# Patient Record
Sex: FEMALE | Race: WHITE | Employment: OTHER | URBAN - NONMETROPOLITAN AREA
[De-identification: names, ages, dates, MRNs, and addresses within clinical notes are randomized per-mention and may not be internally consistent; named-entity substitution may affect disease eponyms.]

---

## 2017-05-15 ENCOUNTER — OFFICE VISIT (OUTPATIENT)
Dept: CARDIOLOGY | Age: 82
End: 2017-05-15
Payer: MEDICARE

## 2017-05-15 VITALS
DIASTOLIC BLOOD PRESSURE: 82 MMHG | WEIGHT: 153 LBS | SYSTOLIC BLOOD PRESSURE: 124 MMHG | HEIGHT: 64 IN | HEART RATE: 47 BPM | BODY MASS INDEX: 26.12 KG/M2

## 2017-05-15 DIAGNOSIS — I48.0 PAROXYSMAL A-FIB (HCC): Primary | ICD-10-CM

## 2017-05-15 DIAGNOSIS — I25.10 MILD CAD: ICD-10-CM

## 2017-05-15 PROCEDURE — 93000 ELECTROCARDIOGRAM COMPLETE: CPT | Performed by: CLINICAL NURSE SPECIALIST

## 2017-05-15 PROCEDURE — 99213 OFFICE O/P EST LOW 20 MIN: CPT | Performed by: CLINICAL NURSE SPECIALIST

## 2017-05-15 ASSESSMENT — ENCOUNTER SYMPTOMS
COUGH: 0
ABDOMINAL PAIN: 0
NAUSEA: 0
ORTHOPNEA: 0
SHORTNESS OF BREATH: 0
VOMITING: 0
HEARTBURN: 0
BLOOD IN STOOL: 0
BLURRED VISION: 0

## 2017-06-16 ENCOUNTER — TELEPHONE (OUTPATIENT)
Dept: INTERNAL MEDICINE | Age: 82
End: 2017-06-16

## 2017-07-23 DIAGNOSIS — I48.91 ATRIAL FIBRILLATION (HCC): ICD-10-CM

## 2017-07-24 RX ORDER — RIVAROXABAN 20 MG/1
TABLET, FILM COATED ORAL
Qty: 90 TABLET | Refills: 3 | Status: SHIPPED | OUTPATIENT
Start: 2017-07-24 | End: 2018-06-11 | Stop reason: SDUPTHER

## 2017-08-03 LAB
ALBUMIN SERPL-MCNC: 4 G/DL (ref 3.5–5.2)
ALP BLD-CCNC: 40 U/L (ref 35–104)
ALT SERPL-CCNC: 10 U/L (ref 5–33)
ANION GAP SERPL CALCULATED.3IONS-SCNC: 17 MMOL/L (ref 7–19)
AST SERPL-CCNC: 16 U/L (ref 5–32)
BASOPHILS ABSOLUTE: 0 K/UL (ref 0–0.2)
BASOPHILS RELATIVE PERCENT: 0.6 % (ref 0–1)
BILIRUB SERPL-MCNC: 0.5 MG/DL (ref 0.2–1.2)
BUN BLDV-MCNC: 16 MG/DL (ref 8–23)
CALCIUM SERPL-MCNC: 10.8 MG/DL (ref 8.8–10.2)
CHLORIDE BLD-SCNC: 103 MMOL/L (ref 98–111)
CHOLESTEROL, TOTAL: 192 MG/DL (ref 160–199)
CO2: 24 MMOL/L (ref 22–29)
CREAT SERPL-MCNC: 0.7 MG/DL (ref 0.5–0.9)
EOSINOPHILS ABSOLUTE: 0.2 K/UL (ref 0–0.6)
EOSINOPHILS RELATIVE PERCENT: 4.8 % (ref 0–5)
GFR NON-AFRICAN AMERICAN: >60
GLUCOSE BLD-MCNC: 82 MG/DL (ref 74–109)
HCT VFR BLD CALC: 34.1 % (ref 37–47)
HDLC SERPL-MCNC: 81 MG/DL (ref 65–121)
HEMOGLOBIN: 12.5 G/DL (ref 12–16)
LDL CHOLESTEROL CALCULATED: 87 MG/DL
LYMPHOCYTES ABSOLUTE: 1.1 K/UL (ref 1.1–4.5)
LYMPHOCYTES RELATIVE PERCENT: 30.2 % (ref 20–40)
MCH RBC QN AUTO: 37.3 PG (ref 27–31)
MCHC RBC AUTO-ENTMCNC: 36.7 G/DL (ref 33–37)
MCV RBC AUTO: 101.8 FL (ref 81–99)
MONOCYTES ABSOLUTE: 0.4 K/UL (ref 0–0.9)
MONOCYTES RELATIVE PERCENT: 11.1 % (ref 0–10)
NEUTROPHILS ABSOLUTE: 1.9 K/UL (ref 1.5–7.5)
NEUTROPHILS RELATIVE PERCENT: 53 % (ref 50–65)
PDW BLD-RTO: 14.2 % (ref 11.5–14.5)
PLATELET # BLD: 267 K/UL (ref 130–400)
PMV BLD AUTO: 10.9 FL (ref 9.4–12.3)
POTASSIUM SERPL-SCNC: 3.9 MMOL/L (ref 3.5–5)
RBC # BLD: 3.35 M/UL (ref 4.2–5.4)
SODIUM BLD-SCNC: 144 MMOL/L (ref 136–145)
T4 FREE: 1.3 NG/ML (ref 0.9–1.7)
TOTAL PROTEIN: 6.9 G/DL (ref 6.6–8.7)
TRIGL SERPL-MCNC: 118 MG/DL (ref 150–199)
TSH SERPL DL<=0.05 MIU/L-ACNC: 2.8 UIU/ML (ref 0.27–4.2)
VITAMIN D 25-HYDROXY: 47.2 NG/ML
WBC # BLD: 3.5 K/UL (ref 4.8–10.8)

## 2017-08-08 ENCOUNTER — HOSPITAL ENCOUNTER (EMERGENCY)
Age: 82
Discharge: HOME | End: 2017-08-08
Attending: FAMILY MEDICINE
Payer: MEDICARE

## 2017-08-08 VITALS
OXYGEN SATURATION: 94 % | SYSTOLIC BLOOD PRESSURE: 163 MMHG | TEMPERATURE: 97.9 F | HEART RATE: 64 BPM | DIASTOLIC BLOOD PRESSURE: 74 MMHG | WEIGHT: 145 LBS | RESPIRATION RATE: 16 BRPM | HEIGHT: 64 IN | BODY MASS INDEX: 24.75 KG/M2

## 2017-08-08 DIAGNOSIS — R31.9 HEMATURIA: Primary | ICD-10-CM

## 2017-08-08 LAB
ALBUMIN SERPL-MCNC: 3.8 G/DL (ref 3.5–5.2)
ALP BLD-CCNC: 41 U/L (ref 35–104)
ALT SERPL-CCNC: 11 U/L (ref 5–33)
ANION GAP SERPL CALCULATED.3IONS-SCNC: 14 MMOL/L (ref 7–19)
AST SERPL-CCNC: 15 U/L (ref 5–32)
BACTERIA: ABNORMAL /HPF
BASOPHILS ABSOLUTE: 0 K/UL (ref 0–0.2)
BASOPHILS RELATIVE PERCENT: 0.4 % (ref 0–1)
BILIRUB SERPL-MCNC: 0.4 MG/DL (ref 0.2–1.2)
BILIRUBIN URINE: NEGATIVE
BLOOD, URINE: ABNORMAL
BUN BLDV-MCNC: 17 MG/DL (ref 8–23)
CALCIUM SERPL-MCNC: 10.1 MG/DL (ref 8.8–10.2)
CHLORIDE BLD-SCNC: 102 MMOL/L (ref 98–111)
CLARITY: ABNORMAL
CO2: 24 MMOL/L (ref 22–29)
COLOR: ABNORMAL
CREAT SERPL-MCNC: 0.6 MG/DL (ref 0.5–0.9)
EOSINOPHILS ABSOLUTE: 0.1 K/UL (ref 0–0.6)
EOSINOPHILS RELATIVE PERCENT: 2.6 % (ref 0–5)
GFR NON-AFRICAN AMERICAN: >60
GLUCOSE BLD-MCNC: 78 MG/DL (ref 74–109)
GLUCOSE URINE: NEGATIVE MG/DL
HCT VFR BLD CALC: 35.9 % (ref 37–47)
HEMOGLOBIN: 11.9 G/DL (ref 12–16)
INR BLD: 1.59 (ref 0.88–1.18)
KETONES, URINE: NEGATIVE MG/DL
LEUKOCYTE ESTERASE, URINE: ABNORMAL
LYMPHOCYTES ABSOLUTE: 1.4 K/UL (ref 1.1–4.5)
LYMPHOCYTES RELATIVE PERCENT: 26.8 % (ref 20–40)
MCH RBC QN AUTO: 32.5 PG (ref 27–31)
MCHC RBC AUTO-ENTMCNC: 33.1 G/DL (ref 33–37)
MCV RBC AUTO: 98.1 FL (ref 81–99)
MONOCYTES ABSOLUTE: 0.6 K/UL (ref 0–0.9)
MONOCYTES RELATIVE PERCENT: 10.6 % (ref 0–10)
NEUTROPHILS ABSOLUTE: 3.2 K/UL (ref 1.5–7.5)
NEUTROPHILS RELATIVE PERCENT: 59.4 % (ref 50–65)
NITRITE, URINE: NEGATIVE
PDW BLD-RTO: 13.8 % (ref 11.5–14.5)
PH UA: 7
PLATELET # BLD: 242 K/UL (ref 130–400)
PMV BLD AUTO: 10.6 FL (ref 9.4–12.3)
POTASSIUM SERPL-SCNC: 4 MMOL/L (ref 3.5–5)
PROTEIN UA: 30 MG/DL
PROTHROMBIN TIME: 19 SEC (ref 12–14.6)
RBC # BLD: 3.66 M/UL (ref 4.2–5.4)
RBC UA: ABNORMAL /HPF (ref 0–2)
SODIUM BLD-SCNC: 140 MMOL/L (ref 136–145)
SPECIFIC GRAVITY UA: 1.01
TOTAL PROTEIN: 6.8 G/DL (ref 6.6–8.7)
UROBILINOGEN, URINE: 1 E.U./DL
WBC # BLD: 5.3 K/UL (ref 4.8–10.8)
WBC UA: ABNORMAL /HPF (ref 0–5)

## 2017-08-08 PROCEDURE — 99283 EMERGENCY DEPT VISIT LOW MDM: CPT

## 2017-08-08 PROCEDURE — 80053 COMPREHEN METABOLIC PANEL: CPT

## 2017-08-08 PROCEDURE — 36415 COLL VENOUS BLD VENIPUNCTURE: CPT

## 2017-08-08 PROCEDURE — 99282 EMERGENCY DEPT VISIT SF MDM: CPT | Performed by: FAMILY MEDICINE

## 2017-08-08 PROCEDURE — 87086 URINE CULTURE/COLONY COUNT: CPT

## 2017-08-08 PROCEDURE — 81001 URINALYSIS AUTO W/SCOPE: CPT

## 2017-08-08 PROCEDURE — 85025 COMPLETE CBC W/AUTO DIFF WBC: CPT

## 2017-08-08 PROCEDURE — 85610 PROTHROMBIN TIME: CPT

## 2017-08-08 PROCEDURE — 87185 SC STD ENZYME DETCJ PER NZM: CPT

## 2017-08-08 ASSESSMENT — ENCOUNTER SYMPTOMS
CONSTIPATION: 0
ABDOMINAL PAIN: 0
COUGH: 0
DIARRHEA: 0
CHEST TIGHTNESS: 0
APNEA: 0
BACK PAIN: 0
SHORTNESS OF BREATH: 0
ABDOMINAL DISTENTION: 0
VOMITING: 0
SORE THROAT: 0
WHEEZING: 0
TROUBLE SWALLOWING: 0

## 2017-08-08 ASSESSMENT — PAIN SCALES - GENERAL: PAINLEVEL_OUTOF10: 8

## 2017-08-08 ASSESSMENT — PAIN DESCRIPTION - PAIN TYPE: TYPE: CHRONIC PAIN

## 2017-08-08 ASSESSMENT — PAIN DESCRIPTION - LOCATION: LOCATION: BACK

## 2017-08-10 ENCOUNTER — OFFICE VISIT (OUTPATIENT)
Dept: INTERNAL MEDICINE | Age: 82
End: 2017-08-10
Payer: MEDICARE

## 2017-08-10 VITALS
HEIGHT: 64 IN | WEIGHT: 140 LBS | SYSTOLIC BLOOD PRESSURE: 116 MMHG | BODY MASS INDEX: 23.9 KG/M2 | HEART RATE: 67 BPM | OXYGEN SATURATION: 94 % | DIASTOLIC BLOOD PRESSURE: 68 MMHG

## 2017-08-10 DIAGNOSIS — Z00.00 ROUTINE GENERAL MEDICAL EXAMINATION AT A HEALTH CARE FACILITY: Primary | ICD-10-CM

## 2017-08-10 DIAGNOSIS — I10 ESSENTIAL HYPERTENSION: ICD-10-CM

## 2017-08-10 DIAGNOSIS — R31.9 URINARY TRACT INFECTION WITH HEMATURIA, SITE UNSPECIFIED: ICD-10-CM

## 2017-08-10 DIAGNOSIS — Z12.31 ENCOUNTER FOR SCREENING MAMMOGRAM FOR MALIGNANT NEOPLASM OF BREAST: ICD-10-CM

## 2017-08-10 DIAGNOSIS — S22.000A COMPRESSION FRACTURE OF BODY OF THORACIC VERTEBRA (HCC): ICD-10-CM

## 2017-08-10 DIAGNOSIS — N39.0 URINARY TRACT INFECTION WITH HEMATURIA, SITE UNSPECIFIED: ICD-10-CM

## 2017-08-10 DIAGNOSIS — I48.20 CHRONIC ATRIAL FIBRILLATION (HCC): ICD-10-CM

## 2017-08-10 DIAGNOSIS — Z12.11 SCREENING FOR COLON CANCER: ICD-10-CM

## 2017-08-10 DIAGNOSIS — E78.5 HYPERLIPIDEMIA, UNSPECIFIED HYPERLIPIDEMIA TYPE: ICD-10-CM

## 2017-08-10 LAB
BILIRUBIN URINE: NEGATIVE
BLOOD, URINE: ABNORMAL
CLARITY: CLEAR
COLOR: YELLOW
GLUCOSE URINE: NEGATIVE MG/DL
KETONES, URINE: NEGATIVE MG/DL
LEUKOCYTE ESTERASE, URINE: ABNORMAL
NITRITE, URINE: POSITIVE
ORGANISM: ABNORMAL
PH UA: 7
PROTEIN UA: NEGATIVE MG/DL
SPECIFIC GRAVITY UA: 1.01
URINE CULTURE, ROUTINE: ABNORMAL
URINE CULTURE, ROUTINE: ABNORMAL
UROBILINOGEN, URINE: 0.2 E.U./DL

## 2017-08-10 PROCEDURE — 81003 URINALYSIS AUTO W/O SCOPE: CPT | Performed by: INTERNAL MEDICINE

## 2017-08-10 PROCEDURE — G0438 PPPS, INITIAL VISIT: HCPCS | Performed by: INTERNAL MEDICINE

## 2017-08-10 PROCEDURE — 99214 OFFICE O/P EST MOD 30 MIN: CPT | Performed by: INTERNAL MEDICINE

## 2017-08-10 RX ORDER — LEVOTHYROXINE SODIUM 0.07 MG/1
TABLET ORAL
Qty: 30 TABLET | Refills: 5 | Status: SHIPPED | OUTPATIENT
Start: 2017-08-10 | End: 2018-03-20 | Stop reason: SDUPTHER

## 2017-08-10 RX ORDER — NITROFURANTOIN 25; 75 MG/1; MG/1
100 CAPSULE ORAL 2 TIMES DAILY
Qty: 14 CAPSULE | Refills: 0 | Status: SHIPPED | OUTPATIENT
Start: 2017-08-10 | End: 2017-08-17

## 2017-08-10 RX ORDER — HYDROCODONE BITARTRATE AND ACETAMINOPHEN 7.5; 325 MG/1; MG/1
1 TABLET ORAL EVERY 6 HOURS PRN
Status: ON HOLD | COMMUNITY
End: 2017-08-31

## 2017-08-10 ASSESSMENT — ANXIETY QUESTIONNAIRES: GAD7 TOTAL SCORE: 0

## 2017-08-10 ASSESSMENT — PATIENT HEALTH QUESTIONNAIRE - PHQ9: SUM OF ALL RESPONSES TO PHQ QUESTIONS 1-9: 0

## 2017-08-10 ASSESSMENT — LIFESTYLE VARIABLES: HOW OFTEN DO YOU HAVE A DRINK CONTAINING ALCOHOL: 0

## 2017-08-12 LAB
ORGANISM: ABNORMAL
ORGANISM: ABNORMAL
URINE CULTURE, ROUTINE: ABNORMAL

## 2017-08-13 ASSESSMENT — ENCOUNTER SYMPTOMS
WHEEZING: 0
SINUS PRESSURE: 0
EYE REDNESS: 0
EYE DISCHARGE: 0
VOICE CHANGE: 0
DIARRHEA: 0
VOMITING: 0
NAUSEA: 0
ABDOMINAL DISTENTION: 0
CONSTIPATION: 0
SHORTNESS OF BREATH: 0
FACIAL SWELLING: 0
RHINORRHEA: 0
COUGH: 0
EYE ITCHING: 0
TROUBLE SWALLOWING: 0
BLOOD IN STOOL: 0
SORE THROAT: 0
BACK PAIN: 1
CHEST TIGHTNESS: 0
EYE PAIN: 0
COLOR CHANGE: 0
PHOTOPHOBIA: 0
ABDOMINAL PAIN: 0

## 2017-08-14 DIAGNOSIS — N39.0 URINARY TRACT INFECTION WITHOUT HEMATURIA, SITE UNSPECIFIED: Primary | ICD-10-CM

## 2017-08-14 RX ORDER — LEVOFLOXACIN 500 MG/1
500 TABLET, FILM COATED ORAL DAILY
COMMUNITY
End: 2017-08-14 | Stop reason: SDUPTHER

## 2017-08-14 RX ORDER — LEVOFLOXACIN 500 MG/1
500 TABLET, FILM COATED ORAL DAILY
Qty: 7 TABLET | Refills: 0 | Status: ON HOLD | OUTPATIENT
Start: 2017-08-14 | End: 2017-08-31 | Stop reason: ALTCHOICE

## 2017-08-15 ENCOUNTER — TELEPHONE (OUTPATIENT)
Dept: UROLOGY | Age: 82
End: 2017-08-15

## 2017-08-15 ENCOUNTER — OFFICE VISIT (OUTPATIENT)
Dept: UROLOGY | Age: 82
End: 2017-08-15
Payer: MEDICARE

## 2017-08-15 VITALS
DIASTOLIC BLOOD PRESSURE: 74 MMHG | BODY MASS INDEX: 24.41 KG/M2 | HEIGHT: 64 IN | OXYGEN SATURATION: 94 % | SYSTOLIC BLOOD PRESSURE: 132 MMHG | HEART RATE: 66 BPM | WEIGHT: 143 LBS | TEMPERATURE: 97.8 F

## 2017-08-15 DIAGNOSIS — N39.0 URINARY TRACT INFECTION WITHOUT HEMATURIA, SITE UNSPECIFIED: ICD-10-CM

## 2017-08-15 DIAGNOSIS — R31.0 GROSS HEMATURIA: Primary | ICD-10-CM

## 2017-08-15 LAB
BACTERIA URINE, POC: ABNORMAL
BILIRUBIN URINE: 2 MG/DL
BLOOD, URINE: POSITIVE
CASTS URINE, POC: ABNORMAL
CLARITY: ABNORMAL
COLOR: ABNORMAL
CRYSTALS URINE, POC: ABNORMAL
EPI CELLS URINE, POC: ABNORMAL
GLUCOSE URINE: ABNORMAL
KETONES, URINE: NEGATIVE
LEUKOCYTE EST, POC: ABNORMAL
NITRITE, URINE: NEGATIVE
PH UA: 5.5 (ref 4.5–8)
PROTEIN UA: POSITIVE
RBC URINE, POC: ABNORMAL
SPECIFIC GRAVITY UA: 1.03 (ref 1–1.03)
UROBILINOGEN, URINE: NORMAL
WBC URINE, POC: ABNORMAL
YEAST URINE, POC: ABNORMAL

## 2017-08-15 PROCEDURE — 99213 OFFICE O/P EST LOW 20 MIN: CPT | Performed by: PHYSICIAN ASSISTANT

## 2017-08-15 PROCEDURE — 81000 URINALYSIS NONAUTO W/SCOPE: CPT | Performed by: PHYSICIAN ASSISTANT

## 2017-08-15 ASSESSMENT — ENCOUNTER SYMPTOMS
NAUSEA: 0
SHORTNESS OF BREATH: 0
WHEEZING: 0
EYE REDNESS: 0
BACK PAIN: 1
EYE DISCHARGE: 0
HEARTBURN: 0

## 2017-08-29 ENCOUNTER — TELEPHONE (OUTPATIENT)
Dept: UROLOGY | Age: 82
End: 2017-08-29

## 2017-08-29 ENCOUNTER — HOSPITAL ENCOUNTER (OUTPATIENT)
Dept: CT IMAGING | Age: 82
Discharge: HOME | End: 2017-08-29
Payer: MEDICARE

## 2017-08-29 DIAGNOSIS — R31.0 GROSS HEMATURIA: ICD-10-CM

## 2017-08-29 DIAGNOSIS — N39.0 URINARY TRACT INFECTION WITHOUT HEMATURIA, SITE UNSPECIFIED: ICD-10-CM

## 2017-08-29 LAB
GFR NON-AFRICAN AMERICAN: 43
PERFORMED ON: ABNORMAL
POC CREATININE: 1.2 MG/DL (ref 0.3–1.3)
POC SAMPLE TYPE: ABNORMAL

## 2017-08-29 PROCEDURE — 74178 CT ABD&PLV WO CNTR FLWD CNTR: CPT

## 2017-08-29 PROCEDURE — 82565 ASSAY OF CREATININE: CPT

## 2017-08-29 PROCEDURE — 6360000004 HC RX CONTRAST MEDICATION: Performed by: PHYSICIAN ASSISTANT

## 2017-08-29 RX ADMIN — IOVERSOL 75 ML: 741 INJECTION INTRA-ARTERIAL; INTRAVENOUS at 09:04

## 2017-08-31 ENCOUNTER — HOSPITAL ENCOUNTER (OUTPATIENT)
Age: 82
Setting detail: OUTPATIENT SURGERY
Discharge: HOME | End: 2017-08-31
Attending: UROLOGY | Admitting: UROLOGY
Payer: MEDICARE

## 2017-08-31 ENCOUNTER — ANESTHESIA EVENT (OUTPATIENT)
Dept: OPERATING ROOM | Age: 82
End: 2017-08-31
Payer: MEDICARE

## 2017-08-31 ENCOUNTER — APPOINTMENT (OUTPATIENT)
Dept: GENERAL RADIOLOGY | Age: 82
End: 2017-08-31
Attending: UROLOGY
Payer: MEDICARE

## 2017-08-31 ENCOUNTER — ANESTHESIA (OUTPATIENT)
Dept: OPERATING ROOM | Age: 82
End: 2017-08-31
Payer: MEDICARE

## 2017-08-31 VITALS
TEMPERATURE: 98.6 F | OXYGEN SATURATION: 100 % | DIASTOLIC BLOOD PRESSURE: 51 MMHG | SYSTOLIC BLOOD PRESSURE: 102 MMHG | RESPIRATION RATE: 1 BRPM

## 2017-08-31 VITALS
DIASTOLIC BLOOD PRESSURE: 55 MMHG | SYSTOLIC BLOOD PRESSURE: 144 MMHG | RESPIRATION RATE: 16 BRPM | OXYGEN SATURATION: 96 % | TEMPERATURE: 97.8 F | BODY MASS INDEX: 22.53 KG/M2 | WEIGHT: 132 LBS | HEART RATE: 91 BPM | HEIGHT: 64 IN

## 2017-08-31 DIAGNOSIS — N20.1 LEFT URETERAL CALCULUS: Primary | ICD-10-CM

## 2017-08-31 LAB
APTT: 42.5 SEC (ref 26–36.2)
INR BLD: 1.3 (ref 0.88–1.18)
PROTHROMBIN TIME: 16.2 SEC (ref 12–14.6)

## 2017-08-31 PROCEDURE — 52356 CYSTO/URETERO W/LITHOTRIPSY: CPT | Performed by: UROLOGY

## 2017-08-31 PROCEDURE — 7100000011 HC PHASE II RECOVERY - ADDTL 15 MIN: Performed by: UROLOGY

## 2017-08-31 PROCEDURE — 2720000000 HC MISC SURG SUPPLY STERILE $0-50: Performed by: UROLOGY

## 2017-08-31 PROCEDURE — 6370000000 HC RX 637 (ALT 250 FOR IP): Performed by: UROLOGY

## 2017-08-31 PROCEDURE — 3600000014 HC SURGERY LEVEL 4 ADDTL 15MIN: Performed by: UROLOGY

## 2017-08-31 PROCEDURE — C1773 RET DEV, INSERTABLE: HCPCS | Performed by: UROLOGY

## 2017-08-31 PROCEDURE — C1758 CATHETER, URETERAL: HCPCS | Performed by: UROLOGY

## 2017-08-31 PROCEDURE — C2617 STENT, NON-COR, TEM W/O DEL: HCPCS | Performed by: UROLOGY

## 2017-08-31 PROCEDURE — 3600000004 HC SURGERY LEVEL 4 BASE: Performed by: UROLOGY

## 2017-08-31 PROCEDURE — 36415 COLL VENOUS BLD VENIPUNCTURE: CPT

## 2017-08-31 PROCEDURE — 2720000010 HC SURG SUPPLY STERILE: Performed by: UROLOGY

## 2017-08-31 PROCEDURE — 7100000001 HC PACU RECOVERY - ADDTL 15 MIN: Performed by: UROLOGY

## 2017-08-31 PROCEDURE — 82360 CALCULUS ASSAY QUANT: CPT

## 2017-08-31 PROCEDURE — 7100000010 HC PHASE II RECOVERY - FIRST 15 MIN: Performed by: UROLOGY

## 2017-08-31 PROCEDURE — 6360000002 HC RX W HCPCS

## 2017-08-31 PROCEDURE — 2500000003 HC RX 250 WO HCPCS

## 2017-08-31 PROCEDURE — 3700000001 HC ADD 15 MINUTES (ANESTHESIA): Performed by: UROLOGY

## 2017-08-31 PROCEDURE — 6360000002 HC RX W HCPCS: Performed by: UROLOGY

## 2017-08-31 PROCEDURE — 6360000002 HC RX W HCPCS: Performed by: ANESTHESIOLOGY

## 2017-08-31 PROCEDURE — 85730 THROMBOPLASTIN TIME PARTIAL: CPT

## 2017-08-31 PROCEDURE — 3700000000 HC ANESTHESIA ATTENDED CARE: Performed by: UROLOGY

## 2017-08-31 PROCEDURE — 2580000003 HC RX 258

## 2017-08-31 PROCEDURE — C1769 GUIDE WIRE: HCPCS | Performed by: UROLOGY

## 2017-08-31 PROCEDURE — 7100000000 HC PACU RECOVERY - FIRST 15 MIN: Performed by: UROLOGY

## 2017-08-31 PROCEDURE — 85610 PROTHROMBIN TIME: CPT

## 2017-08-31 PROCEDURE — 88300 SURGICAL PATH GROSS: CPT

## 2017-08-31 PROCEDURE — 3209999900 FLUORO FOR SURGICAL PROCEDURES

## 2017-08-31 PROCEDURE — C1726 CATH, BAL DIL, NON-VASCULAR: HCPCS | Performed by: UROLOGY

## 2017-08-31 DEVICE — STENT URET 6FR L24CM PERCFLX HYDR+ DBL PGTL THRD 2: Type: IMPLANTABLE DEVICE | Site: URETER | Status: FUNCTIONAL

## 2017-08-31 RX ORDER — SODIUM CHLORIDE, SODIUM LACTATE, POTASSIUM CHLORIDE, CALCIUM CHLORIDE 600; 310; 30; 20 MG/100ML; MG/100ML; MG/100ML; MG/100ML
INJECTION, SOLUTION INTRAVENOUS CONTINUOUS PRN
Status: DISCONTINUED | OUTPATIENT
Start: 2017-08-31 | End: 2017-08-31 | Stop reason: SDUPTHER

## 2017-08-31 RX ORDER — MEPERIDINE HYDROCHLORIDE 50 MG/ML
12.5 INJECTION INTRAMUSCULAR; INTRAVENOUS; SUBCUTANEOUS EVERY 5 MIN PRN
Status: DISCONTINUED | OUTPATIENT
Start: 2017-08-31 | End: 2017-08-31 | Stop reason: HOSPADM

## 2017-08-31 RX ORDER — OXYCODONE HYDROCHLORIDE AND ACETAMINOPHEN 5; 325 MG/1; MG/1
2 TABLET ORAL EVERY 4 HOURS PRN
Status: DISCONTINUED | OUTPATIENT
Start: 2017-08-31 | End: 2017-08-31 | Stop reason: HOSPADM

## 2017-08-31 RX ORDER — HYDRALAZINE HYDROCHLORIDE 20 MG/ML
5 INJECTION INTRAMUSCULAR; INTRAVENOUS EVERY 10 MIN PRN
Status: DISCONTINUED | OUTPATIENT
Start: 2017-08-31 | End: 2017-08-31 | Stop reason: HOSPADM

## 2017-08-31 RX ORDER — CIPROFLOXACIN 500 MG/1
500 TABLET, FILM COATED ORAL 2 TIMES DAILY
Qty: 14 TABLET | Refills: 0 | Status: SHIPPED | OUTPATIENT
Start: 2017-08-31 | End: 2017-08-31

## 2017-08-31 RX ORDER — PROMETHAZINE HYDROCHLORIDE 25 MG/ML
6.25 INJECTION, SOLUTION INTRAMUSCULAR; INTRAVENOUS
Status: DISCONTINUED | OUTPATIENT
Start: 2017-08-31 | End: 2017-08-31 | Stop reason: HOSPADM

## 2017-08-31 RX ORDER — ALFUZOSIN HYDROCHLORIDE 10 MG/1
10 TABLET, EXTENDED RELEASE ORAL DAILY
Qty: 14 TABLET | Refills: 1 | Status: ON HOLD | OUTPATIENT
Start: 2017-08-31 | End: 2017-10-13 | Stop reason: HOSPADM

## 2017-08-31 RX ORDER — PHENAZOPYRIDINE HYDROCHLORIDE 100 MG/1
100 TABLET, FILM COATED ORAL ONCE
Status: COMPLETED | OUTPATIENT
Start: 2017-08-31 | End: 2017-08-31

## 2017-08-31 RX ORDER — CIPROFLOXACIN 500 MG/1
500 TABLET, FILM COATED ORAL 2 TIMES DAILY
Qty: 14 TABLET | Refills: 0 | Status: SHIPPED | OUTPATIENT
Start: 2017-08-31 | End: 2017-09-07

## 2017-08-31 RX ORDER — ENALAPRILAT 2.5 MG/2ML
1.25 INJECTION INTRAVENOUS
Status: DISCONTINUED | OUTPATIENT
Start: 2017-08-31 | End: 2017-08-31 | Stop reason: HOSPADM

## 2017-08-31 RX ORDER — CIPROFLOXACIN 2 MG/ML
400 INJECTION, SOLUTION INTRAVENOUS ONCE
Status: COMPLETED | OUTPATIENT
Start: 2017-08-31 | End: 2017-08-31

## 2017-08-31 RX ORDER — ATROPA BELLADONNA AND OPIUM 16.2; 6 MG/1; MG/1
SUPPOSITORY RECTAL PRN
Status: DISCONTINUED | OUTPATIENT
Start: 2017-08-31 | End: 2017-08-31 | Stop reason: HOSPADM

## 2017-08-31 RX ORDER — DIPHENHYDRAMINE HYDROCHLORIDE 50 MG/ML
12.5 INJECTION INTRAMUSCULAR; INTRAVENOUS
Status: DISCONTINUED | OUTPATIENT
Start: 2017-08-31 | End: 2017-08-31 | Stop reason: HOSPADM

## 2017-08-31 RX ORDER — METOCLOPRAMIDE HYDROCHLORIDE 5 MG/ML
10 INJECTION INTRAMUSCULAR; INTRAVENOUS
Status: DISCONTINUED | OUTPATIENT
Start: 2017-08-31 | End: 2017-08-31 | Stop reason: HOSPADM

## 2017-08-31 RX ORDER — PROPOFOL 10 MG/ML
INJECTION, EMULSION INTRAVENOUS PRN
Status: DISCONTINUED | OUTPATIENT
Start: 2017-08-31 | End: 2017-08-31 | Stop reason: SDUPTHER

## 2017-08-31 RX ORDER — ONDANSETRON 2 MG/ML
INJECTION INTRAMUSCULAR; INTRAVENOUS PRN
Status: DISCONTINUED | OUTPATIENT
Start: 2017-08-31 | End: 2017-08-31 | Stop reason: SDUPTHER

## 2017-08-31 RX ORDER — HYDROCODONE BITARTRATE AND ACETAMINOPHEN 7.5; 325 MG/1; MG/1
1 TABLET ORAL EVERY 6 HOURS PRN
Qty: 30 TABLET | Refills: 0 | Status: ON HOLD | OUTPATIENT
Start: 2017-08-31 | End: 2017-10-13

## 2017-08-31 RX ORDER — FENTANYL CITRATE 50 UG/ML
INJECTION, SOLUTION INTRAMUSCULAR; INTRAVENOUS PRN
Status: DISCONTINUED | OUTPATIENT
Start: 2017-08-31 | End: 2017-08-31 | Stop reason: SDUPTHER

## 2017-08-31 RX ORDER — LABETALOL HYDROCHLORIDE 5 MG/ML
5 INJECTION, SOLUTION INTRAVENOUS EVERY 10 MIN PRN
Status: DISCONTINUED | OUTPATIENT
Start: 2017-08-31 | End: 2017-08-31 | Stop reason: HOSPADM

## 2017-08-31 RX ORDER — SUCCINYLCHOLINE CHLORIDE 20 MG/ML
INJECTION INTRAMUSCULAR; INTRAVENOUS PRN
Status: DISCONTINUED | OUTPATIENT
Start: 2017-08-31 | End: 2017-08-31 | Stop reason: SDUPTHER

## 2017-08-31 RX ORDER — LIDOCAINE HYDROCHLORIDE 10 MG/ML
INJECTION, SOLUTION EPIDURAL; INFILTRATION; INTRACAUDAL; PERINEURAL PRN
Status: DISCONTINUED | OUTPATIENT
Start: 2017-08-31 | End: 2017-08-31 | Stop reason: SDUPTHER

## 2017-08-31 RX ORDER — ONDANSETRON 2 MG/ML
4 INJECTION INTRAMUSCULAR; INTRAVENOUS EVERY 4 HOURS PRN
Status: DISCONTINUED | OUTPATIENT
Start: 2017-08-31 | End: 2017-08-31 | Stop reason: HOSPADM

## 2017-08-31 RX ORDER — ROCURONIUM BROMIDE 10 MG/ML
INJECTION, SOLUTION INTRAVENOUS PRN
Status: DISCONTINUED | OUTPATIENT
Start: 2017-08-31 | End: 2017-08-31 | Stop reason: SDUPTHER

## 2017-08-31 RX ORDER — SODIUM CHLORIDE 9 MG/ML
INJECTION, SOLUTION INTRAVENOUS CONTINUOUS
Status: DISCONTINUED | OUTPATIENT
Start: 2017-08-31 | End: 2017-08-31 | Stop reason: HOSPADM

## 2017-08-31 RX ORDER — MORPHINE SULFATE 4 MG/ML
4 INJECTION, SOLUTION INTRAMUSCULAR; INTRAVENOUS ONCE
Status: COMPLETED | OUTPATIENT
Start: 2017-08-31 | End: 2017-08-31

## 2017-08-31 RX ORDER — MORPHINE SULFATE 4 MG/ML
2 INJECTION, SOLUTION INTRAMUSCULAR; INTRAVENOUS EVERY 5 MIN PRN
Status: DISCONTINUED | OUTPATIENT
Start: 2017-08-31 | End: 2017-08-31 | Stop reason: HOSPADM

## 2017-08-31 RX ORDER — MORPHINE SULFATE 4 MG/ML
4 INJECTION, SOLUTION INTRAMUSCULAR; INTRAVENOUS EVERY 5 MIN PRN
Status: DISCONTINUED | OUTPATIENT
Start: 2017-08-31 | End: 2017-08-31 | Stop reason: HOSPADM

## 2017-08-31 RX ORDER — MORPHINE SULFATE 4 MG/ML
2 INJECTION, SOLUTION INTRAMUSCULAR; INTRAVENOUS EVERY 4 HOURS PRN
Status: DISCONTINUED | OUTPATIENT
Start: 2017-08-31 | End: 2017-08-31 | Stop reason: HOSPADM

## 2017-08-31 RX ADMIN — FENTANYL CITRATE 50 MCG: 50 INJECTION INTRAMUSCULAR; INTRAVENOUS at 19:35

## 2017-08-31 RX ADMIN — SUCCINYLCHOLINE CHLORIDE 120 MG: 20 INJECTION, SOLUTION INTRAMUSCULAR; INTRAVENOUS; PARENTERAL at 19:21

## 2017-08-31 RX ADMIN — SODIUM CHLORIDE, SODIUM LACTATE, POTASSIUM CHLORIDE, AND CALCIUM CHLORIDE: 600; 310; 30; 20 INJECTION, SOLUTION INTRAVENOUS at 19:16

## 2017-08-31 RX ADMIN — FENTANYL CITRATE 50 MCG: 50 INJECTION INTRAMUSCULAR; INTRAVENOUS at 19:21

## 2017-08-31 RX ADMIN — CIPROFLOXACIN 400 MG: 2 INJECTION, SOLUTION INTRAVENOUS at 19:25

## 2017-08-31 RX ADMIN — OXYCODONE HYDROCHLORIDE AND ACETAMINOPHEN 2 TABLET: 5; 325 TABLET ORAL at 21:16

## 2017-08-31 RX ADMIN — ONDANSETRON HYDROCHLORIDE 4 MG: 2 INJECTION, SOLUTION INTRAVENOUS at 19:35

## 2017-08-31 RX ADMIN — PROPOFOL 80 MG: 10 INJECTION, EMULSION INTRAVENOUS at 19:21

## 2017-08-31 RX ADMIN — ROCURONIUM BROMIDE 20 MG: 10 INJECTION INTRAVENOUS at 19:34

## 2017-08-31 RX ADMIN — SUGAMMADEX 120 MG: 100 INJECTION, SOLUTION INTRAVENOUS at 20:04

## 2017-08-31 RX ADMIN — MORPHINE SULFATE 4 MG: 4 INJECTION, SOLUTION INTRAMUSCULAR; INTRAVENOUS at 17:42

## 2017-08-31 RX ADMIN — LIDOCAINE HYDROCHLORIDE 50 MG: 10 INJECTION, SOLUTION EPIDURAL; INFILTRATION; INTRACAUDAL; PERINEURAL at 19:21

## 2017-08-31 RX ADMIN — PHENAZOPYRIDINE HYDROCHLORIDE 100 MG: 100 TABLET ORAL at 21:15

## 2017-08-31 ASSESSMENT — PAIN SCALES - GENERAL
PAINLEVEL_OUTOF10: 0
PAINLEVEL_OUTOF10: 8
PAINLEVEL_OUTOF10: 0
PAINLEVEL_OUTOF10: 5

## 2017-08-31 ASSESSMENT — PAIN DESCRIPTION - PAIN TYPE: TYPE: SURGICAL PAIN

## 2017-09-01 ENCOUNTER — TELEPHONE (OUTPATIENT)
Dept: UROLOGY | Age: 82
End: 2017-09-01

## 2017-09-06 LAB
CALCULI COMPOSITION: NORMAL
MASS: 5 MG
STONE DESCRIPTION: NORMAL
STONE NUMBER: 3
STONE SIZE: NORMAL MM

## 2017-09-07 DIAGNOSIS — I48.0 PAROXYSMAL ATRIAL FIBRILLATION (HCC): ICD-10-CM

## 2017-09-07 RX ORDER — PROPAFENONE HYDROCHLORIDE 225 MG/1
TABLET, FILM COATED ORAL
Qty: 270 TABLET | Refills: 2 | Status: SHIPPED | OUTPATIENT
Start: 2017-09-07 | End: 2018-03-20 | Stop reason: SDUPTHER

## 2017-09-19 ENCOUNTER — HOSPITAL ENCOUNTER (OUTPATIENT)
Dept: CT IMAGING | Age: 82
Discharge: HOME | End: 2017-09-19
Payer: MEDICARE

## 2017-09-19 DIAGNOSIS — N20.1 LEFT URETERAL CALCULUS: ICD-10-CM

## 2017-09-19 PROCEDURE — 74150 CT ABDOMEN W/O CONTRAST: CPT

## 2017-09-22 ENCOUNTER — OFFICE VISIT (OUTPATIENT)
Dept: UROLOGY | Age: 82
End: 2017-09-22
Payer: MEDICARE

## 2017-09-22 VITALS
SYSTOLIC BLOOD PRESSURE: 90 MMHG | TEMPERATURE: 98 F | BODY MASS INDEX: 25.03 KG/M2 | HEART RATE: 67 BPM | HEIGHT: 62 IN | OXYGEN SATURATION: 94 % | DIASTOLIC BLOOD PRESSURE: 60 MMHG | WEIGHT: 136 LBS

## 2017-09-22 DIAGNOSIS — N28.89 PERINEPHRIC FLUID COLLECTION: ICD-10-CM

## 2017-09-22 DIAGNOSIS — N20.1 LEFT URETERAL CALCULUS: Primary | ICD-10-CM

## 2017-09-22 DIAGNOSIS — N20.0 RENAL CALCULUS, LEFT: ICD-10-CM

## 2017-09-22 LAB
BACTERIA URINE, POC: ABNORMAL
BILIRUBIN URINE: 2 MG/DL
BLOOD, URINE: POSITIVE
CASTS URINE, POC: ABNORMAL
CLARITY: ABNORMAL
COLOR: ABNORMAL
CRYSTALS URINE, POC: ABNORMAL
EPI CELLS URINE, POC: ABNORMAL
GLUCOSE URINE: ABNORMAL
KETONES, URINE: POSITIVE
LEUKOCYTE EST, POC: ABNORMAL
NITRITE, URINE: POSITIVE
PH UA: 6.5 (ref 4.5–8)
PROTEIN UA: POSITIVE
RBC URINE, POC: ABNORMAL
SPECIFIC GRAVITY UA: 1.02 (ref 1–1.03)
UROBILINOGEN, URINE: NORMAL
WBC URINE, POC: ABNORMAL
YEAST URINE, POC: ABNORMAL

## 2017-09-22 PROCEDURE — 81000 URINALYSIS NONAUTO W/SCOPE: CPT | Performed by: UROLOGY

## 2017-09-22 PROCEDURE — 99214 OFFICE O/P EST MOD 30 MIN: CPT | Performed by: UROLOGY

## 2017-09-22 RX ORDER — OMEPRAZOLE 10 MG/1
10 CAPSULE, DELAYED RELEASE ORAL DAILY
COMMUNITY
End: 2018-01-02 | Stop reason: SDUPTHER

## 2017-09-22 ASSESSMENT — ENCOUNTER SYMPTOMS
DOUBLE VISION: 0
SHORTNESS OF BREATH: 1
SORE THROAT: 0
HEARTBURN: 1
WHEEZING: 0
BLURRED VISION: 0
NAUSEA: 0

## 2017-10-04 ENCOUNTER — HOSPITAL ENCOUNTER (OUTPATIENT)
Dept: GENERAL RADIOLOGY | Age: 82
Discharge: HOME | End: 2017-10-04
Payer: MEDICARE

## 2017-10-04 ENCOUNTER — PROCEDURE VISIT (OUTPATIENT)
Dept: UROLOGY | Age: 82
End: 2017-10-04
Payer: MEDICARE

## 2017-10-04 VITALS — HEART RATE: 90 BPM | TEMPERATURE: 96.8 F | OXYGEN SATURATION: 94 % | HEIGHT: 64 IN

## 2017-10-04 DIAGNOSIS — N20.0 RENAL CALCULUS, LEFT: ICD-10-CM

## 2017-10-04 DIAGNOSIS — N20.1 LEFT URETERAL CALCULUS: Primary | ICD-10-CM

## 2017-10-04 DIAGNOSIS — N28.89 PERINEPHRIC FLUID COLLECTION: ICD-10-CM

## 2017-10-04 LAB
BACTERIA URINE, POC: ABNORMAL
BILIRUBIN URINE: 2 MG/DL
BLOOD, URINE: POSITIVE
CASTS URINE, POC: ABNORMAL
CLARITY: ABNORMAL
COLOR: ABNORMAL
CRYSTALS URINE, POC: ABNORMAL
EPI CELLS URINE, POC: ABNORMAL
GLUCOSE URINE: ABNORMAL
KETONES, URINE: POSITIVE
LEUKOCYTE EST, POC: ABNORMAL
NITRITE, URINE: POSITIVE
PH UA: 6.5 (ref 4.5–8)
PROTEIN UA: POSITIVE
RBC URINE, POC: ABNORMAL
SPECIFIC GRAVITY UA: 1.02 (ref 1–1.03)
UROBILINOGEN, URINE: 4
WBC URINE, POC: ABNORMAL
YEAST URINE, POC: ABNORMAL

## 2017-10-04 PROCEDURE — 99999 PR CYSTOURETHROGRAPHY REMV CALCULUS, STENT, FOREIGN BODY, SIMPLE: CPT | Performed by: UROLOGY

## 2017-10-04 PROCEDURE — 74000 XR ABDOMEN LIMITED (KUB): CPT

## 2017-10-04 PROCEDURE — 99213 OFFICE O/P EST LOW 20 MIN: CPT | Performed by: UROLOGY

## 2017-10-04 PROCEDURE — 81000 URINALYSIS NONAUTO W/SCOPE: CPT | Performed by: UROLOGY

## 2017-10-04 ASSESSMENT — ENCOUNTER SYMPTOMS
WHEEZING: 0
SHORTNESS OF BREATH: 1
SORE THROAT: 0
NAUSEA: 0
BLURRED VISION: 0
HEARTBURN: 1
DOUBLE VISION: 0

## 2017-10-04 NOTE — PROGRESS NOTES
Karan Young is a 80 y.o. female who presents today   Chief Complaint   Patient presents with    Follow-up     I'm here for cysto stent removal from kidney stone sx    Cystoscopy       HPI: Patient is status post left ureteroscopy for distal left ureteral calculus found on a CT scan from August 29. At that time she also had a left perinephric fluid collection thought to be a urinoma. She comes in now for possible cystoscopy stent removal and a follow-up CT scan see if there are some resolution or decrease in the perinephric fluid. She's been tolerating straight stent reasonably well she does have some gross hematuria    She also has known stone lower pole left kidney.  KUB to see if these are visible    Past Medical History:   Diagnosis Date    A-fib ([address]     Anemia     CAD (coronary artery disease)     Conjunctivitis     [facility]     Depression     Fatigue     Kidney stone     Leukopenia     Low back pain     Mild CAD 5/15/2017    Osteoporosis     [facility]oxysmal a-fib ([facility])     Sinus pause     10/2014    Urinary tract infection     Weakness        Past Surgical History:   Procedure Laterality Date    BACK SURGERY      CARDIAC CATHETERIZATION  8/14/14  [facility]    Mild, non-occlusive CAD, EF 60%    CYSTOSCOPY Left 8/31/2017    CYSTOSCOPY; LEFT URETEROSCOPY; LEFT URETERAL LASER LITHOTRIPSY AND STONE EXTRACTION; INSERTION LEFT URETERAL DOUBLE J STENT performed by Loulou Nunez MD at [facility] 5747      x 2    LAMINECTOMY      2 lumbar segments    SHOULDER ARTHROPLASTY         Current Outpatient Prescriptions   Medication Sig Dispense Refill    omeprazole (PRILOSEC) 10 MG delayed release capsule Take 10 mg by mouth daily      propafenone (RYTHMOL) 225 MG tablet TAKE ONE TABLET BY MOUTH EVERY EIGHT HOURS 270 tablet 2    HYDROcodone-acetaminophen (NORCO) 7.5-325 MG per tablet Take 1 tablet by mouth every 6 hours as needed for Pain . 30 tablet 0    levothyroxine (SYNTHROID) 75 MCG tablet TAKE ONE TABLET BY MOUTH DAILY AS DIRECTED 30 tablet 5    XARELTO 20 MG TABS tablet TAKE 1 TABLET DAILY WITH BREAKFAST 90 tablet 3    meclizine (ANTIVERT) 12.5 MG tablet Take 12.5 mg by mouth as needed      Calcium Carbonate-Vitamin D (CALTRATE 600+D PO) Take 600 mg by mouth 2 times daily.  furosemide (LASIX) 20 MG tablet Take 20 mg by mouth daily as needed       alfuzosin (UROXATRAL) 10 MG extended release tablet Take 1 tablet by mouth daily for 14 days 14 tablet 1     No current facility-administered medications for this visit. Allergies   Allergen Reactions    Nitrofuran Derivatives Swelling    Pcn [Penicillins]     Sulfa Antibiotics          REVIEW OF SYSTEMS:  Review of Systems   Constitutional: Negative for chills and fever. HENT: Negative for congestion and sore throat. Eyes: Negative for blurred vision and double vision. Respiratory: Positive for shortness of breath. Negative for wheezing. Cardiovascular: Negative for chest pain and palpitations. Gastrointestinal: Positive for heartburn. Negative for nausea. Genitourinary: Positive for hematuria. Negative for dysuria, flank pain, frequency and urgency. Musculoskeletal: Negative for falls and neck pain. Skin: Negative for itching and rash. Neurological: Negative for dizziness and tingling. Endo/Heme/Allergies: Bruises/bleeds easily. Psychiatric/Behavioral: The patient does not have insomnia. PHYSICAL EXAM:  Physical Exam   Constitutional: She is oriented to person, place, and time. She appears well-developed and well-nourished. HENT:   Head: Normocephalic and atraumatic. Eyes: Conjunctivae and EOM are normal. Pupils are equal, round, and reactive to light. No scleral icterus. Neck: Normal range of motion. Neck supple. Cardiovascular: Normal rate, regular rhythm and intact distal pulses. Pulmonary/Chest: Effort normal and breath sounds normal. No respiratory distress. She exhibits no tenderness. Abdominal: Soft. She exhibits no distension and no mass. There is no tenderness. Musculoskeletal: Normal range of motion. She exhibits no edema or tenderness. Lymphadenopathy:     She has no cervical adenopathy. Neurological: She is alert and oriented to person, place, and time. Skin: Skin is warm and dry. Psychiatric: She has a normal mood and affect. Her behavior is normal.   Vitals reviewed. DATA:    Results for orders placed or performed in visit on 10/04/17   POC Urine with Microscopic   Result Value Ref Range    Color, UA Red     Clarity, UA Cloudy (A) Clear    Glucose, Ur neg     Bilirubin Urine 2 mg/dL    Ketones, Urine Positive     Specific Gravity, UA 1.020 1.005 - 1.030    Blood, Urine Positive     pH, UA 6.5 4.5 - 8.0    Protein, UA Positive (A) Negative    Nitrite, Urine Positive     Leukocytes, UA large     Urobilinogen, Urine  4     rbc urine, poc ++++     wbc urine, poc      bacteria urine, poc      yeast urine, poc      casts urine, poc      epi cells urine, poc      crystals urine, poc                 IMAGING:   CT scan shows continued left perinephric fluid collection from September 19 as compared to August 29 that has got a little smaller    1. Left ureteral calculus    - POC Urine with Microscopic  - ND CYSTOURETHROGRAPHY REMV CALCULUS, STENT, FOREIGN BODY, SIMPLE    2. Perinephric fluid collection  I recommending waiting another additional 3 weeks before he removed the stent.  - ND CYSTOURETHROGRAPHY REMV CALCULUS, STENT, FOREIGN BODY, SIMPLE    3.  Renal calculus, left  KUB shows the stones lower pole left kidney are not visible therefore she's not a candidate for shockwave lithotripsy we did discuss doing a retrograde ureteroscopy with a center longer not bother her they recommended that he did not want to do anything    Orders Placed This Encounter Procedures    POC Urine with Microscopic    MA CYSTOURETHROGRAPHY REMV CALCULUS, STENT, FOREIGN BODY, SIMPLE     Cysto stent removal in 3 weeks        Return in about 3 months (around 1/4/2018) for Cystoscopy on next visit.

## 2017-10-08 ENCOUNTER — HOSPITAL ENCOUNTER (INPATIENT)
Age: 82
LOS: 5 days | Discharge: NURSING, REHAB OR LONG-TERM CARE | DRG: 481 | End: 2017-10-13
Attending: EMERGENCY MEDICINE | Admitting: INTERNAL MEDICINE
Payer: MEDICARE

## 2017-10-08 ENCOUNTER — APPOINTMENT (OUTPATIENT)
Dept: GENERAL RADIOLOGY | Age: 82
DRG: 481 | End: 2017-10-08
Payer: MEDICARE

## 2017-10-08 ENCOUNTER — APPOINTMENT (OUTPATIENT)
Dept: CT IMAGING | Age: 82
DRG: 481 | End: 2017-10-08
Payer: MEDICARE

## 2017-10-08 DIAGNOSIS — S72.001A CLOSED RIGHT HIP FRACTURE, INITIAL ENCOUNTER (HCC): Primary | ICD-10-CM

## 2017-10-08 DIAGNOSIS — S09.90XA HEAD INJURY, INITIAL ENCOUNTER: ICD-10-CM

## 2017-10-08 LAB
ALBUMIN SERPL-MCNC: 3.2 G/DL (ref 3.5–5.2)
ALP BLD-CCNC: 52 U/L (ref 35–104)
ALT SERPL-CCNC: 8 U/L (ref 5–33)
ANION GAP SERPL CALCULATED.3IONS-SCNC: 13 MMOL/L (ref 7–19)
APTT: 35.9 SEC (ref 26–36.2)
AST SERPL-CCNC: 14 U/L (ref 5–32)
BASOPHILS ABSOLUTE: 0 K/UL (ref 0–0.2)
BASOPHILS RELATIVE PERCENT: 0.3 % (ref 0–1)
BILIRUB SERPL-MCNC: 0.5 MG/DL (ref 0.2–1.2)
BUN BLDV-MCNC: 16 MG/DL (ref 8–23)
CALCIUM SERPL-MCNC: 9.1 MG/DL (ref 8.8–10.2)
CHLORIDE BLD-SCNC: 100 MMOL/L (ref 98–111)
CO2: 26 MMOL/L (ref 22–29)
CREAT SERPL-MCNC: 0.5 MG/DL (ref 0.5–0.9)
EOSINOPHILS ABSOLUTE: 0.2 K/UL (ref 0–0.6)
EOSINOPHILS RELATIVE PERCENT: 2.8 % (ref 0–5)
GFR NON-AFRICAN AMERICAN: >60
GLUCOSE BLD-MCNC: 101 MG/DL (ref 74–109)
HCT VFR BLD CALC: 32 % (ref 37–47)
HEMOGLOBIN: 10.2 G/DL (ref 12–16)
INR BLD: 1.97 (ref 0.88–1.18)
LYMPHOCYTES ABSOLUTE: 1.3 K/UL (ref 1.1–4.5)
LYMPHOCYTES RELATIVE PERCENT: 20.1 % (ref 20–40)
MCH RBC QN AUTO: 31.4 PG (ref 27–31)
MCHC RBC AUTO-ENTMCNC: 31.9 G/DL (ref 33–37)
MCV RBC AUTO: 98.5 FL (ref 81–99)
MONOCYTES ABSOLUTE: 0.5 K/UL (ref 0–0.9)
MONOCYTES RELATIVE PERCENT: 8 % (ref 0–10)
NEUTROPHILS ABSOLUTE: 4.4 K/UL (ref 1.5–7.5)
NEUTROPHILS RELATIVE PERCENT: 68.5 % (ref 50–65)
PDW BLD-RTO: 15.4 % (ref 11.5–14.5)
PLATELET # BLD: 312 K/UL (ref 130–400)
PMV BLD AUTO: 10.2 FL (ref 9.4–12.3)
POTASSIUM SERPL-SCNC: 3.8 MMOL/L (ref 3.5–5)
PROTHROMBIN TIME: 22.5 SEC (ref 12–14.6)
RBC # BLD: 3.25 M/UL (ref 4.2–5.4)
SODIUM BLD-SCNC: 139 MMOL/L (ref 136–145)
TOTAL PROTEIN: 6 G/DL (ref 6.6–8.7)
WBC # BLD: 6.5 K/UL (ref 4.8–10.8)

## 2017-10-08 PROCEDURE — 73552 X-RAY EXAM OF FEMUR 2/>: CPT

## 2017-10-08 PROCEDURE — 1210000000 HC MED SURG R&B

## 2017-10-08 PROCEDURE — 85730 THROMBOPLASTIN TIME PARTIAL: CPT

## 2017-10-08 PROCEDURE — 99284 EMERGENCY DEPT VISIT MOD MDM: CPT | Performed by: EMERGENCY MEDICINE

## 2017-10-08 PROCEDURE — 99285 EMERGENCY DEPT VISIT HI MDM: CPT

## 2017-10-08 PROCEDURE — 96374 THER/PROPH/DIAG INJ IV PUSH: CPT

## 2017-10-08 PROCEDURE — 2580000003 HC RX 258: Performed by: EMERGENCY MEDICINE

## 2017-10-08 PROCEDURE — 73560 X-RAY EXAM OF KNEE 1 OR 2: CPT

## 2017-10-08 PROCEDURE — 71010 XR CHEST PORTABLE: CPT

## 2017-10-08 PROCEDURE — 85025 COMPLETE CBC W/AUTO DIFF WBC: CPT

## 2017-10-08 PROCEDURE — 93005 ELECTROCARDIOGRAM TRACING: CPT

## 2017-10-08 PROCEDURE — 70450 CT HEAD/BRAIN W/O DYE: CPT

## 2017-10-08 PROCEDURE — 73502 X-RAY EXAM HIP UNI 2-3 VIEWS: CPT

## 2017-10-08 PROCEDURE — 80053 COMPREHEN METABOLIC PANEL: CPT

## 2017-10-08 PROCEDURE — 96376 TX/PRO/DX INJ SAME DRUG ADON: CPT

## 2017-10-08 PROCEDURE — 6360000002 HC RX W HCPCS: Performed by: EMERGENCY MEDICINE

## 2017-10-08 PROCEDURE — 99223 1ST HOSP IP/OBS HIGH 75: CPT | Performed by: INTERNAL MEDICINE

## 2017-10-08 PROCEDURE — 36415 COLL VENOUS BLD VENIPUNCTURE: CPT

## 2017-10-08 PROCEDURE — 85610 PROTHROMBIN TIME: CPT

## 2017-10-08 RX ORDER — MORPHINE SULFATE 4 MG/ML
2 INJECTION, SOLUTION INTRAMUSCULAR; INTRAVENOUS ONCE
Status: COMPLETED | OUTPATIENT
Start: 2017-10-08 | End: 2017-10-08

## 2017-10-08 RX ORDER — SODIUM CHLORIDE 9 MG/ML
INJECTION, SOLUTION INTRAVENOUS CONTINUOUS
Status: DISCONTINUED | OUTPATIENT
Start: 2017-10-08 | End: 2017-10-09 | Stop reason: ALTCHOICE

## 2017-10-08 RX ADMIN — SODIUM CHLORIDE: 9 INJECTION, SOLUTION INTRAVENOUS at 22:40

## 2017-10-08 RX ADMIN — MORPHINE SULFATE 2 MG: 4 INJECTION, SOLUTION INTRAMUSCULAR; INTRAVENOUS at 22:40

## 2017-10-08 RX ADMIN — MORPHINE SULFATE 2 MG: 4 INJECTION, SOLUTION INTRAMUSCULAR; INTRAVENOUS at 20:44

## 2017-10-08 ASSESSMENT — ENCOUNTER SYMPTOMS
RHINORRHEA: 0
BACK PAIN: 0
SORE THROAT: 0
ABDOMINAL PAIN: 0
VOMITING: 0
COUGH: 0
NAUSEA: 0
SHORTNESS OF BREATH: 0

## 2017-10-08 ASSESSMENT — PAIN SCALES - GENERAL
PAINLEVEL_OUTOF10: 7
PAINLEVEL_OUTOF10: 3

## 2017-10-09 PROBLEM — K21.9 GASTROESOPHAGEAL REFLUX DISEASE WITHOUT ESOPHAGITIS: Chronic | Status: ACTIVE | Noted: 2017-10-09

## 2017-10-09 PROBLEM — S72.001A CLOSED RIGHT HIP FRACTURE (HCC): Status: ACTIVE | Noted: 2017-10-09

## 2017-10-09 PROBLEM — N30.01 ACUTE CYSTITIS WITH HEMATURIA: Status: ACTIVE | Noted: 2017-10-09

## 2017-10-09 LAB
BACTERIA: ABNORMAL /HPF
BILIRUBIN URINE: ABNORMAL
BLOOD, URINE: ABNORMAL
CLARITY: ABNORMAL
COLOR: ABNORMAL
EPITHELIAL CELLS, UA: 0 /HPF (ref 0–5)
GLUCOSE URINE: NEGATIVE MG/DL
HYALINE CASTS: 3 /HPF (ref 0–8)
KETONES, URINE: 15 MG/DL
LEUKOCYTE ESTERASE, URINE: ABNORMAL
NITRITE, URINE: POSITIVE
PH UA: 7
PROTEIN UA: 300 MG/DL
RBC UA: >100 /HPF (ref 0–4)
SPECIFIC GRAVITY UA: 1.02
UROBILINOGEN, URINE: 1 E.U./DL
WBC UA: 107 /HPF (ref 0–5)

## 2017-10-09 PROCEDURE — 87185 SC STD ENZYME DETCJ PER NZM: CPT

## 2017-10-09 PROCEDURE — 6360000002 HC RX W HCPCS: Performed by: PHYSICIAN ASSISTANT

## 2017-10-09 PROCEDURE — 2580000003 HC RX 258: Performed by: HOSPITALIST

## 2017-10-09 PROCEDURE — 6360000002 HC RX W HCPCS: Performed by: INTERNAL MEDICINE

## 2017-10-09 PROCEDURE — 6370000000 HC RX 637 (ALT 250 FOR IP): Performed by: PHYSICIAN ASSISTANT

## 2017-10-09 PROCEDURE — 81001 URINALYSIS AUTO W/SCOPE: CPT

## 2017-10-09 PROCEDURE — 2580000003 HC RX 258: Performed by: INTERNAL MEDICINE

## 2017-10-09 PROCEDURE — 87077 CULTURE AEROBIC IDENTIFY: CPT

## 2017-10-09 PROCEDURE — 99233 SBSQ HOSP IP/OBS HIGH 50: CPT | Performed by: HOSPITALIST

## 2017-10-09 PROCEDURE — 6370000000 HC RX 637 (ALT 250 FOR IP): Performed by: INTERNAL MEDICINE

## 2017-10-09 PROCEDURE — 94664 DEMO&/EVAL PT USE INHALER: CPT

## 2017-10-09 PROCEDURE — 1210000000 HC MED SURG R&B

## 2017-10-09 PROCEDURE — 87086 URINE CULTURE/COLONY COUNT: CPT

## 2017-10-09 RX ORDER — CLINDAMYCIN PHOSPHATE 600 MG/50ML
600 INJECTION INTRAVENOUS
Status: DISCONTINUED | OUTPATIENT
Start: 2017-10-09 | End: 2017-10-12

## 2017-10-09 RX ORDER — DOCUSATE SODIUM 100 MG/1
100 CAPSULE, LIQUID FILLED ORAL 2 TIMES DAILY
Status: DISCONTINUED | OUTPATIENT
Start: 2017-10-09 | End: 2017-10-11 | Stop reason: SDUPTHER

## 2017-10-09 RX ORDER — 0.9 % SODIUM CHLORIDE 0.9 %
250 INTRAVENOUS SOLUTION INTRAVENOUS ONCE
Status: COMPLETED | OUTPATIENT
Start: 2017-10-09 | End: 2017-10-10

## 2017-10-09 RX ORDER — ACETAMINOPHEN 325 MG/1
650 TABLET ORAL EVERY 4 HOURS PRN
Status: DISCONTINUED | OUTPATIENT
Start: 2017-10-09 | End: 2017-10-09

## 2017-10-09 RX ORDER — CEFTRIAXONE 1 G/1
1 INJECTION, POWDER, FOR SOLUTION INTRAMUSCULAR; INTRAVENOUS EVERY 24 HOURS
Status: DISCONTINUED | OUTPATIENT
Start: 2017-10-09 | End: 2017-10-09

## 2017-10-09 RX ORDER — SODIUM CHLORIDE 9 MG/ML
INJECTION, SOLUTION INTRAVENOUS CONTINUOUS
Status: DISCONTINUED | OUTPATIENT
Start: 2017-10-09 | End: 2017-10-12

## 2017-10-09 RX ORDER — WATER 1000 ML/1000ML
10 INJECTION, SOLUTION INTRAVENOUS EVERY 24 HOURS
Status: DISCONTINUED | OUTPATIENT
Start: 2017-10-09 | End: 2017-10-09

## 2017-10-09 RX ORDER — PROPAFENONE HYDROCHLORIDE 225 MG/1
225 TABLET, FILM COATED ORAL EVERY 8 HOURS SCHEDULED
Status: DISCONTINUED | OUTPATIENT
Start: 2017-10-09 | End: 2017-10-14 | Stop reason: HOSPADM

## 2017-10-09 RX ORDER — PHENOL 1.4 %
600 AEROSOL, SPRAY (ML) MUCOUS MEMBRANE 2 TIMES DAILY
Status: DISCONTINUED | OUTPATIENT
Start: 2017-10-09 | End: 2017-10-14 | Stop reason: HOSPADM

## 2017-10-09 RX ORDER — MECLIZINE HYDROCHLORIDE 25 MG/1
12.5 TABLET ORAL PRN
Status: DISCONTINUED | OUTPATIENT
Start: 2017-10-09 | End: 2017-10-14 | Stop reason: HOSPADM

## 2017-10-09 RX ORDER — HYDROCODONE BITARTRATE AND ACETAMINOPHEN 5; 325 MG/1; MG/1
1 TABLET ORAL EVERY 4 HOURS PRN
Status: DISCONTINUED | OUTPATIENT
Start: 2017-10-09 | End: 2017-10-09 | Stop reason: ALTCHOICE

## 2017-10-09 RX ORDER — PANTOPRAZOLE SODIUM 40 MG/1
40 TABLET, DELAYED RELEASE ORAL
Status: DISCONTINUED | OUTPATIENT
Start: 2017-10-10 | End: 2017-10-14 | Stop reason: HOSPADM

## 2017-10-09 RX ORDER — HYDROCODONE BITARTRATE AND ACETAMINOPHEN 7.5; 325 MG/1; MG/1
1 TABLET ORAL EVERY 6 HOURS PRN
Status: DISCONTINUED | OUTPATIENT
Start: 2017-10-09 | End: 2017-10-11 | Stop reason: SDUPTHER

## 2017-10-09 RX ORDER — LEVOTHYROXINE SODIUM 0.07 MG/1
75 TABLET ORAL DAILY
Status: DISCONTINUED | OUTPATIENT
Start: 2017-10-09 | End: 2017-10-14 | Stop reason: HOSPADM

## 2017-10-09 RX ORDER — OMEGA-3S/DHA/EPA/FISH OIL/D3 300MG-1000
400 CAPSULE ORAL 2 TIMES DAILY
Status: DISCONTINUED | OUTPATIENT
Start: 2017-10-09 | End: 2017-10-14 | Stop reason: HOSPADM

## 2017-10-09 RX ORDER — ONDANSETRON 2 MG/ML
4 INJECTION INTRAMUSCULAR; INTRAVENOUS EVERY 6 HOURS PRN
Status: DISCONTINUED | OUTPATIENT
Start: 2017-10-09 | End: 2017-10-11 | Stop reason: SDUPTHER

## 2017-10-09 RX ORDER — BISACODYL 10 MG
10 SUPPOSITORY, RECTAL RECTAL DAILY PRN
Status: DISCONTINUED | OUTPATIENT
Start: 2017-10-09 | End: 2017-10-14 | Stop reason: HOSPADM

## 2017-10-09 RX ADMIN — LEVOTHYROXINE SODIUM 75 MCG: 75 TABLET ORAL at 11:09

## 2017-10-09 RX ADMIN — DOCUSATE SODIUM 100 MG: 100 CAPSULE, LIQUID FILLED ORAL at 11:10

## 2017-10-09 RX ADMIN — HYDROMORPHONE HYDROCHLORIDE 0.5 MG: 1 INJECTION, SOLUTION INTRAMUSCULAR; INTRAVENOUS; SUBCUTANEOUS at 18:22

## 2017-10-09 RX ADMIN — PROPAFENONE HYDROCHLORIDE 225 MG: 225 TABLET, FILM COATED ORAL at 13:21

## 2017-10-09 RX ADMIN — HYDROCODONE BITARTRATE AND ACETAMINOPHEN 1 TABLET: 7.5; 325 TABLET ORAL at 20:37

## 2017-10-09 RX ADMIN — HYDROMORPHONE HYDROCHLORIDE 0.25 MG: 1 INJECTION, SOLUTION INTRAMUSCULAR; INTRAVENOUS; SUBCUTANEOUS at 00:59

## 2017-10-09 RX ADMIN — Medication 600 MG: at 20:36

## 2017-10-09 RX ADMIN — ENOXAPARIN SODIUM 60 MG: 60 INJECTION SUBCUTANEOUS at 13:21

## 2017-10-09 RX ADMIN — SODIUM CHLORIDE: 9 INJECTION, SOLUTION INTRAVENOUS at 18:23

## 2017-10-09 RX ADMIN — CEFTRIAXONE SODIUM 1 G: 1 INJECTION, POWDER, FOR SOLUTION INTRAMUSCULAR; INTRAVENOUS at 02:59

## 2017-10-09 RX ADMIN — DOCUSATE SODIUM 100 MG: 100 CAPSULE, LIQUID FILLED ORAL at 20:36

## 2017-10-09 RX ADMIN — HYDROMORPHONE HYDROCHLORIDE 0.25 MG: 1 INJECTION, SOLUTION INTRAMUSCULAR; INTRAVENOUS; SUBCUTANEOUS at 10:31

## 2017-10-09 RX ADMIN — HYDROMORPHONE HYDROCHLORIDE 0.5 MG: 1 INJECTION, SOLUTION INTRAMUSCULAR; INTRAVENOUS; SUBCUTANEOUS at 23:04

## 2017-10-09 RX ADMIN — HYDROCODONE BITARTRATE AND ACETAMINOPHEN 1 TABLET: 7.5; 325 TABLET ORAL at 13:25

## 2017-10-09 RX ADMIN — HYDROMORPHONE HYDROCHLORIDE 0.25 MG: 1 INJECTION, SOLUTION INTRAMUSCULAR; INTRAVENOUS; SUBCUTANEOUS at 06:50

## 2017-10-09 RX ADMIN — HYDROMORPHONE HYDROCHLORIDE 0.25 MG: 1 INJECTION, SOLUTION INTRAMUSCULAR; INTRAVENOUS; SUBCUTANEOUS at 03:58

## 2017-10-09 RX ADMIN — CHOLECALCIFEROL TAB 10 MCG (400 UNIT) 400 UNITS: 10 TAB at 20:36

## 2017-10-09 RX ADMIN — PROPAFENONE HYDROCHLORIDE 225 MG: 225 TABLET, FILM COATED ORAL at 23:01

## 2017-10-09 RX ADMIN — CHOLECALCIFEROL TAB 10 MCG (400 UNIT) 400 UNITS: 10 TAB at 11:09

## 2017-10-09 RX ADMIN — Medication 600 MG: at 11:09

## 2017-10-09 RX ADMIN — ENOXAPARIN SODIUM 60 MG: 60 INJECTION SUBCUTANEOUS at 20:36

## 2017-10-09 ASSESSMENT — ENCOUNTER SYMPTOMS
HEARTBURN: 0
SPUTUM PRODUCTION: 0
DOUBLE VISION: 0
VOMITING: 0
DIARRHEA: 0
SHORTNESS OF BREATH: 0
ABDOMINAL PAIN: 0
BLURRED VISION: 0
NAUSEA: 0
PHOTOPHOBIA: 0
BACK PAIN: 0
COUGH: 0
HEMOPTYSIS: 0
ORTHOPNEA: 0

## 2017-10-09 ASSESSMENT — PAIN SCALES - GENERAL
PAINLEVEL_OUTOF10: 9
PAINLEVEL_OUTOF10: 9
PAINLEVEL_OUTOF10: 8
PAINLEVEL_OUTOF10: 3
PAINLEVEL_OUTOF10: 4
PAINLEVEL_OUTOF10: 8
PAINLEVEL_OUTOF10: 7
PAINLEVEL_OUTOF10: 7
PAINLEVEL_OUTOF10: 0
PAINLEVEL_OUTOF10: 3

## 2017-10-09 NOTE — ED NOTES
Bed: 07  Expected date: 10/8/17  Expected time:   Means of arrival:   Comments:  [facility]     [address] [facility] Stacie Fonseca RN  10/08/17 2029

## 2017-10-09 NOTE — ED PROVIDER NOTES
[facility] EMERGENCY DEPT  eMERGENCY dEPARTMENT eNCOUnter      Pt Name: Margot Ritter  MRN: [id number]  [place] 1/11/1932  Date of evaluation: 10/8/2017  Provider: Sandra Ramesh MD    [address] [place] [address]       Chief Complaint   Patient presents with    Fall     Pt fell in kitchen injured right hip         HISTORY OF PRESENT ILLNESS   (Location/Symptom, Timing/Onset, Context/Setting, Quality, Duration, Modifying Factors, Severity)  Note limiting factors. Margot Ritter is a 80 y.o. female who presents to the emergency department For concern of right hip pain after mechanical fall. Patient reports that she asked only stubbed her toe lost her balance and fell onto her right side. Patient reports she did strike the back of her head but did not lose consciousness. The patient reports she is on anticoagulation xarelto  for atrial fibrillation. Patient complains only of right hip region pain. HPI    Nursing Notes were reviewed. REVIEW OF SYSTEMS    (2-9 systems for level 4, 10 or more for level 5)     Review of Systems   Constitutional: Negative for fever. HENT: Negative for rhinorrhea and sore throat. Respiratory: Negative for cough and shortness of breath. Cardiovascular: Negative for chest pain. Gastrointestinal: Negative for abdominal pain, nausea and vomiting. Genitourinary: Negative for dysuria and frequency. Musculoskeletal: Negative for back pain and neck pain. Skin: Negative for rash. Neurological: Negative for dizziness and headaches.             PAST MEDICAL HISTORY     Past Medical History:   Diagnosis Date    A-fib ([address]     Anemia     CAD (coronary artery disease)     Conjunctivitis     COPD (chronic obstructive pulmonary disease) ([facility])     Depression     Fatigue     Kidney stone     Leukopenia     Low back pain     Mild CAD 5/15/2017    Osteoporosis     Paroxysmal a-fib ([address]     Sinus pause     10/2014    Urinary tract infection     Weakness          SURGICAL HISTORY       Past Surgical History:   Procedure Laterality Date    BACK SURGERY      CARDIAC [facility]HETER[facility]ATION  8/14/14  [facility]    Mild, non-occlusive CAD, EF 60%    CYSTOSCOPY Left 8/31/2017    CYSTOSCOPY; LEFT URETEROSCOPY; LEFT URETERAL LASER LITHOTRIPSY AND STONE EXTRACTION; INSERTION LEFT URETERAL DOUBLE J STENT performed by Hemant Anguiano MD at [address] [place] [address]      KNEE ARTHROPLASTY      x 2    LAMINECTOMY      2 lumbar segments    SHOULDER ARTHROPLASTY           CURRENT MEDICATIONS       Previous Medications    ALFUZOSIN (UROXATRAL) 10 MG EXTENDED RELEASE TABLET    Take 1 tablet by mouth daily for 14 days    CALCIUM CARBONATE-VITAMIN D (CALTRATE 600+D PO)    Take 600 mg by mouth 2 times daily. FUROSEMIDE (LASIX) 20 MG TABLET    Take 20 mg by mouth daily as needed     HYDROCODONE-ACETAMINOPHEN (NORCO) 7.5-325 MG PER TABLET    Take 1 tablet by mouth every 6 hours as needed for Pain . LEVOTHYROXINE (SYNTHROID) 75 MCG TABLET    TAKE ONE TABLET BY MOUTH DAILY AS DIRECTED    MECLIZINE (ANTIVERT) 12.5 MG TABLET    Take 12.5 mg by mouth as needed    OMEPRAZOLE (PRILOSEC) 10 MG DELAYED RELEASE CAPSULE    Take 10 mg by mouth daily    PROPAFENONE (RYTHMOL) 225 MG TABLET    TAKE ONE TABLET BY MOUTH EVERY EIGHT HOURS    XARELTO 20 MG TABS TABLET    TAKE 1 TABLET DAILY WITH BREAKFAST       ALLERGIES     Nitrofuran derivatives; Pcn [penicillins]; and Sulfa antibiotics    FAMILY HISTORY       Family History   Problem Relation Age of Onset    Cancer Father      lung    Stroke Mother      mini    Hypotension Mother           SOCIAL HISTORY       Social History     Social History    Marital status:       Spouse name: N/A    Number of children: N/A    Years of education: N/A     Social History Main Topics    Smoking status: Never Smoker    Smokeless tobacco: Never Used    Alcohol use No    Drug use: No    Sexual activity: Yes     Other Topics Concern    None     Social History Narrative    None       SCREENINGS             PHYSICAL EXAM    (up to 7 for level 4, 8 or more for level 5)     ED Triage Vitals [10/08/17 2029]   BP Temp Temp Source Pulse Resp SpO2 Height Weight   (!) 179/79 98.7 °F (37.1 °C) Oral 61 16 94 % 5' 4\" (1.626 m) 132 lb (59.9 kg)       Physical Exam   Constitutional: She is oriented to person, place, and time. She appears well-developed and well-nourished. No distress. HENT:   Head: Normocephalic and atraumatic. Mouth/Throat: Oropharynx is clear and moist.   Eyes: Conjunctivae and EOM are normal. Pupils are equal, round, and reactive to light. Neck: Normal range of motion. No spinous process tenderness and no muscular tenderness present. No tracheal deviation present. Cardiovascular: Normal rate, S1 normal, S2 normal, normal heart sounds and normal pulses. An irregularly irregular rhythm present. No murmur heard. Pulmonary/Chest: Effort normal. She has no decreased breath sounds. She has no wheezes. She has no rhonchi. She has no rales. Abdominal: Soft. Normal appearance. There is no tenderness. Genitourinary: Rectal exam shows guaiac negative stool. Musculoskeletal:   Right lower extremity tender to palpation over right hip and femur region, shortened and externally rotated compared to left, sensation and 2+ DP pulse, strength limited on right secondary to pain, left 5 out of 5   Neurological: She is alert and oriented to person, place, and time. She has normal strength. No sensory deficit. GCS eye subscore is 4. GCS verbal subscore is 5. GCS motor subscore is 6. Skin: Skin is warm and dry. Vitals reviewed.       DIAGNOSTIC RESULTS     EKG: All EKG's are interpreted by the Emergency Department Physician who either signs or Co-signs this chart in the absence of a cardiologist.    Atrial fibrillation rate controlled rate 60    RADIOLOGY:   Non-plain film images such as CT, Ultrasound and MRI are read by the radiologist. Plain radiographic Narrative:      Hemolyzed. Notified Max/RN/ER @ 2050. RN to collect       All other labs were within normal range or not returned as of this dictation.     EMERGENCY DEPARTMENT COURSE and DIFFERENTIAL DIAGNOSIS/MDM:   Vitals:    Vitals:    10/08/17 2029   BP: (!) 179/79   Pulse: 61   Resp: 16   Temp: 98.7 °F (37.1 °C)   TempSrc: Oral   SpO2: 94%   Weight: 132 lb (59.9 kg)   Height: 5' 4\" (1.626 m)       MDM  Number of Diagnoses or Management Options     Amount and/or Complexity of Data Reviewed  Clinical lab tests: ordered and reviewed  Tests in the radiology section of CPT®: ordered and reviewed  Discuss the patient with other providers: yes  Independent visualization of images, tracings, or specimens: yes        Vital signs stable, patient is alert and oriented, no obvious head trauma but anticoagulated proceed with CT head, right lower extremity externally rotated and shortened tender to palpation over right hip, neurovascularly intact, suspect right hip fracture, have ordered screening labs as well as extremity imaging, continue to monitor 2042    No obvious ICH per my read on CT head, pending final radiology, crit 32 dropped from 35 in august, , will plan for rectal, cont to monitor 2103    Pt states last week thought maybe noticed some darker stool but brown stool and hemoccult negative, cont to monitor 2110    intertroch fx with appears to have sheared off greater troch, have paged ortho 2113    Chest x-ray read noted, no infectious symptoms likely atelectasis will not treat his pneumonia, CT head negative, reads noted on lower extremity, discussed this case with Dr. Jonas Whitlock in orthopedics who is agreeable with consult to evaluate the morning, pending CMP as it was hemolyzed, then we'll plan for hospitalist admission 2129    Hospitalist paged for admission at 2159    Discussed case with hospitalist service  in regards to hip fx and crit drop, he is in agreement with plan 2208        CONSULTS:  IP CONSULT

## 2017-10-09 NOTE — CONSULTS
Orthopaedic Inpatient Consultation    NAME:  Lucas Swartz   : 1932  MRN: [id number]    10/8/2017  8:29 PM        CHIEF COMPLAINT:  Right hip pain      HISTORY OF PRESENT ILLNESS:   The patient is a 80 y.o. female who presents with the above complaint. Patient is seen, evaluated and examined in [facility]. Past Medical History:        Diagnosis Date    A-fib ([address]     Anemia     CAD (coronary artery disease)     Conjunctivitis     [facility]     Depression     Fatigue     Kidney stone     Leukopenia     Low back pain     Mild CAD 5/15/2017    Osteoporosis     Paroxysmal a-fib ([facility])     Sinus pause     10/2014    Urinary tract infection     Weakness        Past Surgical History:        Procedure Laterality Date    BACK SURGERY      CARDIAC CATHETERIZATION  14  [facility]    Mild, non-occlusive CAD, EF 60%    CYSTOSCOPY Left 2017    CYSTOSCOPY; LEFT URETEROSCOPY; LEFT URETERAL LASER LITHOTRIPSY AND STONE EXTRACTION; INSERTION LEFT URETERAL DOUBLE J STENT performed by Brendon Torre MD at [facility] 5747      x 2    LAMINECTOMY      2 lumbar segments    SHOULDER ARTHROPLASTY         Current Medications:   Prior to Admission medications    Medication Sig Start Date End Date Taking? Authorizing Provider   omeprazole (PRILOSEC) 10 MG delayed release capsule Take 10 mg by mouth daily   Yes Historical Provider, MD   propafenone (RYTHMOL) 225 MG tablet TAKE ONE TABLET BY MOUTH EVERY EIGHT HOURS 17  Yes ROCAEL Evans   HYDROcodone-acetaminophen (NORCO) 7.5-325 MG per tablet Take 1 tablet by mouth every 6 hours as needed for Pain .  17  Yes Brendon Torre MD   levothyroxine (SYNTHROID) 75 MCG tablet TAKE ONE TABLET BY MOUTH DAILY AS DIRECTED 8/10/17  Yes Roderick Mariscal MD   XARELTO 20 MG TABS tablet TAKE 1 TABLET DAILY WITH BREAKFAST 17  Yes Shawn James APRN   meclizine (ANTIVERT) 12.5 MG tablet Take 12.5 mg by mouth as needed   Yes Historical Provider, MD   Calcium Carbonate-Vitamin D (CALTRATE 600+D PO) Take 600 mg by mouth 2 times daily. Yes Historical Provider, MD   furosemide (LASIX) 20 MG tablet Take 20 mg by mouth daily as needed    Yes Historical Provider, MD   alfuzosin (UROXATRAL) 10 MG extended release tablet Take 1 tablet by mouth daily for 14 days 8/31/17 9/14/17  Shimon Liang MD       Allergies:  Nitrofuran derivatives; Pcn [penicillins]; and Sulfa antibiotics    Social History:   Social History     Social History    Marital status:      Spouse name: N/A    Number of children: N/A    Years of education: N/A     Occupational History    Not on file. Social History Main Topics    Smoking status: Never Smoker    Smokeless tobacco: Never Used    Alcohol use No    Drug use: No    Sexual activity: Yes     Other Topics Concern    Not on file     Social History Narrative    No narrative on file       Family History:   Family History   Problem Relation Age of Onset    Cancer Father      lung    Stroke Mother      mini    Hypotension Mother        REVIEW OF SYSTEMS:  14 point review of systems has been reviewed from the patient's emergency room visit, reviewed with the patient on today's date with no new changes. PHYSICAL EXAM:      Physical Examination:  Vitals:   Vitals:    10/08/17 2308 10/08/17 2357 10/09/17 0418 10/09/17 0610   BP: 132/65 (!) 149/83 127/70 118/67   Pulse: 58 67 69 61   Resp: 14 16 14 14   Temp: 98.9 °F (37.2 °C) 98.9 °F (37.2 °C) 98.3 °F (36.8 °C) 97 °F (36.1 °C)   TempSrc: Oral Temporal Temporal Temporal   SpO2: 92% 95% 95% 92%   Weight:  136 lb 8 oz (61.9 kg)     Height:  5' 4\" (1.626 m)       General:  Appears stated age, no distress. Orientation:  Alert and oriented to time, place, and person. Mood and Affect:  Cooperative and pleasant. Gait:  Resting comfortably in bed.   Cardiovascular:  Symmetric each. Patient is elected to proceed with the proposed operative procedure of short trochanteric fixation nail stabilization intertrochanteric right hip fracture. Definitive operative intervention be totally scheduled for 10/9/2017. For further information please see inpatient hospital chart.         Provider: Jennifer Emanuel  Date: 10/9/2017

## 2017-10-09 NOTE — PLAN OF CARE
Problem: Falls - Risk of  Goal: Absence of falls  Outcome: Ongoing  Pt placed on fall precautions.   Instructed not to get out of bed without asst.

## 2017-10-09 NOTE — PROGRESS NOTES
Nutrition Assessment    Type and Reason for Visit: Initial, Positive Nutrition Screen    Nutrition Recommendations: Adding Vanilla Ensure once daily. Malnutrition Assessment:  · Malnutrition Status: At risk for malnutrition    Nutrition Diagnosis:   · Problem: Increased nutrient needs  · Etiology: related to Increased demand for energy/nutrients due to     Signs and symptoms:  as evidenced by  (anticipated surgery)    Nutrition Assessment:  · Subjective Assessment: +NS for decreased intake. Pt reports appetite decreased since her fall yesterday. Typically drinks one Ensure daily. Prefers Fayetta Dykes. · Current Nutrition Therapies:  · Oral Diet Orders: General   · Oral Diet intake: Unable to assess  · Oral Nutrition Supplement (ONS) Orders: None  · ONS intake: Unable to assess  · Anthropometric Measures:  · Ht: 5' 4\" (162.6 cm)   · Current Body Wt: 136 lb (61.7 kg)  · Admission Body Wt: 136 lb (61.7 kg)  · Ideal Body Wt: 120 lb (54.4 kg),   · BMI Classification: BMI 18.5 - 24.9 Normal Weight    Estimated Intake vs Estimated Needs: Intake Less Than Needs    Nutrition Risk Level: Moderate    Nutrition Interventions:   Continue current diet, Start ONS  Continued Inpatient Monitoring    Nutrition Evaluation:   · Evaluation: Goals set   · Goals: PO 50% or more of meals and ONS. · Monitoring: Meal Intake, Supplement Intake, Weight, Pertinent Labs    See Adult Nutrition Doc Flowsheet for more detail.      Electronically signed by John Oro MS, RD, LD on 10/9/17 at 2:22 PM

## 2017-10-09 NOTE — PROGRESS NOTES
Placed FC #16 Fr using aseptic technique as ordered. Immediate return of bloody urine observed. Pt stated that her urine has had blood in it since she had a urinary stent placed on August 31st.  Pt tolerated well.

## 2017-10-09 NOTE — PLAN OF CARE
Problem: Nutrition  Goal: Optimal nutrition therapy  Outcome: Ongoing  Nutrition Problem: Increased nutrient needs  Intervention: Food and/or Nutrient Delivery: Continue current diet, Start ONS  Nutritional Goals: PO 50% or more of meals and ONS.

## 2017-10-09 NOTE — PROGRESS NOTES
clear to auscultation bilaterally without rales, rhonchi or wheezes  Heart: regular rate and rhythm, S1, S2 normal, no murmur, click, rub or gallop  Abdomen: soft, non-tender; bowel sounds normal; no masses,  no organomegaly  Extremities: extremities normal, atraumatic, no cyanosis or edema  Neurologic: No focal neurologic deficits, normal sensation, alert and oriented, affect and mood appropriate. Skin: no rashes, nodules. A/P: She needs surgery, last dose of Xarelto was 10/8/17, she could go to OR 24-72 hours after last dose depending on the severity of the need, will use Lovenox to bridge starting today, will hope for OR 10/11/17 which is > 72 hours since last dose. Monitor HG, VS, and use incentive spirometry as she will have a prolonged bed-bound period.

## 2017-10-10 LAB
ANION GAP SERPL CALCULATED.3IONS-SCNC: 6 MMOL/L (ref 7–19)
ANION GAP SERPL CALCULATED.3IONS-SCNC: 7 MMOL/L (ref 7–19)
BASOPHILS ABSOLUTE: 0 K/UL (ref 0–0.2)
BASOPHILS RELATIVE PERCENT: 0.2 % (ref 0–1)
BUN BLDV-MCNC: 16 MG/DL (ref 8–23)
BUN BLDV-MCNC: 16 MG/DL (ref 8–23)
CALCIUM SERPL-MCNC: 8.8 MG/DL (ref 8.8–10.2)
CALCIUM SERPL-MCNC: 8.9 MG/DL (ref 8.8–10.2)
CHLORIDE BLD-SCNC: 102 MMOL/L (ref 98–111)
CHLORIDE BLD-SCNC: 105 MMOL/L (ref 98–111)
CO2: 29 MMOL/L (ref 22–29)
CO2: 30 MMOL/L (ref 22–29)
CREAT SERPL-MCNC: 0.4 MG/DL (ref 0.5–0.9)
CREAT SERPL-MCNC: 0.5 MG/DL (ref 0.5–0.9)
EOSINOPHILS ABSOLUTE: 0.1 K/UL (ref 0–0.6)
EOSINOPHILS RELATIVE PERCENT: 1.5 % (ref 0–5)
GFR NON-AFRICAN AMERICAN: >60
GFR NON-AFRICAN AMERICAN: >60
GLUCOSE BLD-MCNC: 106 MG/DL (ref 74–109)
GLUCOSE BLD-MCNC: 117 MG/DL (ref 74–109)
HBA1C MFR BLD: 5.1 %
HCT VFR BLD CALC: 25.6 % (ref 37–47)
HEMOGLOBIN: 8.4 G/DL (ref 12–16)
LYMPHOCYTES ABSOLUTE: 1.3 K/UL (ref 1.1–4.5)
LYMPHOCYTES RELATIVE PERCENT: 24.9 % (ref 20–40)
MAGNESIUM: 1.8 MG/DL (ref 1.6–2.4)
MCH RBC QN AUTO: 33.3 PG (ref 27–31)
MCHC RBC AUTO-ENTMCNC: 32.8 G/DL (ref 33–37)
MCV RBC AUTO: 101.6 FL (ref 81–99)
MONOCYTES ABSOLUTE: 0.7 K/UL (ref 0–0.9)
MONOCYTES RELATIVE PERCENT: 13.1 % (ref 0–10)
NEUTROPHILS ABSOLUTE: 3.2 K/UL (ref 1.5–7.5)
NEUTROPHILS RELATIVE PERCENT: 59.9 % (ref 50–65)
PDW BLD-RTO: 15.9 % (ref 11.5–14.5)
PLATELET # BLD: 271 K/UL (ref 130–400)
PMV BLD AUTO: 10.2 FL (ref 9.4–12.3)
POTASSIUM SERPL-SCNC: 4 MMOL/L (ref 3.5–5)
POTASSIUM SERPL-SCNC: 4.2 MMOL/L (ref 3.5–5)
RBC # BLD: 2.52 M/UL (ref 4.2–5.4)
SODIUM BLD-SCNC: 138 MMOL/L (ref 136–145)
SODIUM BLD-SCNC: 141 MMOL/L (ref 136–145)
TROPONIN: <0.01 NG/ML (ref 0–0.03)
TSH SERPL DL<=0.05 MIU/L-ACNC: 2.49 UIU/ML (ref 0.27–4.2)
WBC # BLD: 5.3 K/UL (ref 4.8–10.8)

## 2017-10-10 PROCEDURE — 6360000002 HC RX W HCPCS: Performed by: INTERNAL MEDICINE

## 2017-10-10 PROCEDURE — 6370000000 HC RX 637 (ALT 250 FOR IP): Performed by: ORTHOPAEDIC SURGERY

## 2017-10-10 PROCEDURE — 2580000003 HC RX 258: Performed by: INTERNAL MEDICINE

## 2017-10-10 PROCEDURE — 1210000000 HC MED SURG R&B

## 2017-10-10 PROCEDURE — 80048 BASIC METABOLIC PNL TOTAL CA: CPT

## 2017-10-10 PROCEDURE — 84484 ASSAY OF TROPONIN QUANT: CPT

## 2017-10-10 PROCEDURE — 83735 ASSAY OF MAGNESIUM: CPT

## 2017-10-10 PROCEDURE — 85025 COMPLETE CBC W/AUTO DIFF WBC: CPT

## 2017-10-10 PROCEDURE — 36415 COLL VENOUS BLD VENIPUNCTURE: CPT

## 2017-10-10 PROCEDURE — 2580000003 HC RX 258: Performed by: HOSPITALIST

## 2017-10-10 PROCEDURE — 6360000002 HC RX W HCPCS: Performed by: PHYSICIAN ASSISTANT

## 2017-10-10 PROCEDURE — 6370000000 HC RX 637 (ALT 250 FOR IP): Performed by: INTERNAL MEDICINE

## 2017-10-10 PROCEDURE — 84443 ASSAY THYROID STIM HORMONE: CPT

## 2017-10-10 PROCEDURE — 6370000000 HC RX 637 (ALT 250 FOR IP): Performed by: PHYSICIAN ASSISTANT

## 2017-10-10 PROCEDURE — 99232 SBSQ HOSP IP/OBS MODERATE 35: CPT | Performed by: PHYSICIAN ASSISTANT

## 2017-10-10 PROCEDURE — 83036 HEMOGLOBIN GLYCOSYLATED A1C: CPT

## 2017-10-10 RX ORDER — VANCOMYCIN HYDROCHLORIDE 1 G/200ML
1000 INJECTION, SOLUTION INTRAVENOUS EVERY 24 HOURS
Status: DISCONTINUED | OUTPATIENT
Start: 2017-10-10 | End: 2017-10-12

## 2017-10-10 RX ORDER — DIAZEPAM 2 MG/1
2 TABLET ORAL EVERY 6 HOURS PRN
Status: DISCONTINUED | OUTPATIENT
Start: 2017-10-10 | End: 2017-10-12

## 2017-10-10 RX ADMIN — PANTOPRAZOLE SODIUM 40 MG: 40 TABLET, DELAYED RELEASE ORAL at 06:55

## 2017-10-10 RX ADMIN — PROPAFENONE HYDROCHLORIDE 225 MG: 225 TABLET, FILM COATED ORAL at 21:04

## 2017-10-10 RX ADMIN — SODIUM CHLORIDE 250 ML: 9 INJECTION, SOLUTION INTRAVENOUS at 01:59

## 2017-10-10 RX ADMIN — Medication 600 MG: at 09:44

## 2017-10-10 RX ADMIN — LEVOTHYROXINE SODIUM 75 MCG: 75 TABLET ORAL at 05:59

## 2017-10-10 RX ADMIN — HYDROMORPHONE HYDROCHLORIDE 0.5 MG: 1 INJECTION, SOLUTION INTRAMUSCULAR; INTRAVENOUS; SUBCUTANEOUS at 06:55

## 2017-10-10 RX ADMIN — PROPAFENONE HYDROCHLORIDE 225 MG: 225 TABLET, FILM COATED ORAL at 14:33

## 2017-10-10 RX ADMIN — PROPAFENONE HYDROCHLORIDE 225 MG: 225 TABLET, FILM COATED ORAL at 05:59

## 2017-10-10 RX ADMIN — DOCUSATE SODIUM 100 MG: 100 CAPSULE, LIQUID FILLED ORAL at 09:44

## 2017-10-10 RX ADMIN — ENOXAPARIN SODIUM 60 MG: 60 INJECTION SUBCUTANEOUS at 09:44

## 2017-10-10 RX ADMIN — HYDROCODONE BITARTRATE AND ACETAMINOPHEN 1 TABLET: 7.5; 325 TABLET ORAL at 21:04

## 2017-10-10 RX ADMIN — HYDROCODONE BITARTRATE AND ACETAMINOPHEN 1 TABLET: 7.5; 325 TABLET ORAL at 14:33

## 2017-10-10 RX ADMIN — VANCOMYCIN HYDROCHLORIDE 1000 MG: 1 INJECTION, SOLUTION INTRAVENOUS at 17:26

## 2017-10-10 RX ADMIN — HYDROCODONE BITARTRATE AND ACETAMINOPHEN 1 TABLET: 7.5; 325 TABLET ORAL at 08:31

## 2017-10-10 RX ADMIN — DOCUSATE SODIUM 100 MG: 100 CAPSULE, LIQUID FILLED ORAL at 21:04

## 2017-10-10 RX ADMIN — DIAZEPAM 2 MG: 2 TABLET ORAL at 09:44

## 2017-10-10 RX ADMIN — CHOLECALCIFEROL TAB 10 MCG (400 UNIT) 400 UNITS: 10 TAB at 09:44

## 2017-10-10 RX ADMIN — HYDROMORPHONE HYDROCHLORIDE 0.5 MG: 1 INJECTION, SOLUTION INTRAMUSCULAR; INTRAVENOUS; SUBCUTANEOUS at 17:35

## 2017-10-10 RX ADMIN — SODIUM CHLORIDE: 9 INJECTION, SOLUTION INTRAVENOUS at 12:32

## 2017-10-10 RX ADMIN — CEFTRIAXONE SODIUM 1 G: 1 INJECTION, POWDER, FOR SOLUTION INTRAMUSCULAR; INTRAVENOUS at 02:00

## 2017-10-10 ASSESSMENT — PAIN SCALES - GENERAL
PAINLEVEL_OUTOF10: 2
PAINLEVEL_OUTOF10: 7
PAINLEVEL_OUTOF10: 4
PAINLEVEL_OUTOF10: 0
PAINLEVEL_OUTOF10: 5
PAINLEVEL_OUTOF10: 3
PAINLEVEL_OUTOF10: 3
PAINLEVEL_OUTOF10: 9
PAINLEVEL_OUTOF10: 5

## 2017-10-10 NOTE — PROGRESS NOTES
[facility]      Patient:  Sima Vázquez  YOB: 1932  Date of Service: 10/10/2017  MRN: [id number]   Acct: [de-identified]   Primary Care Physician: Marilu Antonio MD  Advance Directive: Full Code  Admit Date: 10/8/2017       Hospital Day: 2    CHIEF COMPLAINT Right hip pain    SUBJECTIVE:  Ms. Marleni Valencia states her right hip pain has become intractable. She has not received her oral pain medication yet this afternoon. Cumulative Hospital course:  Ms. Marleni Valencia is a pleasant 80year old female who was admitted to [facility] after she presented to the emergency department after suffering a fall. She stated she stubbed her toe, lost balance and fell onto her right side which resulted in hip pain. She was found to have fracture and was admitted to Hospitalist with orthopedic consultation. Urinalysis is concerning for urinary tract infection. Patient recently had a left ureteroscopy for distal left ureteral calculus with left perinephric fluid collection. Stent was placed per Dr. Brett Temple and at her follow with him on 10/04/2017 he recommended stent removal in 3 weeks. She does also have history of atrial fibrillation and takes Xarelto for anticoagulation. She believes her last dose was on 10/08/2017.      Review of Systems:   Constitutional / general:  Denies fever / chills / sweats  Head:  Denies headache / neck stiffness / trauma / visual change  Eyes:  Denies blurry vision / acute visual change or loss / itching / redness  ENT: Denies sore throat / hoarseness / nasal drainage / ear pain  CV:  Denies chest pain / palpitations/ orthopnea   Respiratory:  Denies cough / shortness of breath / sputum / hemoptysis  GI: Denies nausea / vomiting / abdominal pain / diarrhea / constipation  :  Denies dysuria / hesitancy / urgency /+ hematuria   Neuro: Denies paralysis / syncope / seizure / dysphagia / headache / paresthesias  Musculoskeletal:  + muscle weakness /joint stiffness / fracture or  acute abnormality is identified. Hip 2 view 10/08/2017  Acute closed comminuted intertrochanteric right hip  fracture with mild lateral angulation and displacement. CT head 10/08/2017  Impression: No acute intracranial abnormality. There are chronic  findings associated with aging. Right-sided mastoiditis. Micro: Urine culture pending collected 10/09/2017    Assessment/Plan   Principal Problem:    Closed right hip fracture ([facility])-mgmt per Dr. Marko Yun, likely to OR in AM  Active Problems:    Acute cystitis with hematuria + Staphylococcus [id number] Rocephin, add pharmacy to dose Vanc    Paroxysmal a-fib ([facility])-Xarelto on hold for OR, full dose Lovenox to be held in AM, Rythmol resumed    Renal calculus, left-stent in place followed by Dr. Crystal Warren as OP    Gastroesophageal reflux disease without esophagitis-PPI    COPD (chronic obstructive pulmonary disease) ([facility])-no exacerbation     Will change from Rocephin to Vancomycin based on most recent urine culture, given the patient's bleeding risk Lovenox will need to be held the evening and AM prior to surgery.  Will repeat CBC, BMP in AM, continue to monitor HR    Antibiotic: Rocephin DC'd 10/10/2017, Vanc started     DVT Prophylaxis: Full dose Lovenoxd, held 2' need for operative intervention     GI prophylaxis:  Protonix    Tan Muñiz PA-C

## 2017-10-10 NOTE — PLAN OF CARE
Problem: Falls - Risk of  Goal: Absence of falls  Outcome: Ongoing      Problem: Risk for Impaired Skin Integrity  Goal: Tissue integrity - skin and mucous membranes  Structural intactness and normal physiological function of skin and  mucous membranes.    Outcome: Ongoing      Problem: Pain:  Goal: Pain level will decrease  Pain level will decrease   Outcome: Ongoing    Goal: Control of acute pain  Control of acute pain   Outcome: Ongoing    Goal: Control of chronic pain  Control of chronic pain   Outcome: Ongoing

## 2017-10-10 NOTE — PLAN OF CARE
Problem: Falls - Risk of  Goal: Absence of falls  Outcome: Ongoing      Problem: Risk for Impaired Skin Integrity  Goal: Tissue integrity - skin and mucous membranes  Structural intactness and normal physiological function of skin and  mucous membranes.    Outcome: Ongoing      Problem: Pain:  Goal: Pain level will decrease  Pain level will decrease   Outcome: Met This Shift    Goal: Control of acute pain  Control of acute pain   Outcome: Met This Shift    Goal: Control of chronic pain  Control of chronic pain   Outcome: Met This Shift      Problem: Nutrition  Goal: Optimal nutrition therapy  Outcome: Met This Shift

## 2017-10-10 NOTE — PROGRESS NOTES
Subjective:     Patient seen. No new complaints    Objective:     Patient Vitals for the past 24 hrs:   BP Temp Temp src Pulse Resp SpO2 Height Weight   10/10/17 0648 (!) 149/82 97.4 °F (36.3 °C) Temporal 79 14 93 % - -   10/10/17 0331 130/77 98.2 °F (36.8 °C) Temporal 75 16 94 % - 138 lb (62.6 kg)   10/10/17 0116 112/71 96.8 °F (36 °C) Temporal 75 16 94 % - -   10/09/17 1851 110/69 97.1 °F (36.2 °C) Temporal 73 20 96 % - -   10/09/17 1746 121/70 97.4 °F (36.3 °C) Temporal 74 14 94 % - -   10/09/17 1435 (!) 92/56 - - - - - - -   10/09/17 1432 (!) 82/48 98.3 °F (36.8 °C) Temporal 83 14 93 % - -   10/09/17 1419 - - - - - - 5' 4\" (1.626 m) -   10/09/17 1049 123/72 98.5 °F (36.9 °C) Temporal 65 14 92 % - -       General: alert, appears stated age and cooperative   Wound:    Neurovascular: Exam normal   DVT Exam: No evidence of DVT seen on physical exam.         Data Review:  Recent Labs      10/08/17   2036  10/10/17   0600   HGB  10.2*  8.4*     Recent Labs      10/10/17   0600   NA  141   K  4.2   CREATININE  0.4*         Assessment:     Interrochenteric right hip fracture.    Acute postoperative anemia    Plan:     Continue PO/ IV Medication   For Sx tomorrow am

## 2017-10-11 ENCOUNTER — APPOINTMENT (OUTPATIENT)
Dept: GENERAL RADIOLOGY | Age: 82
DRG: 481 | End: 2017-10-11
Payer: MEDICARE

## 2017-10-11 ENCOUNTER — ANESTHESIA (OUTPATIENT)
Dept: OPERATING ROOM | Age: 82
DRG: 481 | End: 2017-10-11
Payer: MEDICARE

## 2017-10-11 ENCOUNTER — ANESTHESIA EVENT (OUTPATIENT)
Dept: OPERATING ROOM | Age: 82
DRG: 481 | End: 2017-10-11
Payer: MEDICARE

## 2017-10-11 VITALS
TEMPERATURE: 99.2 F | OXYGEN SATURATION: 100 % | SYSTOLIC BLOOD PRESSURE: 103 MMHG | RESPIRATION RATE: 17 BRPM | DIASTOLIC BLOOD PRESSURE: 73 MMHG

## 2017-10-11 LAB
ABO/RH: NORMAL
ANION GAP SERPL CALCULATED.3IONS-SCNC: 8 MMOL/L (ref 7–19)
ANTIBODY SCREEN: NORMAL
BUN BLDV-MCNC: 18 MG/DL (ref 8–23)
CALCIUM SERPL-MCNC: 8.5 MG/DL (ref 8.8–10.2)
CHLORIDE BLD-SCNC: 104 MMOL/L (ref 98–111)
CO2: 27 MMOL/L (ref 22–29)
CREAT SERPL-MCNC: 0.6 MG/DL (ref 0.5–0.9)
GFR NON-AFRICAN AMERICAN: >60
GLUCOSE BLD-MCNC: 120 MG/DL (ref 74–109)
HCT VFR BLD CALC: 23.4 % (ref 37–47)
HCT VFR BLD CALC: 26.2 % (ref 37–47)
HEMOGLOBIN: 7.5 G/DL (ref 12–16)
HEMOGLOBIN: 8.4 G/DL (ref 12–16)
MCH RBC QN AUTO: 31.5 PG (ref 27–31)
MCH RBC QN AUTO: 32.8 PG (ref 27–31)
MCHC RBC AUTO-ENTMCNC: 32.1 G/DL (ref 33–37)
MCHC RBC AUTO-ENTMCNC: 32.1 G/DL (ref 33–37)
MCV RBC AUTO: 102.2 FL (ref 81–99)
MCV RBC AUTO: 98.1 FL (ref 81–99)
ORGANISM: ABNORMAL
PDW BLD-RTO: 16 % (ref 11.5–14.5)
PDW BLD-RTO: 16.5 % (ref 11.5–14.5)
PLATELET # BLD: 252 K/UL (ref 130–400)
PLATELET # BLD: 268 K/UL (ref 130–400)
PMV BLD AUTO: 10.2 FL (ref 9.4–12.3)
PMV BLD AUTO: 10.6 FL (ref 9.4–12.3)
POTASSIUM SERPL-SCNC: 3.5 MMOL/L (ref 3.5–5)
RBC # BLD: 2.29 M/UL (ref 4.2–5.4)
RBC # BLD: 2.67 M/UL (ref 4.2–5.4)
SODIUM BLD-SCNC: 139 MMOL/L (ref 136–145)
URINE CULTURE, ROUTINE: ABNORMAL
URINE CULTURE, ROUTINE: ABNORMAL
WBC # BLD: 5.5 K/UL (ref 4.8–10.8)
WBC # BLD: 6.6 K/UL (ref 4.8–10.8)

## 2017-10-11 PROCEDURE — 6370000000 HC RX 637 (ALT 250 FOR IP): Performed by: ANESTHESIOLOGY

## 2017-10-11 PROCEDURE — 2700000000 HC OXYGEN THERAPY PER DAY

## 2017-10-11 PROCEDURE — 2500000003 HC RX 250 WO HCPCS: Performed by: ORTHOPAEDIC SURGERY

## 2017-10-11 PROCEDURE — 6370000000 HC RX 637 (ALT 250 FOR IP): Performed by: ORTHOPAEDIC SURGERY

## 2017-10-11 PROCEDURE — C1769 GUIDE WIRE: HCPCS | Performed by: ORTHOPAEDIC SURGERY

## 2017-10-11 PROCEDURE — 3209999900 FLUORO FOR SURGICAL PROCEDURES

## 2017-10-11 PROCEDURE — 2580000003 HC RX 258: Performed by: ANESTHESIOLOGY

## 2017-10-11 PROCEDURE — 99232 SBSQ HOSP IP/OBS MODERATE 35: CPT | Performed by: PHYSICIAN ASSISTANT

## 2017-10-11 PROCEDURE — 6360000002 HC RX W HCPCS: Performed by: PHYSICIAN ASSISTANT

## 2017-10-11 PROCEDURE — 7100000000 HC PACU RECOVERY - FIRST 15 MIN: Performed by: ORTHOPAEDIC SURGERY

## 2017-10-11 PROCEDURE — 3700000001 HC ADD 15 MINUTES (ANESTHESIA): Performed by: ORTHOPAEDIC SURGERY

## 2017-10-11 PROCEDURE — 99231 SBSQ HOSP IP/OBS SF/LOW 25: CPT | Performed by: UROLOGY

## 2017-10-11 PROCEDURE — 87086 URINE CULTURE/COLONY COUNT: CPT

## 2017-10-11 PROCEDURE — 3600000014 HC SURGERY LEVEL 4 ADDTL 15MIN: Performed by: ORTHOPAEDIC SURGERY

## 2017-10-11 PROCEDURE — 2720000001 HC MISC SURG SUPPLY STERILE $51-500: Performed by: ORTHOPAEDIC SURGERY

## 2017-10-11 PROCEDURE — 6370000000 HC RX 637 (ALT 250 FOR IP): Performed by: PHYSICIAN ASSISTANT

## 2017-10-11 PROCEDURE — P9045 ALBUMIN (HUMAN), 5%, 250 ML: HCPCS | Performed by: NURSE ANESTHETIST, CERTIFIED REGISTERED

## 2017-10-11 PROCEDURE — 80048 BASIC METABOLIC PNL TOTAL CA: CPT

## 2017-10-11 PROCEDURE — 6360000002 HC RX W HCPCS: Performed by: NURSE ANESTHETIST, CERTIFIED REGISTERED

## 2017-10-11 PROCEDURE — 0QS604Z REPOSITION RIGHT UPPER FEMUR WITH INTERNAL FIXATION DEVICE, OPEN APPROACH: ICD-10-PCS | Performed by: ORTHOPAEDIC SURGERY

## 2017-10-11 PROCEDURE — 7100000001 HC PACU RECOVERY - ADDTL 15 MIN: Performed by: ORTHOPAEDIC SURGERY

## 2017-10-11 PROCEDURE — 86850 RBC ANTIBODY SCREEN: CPT

## 2017-10-11 PROCEDURE — 3700000000 HC ANESTHESIA ATTENDED CARE: Performed by: ORTHOPAEDIC SURGERY

## 2017-10-11 PROCEDURE — 2780000010 HC IMPLANT OTHER: Performed by: ORTHOPAEDIC SURGERY

## 2017-10-11 PROCEDURE — C1713 ANCHOR/SCREW BN/BN,TIS/BN: HCPCS | Performed by: ORTHOPAEDIC SURGERY

## 2017-10-11 PROCEDURE — 86900 BLOOD TYPING SEROLOGIC ABO: CPT

## 2017-10-11 PROCEDURE — 85027 COMPLETE CBC AUTOMATED: CPT

## 2017-10-11 PROCEDURE — 2500000003 HC RX 250 WO HCPCS: Performed by: NURSE ANESTHETIST, CERTIFIED REGISTERED

## 2017-10-11 PROCEDURE — P9016 RBC LEUKOCYTES REDUCED: HCPCS

## 2017-10-11 PROCEDURE — 86901 BLOOD TYPING SEROLOGIC RH(D): CPT

## 2017-10-11 PROCEDURE — 3600000004 HC SURGERY LEVEL 4 BASE: Performed by: ORTHOPAEDIC SURGERY

## 2017-10-11 PROCEDURE — 36415 COLL VENOUS BLD VENIPUNCTURE: CPT

## 2017-10-11 PROCEDURE — 1210000000 HC MED SURG R&B

## 2017-10-11 DEVICE — IMPLANTABLE DEVICE: Type: IMPLANTABLE DEVICE | Site: HIP | Status: FUNCTIONAL

## 2017-10-11 DEVICE — NAIL IM L170MM DIA11MM 130DEG TROCHANTERIC FEM GRN TI CANN: Type: IMPLANTABLE DEVICE | Site: HIP | Status: FUNCTIONAL

## 2017-10-11 DEVICE — BLADE IM L95MM DIA11MM ST G TI HNDL HELI FOR TROCHANTERIC: Type: IMPLANTABLE DEVICE | Site: HIP | Status: FUNCTIONAL

## 2017-10-11 RX ORDER — MORPHINE SULFATE 4 MG/ML
4 INJECTION, SOLUTION INTRAMUSCULAR; INTRAVENOUS EVERY 5 MIN PRN
Status: DISCONTINUED | OUTPATIENT
Start: 2017-10-11 | End: 2017-10-11 | Stop reason: HOSPADM

## 2017-10-11 RX ORDER — HYDRALAZINE HYDROCHLORIDE 20 MG/ML
5 INJECTION INTRAMUSCULAR; INTRAVENOUS EVERY 10 MIN PRN
Status: DISCONTINUED | OUTPATIENT
Start: 2017-10-11 | End: 2017-10-11 | Stop reason: HOSPADM

## 2017-10-11 RX ORDER — ROCURONIUM BROMIDE 10 MG/ML
INJECTION, SOLUTION INTRAVENOUS PRN
Status: DISCONTINUED | OUTPATIENT
Start: 2017-10-11 | End: 2017-10-11 | Stop reason: SDUPTHER

## 2017-10-11 RX ORDER — METOCLOPRAMIDE HYDROCHLORIDE 5 MG/ML
10 INJECTION INTRAMUSCULAR; INTRAVENOUS
Status: DISCONTINUED | OUTPATIENT
Start: 2017-10-11 | End: 2017-10-11 | Stop reason: HOSPADM

## 2017-10-11 RX ORDER — LIDOCAINE HYDROCHLORIDE 10 MG/ML
1 INJECTION, SOLUTION EPIDURAL; INFILTRATION; INTRACAUDAL; PERINEURAL
Status: DISCONTINUED | OUTPATIENT
Start: 2017-10-11 | End: 2017-10-11 | Stop reason: HOSPADM

## 2017-10-11 RX ORDER — LABETALOL HYDROCHLORIDE 5 MG/ML
5 INJECTION, SOLUTION INTRAVENOUS EVERY 10 MIN PRN
Status: DISCONTINUED | OUTPATIENT
Start: 2017-10-11 | End: 2017-10-11 | Stop reason: HOSPADM

## 2017-10-11 RX ORDER — LIDOCAINE HYDROCHLORIDE 10 MG/ML
INJECTION, SOLUTION INFILTRATION; PERINEURAL PRN
Status: DISCONTINUED | OUTPATIENT
Start: 2017-10-11 | End: 2017-10-11 | Stop reason: SDUPTHER

## 2017-10-11 RX ORDER — EPHEDRINE SULFATE 50 MG/ML
INJECTION, SOLUTION INTRAVENOUS PRN
Status: DISCONTINUED | OUTPATIENT
Start: 2017-10-11 | End: 2017-10-11 | Stop reason: SDUPTHER

## 2017-10-11 RX ORDER — SCOLOPAMINE TRANSDERMAL SYSTEM 1 MG/1
1 PATCH, EXTENDED RELEASE TRANSDERMAL
Status: DISCONTINUED | OUTPATIENT
Start: 2017-10-11 | End: 2017-10-14

## 2017-10-11 RX ORDER — SODIUM CHLORIDE 0.9 % (FLUSH) 0.9 %
10 SYRINGE (ML) INJECTION EVERY 12 HOURS SCHEDULED
Status: DISCONTINUED | OUTPATIENT
Start: 2017-10-11 | End: 2017-10-14 | Stop reason: HOSPADM

## 2017-10-11 RX ORDER — 0.9 % SODIUM CHLORIDE 0.9 %
250 INTRAVENOUS SOLUTION INTRAVENOUS ONCE
Status: DISCONTINUED | OUTPATIENT
Start: 2017-10-11 | End: 2017-10-14 | Stop reason: HOSPADM

## 2017-10-11 RX ORDER — HYDROCODONE BITARTRATE AND ACETAMINOPHEN 5; 325 MG/1; MG/1
2 TABLET ORAL EVERY 4 HOURS PRN
Status: DISCONTINUED | OUTPATIENT
Start: 2017-10-11 | End: 2017-10-12

## 2017-10-11 RX ORDER — IPRATROPIUM BROMIDE AND ALBUTEROL SULFATE 2.5; .5 MG/3ML; MG/3ML
1 SOLUTION RESPIRATORY (INHALATION) EVERY 4 HOURS PRN
Status: DISCONTINUED | OUTPATIENT
Start: 2017-10-11 | End: 2017-10-14 | Stop reason: HOSPADM

## 2017-10-11 RX ORDER — PROMETHAZINE HYDROCHLORIDE 25 MG/ML
6.25 INJECTION, SOLUTION INTRAMUSCULAR; INTRAVENOUS
Status: DISCONTINUED | OUTPATIENT
Start: 2017-10-11 | End: 2017-10-11 | Stop reason: HOSPADM

## 2017-10-11 RX ORDER — MEPERIDINE HYDROCHLORIDE 50 MG/ML
12.5 INJECTION INTRAMUSCULAR; INTRAVENOUS; SUBCUTANEOUS EVERY 5 MIN PRN
Status: DISCONTINUED | OUTPATIENT
Start: 2017-10-11 | End: 2017-10-11 | Stop reason: HOSPADM

## 2017-10-11 RX ORDER — DIPHENHYDRAMINE HYDROCHLORIDE 50 MG/ML
12.5 INJECTION INTRAMUSCULAR; INTRAVENOUS
Status: DISCONTINUED | OUTPATIENT
Start: 2017-10-11 | End: 2017-10-11 | Stop reason: HOSPADM

## 2017-10-11 RX ORDER — MORPHINE SULFATE 4 MG/ML
2 INJECTION, SOLUTION INTRAMUSCULAR; INTRAVENOUS EVERY 5 MIN PRN
Status: DISCONTINUED | OUTPATIENT
Start: 2017-10-11 | End: 2017-10-11 | Stop reason: HOSPADM

## 2017-10-11 RX ORDER — ONDANSETRON 2 MG/ML
INJECTION INTRAMUSCULAR; INTRAVENOUS PRN
Status: DISCONTINUED | OUTPATIENT
Start: 2017-10-11 | End: 2017-10-11 | Stop reason: SDUPTHER

## 2017-10-11 RX ORDER — SODIUM CHLORIDE 0.9 % (FLUSH) 0.9 %
10 SYRINGE (ML) INJECTION EVERY 12 HOURS SCHEDULED
Status: DISCONTINUED | OUTPATIENT
Start: 2017-10-11 | End: 2017-10-11 | Stop reason: HOSPADM

## 2017-10-11 RX ORDER — ALBUMIN, HUMAN INJ 5% 5 %
SOLUTION INTRAVENOUS PRN
Status: DISCONTINUED | OUTPATIENT
Start: 2017-10-11 | End: 2017-10-11 | Stop reason: SDUPTHER

## 2017-10-11 RX ORDER — FENTANYL CITRATE 50 UG/ML
INJECTION, SOLUTION INTRAMUSCULAR; INTRAVENOUS PRN
Status: DISCONTINUED | OUTPATIENT
Start: 2017-10-11 | End: 2017-10-11 | Stop reason: SDUPTHER

## 2017-10-11 RX ORDER — MIDAZOLAM HYDROCHLORIDE 1 MG/ML
2 INJECTION INTRAMUSCULAR; INTRAVENOUS
Status: DISCONTINUED | OUTPATIENT
Start: 2017-10-11 | End: 2017-10-11 | Stop reason: HOSPADM

## 2017-10-11 RX ORDER — PROPOFOL 10 MG/ML
INJECTION, EMULSION INTRAVENOUS PRN
Status: DISCONTINUED | OUTPATIENT
Start: 2017-10-11 | End: 2017-10-11 | Stop reason: SDUPTHER

## 2017-10-11 RX ORDER — CLINDAMYCIN PHOSPHATE 900 MG/50ML
900 INJECTION INTRAVENOUS EVERY 8 HOURS
Status: COMPLETED | OUTPATIENT
Start: 2017-10-11 | End: 2017-10-12

## 2017-10-11 RX ORDER — FENTANYL CITRATE 50 UG/ML
50 INJECTION, SOLUTION INTRAMUSCULAR; INTRAVENOUS
Status: DISCONTINUED | OUTPATIENT
Start: 2017-10-11 | End: 2017-10-11 | Stop reason: HOSPADM

## 2017-10-11 RX ORDER — HYDROCODONE BITARTRATE AND ACETAMINOPHEN 5; 325 MG/1; MG/1
1 TABLET ORAL EVERY 4 HOURS PRN
Status: DISCONTINUED | OUTPATIENT
Start: 2017-10-11 | End: 2017-10-14 | Stop reason: HOSPADM

## 2017-10-11 RX ORDER — DOCUSATE SODIUM 100 MG/1
100 CAPSULE, LIQUID FILLED ORAL 2 TIMES DAILY
Status: DISCONTINUED | OUTPATIENT
Start: 2017-10-11 | End: 2017-10-14 | Stop reason: HOSPADM

## 2017-10-11 RX ORDER — SODIUM CHLORIDE, SODIUM LACTATE, POTASSIUM CHLORIDE, CALCIUM CHLORIDE 600; 310; 30; 20 MG/100ML; MG/100ML; MG/100ML; MG/100ML
INJECTION, SOLUTION INTRAVENOUS CONTINUOUS
Status: DISCONTINUED | OUTPATIENT
Start: 2017-10-11 | End: 2017-10-11

## 2017-10-11 RX ORDER — MORPHINE SULFATE 4 MG/ML
2 INJECTION, SOLUTION INTRAMUSCULAR; INTRAVENOUS
Status: DISCONTINUED | OUTPATIENT
Start: 2017-10-11 | End: 2017-10-12

## 2017-10-11 RX ORDER — SODIUM CHLORIDE, SODIUM LACTATE, POTASSIUM CHLORIDE, CALCIUM CHLORIDE 600; 310; 30; 20 MG/100ML; MG/100ML; MG/100ML; MG/100ML
INJECTION, SOLUTION INTRAVENOUS CONTINUOUS
Status: DISCONTINUED | OUTPATIENT
Start: 2017-10-11 | End: 2017-10-12

## 2017-10-11 RX ORDER — SODIUM CHLORIDE 0.9 % (FLUSH) 0.9 %
10 SYRINGE (ML) INJECTION PRN
Status: DISCONTINUED | OUTPATIENT
Start: 2017-10-11 | End: 2017-10-14 | Stop reason: HOSPADM

## 2017-10-11 RX ORDER — MORPHINE SULFATE 4 MG/ML
4 INJECTION, SOLUTION INTRAMUSCULAR; INTRAVENOUS
Status: DISCONTINUED | OUTPATIENT
Start: 2017-10-11 | End: 2017-10-12

## 2017-10-11 RX ORDER — ONDANSETRON 2 MG/ML
4 INJECTION INTRAMUSCULAR; INTRAVENOUS EVERY 6 HOURS PRN
Status: DISCONTINUED | OUTPATIENT
Start: 2017-10-11 | End: 2017-10-14 | Stop reason: HOSPADM

## 2017-10-11 RX ORDER — SODIUM CHLORIDE 0.9 % (FLUSH) 0.9 %
10 SYRINGE (ML) INJECTION PRN
Status: DISCONTINUED | OUTPATIENT
Start: 2017-10-11 | End: 2017-10-11 | Stop reason: HOSPADM

## 2017-10-11 RX ORDER — SCOLOPAMINE TRANSDERMAL SYSTEM 1 MG/1
1 PATCH, EXTENDED RELEASE TRANSDERMAL
Status: DISCONTINUED | OUTPATIENT
Start: 2017-10-11 | End: 2017-10-11

## 2017-10-11 RX ORDER — MORPHINE SULFATE 4 MG/ML
4 INJECTION, SOLUTION INTRAMUSCULAR; INTRAVENOUS
Status: DISCONTINUED | OUTPATIENT
Start: 2017-10-11 | End: 2017-10-11 | Stop reason: HOSPADM

## 2017-10-11 RX ORDER — ACETAMINOPHEN 325 MG/1
650 TABLET ORAL EVERY 4 HOURS PRN
Status: DISCONTINUED | OUTPATIENT
Start: 2017-10-11 | End: 2017-10-14 | Stop reason: HOSPADM

## 2017-10-11 RX ORDER — FAMOTIDINE 20 MG/1
20 TABLET, FILM COATED ORAL 2 TIMES DAILY PRN
Status: DISCONTINUED | OUTPATIENT
Start: 2017-10-11 | End: 2017-10-14 | Stop reason: HOSPADM

## 2017-10-11 RX ORDER — LACTULOSE 10 G/15ML
20 SOLUTION ORAL 3 TIMES DAILY PRN
Status: DISCONTINUED | OUTPATIENT
Start: 2017-10-11 | End: 2017-10-14 | Stop reason: HOSPADM

## 2017-10-11 RX ADMIN — CHOLECALCIFEROL TAB 10 MCG (400 UNIT) 400 UNITS: 10 TAB at 20:34

## 2017-10-11 RX ADMIN — HYDROMORPHONE HYDROCHLORIDE 0.5 MG: 1 INJECTION, SOLUTION INTRAMUSCULAR; INTRAVENOUS; SUBCUTANEOUS at 16:31

## 2017-10-11 RX ADMIN — PROPOFOL 150 MG: 10 INJECTION, EMULSION INTRAVENOUS at 10:24

## 2017-10-11 RX ADMIN — DOCUSATE SODIUM 100 MG: 100 CAPSULE, LIQUID FILLED ORAL at 20:34

## 2017-10-11 RX ADMIN — PROPAFENONE HYDROCHLORIDE 225 MG: 225 TABLET, FILM COATED ORAL at 22:26

## 2017-10-11 RX ADMIN — SODIUM CHLORIDE, SODIUM LACTATE, POTASSIUM CHLORIDE, AND CALCIUM CHLORIDE: 600; 310; 30; 20 INJECTION, SOLUTION INTRAVENOUS at 12:04

## 2017-10-11 RX ADMIN — ROCURONIUM BROMIDE 50 MG: 10 INJECTION INTRAVENOUS at 10:24

## 2017-10-11 RX ADMIN — HYDROCODONE BITARTRATE AND ACETAMINOPHEN 2 TABLET: 5; 325 TABLET ORAL at 20:34

## 2017-10-11 RX ADMIN — EPHEDRINE SULFATE 10 MG: 50 INJECTION, SOLUTION INTRAMUSCULAR; INTRAVENOUS; SUBCUTANEOUS at 10:31

## 2017-10-11 RX ADMIN — HYDROMORPHONE HYDROCHLORIDE 0.5 MG: 1 INJECTION, SOLUTION INTRAMUSCULAR; INTRAVENOUS; SUBCUTANEOUS at 11:31

## 2017-10-11 RX ADMIN — SODIUM CHLORIDE, SODIUM LACTATE, POTASSIUM CHLORIDE, AND CALCIUM CHLORIDE: 600; 310; 30; 20 INJECTION, SOLUTION INTRAVENOUS at 08:50

## 2017-10-11 RX ADMIN — EPHEDRINE SULFATE 10 MG: 50 INJECTION, SOLUTION INTRAMUSCULAR; INTRAVENOUS; SUBCUTANEOUS at 10:36

## 2017-10-11 RX ADMIN — LIDOCAINE HYDROCHLORIDE 50 ML: 10 INJECTION, SOLUTION INFILTRATION; PERINEURAL at 10:24

## 2017-10-11 RX ADMIN — HYDROMORPHONE HYDROCHLORIDE 0.5 MG: 1 INJECTION, SOLUTION INTRAMUSCULAR; INTRAVENOUS; SUBCUTANEOUS at 11:28

## 2017-10-11 RX ADMIN — ONDANSETRON HYDROCHLORIDE 4 MG: 2 INJECTION, SOLUTION INTRAVENOUS at 11:25

## 2017-10-11 RX ADMIN — PROPAFENONE HYDROCHLORIDE 225 MG: 225 TABLET, FILM COATED ORAL at 14:53

## 2017-10-11 RX ADMIN — VANCOMYCIN HYDROCHLORIDE 1000 MG: 1 INJECTION, SOLUTION INTRAVENOUS at 17:40

## 2017-10-11 RX ADMIN — ALBUMIN (HUMAN) 12.5 G: 2.5 SOLUTION INTRAVENOUS at 10:47

## 2017-10-11 RX ADMIN — PROPAFENONE HYDROCHLORIDE 225 MG: 225 TABLET, FILM COATED ORAL at 05:55

## 2017-10-11 RX ADMIN — HYDROCODONE BITARTRATE AND ACETAMINOPHEN 1 TABLET: 5; 325 TABLET ORAL at 14:54

## 2017-10-11 RX ADMIN — CLINDAMYCIN PHOSPHATE 600 MG: 12 INJECTION, SOLUTION INTRAMUSCULAR; INTRAVENOUS at 10:40

## 2017-10-11 RX ADMIN — Medication 600 MG: at 20:34

## 2017-10-11 RX ADMIN — CLINDAMYCIN PHOSPHATE 900 MG: 18 INJECTION, SOLUTION INTRAVENOUS at 19:53

## 2017-10-11 RX ADMIN — SUGAMMADEX 125 MG: 100 INJECTION, SOLUTION INTRAVENOUS at 11:34

## 2017-10-11 RX ADMIN — FENTANYL CITRATE 100 MCG: 50 INJECTION INTRAMUSCULAR; INTRAVENOUS at 10:24

## 2017-10-11 RX ADMIN — DIAZEPAM 2 MG: 2 TABLET ORAL at 02:12

## 2017-10-11 RX ADMIN — ENOXAPARIN SODIUM 60 MG: 60 INJECTION SUBCUTANEOUS at 20:34

## 2017-10-11 RX ADMIN — HYDROMORPHONE HYDROCHLORIDE 0.5 MG: 1 INJECTION, SOLUTION INTRAMUSCULAR; INTRAVENOUS; SUBCUTANEOUS at 01:11

## 2017-10-11 RX ADMIN — EPHEDRINE SULFATE 5 MG: 50 INJECTION, SOLUTION INTRAMUSCULAR; INTRAVENOUS; SUBCUTANEOUS at 11:17

## 2017-10-11 ASSESSMENT — PAIN SCALES - GENERAL
PAINLEVEL_OUTOF10: 0
PAINLEVEL_OUTOF10: 0
PAINLEVEL_OUTOF10: 10
PAINLEVEL_OUTOF10: 0
PAINLEVEL_OUTOF10: 0
PAINLEVEL_OUTOF10: 6
PAINLEVEL_OUTOF10: 0
PAINLEVEL_OUTOF10: 7
PAINLEVEL_OUTOF10: 5

## 2017-10-11 ASSESSMENT — COPD QUESTIONNAIRES: CAT_SEVERITY: MILD

## 2017-10-11 NOTE — PLAN OF CARE
Problem: Nutrition  Goal: Optimal nutrition therapy  Outcome: Ongoing  Nutrition Problem: Increased nutrient needs  Intervention: Food and/or Nutrient Delivery: Start oral diet, Continue current ONS  Nutritional Goals: PO 50% or more of meals and ONS.

## 2017-10-11 NOTE — PROGRESS NOTES
Bonnie SCHULTZ notified earlier that night shift reported poor urinary output and report of increase IVF in OR with a 300 ml bolus with only 30 ml output. Orders received for urology consult.

## 2017-10-11 NOTE — CONSULTS
Inpatient consult to Urology  Consult performed by: Asya Jacobs ordered by: Nolele Rothman  Assessment/Recommendations: Department of Urology  Attending Consult Note      Reason for Consult:  Hematuria and decreased urine output  Requesting Physician:  Helen Doan    CHIEF COMPLAINT:  Hip fracture    History Obtained From:  patient    HISTORY OF PRESENT ILLNESS:                The patient is a 80 y.o. female with significant past medical history of hip fracture who presents with hematuria and decreased urine output.   Pt had stone removal and stent insertion in August.      Past Medical History:       No date: A-fib [facility])  No date: Anemia  No date: CAD (coronary artery disease)  No date: Conjunctivitis  No date: COPD (chronic obstructive pulmonary disease) (*  No date: Depression  No date: Fatigue  No date: Kidney stone  No date: Leukopenia  No date: Low back pain  5/15/2017: Mild CAD  No date: Osteoporosis  No date: Paroxysmal a-fib (HCC)  No date: Sinus pause      Comment: 10/2014  No date: Urinary tract infection  No date: Weakness  Past Surgical History:       No date: BACK SURGERY  8/14/14  [address]: CARDIAC CATHETERIZATION      Comment: Mild, non-occlusive CAD, EF 60%  8/31/2017: CYSTOSCOPY Left      Comment: CYSTOSCOPY; LEFT URETEROSCOPY; LEFT URETERAL                LASER LITHOTRIPSY AND STONE EXTRACTION;                INSERTION LEFT URETERAL DOUBLE J STENT                performed by Pedro Escalera MD at [facility]  10/11/2017: FEMUR FRACTURE SURGERY Right      Comment: FEMUR IM NAIL MICHELLE INSERTION performed by Adrián Peres DO at [facility] OR  No date: HERNIA REPAIR  No date: HYSTERECTOMY  No date: KNEE ARTHROPLASTY      Comment: x 2  No date: LAMINECTOMY      Comment: 2 lumbar segments  No date: SHOULDER ARTHROPLASTY  Current Medications:   Current Facility-Administered Medications: 0.9 % sodium chloride bolus, 250 mL, Intravenous, Once  ipratropium-albuterol (DUONEB) nebulizer solution 1 ampule, 1 ampule, Inhalation, Q4H PRN  lactated ringers infusion, , Intravenous, Continuous  sodium chloride flush 0.9 % injection 10 mL, 10 mL, Intravenous, 2 times per day  sodium chloride flush 0.9 % injection 10 mL, 10 mL, Intravenous, PRN  clindamycin (CLEOCIN) 900 mg in dextrose 5 % 50 mL IVPB, 900 mg, Intravenous, Q8H  acetaminophen (TYLENOL) tablet 650 mg, 650 mg, Oral, Q4H PRN  HYDROcodone-acetaminophen (NORCO) 5-325 MG per tablet 1 tablet, 1 tablet, Oral, Q4H PRN **OR** HYDROcodone-acetaminophen (NORCO) 5-325 MG per tablet 2 tablet, 2 tablet, Oral, Q4H PRN  morphine injection 2 mg, 2 mg, Intravenous, Q2H PRN **OR** morphine injection 4 mg, 4 mg, Intravenous, Q2H PRN  docusate sodium (COLACE) capsule 100 mg, 100 mg, Oral, BID  lactulose (CHRONULAC) 10 GM/15ML solution 20 g, 20 g, Oral, TID PRN  ondansetron (ZOFRAN) injection 4 mg, 4 mg, Intravenous, Q6H PRN  famotidine (PEPCID) tablet 20 mg, 20 mg, Oral, BID PRN  diazepam (VALIUM) tablet 2 mg, 2 mg, Oral, Q6H PRN  vancomycin (VANCOCIN) 1000 mg in dextrose 5% 200 mL IVPB, 1,000 mg, Intravenous, Q24H  vancomycin (VANCOCIN) intermittent dosing (placeholder), , Other, [place]  influenza quadrivalent subunit vaccine (FLUCELVAX) injection 0.5 mL, 0.5 mL, Intramuscular, Once  bisacodyl (DULCOLAX) suppository 10 mg, 10 mg, Rectal, Daily PRN  clindamycin (CLEOCIN) 600 mg in dextrose 5 % 50 mL IVPB, 600 mg, Intravenous, 30 Min Pre-Op  levothyroxine (SYNTHROID) tablet 75 mcg, 75 mcg, Oral, Daily  meclizine (ANTIVERT) tablet 12.5 mg, 12.5 mg, Oral, PRN  pantoprazole (PROTONIX) tablet 40 mg, 40 mg, Oral, QAM AC  propafenone (RYTHMOL) tablet 225 mg, 225 mg, Oral, 3 times per day  calcium carbonate tablet 600 mg, 600 mg, Oral, BID **AND** vitamin D3 (CHOLECALCIFEROL) tablet 400 Units, 400 Units, Oral, BID  HYDROmorphone (DILAUDID) injection 0.5 mg, 0.5 mg, Intravenous, Q4H PRN **OR** HYDROmorphone (DILAUDID) injection 1 mg, 1 mg, Intravenous, Q4H PRN  enoxaparin (LOVENOX) injection 60 mg, 1 mg/kg, Subcutaneous, BID  0.9 % sodium chloride infusion, , Intravenous, Continuous    Allergies:  Nitrofuran derivatives; Pcn (penicillins); and Sulfa antibiotics    Social History:   TOBACCO:  Never used tobacco  Family History:   Review of patient's family history indicates:    Cancer                         Father                      Comment: lung    Stroke                         Mother                      Comment: mini    Hypotension                    Mother                    REVIEW OF SYSTEMS:  GENITOURINARY:  positive for hematuria. Pt has indwelling stent and is on blood thinners    PHYSICAL EXAM:    VITALS:  /80   Pulse 71   Temp 97.2 °F (36.2 °C) (Temporal)   Resp 14   Ht 5' 4\" (1.626 m)   Wt 138 lb (62.6 kg)   SpO2 91%   BMI 23.69 kg/m²   ABDOMEN:  No scars, normal bowel sounds, soft, non-distended, non-tender, no masses palpated, no hepatosplenomegally  DATA:  CBC: Lab Results       Component                Value               Date                       WBC                      5.5                 10/11/2017                 RBC                      2.29                10/11/2017                 HGB                      7.5                 10/11/2017                 HCT                      23.4                10/11/2017                 MCV                      102.2               10/11/2017                 MCH                      32.8                10/11/2017                 MCHC                     32.1                10/11/2017                 RDW                      16.0                10/11/2017                 PLT                      268                 10/11/2017                 MPV                      10.6                10/11/2017            IMPRESSION/RECOMMENDATION:    Hematuria and decreased urine output. Hematuria is probably secondary to indwelling stent and blood thinners. Pt urine C+S shows Staph. Pt has low H+H.   Would

## 2017-10-11 NOTE — ANESTHESIA POSTPROCEDURE EVALUATION
Department of Anesthesiology  Postprocedure Note    Patient: Kamran Bridges  MRN: [id number]  YOB: 1932  Date of evaluation: 10/11/2017  Time:  11:48 AM     Procedure Summary     Date:  10/11/17 Room / Location:  [facility] OR  / [facility] OR    Anesthesia Start:  1020 Anesthesia Stop:      Procedure:  FEMUR IM NAIL MICHELLE INSERTION (Right Hip) Diagnosis:  (femur fracture)    Surgeon:  Anival Rosado DO Responsible Provider:  Nikko Mitchell CRNA    Anesthesia Type:  general ASA Status:  3          Anesthesia Type: general    Lisa Phase I: Lisa Score: 6    Lisa Phase II:      Last vitals: Reviewed and per EMR flowsheets.        Anesthesia Post Evaluation    Patient location during evaluation: PACU  Patient participation: waiting for patient participation  Level of consciousness: sleepy but conscious  Pain score: 1  Airway patency: patent  Nausea & Vomiting: no nausea and no vomiting  Complications: no  Cardiovascular status: blood pressure returned to baseline and hemodynamically stable  Respiratory status: acceptable, nasal cannula and spontaneous ventilation  Hydration status: stable

## 2017-10-11 NOTE — OP NOTE
AP and Lateral c-arm views. The starter drill was advanced over the pin and the drill/pin were removed. The nail was advanced down the canal.   A reamer was utilized to create a starting hole in the proximal femoral metaphysis diaphysis. Radiopaque ruler confirmed appropriate trochanteric fixation nail length and diameter and angulation. The appropriate trochanteric fixation nail was attached to the external alignment guide and impacted into the proximal right femoral metaphysis and diaphysis. The proximal cannula was placed through the external alignment guide and pressed against the skin. The skin was incised over the skin impression and the scalpel advanced to bone. The cannula was advanced up against the lateral femur. A guille pin was advanced through the cannula to within 5 mm of subchondral bone of the femoral head. The pin was centered in the head on AP and lateral views. Depth gauge confirmed the appropriate spiral blade length. After appropriate reaming of the right femoral head neck over the Steinmann pin a spiral blade was impacted over the guide pin and into the femoral head and neck. Spiral blade was locked to the proximal aspect of the trochanteric fixation nail with the trochanteric fixation nail's incorporated proximal locking screw. Guide pin was removed from the femoral head and neck. With the assistance of the external alignment guide a distal locking screw was placed. A 5.0 mm distal titanium locking screw was utilized for this purpose. Fluoroscopic visualization confirmed appropriate fracture reduction appropriate hardware placement. Fluoroscopic spot films were obtained for medical records purposes. Wounds were copiously irrigated pulsatile irrigation. The fascia was closed with 0 vicryl running suture. The subcutaneous layer was closed  with 2-0 vicryl and the skin closed with staples. A sterile dressing was placed.   The patient was awakened, extubated and transferred to recovery in

## 2017-10-11 NOTE — PROGRESS NOTES
Nutrition Assessment    Type and Reason for Visit: Reassess    Nutrition Recommendations: Will follow for intakes. Malnutrition Assessment:  · Malnutrition Status: At risk for malnutrition    Nutrition Diagnosis:   · Problem: Increased nutrient needs  · Etiology: related to Increased demand for energy/nutrients due to     Signs and symptoms:  as evidenced by  (anticipated surgery)    Nutrition Assessment:  · Subjective Assessment: Pt s/p surgery. ONS still ordered. Will follow for intakes. · Current Nutrition Therapies:  · Oral Diet Orders: NPO   · Oral Diet intake: 0%, 1-25%, 26-50%  · Oral Nutrition Supplement (ONS) Orders: Standard High Calorie Oral Supplement  · ONS intake: Unable to assess  · Anthropometric Measures:  · Ht: 5' 4\" (162.6 cm)   · Current Body Wt: 138 lb (62.6 kg)  · Admission Body Wt: 136 lb (61.7 kg)  · Ideal Body Wt: 120 lb (54.4 kg),   · BMI Classification: BMI 18.5 - 24.9 Normal Weight    Estimated Intake vs Estimated Needs: Intake Less Than Needs, Intake Improving    Nutrition Risk Level: Moderate    Nutrition Interventions:   Start oral diet, Continue current ONS  Continued Inpatient Monitoring    Nutrition Evaluation:   · Evaluation: Progressing toward goals   · Goals: PO 50% or more of meals and ONS. · Monitoring: Meal Intake, Supplement Intake, Weight, Pertinent Labs, Wound Healing    See Adult Nutrition Doc Flowsheet for more detail.      Electronically signed by Tavares Waters MS, RD, LD on 10/11/17 at 1:16 PM

## 2017-10-11 NOTE — ANESTHESIA PRE PROCEDURE
Department of Anesthesiology  Preprocedure Note       Name:  Nancy Machado   Age:  80 y.o.  :  1932                                          MRN:  [id number]         Date:  10/11/2017      Surgeon: Guanakito Quinteros):  Gladys Celis DO    Procedure: Procedure(s): FEMUR IM NAIL MICHELLE INSERTION    Medications prior to admission:   Prior to Admission medications    Medication Sig Start Date End Date Taking? Authorizing Provider   omeprazole (PRILOSEC) 10 MG delayed release capsule Take 10 mg by mouth daily   Yes Historical Provider, MD   propafenone (RYTHMOL) 225 MG tablet TAKE ONE TABLET BY MOUTH EVERY EIGHT HOURS 17  Yes ROCAEL Green   HYDROcodone-acetaminophen (NORCO) 7.5-325 MG per tablet Take 1 tablet by mouth every 6 hours as needed for Pain . 17  Yes Omkar Araya MD   levothyroxine (SYNTHROID) 75 MCG tablet TAKE ONE TABLET BY MOUTH DAILY AS DIRECTED 8/10/17  Yes Marcelina Douglas MD   XARELTO 20 MG TABS tablet TAKE 1 TABLET DAILY WITH BREAKFAST 17  Yes ROCAEL Subramanian   meclizine (ANTIVERT) 12.5 MG tablet Take 12.5 mg by mouth as needed   Yes Historical Provider, MD   Calcium Carbonate-Vitamin D (CALTRATE 600+D PO) Take 600 mg by mouth 2 times daily.    Yes Historical Provider, MD   furosemide (LASIX) 20 MG tablet Take 20 mg by mouth daily as needed    Yes Historical Provider, MD   alfuzosin (UROXATRAL) 10 MG extended release tablet Take 1 tablet by mouth daily for 14 days 17  Omkar Araya MD       Current medications:    Current Facility-Administered Medications   Medication Dose Route Frequency Provider Last Rate Last Dose    [MAR Hold] 0.9 % sodium chloride bolus  250 mL Intravenous Once Mak Goss MD        [facility] Hold] diazepam (VALIUM) tablet 2 mg  2 mg Oral Q6H PRN Kae Hughes DO   2 mg at 10/11/17 0212    [MAR Hold] vancomycin (VANCOCIN) 1000 mg in dextrose 5% 200 mL IVPB  1,000 mg Intravenous Q24H Dante Elaine PA-C   Stopped at 10/10/17 2104    [MAR Hold] 0.9 % sodium chloride infusion   Intravenous Continuous Wilder Whitlock  mL/hr at 10/10/17 1232         Allergies:     Allergies   Allergen Reactions    Nitrofuran Derivatives Swelling    Pcn [Penicillins]     Sulfa Antibiotics        Problem List:    Patient Active Problem List   Diagnosis Code    Fast heart beat R00.0    Sinus pause I45.5    Paroxysmal a-fib ([facility]) I48.0    Mild CAD I25.10    Left ureteral calculus N20.1    Renal calculus, left N20.0    Perinephric fluid collection N28.89    Closed right hip fracture ([facility]) S72.001A    Acute cystitis with hematuria N30.01    Gastroesophageal reflux disease without esophagitis K21.9    COPD (chronic obstructive pulmonary disease) ([facility]) J44.9    CAD (coronary artery disease) I25.10       Past Medical History:        Diagnosis Date    A-fib ([facility])     Anemia     CAD (coronary artery disease)     Conjunctivitis     COPD (chronic obstructive pulmonary disease) ([facility])     Depression     Fatigue     Kidney stone     Leukopenia     Low back pain     Mild CAD 5/15/2017    Osteoporosis     Paroxysmal a-fib ([facility])     Sinus pause     10/2014    Urinary tract infection     Weakness        Past Surgical History:        Procedure Laterality Date    BACK SURGERY      CARDIAC CATHETERIZATION  8/14/14  [facility]    Mild, non-occlusive CAD, EF 60%    CYSTOSCOPY Left 8/31/2017    CYSTOSCOPY; LEFT URETEROSCOPY; LEFT URETERAL LASER LITHOTRIPSY AND STONE EXTRACTION; INSERTION LEFT URETERAL DOUBLE J STENT performed by Pedro Escalera MD at [id number]      x 2    LAMINECTOMY      2 lumbar segments    SHOULDER ARTHROPLASTY         Social History:    Social History   Substance Use Topics    Smoking status: Never Smoker    Smokeless tobacco: Never Used    Alcohol use No                                Counseling given: Not Answered      Vital Signs (Current):   Vitals: 10/10/17 2328 10/11/17 0316 10/11/17 0319 10/11/17 0555   BP: 111/66 (!) 89/50 102/62 113/68   Pulse: 102 75  72   Resp: 18 15  16   Temp: 97.1 °F (36.2 °C) 97.1 °F (36.2 °C)  97.2 °F (36.2 °C)   TempSrc: Temporal Temporal  Temporal   SpO2: 91% 92%  92%   Weight:       Height:                                                  BP Readings from Last 3 Encounters:   10/11/17 113/68   09/22/17 90/60   08/31/17 (!) 144/55       NPO Status:                                                                                 BMI:   Wt Readings from Last 3 Encounters:   10/10/17 138 lb (62.6 kg)   09/22/17 136 lb (61.7 kg)   08/31/17 132 lb (59.9 kg)     Body mass index is 23.69 kg/m². CBC:   Lab Results   Component Value Date    WBC 5.5 10/11/2017    RBC 2.29 10/11/2017    HGB 7.5 10/11/2017    HCT 23.4 10/11/2017    .2 10/11/2017    RDW 16.0 10/11/2017     10/11/2017       CMP:   Lab Results   Component Value Date     10/11/2017    K 3.5 10/11/2017     10/11/2017    CO2 27 10/11/2017    BUN 18 10/11/2017    CREATININE 0.6 10/11/2017    LABGLOM >60 10/11/2017    GLUCOSE 120 10/11/2017    PROT 6.0 10/08/2017    CALCIUM 8.5 10/11/2017    BILITOT 0.5 10/08/2017    ALKPHOS 52 10/08/2017    AST 14 10/08/2017    ALT 8 10/08/2017       POC Tests: No results for input(s): POCGLU, POCNA, POCK, POCCL, POCBUN, POCHEMO, POCHCT in the last 72 hours.     Coags:   Lab Results   Component Value Date    PROTIME 22.5 10/08/2017    INR 1.97 10/08/2017    APTT 35.9 10/08/2017       HCG (If Applicable): No results found for: PREGTESTUR, PREGSERUM, HCG, HCGQUANT     ABGs: No results found for: PHART, PO2ART, ULM5ZGL, TCU6FSK, BEART, M8GEVLTO     Type & Screen (If Applicable):  No results found for: LABABO, [address]    Anesthesia Evaluation  Patient summary reviewed and Nursing notes reviewed no history of anesthetic complications:   Airway: Mallampati: II  TM distance: >3 FB   Neck ROM: full  Mouth opening: > = 3 FB Dental: normal exam   (+) upper dentures and lower dentures      Pulmonary:normal exam  breath sounds clear to auscultation  (+) COPD: mild,                             Cardiovascular:    (+) CAD: non-obstructive, dysrhythmias: atrial fibrillation,     (-) pacemaker    ECG reviewed  Rhythm: irregular  Rate: abnormal    Stress test reviewed             ROS comment: [facility] 2014 nl EF, 30% lad      Neuro/Psych:               GI/Hepatic/Renal:   (+) GERD:, renal disease: kidney stones,           Endo/Other:    (+) hypothyroidism, blood dyscrasia: anticoagulation therapy:. Comments: Anemia  Abdominal:       Abdomen: soft. Vascular:                                  Anesthesia Plan      general     ASA 3     (May need prbc )  Induction: intravenous. BIS  MIPS: Postoperative opioids intended and Prophylactic antiemetics administered. Anesthetic plan and risks discussed with patient.         Attending anesthesiologist reviewed and agrees with Pre Eval content              Twyla Lovell MD   10/11/2017

## 2017-10-11 NOTE — PROGRESS NOTES
0600  10/11/17   0238   WBC  6.5  5.3  5.5   HGB  10.2*  8.4*  7.5*   PLT  312  271  268     Recent Labs      10/10/17   0026  10/10/17   0600  10/11/17   0238   NA  138  141  139   K  4.0  4.2  3.5   CL  102  105  104   CO2  30*  29  27   BUN  16  16  18   CREATININE  0.5  0.4*  0.6   GLUCOSE  117*  106  120*     Recent Labs      10/08/17   2125   AST  14   ALT  8   BILITOT  0.5   ALKPHOS  52     INR:   Recent Labs      10/08/17   2036   INR  1.97*     UA:  Recent Labs      10/09/17   0105   COLORU  RED*   PHUR  7.0   WBCUA  107*   RBCUA  >100*   BACTERIA  2+   CLARITYU  TURBID*   SPECGRAV  1.017   LEUKOCYTESUR  LARGE*   UROBILINOGEN  1.0   BILIRUBINUR  SMALL*   BLOODU  LARGE*   GLUCOSEU  Negative     RAD:   CXR 10/08/2017-portable  Left basilar airspace opacities atelectasis versus less  likely pneumonia. Probable small left pleural effusion. Right Femur 10/08/2017  Acute comminuted mildly angulated intertrochanteric right  hip fracture, the lesser trochanter is sheared off and displaced  mildly medially. Right Knee 10/08/2017  Previous right knee arthroplasty is noted. No fracture or  acute abnormality is identified. Hip 2 view 10/08/2017  Acute closed comminuted intertrochanteric right hip  fracture with mild lateral angulation and displacement. CT head 10/08/2017  Impression: No acute intracranial abnormality. There are chronic  findings associated with aging. Right-sided mastoiditis.     Micro: Urine culture collected 10/09/2017  STAPHYLOCOCCUS EPIDERMIDIS   Antibiotic Interpretation ELLE Unit  benzylpenicillin Resistant 0.25 mcg/mL  doxycycline Sensitive  mcg/mL  nitrofurantoin Sensitive <=16 mcg/mL  oxacillin Sensitive <=0.25 mcg/mL  tetracycline Sensitive <=1 mcg/mL  vancomycin Sensitive 2 mcg/mL    Assessment/Plan   Principal Problem:    Closed right hip fracture (HCC)-mgmt per Dr. Karl Good,  to OR this AM  Active Problems:    Acute cystitis with hematuria + Staphylococcus coagulase-neg-

## 2017-10-12 ENCOUNTER — APPOINTMENT (OUTPATIENT)
Dept: GENERAL RADIOLOGY | Age: 82
DRG: 481 | End: 2017-10-12
Payer: MEDICARE

## 2017-10-12 LAB
ANION GAP SERPL CALCULATED.3IONS-SCNC: 5 MMOL/L (ref 7–19)
BUN BLDV-MCNC: 14 MG/DL (ref 8–23)
CALCIUM SERPL-MCNC: 8 MG/DL (ref 8.8–10.2)
CHLORIDE BLD-SCNC: 107 MMOL/L (ref 98–111)
CO2: 27 MMOL/L (ref 22–29)
CREAT SERPL-MCNC: 0.5 MG/DL (ref 0.5–0.9)
GFR NON-AFRICAN AMERICAN: >60
GLUCOSE BLD-MCNC: 97 MG/DL (ref 74–109)
HCT VFR BLD CALC: 25.5 % (ref 37–47)
HEMOGLOBIN: 8.1 G/DL (ref 12–16)
MCH RBC QN AUTO: 31.5 PG (ref 27–31)
MCHC RBC AUTO-ENTMCNC: 31.8 G/DL (ref 33–37)
MCV RBC AUTO: 99.2 FL (ref 81–99)
PDW BLD-RTO: 16.7 % (ref 11.5–14.5)
PLATELET # BLD: 248 K/UL (ref 130–400)
PMV BLD AUTO: 10.4 FL (ref 9.4–12.3)
POTASSIUM SERPL-SCNC: 3.9 MMOL/L (ref 3.5–5)
RBC # BLD: 2.57 M/UL (ref 4.2–5.4)
SODIUM BLD-SCNC: 139 MMOL/L (ref 136–145)
WBC # BLD: 5 K/UL (ref 4.8–10.8)

## 2017-10-12 PROCEDURE — 6370000000 HC RX 637 (ALT 250 FOR IP): Performed by: ORTHOPAEDIC SURGERY

## 2017-10-12 PROCEDURE — G8987 SELF CARE CURRENT STATUS: HCPCS

## 2017-10-12 PROCEDURE — 6370000000 HC RX 637 (ALT 250 FOR IP): Performed by: PHYSICIAN ASSISTANT

## 2017-10-12 PROCEDURE — G0008 ADMIN INFLUENZA VIRUS VAC: HCPCS | Performed by: HOSPITALIST

## 2017-10-12 PROCEDURE — 2580000003 HC RX 258: Performed by: ORTHOPAEDIC SURGERY

## 2017-10-12 PROCEDURE — 2500000003 HC RX 250 WO HCPCS: Performed by: ORTHOPAEDIC SURGERY

## 2017-10-12 PROCEDURE — 6360000002 HC RX W HCPCS: Performed by: HOSPITALIST

## 2017-10-12 PROCEDURE — 36415 COLL VENOUS BLD VENIPUNCTURE: CPT

## 2017-10-12 PROCEDURE — 6360000002 HC RX W HCPCS: Performed by: PHYSICIAN ASSISTANT

## 2017-10-12 PROCEDURE — G8988 SELF CARE GOAL STATUS: HCPCS

## 2017-10-12 PROCEDURE — 85027 COMPLETE CBC AUTOMATED: CPT

## 2017-10-12 PROCEDURE — 51798 US URINE CAPACITY MEASURE: CPT

## 2017-10-12 PROCEDURE — 1210000000 HC MED SURG R&B

## 2017-10-12 PROCEDURE — 6370000000 HC RX 637 (ALT 250 FOR IP): Performed by: INTERNAL MEDICINE

## 2017-10-12 PROCEDURE — G8978 MOBILITY CURRENT STATUS: HCPCS

## 2017-10-12 PROCEDURE — 90674 CCIIV4 VAC NO PRSV 0.5 ML IM: CPT | Performed by: HOSPITALIST

## 2017-10-12 PROCEDURE — 80048 BASIC METABOLIC PNL TOTAL CA: CPT

## 2017-10-12 PROCEDURE — 99232 SBSQ HOSP IP/OBS MODERATE 35: CPT | Performed by: INTERNAL MEDICINE

## 2017-10-12 PROCEDURE — 97162 PT EVAL MOD COMPLEX 30 MIN: CPT

## 2017-10-12 PROCEDURE — 97530 THERAPEUTIC ACTIVITIES: CPT

## 2017-10-12 PROCEDURE — 73502 X-RAY EXAM HIP UNI 2-3 VIEWS: CPT

## 2017-10-12 PROCEDURE — 2580000003 HC RX 258: Performed by: HOSPITALIST

## 2017-10-12 PROCEDURE — 97166 OT EVAL MOD COMPLEX 45 MIN: CPT

## 2017-10-12 PROCEDURE — 3E0234Z INTRODUCTION OF SERUM, TOXOID AND VACCINE INTO MUSCLE, PERCUTANEOUS APPROACH: ICD-10-PCS | Performed by: HOSPITALIST

## 2017-10-12 PROCEDURE — G8979 MOBILITY GOAL STATUS: HCPCS

## 2017-10-12 RX ORDER — DOXYCYCLINE HYCLATE 100 MG/1
100 CAPSULE ORAL EVERY 12 HOURS SCHEDULED
Status: DISCONTINUED | OUTPATIENT
Start: 2017-10-12 | End: 2017-10-14 | Stop reason: HOSPADM

## 2017-10-12 RX ORDER — DIAZEPAM 5 MG/1
5 TABLET ORAL EVERY 6 HOURS PRN
Status: DISCONTINUED | OUTPATIENT
Start: 2017-10-12 | End: 2017-10-12

## 2017-10-12 RX ADMIN — Medication 600 MG: at 10:11

## 2017-10-12 RX ADMIN — ENOXAPARIN SODIUM 60 MG: 60 INJECTION SUBCUTANEOUS at 22:05

## 2017-10-12 RX ADMIN — CLINDAMYCIN PHOSPHATE 900 MG: 18 INJECTION, SOLUTION INTRAVENOUS at 02:43

## 2017-10-12 RX ADMIN — HYDROCODONE BITARTRATE AND ACETAMINOPHEN 1 TABLET: 5; 325 TABLET ORAL at 22:06

## 2017-10-12 RX ADMIN — HYDROCODONE BITARTRATE AND ACETAMINOPHEN 2 TABLET: 5; 325 TABLET ORAL at 00:44

## 2017-10-12 RX ADMIN — CHOLECALCIFEROL TAB 10 MCG (400 UNIT) 400 UNITS: 10 TAB at 22:06

## 2017-10-12 RX ADMIN — HYDROCODONE BITARTRATE AND ACETAMINOPHEN 2 TABLET: 5; 325 TABLET ORAL at 15:49

## 2017-10-12 RX ADMIN — ACETAMINOPHEN 650 MG: 325 TABLET, FILM COATED ORAL at 22:06

## 2017-10-12 RX ADMIN — ENOXAPARIN SODIUM 60 MG: 60 INJECTION SUBCUTANEOUS at 10:12

## 2017-10-12 RX ADMIN — FAMOTIDINE 20 MG: 20 TABLET, FILM COATED ORAL at 22:06

## 2017-10-12 RX ADMIN — DIAZEPAM 2 MG: 2 TABLET ORAL at 10:11

## 2017-10-12 RX ADMIN — DIAZEPAM 5 MG: 5 TABLET ORAL at 02:33

## 2017-10-12 RX ADMIN — Medication 600 MG: at 22:06

## 2017-10-12 RX ADMIN — CHOLECALCIFEROL TAB 10 MCG (400 UNIT) 400 UNITS: 10 TAB at 10:11

## 2017-10-12 RX ADMIN — LEVOTHYROXINE SODIUM 75 MCG: 75 TABLET ORAL at 06:17

## 2017-10-12 RX ADMIN — PROPAFENONE HYDROCHLORIDE 225 MG: 225 TABLET, FILM COATED ORAL at 14:14

## 2017-10-12 RX ADMIN — HYDROCODONE BITARTRATE AND ACETAMINOPHEN 2 TABLET: 5; 325 TABLET ORAL at 10:12

## 2017-10-12 RX ADMIN — PROPAFENONE HYDROCHLORIDE 225 MG: 225 TABLET, FILM COATED ORAL at 22:06

## 2017-10-12 RX ADMIN — Medication 10 ML: at 22:06

## 2017-10-12 RX ADMIN — DOXYCYCLINE HYCLATE 100 MG: 100 CAPSULE, GELATIN COATED ORAL at 22:06

## 2017-10-12 RX ADMIN — DOCUSATE SODIUM 100 MG: 100 CAPSULE, LIQUID FILLED ORAL at 10:11

## 2017-10-12 RX ADMIN — PROPAFENONE HYDROCHLORIDE 225 MG: 225 TABLET, FILM COATED ORAL at 06:18

## 2017-10-12 RX ADMIN — DIAZEPAM 2 MG: 2 TABLET ORAL at 15:48

## 2017-10-12 RX ADMIN — SODIUM CHLORIDE: 9 INJECTION, SOLUTION INTRAVENOUS at 08:55

## 2017-10-12 RX ADMIN — PANTOPRAZOLE SODIUM 40 MG: 40 TABLET, DELAYED RELEASE ORAL at 06:18

## 2017-10-12 RX ADMIN — DOCUSATE SODIUM 100 MG: 100 CAPSULE, LIQUID FILLED ORAL at 22:06

## 2017-10-12 RX ADMIN — A/SINGAPORE/GP1908/2015 IVR-180 (H1N1) (AN A/MICHIGAN/45/2015-LIKE VIRUS), A/SINGAPORE/GP2050/2015 (H3N2) (AN A/HONG KONG/4801/2014 - LIKE VIRUS), B/UTAH/9/2014 (A B/PHUKET/3073/2013-LIKE VIRUS), B/HONG KONG/259/2010 (A B/BRISBANE/60/08-LIKE VIRUS) 0.5 ML: 15; 15; 15; 15 INJECTION, SUSPENSION INTRAMUSCULAR at 11:48

## 2017-10-12 ASSESSMENT — PAIN DESCRIPTION - LOCATION
LOCATION: HIP
LOCATION: HIP

## 2017-10-12 ASSESSMENT — PAIN SCALES - GENERAL
PAINLEVEL_OUTOF10: 7
PAINLEVEL_OUTOF10: 5
PAINLEVEL_OUTOF10: 7
PAINLEVEL_OUTOF10: 0
PAINLEVEL_OUTOF10: 7
PAINLEVEL_OUTOF10: 7

## 2017-10-12 ASSESSMENT — PAIN DESCRIPTION - PAIN TYPE
TYPE: SURGICAL PAIN
TYPE: SURGICAL PAIN

## 2017-10-12 ASSESSMENT — PAIN DESCRIPTION - ORIENTATION
ORIENTATION: RIGHT
ORIENTATION: RIGHT

## 2017-10-12 NOTE — PROGRESS NOTES
Talked to [facility] as requested by GAUDENCIO Henry to see if patient can start full dose lovenox. Ok per [facility] for patient to start full dose lovenox. Patient stable. Will continue to monitor.

## 2017-10-12 NOTE — PROGRESS NOTES
10/12/17 1613   Restrictions/Precautions   Restrictions/Precautions Weight Bearing   Lower Extremity Weight Bearing Restrictions   Right Lower Extremity Weight Bearing Weight Bearing As Tolerated   General   Chart Reviewed Yes   Additional Pertinent Hx LBP, AFIB, OSTEOPOROSIS   Family / Caregiver Present No   Subjective   Subjective I guess I am ready to go back to bed   Pain Screening   Patient Currently in Pain No   Vital Signs   Level of Consciousness 0   Oxygen Therapy   O2 Device None (Room air)   Bed Mobility   Sit to Supine Maximal assistance   Scooting Dependent/Total   Transfers   Sit to Stand Maximum Assistance   Stand to sit Moderate Assistance   Bed to Chair Maximum assistance   Stand Pivot Transfers Maximum Assistance   Comment Patient has difficulty moving R LE   Ambulation   Ambulation? No   Balance   Sitting - Static Fair;+   Sitting - Dynamic Fair;+   Standing - Static Poor   Standing - Dynamic Poor   Exercises   Comments Patient stood and pivot stepped to chair   Short term goals   Time Frame for Short term goals 14 DAYS BEFORE RE EVAL   Short term goal 1 SUP<>SIT MIN A 1   Short term goal 2 SIT<>STAND MIN A 1   Short term goal 3 STAND STEP TF'S WITH WALKER AND MIN A 1   Short term goal 4 AMB 50 FT WITH RW AND MIN A 1   Conditions Requiring Skilled Therapeutic Intervention   Body structures, Functions, Activity limitations Decreased functional mobility ; Decreased strength;Decreased endurance   Activity Tolerance   Activity Tolerance Patient limited by pain   Safety Devices   Type of devices Left in bed;Bed alarm in place;Call light within reach   PT Whiteboard Notes   Therapy Whiteboard WBAT R HIP FX   Physical Therapy  Facility/Department: [facility] SURG SERVICES  Daily Treatment Note  NAME: Nancy Machado  : 1932  MRN: [id number]    Date of Service: 10/12/2017    Patient Diagnosis(es):   Patient Active Problem List    Diagnosis Date Noted    Closed right hip fracture ([facility] [address] 10/09/2017    Consciousness: (P) Alert  Patient Currently in Pain: (P) No  Oxygen Therapy  O2 Device: (P) None (Room air)       Orientation     Objective   Bed mobility  Scooting: (P) Dependent/Total  Transfers  Sit to Stand: (P) Maximum Assistance  Stand to sit: (P) Moderate Assistance  Bed to Chair: (P) Maximum assistance  Stand Pivot Transfers: (P) Maximum Assistance  Comment: (P) Patient has difficulty moving R LE  Ambulation  Ambulation?: (P) No     Balance  Sitting - Static: (P) Fair;+  Sitting - Dynamic: (P) Fair;+  Standing - Static: (P) Poor  Standing - Dynamic: (P) Poor  Exercises  Comments: (P) Patient stood and pivot stepped to chair                        Assessment   Body structures, Functions, Activity limitations: (P) Decreased functional mobility ; Decreased strength;Decreased endurance  Activity Tolerance  Activity Tolerance: (P) Patient limited by pain       Discharge Recommendations:  Continue to assess pending progress, Patient would benefit from continued therapy after discharge    G-Code  PT G-Codes  Functional Assessment Tool Used: BED>CHAIR  Score: CL, MOD A 2  Functional Limitation: Mobility: Walking and moving around  Mobility: Walking and Moving Around Current Status (): At least 60 percent but less than 80 percent impaired, limited or restricted  Mobility: Walking and Moving Around Goal Status ():  At least 40 percent but less than 60 percent impaired, limited or restricted  OutComes Score                                                      Goals  Short term goals  Time Frame for Short term goals: (P) 14 DAYS BEFORE RE EVAL  Short term goal 1: (P) SUP<>SIT MIN A 1  Short term goal 2: (P) SIT<>STAND MIN A 1  Short term goal 3: (P) STAND STEP TF'S WITH WALKER AND MIN A 1  Short term goal 4: (P) AMB 50 FT WITH RW AND MIN A 1  Patient Goals   Patient goals : D/C TO FACILITY FOR ADDITIONAL PT    Plan    Plan  Times per week: PT AT LEAST ONCE DAILY X 14 DAYS  Current Treatment Recommendations: Functional Mobility Training, Transfer Training, Gait Training, Positioning, Pain Management, Safety Education & Training  Plan Comment: WBAT R LE  Safety Devices  Type of devices: (P) Left in bed, Bed alarm in place, Call light within reach     Therapy Time   Individual Concurrent Group Co-treatment   Time In           Time Out           Minutes                   Josiane Cordova PTA    Electronically signed by Josiane Cordova PTA on 10/12/2017 at 4:28 PM

## 2017-10-12 NOTE — CARE COORDINATION
NARCISO spoke with Pt daughter Uriel Tucker () regarding DC Plans. Yenny stated she would prefer that Pt go home with family support and hh services. Yenny stated she did not have any DME needs. NARCISO gave contact number to Pt daughter for her to follow up if her plans/needs change.  Electronically signed by Jessica Mcgregor on 10/12/2017 at 1:49 PM

## 2017-10-12 NOTE — PROGRESS NOTES
Department of Urology  Attending Progress Note      SUBJECTIVE:  decreased urine output    OBJECTIVE:  Decreased urine output    Physical  VITALS:  /73   Pulse 59   Temp 97.2 °F (36.2 °C) (Temporal)   Resp 16   Ht 5' 4\" (1.626 m)   Wt 138 lb (62.6 kg)   SpO2 91%   BMI 23.69 kg/m²   ABDOMEN:  No scars, normal bowel sounds, soft, non-distended, non-tender, no masses palpated, no hepatosplenomegally    Data  CBC:   Lab Results   Component Value Date    WBC 5.0 10/12/2017    RBC 2.57 10/12/2017    HGB 8.1 10/12/2017    HCT 25.5 10/12/2017    MCV 99.2 10/12/2017    MCH 31.5 10/12/2017    MCHC 31.8 10/12/2017    RDW 16.7 10/12/2017     10/12/2017    MPV 10.4 10/12/2017       ASSESSMENT AND PLAN    Decreased urine output. [place] out.   H/H 8.1/25.5  Continue to hydrate and moniter output

## 2017-10-12 NOTE — PLAN OF CARE
Problem: Risk for Impaired Skin Integrity  Goal: Tissue integrity - skin and mucous membranes  Structural intactness and normal physiological function of skin and  mucous membranes.    Outcome: Ongoing      Problem: Pain:  Goal: Pain level will decrease  Pain level will decrease   Outcome: Ongoing    Goal: Control of acute pain  Control of acute pain   Outcome: Ongoing    Goal: Control of chronic pain  Control of chronic pain   Outcome: Ongoing      Problem: Discharge Planning:  Goal: Knowledge of discharge instructions  Knowledge of discharge instructions  Outcome: Ongoing      Problem: Infection - Surgical Site:  Goal: Signs of wound healing will improve  Signs of wound healing will improve  Outcome: Ongoing      Problem: Mobility - Impaired:  Goal: Achieve maximum mobility level  Achieve maximum mobility level  Outcome: Ongoing

## 2017-10-12 NOTE — PROGRESS NOTES
Physical Therapy    Facility/Department: [facility] SURG SERVICES  Initial Assessment    NAME: Sujey Price  : 1932  MRN: [id number]    Date of Service: 10/12/2017    Patient Diagnosis(es): The primary encounter diagnosis was Closed right hip fracture, initial encounter ([facility] [address] A diagnosis of Head injury, initial encounter was also pertinent to this visit. has a past medical history of A-fib ([facility] [address] Anemia; CAD (coronary artery disease); Conjunctivitis; COPD (chronic obstructive pulmonary disease) ([facility] [address] Depression; Fatigue; Kidney stone; Leukopenia; Low back pain; Mild CAD; Osteoporosis; Paroxysmal a-fib ([facility] [address] Sinus pause; Urinary tract infection; and Weakness. has a past surgical history that includes Hysterectomy; laminectomy; Knee Arthroplasty; Total shoulder arthroplasty; hernia repair; Cardiac catheterization (14  [facility]); back surgery; Cystoscopy (Left, 2017); and Femur fracture surgery (Right, 10/11/2017). Restrictions  Restrictions/Precautions  Restrictions/Precautions: Weight Bearing  Lower Extremity Weight Bearing Restrictions  Right Lower Extremity Weight Bearing: Weight Bearing As Tolerated  Vision/Hearing  Vision: Within Functional Limits  Hearing: Exceptions to [facility]  Hearing Exceptions: Hard of hearing/hearing concerns     Subjective  General  Patient assessed for rehabilitation services?: Yes  Additional Pertinent Hx: LBP, AFIB, OSTEOPOROSIS  Diagnosis: R HIP FX WITH NAIL FIXATION  Follows Commands: Within Functional Limits  General Comment  Comments: Pt LYING IN BED WITH NO KRUSE. NOTED pt TO LIE LEANING TO THE LEFT WITH R LE IN IR  Subjective  Subjective: pt EXPRESSES ANXIETY ABOUT WORKING WITH PT/OT DUE TO PAIN.   RN NOTIFIED AND PAIN MEDS WERE OBTAINED  Pain Screening  Patient Currently in Pain: Yes          Orientation  Orientation  Overall Orientation Status: Within Functional Limits    Social/Functional History  Social/Functional History  Lives With: Alone  Type of Home: House  Home Layout: One level  Home Access: Ramped entrance  Bathroom Shower/Tub: Tub/Shower unit  Bathroom Toilet: Standard  Home Equipment: Rolling walker, 4 wheeled walker  ADL Assistance: Independent  Homemaking Assistance: Needs assistance  Ambulation Assistance: Independent (WITH ROLLATOR)  Transfer Assistance: Independent  Active : No  Additional Comments: Has family that lives next door and other family members who assist her  Objective          AROM RLE (degrees)  RLE General AROM: AS EXPECTED FOR POST OP  AROM LLE (degrees)  LLE AROM : WFL  Strength RLE  Comment: REQUIRES ASSSIST TO MOVE R LE FOR BED MOBLITY AND DIFFICULT TO ADVANCE FOR TAKING STEPS     Sensation  Overall Sensation Status: WFL  Bed mobility  Supine to Sit: Maximum assistance (X2, IN PART MAX OF TWO WAS FOR PAIN CONTROL FOR FIRST TIME SITTING UP EOB)  Transfers  Sit to Stand: Moderate Assistance (1+1 FOR SAFETY)  Stand to sit: Minimal Assistance (X2)  Bed to Chair: Minimal assistance; Moderate assistance  Comment: Pt HAS DIFFICULTY ADVANCING R LE DUE TO PAIN AND L LE DUE TO PAIN WITH TRYING TO WB ON R LE. Pt MILDLY ANXIOUS AND ASSISTED LITTLE WITH STAND TO SIT  Ambulation  Ambulation?: No     Balance  Sitting - Static: Fair;+  Sitting - Dynamic: Fair;+  Standing - Static: Fair  Standing - Dynamic: Fair        Assessment   Body structures, Functions, Activity limitations: Decreased functional mobility ; Decreased strength;Decreased endurance  Assessment: Pt WORKING WELL WITH PT, BUT ADVANCED AGE, PAIN, AND ANXIETY CONTRIBUTE TO DECREASED MOBILITY  Treatment Diagnosis: FX R HIP WITH NAIL FIXATION  Prognosis: Fair;Good  Decision Making: Medium Complexity  Patient Education: FALL PREVENTION. MOBLITY TECHNIQUES  REQUIRES PT FOLLOW UP: Yes  Activity Tolerance  Activity Tolerance: Patient Tolerated treatment well;Patient limited by pain; Patient limited by endurance  PT Equipment Recommendations  Other: ASSESSING     Discharge Recommendations:  Continue to assess pending progress, Patient would benefit from continued therapy after discharge      Plan   Plan  Times per week: PT AT LEAST ONCE DAILY X 14 DAYS  Current Treatment Recommendations: Functional Mobility Training, Transfer Training, Gait Training, Positioning, Pain Management, Safety Education & Training  Plan Comment: WBAT R LE  Safety Devices  Type of devices: Gait belt, Left in chair, Call light within reach    G-Code  PT G-Codes  Functional Assessment Tool Used: BED>CHAIR  Score: CL, MOD A 2  Functional Limitation: Mobility: Walking and moving around  Mobility: Walking and Moving Around Current Status (): At least 60 percent but less than 80 percent impaired, limited or restricted  Mobility: Walking and Moving Around Goal Status ():  At least 40 percent but less than 60 percent impaired, limited or restricted  OutComes Score                                           Goals  Short term goals  Time Frame for Short term goals: 14 DAYS BEFORE RE EVAL  Short term goal 1: SUP<>SIT MIN A 1  Short term goal 2: SIT<>STAND MIN A 1  Short term goal 3: STAND STEP TF'S WITH WALKER AND MIN A 1  Short term goal 4: AMB 50 FT WITH RW AND MIN A 1  Patient Goals   Patient goals : D/C TO FACILITY FOR ADDITIONAL PT       Therapy Time   Individual Concurrent Group Co-treatment   Time In           Time Out           Minutes                   Gin Joy PT     Electronically signed by Gin Joy PT on 10/12/2017 at 11:26 AM

## 2017-10-12 NOTE — PROGRESS NOTES
Rolling walker, 4 wheeled walker  ADL Assistance: [address]: Needs assistance  Ambulation Assistance: Independent (used rollator)  Transfer Assistance: Independent  Active : No  Additional Comments: Has family that lives next door and other family members who assist her       Objective   Vision: Within Functional Limits  Hearing: Exceptions to [facility]  Hearing Exceptions: Hard of hearing/hearing concerns          Balance  Sitting Balance: Contact guard assistance  Standing Balance: Moderate assistance  ADL  Feeding: Supervision;Setup  Grooming: Minimal assistance  UE Bathing: Minimal assistance  LE Bathing: Maximum assistance  UE Dressing: Minimal assistance  LE Dressing: Maximum assistance  Toileting: Maximum assistance        Bed mobility  Supine to Sit: Maximum assistance  Transfers  Stand Step Transfers: Moderate assistance (of two)     Cognition  Overall Cognitive Status: [facility]  Cognition Comment: Awake, alert, following directions, no confusion noted                                       Assessment   Performance deficits / Impairments: Decreased functional mobility ; Decreased ADL status  Assessment: Patient will need skilled intervention to progress ADL and mobility. Recommend nursing staff use [facility] initially. Treatment Diagnosis: Right TFN s/p intertrochanteric femoral fracture  Decision Making: Medium Complexity  Patient Education: initial movement strategies  Barriers to Learning: will need repetition  Discharge Recommendations: Patient would benefit from continued therapy after discharge  REQUIRES OT FOLLOW UP: Yes  Activity Tolerance  Activity Tolerance: Patient limited by pain  Safety Devices  Safety Devices in place: Yes  Type of devices: Call light within reach; Left in chair;Nurse notified        Discharge Recommendations:  Patient would benefit from continued therapy after discharge     Plan   Plan  Times per week: 4-8 visits weekly qd to bid as tolerated  Current

## 2017-10-12 NOTE — PROGRESS NOTES
chills / sweats  Head:  Denies headache / neck stiffness / trauma / visual change  Eyes:  Denies blurry vision / acute visual change or loss / itching / redness  ENT: Denies sore throat / hoarseness / nasal drainage / ear pain  CV:  Denies chest pain / palpitations/ orthopnea   Respiratory:  Denies cough / shortness of breath / sputum / hemoptysis  GI: Denies nausea / vomiting / abdominal pain / diarrhea / constipation  :  Denies dysuria / hesitancy / urgency /+ hematuria   Neuro: Denies paralysis / syncope / seizure / dysphagia / headache / paresthesias  Musculoskeletal:  + muscle weakness /joint stiffness / +pain  Vascular: Denies edema / claudication / varicosities  Heme / endocrine: Denies easy bruising / bleeding / excessive sweating / heat or cold intolerance  Psychiatric:  Denies depression / anxiety / insomnia / mood changes  Skin:  Denies new rashes / lesions / skin hair or nail changes    14 point review of systems is negative except as specifically addressed above. Objective:   VITALS:  /73   Pulse 59   Temp 97.2 °F (36.2 °C) (Temporal)   Resp 16   Ht 5' 4\" (1.626 m)   Wt 138 lb (62.6 kg)   SpO2 91%   BMI 23.69 kg/m²   24HR INTAKE/OUTPUT:      Intake/Output Summary (Last 24 hours) at 10/12/17 0813  Last data filed at 10/12/17 0805   Gross per 24 hour   Intake             3170 ml   Output             1075 ml   Net             2095 ml     General appearance: alert, appears stated age, frail, cooperative and no distress  Head: Normocephalic, without obvious abnormality, atraumatic  Eyes: conjunctivae/corneas clear. PERRL, EOM's intact.  Left eye lid droop (chronic per daughter)  Ears: normal external ears and nose, throat without exudate  Neck: no adenopathy, no carotid bruit, no JVD, supple, symmetrical, trachea midline and thyroid not enlarged, symmetric, no tenderness/mass/nodules  Lungs: Coarse air exchange otherwise clear to auscultation bilaterally,no rales or wheezes   Heart: Currently Regular rate and regular rhythm, S1, S2 normal, I/VI systolic murmur, no chest wall tenderness  Abdomen:soft, non-tender; non-distended, normal bowel sounds no masses, no organomegaly  Extremities:Trace lower extremity edema,  No erythema, there is severe tenderness to palpation right lower extremity, Incision C/D/I. Peripheral pulses present and palpable  Skin: Skin color, texture, turgor normal. No rashes or lesions  Lymphatic: No palpable lymph node enlargment  Neurologic: Alert and oriented X 3,generalized weakness and normal tone. Normal symmetric reflexes.   Cranial nerves:II-XII Grossly intact Sensory: normal Motor:grossly normal  Psychiatric: Alert and oriented, thought content appropriate, normal insight, mood appropriate    Medications:      lactated ringers      sodium chloride Stopped (10/12/17 0723)      sodium chloride  250 mL Intravenous Once    sodium chloride flush  10 mL Intravenous 2 times per day    docusate sodium  100 mg Oral BID    vancomycin  1,000 mg Intravenous Q24H    vancomycin (VANCOCIN) intermittent dosing (placeholder)   Other RX Placeholder    influenza virus vaccine  0.5 mL Intramuscular Once    clindamycin (CLEOCIN) IV  600 mg Intravenous 30 Min Pre-Op    levothyroxine  75 mcg Oral Daily    pantoprazole  40 mg Oral QAM AC    propafenone  225 mg Oral 3 times per day    calcium carbonate  600 mg Oral BID    And    vitamin D3  400 Units Oral BID    enoxaparin  1 mg/kg Subcutaneous BID     diazepam, ipratropium-albuterol, sodium chloride flush, acetaminophen, HYDROcodone 5 mg - acetaminophen **OR** HYDROcodone 5 mg - acetaminophen, morphine **OR** morphine, lactulose, ondansetron, famotidine, diazepam, bisacodyl, meclizine, HYDROmorphone **OR** HYDROmorphone  Dietary Nutrition Supplements: Standard High Calorie Oral Supplement  DIET GENERAL;     Lab and other Data:     Recent Labs      10/11/17   0238  10/11/17   1500  10/12/17   0229   WBC  5.5  6.6  5.0   HGB -expected post op blood loss-monitor closely, transfuse if <7.0     Will confirm timing to resume anticoagulation with Dr. Annie Hua. Will do full dose Lovenox for now. Bladder scan, In/Out cath as needed since indwelling was removed. PT/OT to ambulate today. Valium 5 mg was added last night in addition to 2 mg prn. Will DC 5 mg dosage and place hold parameters on 2 mg dosage. Antibiotic: Rocephin DC'd 10/10/2017, Vanc started     DVT Prophylaxis: Full dose Lovenox, Xarelto held 2' need for operative intervention     GI prophylaxis:  Protonix    Barbara Sosa PA-C     ______________________________________________________________________  I have seen and evaluated the patient by myself. I agree with the plan and documentation per the APRN/PA. Please see addendum. Subjective:    Feeling better overall. Pain controlled for the most part. Agreeable to rehab but wants to talk it over with her daughter. No chest pain, SOB, fevers or chills. Objective:   General:  NAD. Pleasant and interactive. Respiratory:  CTA without rhonchi or wheezes. Effort regular and unlabored. Cardiovascular:  RRR with soft systolic murmur. No rubs or gallops. Normal S1/S2. Extremities:  No clubbing or cyanosis. Trace edema. Incisions c/d/i.    GI:  Abdomen soft, NT, ND.  +BS. No guarding. Neuro:  CN II-XII intact. No focal motor deficits. Psych:  Alert and oriented times 3. Plan:  DC vancomycin, doxycycline 100 mg PO BID for s. Epi in her urine culture. Continue PT/OT. Increase activity as tolerated. See all orders. Continue Further recommendations per clinical course.       Electronically signed by Ellis Cabrera DO on 10/12/17 at 4:01 PM

## 2017-10-12 NOTE — CARE COORDINATION
The [address] at [facility]  Notification of Admission Decision      [] Patient has been accepted for admit to [facility] on :       Please write discharge orders and summary prior to discharge. [x] Patient acceptance to Rehab pending the following : insurance approval  [] Eval in progress       [] Patient determined to be ineligible for services at [facility] because : We recommend you consider        Thank you for your referral, we appreciate you.     Electronically Signed by Mary Aponte Admissions Coordinator 10/12/2017 2:56 PM

## 2017-10-13 VITALS
OXYGEN SATURATION: 96 % | HEART RATE: 70 BPM | HEIGHT: 64 IN | DIASTOLIC BLOOD PRESSURE: 79 MMHG | SYSTOLIC BLOOD PRESSURE: 166 MMHG | WEIGHT: 164 LBS | BODY MASS INDEX: 28 KG/M2 | RESPIRATION RATE: 20 BRPM | TEMPERATURE: 97.6 F

## 2017-10-13 LAB
ANION GAP SERPL CALCULATED.3IONS-SCNC: 8 MMOL/L (ref 7–19)
BLOOD BANK DISPENSE STATUS: NORMAL
BLOOD BANK PRODUCT CODE: NORMAL
BPU ID: NORMAL
BUN BLDV-MCNC: 17 MG/DL (ref 8–23)
CALCIUM SERPL-MCNC: 8.3 MG/DL (ref 8.8–10.2)
CHLORIDE BLD-SCNC: 106 MMOL/L (ref 98–111)
CO2: 25 MMOL/L (ref 22–29)
CREAT SERPL-MCNC: 0.7 MG/DL (ref 0.5–0.9)
DESCRIPTION BLOOD BANK: NORMAL
GFR NON-AFRICAN AMERICAN: >60
GLUCOSE BLD-MCNC: 104 MG/DL (ref 74–109)
HCT VFR BLD CALC: 24.7 % (ref 37–47)
HEMOGLOBIN: 7.8 G/DL (ref 12–16)
MCH RBC QN AUTO: 30.8 PG (ref 27–31)
MCHC RBC AUTO-ENTMCNC: 31.6 G/DL (ref 33–37)
MCV RBC AUTO: 97.6 FL (ref 81–99)
PDW BLD-RTO: 16.4 % (ref 11.5–14.5)
PLATELET # BLD: 271 K/UL (ref 130–400)
PMV BLD AUTO: 10.2 FL (ref 9.4–12.3)
POTASSIUM SERPL-SCNC: 4 MMOL/L (ref 3.5–5)
RBC # BLD: 2.53 M/UL (ref 4.2–5.4)
SODIUM BLD-SCNC: 139 MMOL/L (ref 136–145)
URINE CULTURE, ROUTINE: NORMAL
WBC # BLD: 5.8 K/UL (ref 4.8–10.8)

## 2017-10-13 PROCEDURE — 1210000000 HC MED SURG R&B

## 2017-10-13 PROCEDURE — 36430 TRANSFUSION BLD/BLD COMPNT: CPT

## 2017-10-13 PROCEDURE — 80048 BASIC METABOLIC PNL TOTAL CA: CPT

## 2017-10-13 PROCEDURE — 99232 SBSQ HOSP IP/OBS MODERATE 35: CPT | Performed by: UROLOGY

## 2017-10-13 PROCEDURE — 6370000000 HC RX 637 (ALT 250 FOR IP): Performed by: INTERNAL MEDICINE

## 2017-10-13 PROCEDURE — 6370000000 HC RX 637 (ALT 250 FOR IP): Performed by: PHYSICIAN ASSISTANT

## 2017-10-13 PROCEDURE — 6360000002 HC RX W HCPCS: Performed by: PHYSICIAN ASSISTANT

## 2017-10-13 PROCEDURE — 99238 HOSP IP/OBS DSCHRG MGMT 30/<: CPT | Performed by: INTERNAL MEDICINE

## 2017-10-13 PROCEDURE — 6370000000 HC RX 637 (ALT 250 FOR IP): Performed by: ORTHOPAEDIC SURGERY

## 2017-10-13 PROCEDURE — 36415 COLL VENOUS BLD VENIPUNCTURE: CPT

## 2017-10-13 PROCEDURE — 85027 COMPLETE CBC AUTOMATED: CPT

## 2017-10-13 PROCEDURE — 97530 THERAPEUTIC ACTIVITIES: CPT

## 2017-10-13 PROCEDURE — 97116 GAIT TRAINING THERAPY: CPT

## 2017-10-13 PROCEDURE — 2580000003 HC RX 258: Performed by: ORTHOPAEDIC SURGERY

## 2017-10-13 PROCEDURE — P9016 RBC LEUKOCYTES REDUCED: HCPCS

## 2017-10-13 RX ORDER — 0.9 % SODIUM CHLORIDE 0.9 %
250 INTRAVENOUS SOLUTION INTRAVENOUS ONCE
Status: DISCONTINUED | OUTPATIENT
Start: 2017-10-13 | End: 2017-10-14 | Stop reason: HOSPADM

## 2017-10-13 RX ORDER — HYDROCODONE BITARTRATE AND ACETAMINOPHEN 7.5; 325 MG/1; MG/1
1 TABLET ORAL EVERY 6 HOURS PRN
Qty: 20 TABLET | Refills: 0 | Status: SHIPPED | OUTPATIENT
Start: 2017-10-13 | End: 2017-10-18

## 2017-10-13 RX ORDER — DOXYCYCLINE HYCLATE 100 MG/1
100 CAPSULE ORAL EVERY 12 HOURS SCHEDULED
Qty: 8 CAPSULE | Refills: 0 | DISCHARGE
Start: 2017-10-13 | End: 2017-10-17

## 2017-10-13 RX ADMIN — Medication 600 MG: at 10:52

## 2017-10-13 RX ADMIN — HYDROCODONE BITARTRATE AND ACETAMINOPHEN 1 TABLET: 5; 325 TABLET ORAL at 14:57

## 2017-10-13 RX ADMIN — HYDROMORPHONE HYDROCHLORIDE 0.5 MG: 1 INJECTION, SOLUTION INTRAMUSCULAR; INTRAVENOUS; SUBCUTANEOUS at 14:08

## 2017-10-13 RX ADMIN — Medication 10 ML: at 11:12

## 2017-10-13 RX ADMIN — LEVOTHYROXINE SODIUM 75 MCG: 75 TABLET ORAL at 05:26

## 2017-10-13 RX ADMIN — CHOLECALCIFEROL TAB 10 MCG (400 UNIT) 400 UNITS: 10 TAB at 10:54

## 2017-10-13 RX ADMIN — HYDROCODONE BITARTRATE AND ACETAMINOPHEN 1 TABLET: 5; 325 TABLET ORAL at 05:26

## 2017-10-13 RX ADMIN — HYDROCODONE BITARTRATE AND ACETAMINOPHEN 1 TABLET: 5; 325 TABLET ORAL at 11:11

## 2017-10-13 RX ADMIN — DOCUSATE SODIUM 100 MG: 100 CAPSULE, LIQUID FILLED ORAL at 10:54

## 2017-10-13 RX ADMIN — PANTOPRAZOLE SODIUM 40 MG: 40 TABLET, DELAYED RELEASE ORAL at 05:26

## 2017-10-13 RX ADMIN — PROPAFENONE HYDROCHLORIDE 225 MG: 225 TABLET, FILM COATED ORAL at 05:26

## 2017-10-13 RX ADMIN — DOXYCYCLINE HYCLATE 100 MG: 100 CAPSULE, GELATIN COATED ORAL at 10:54

## 2017-10-13 RX ADMIN — ENOXAPARIN SODIUM 60 MG: 60 INJECTION SUBCUTANEOUS at 11:11

## 2017-10-13 RX ADMIN — PROPAFENONE HYDROCHLORIDE 225 MG: 225 TABLET, FILM COATED ORAL at 14:08

## 2017-10-13 ASSESSMENT — PAIN DESCRIPTION - LOCATION: LOCATION: HIP

## 2017-10-13 ASSESSMENT — PAIN SCALES - GENERAL
PAINLEVEL_OUTOF10: 7
PAINLEVEL_OUTOF10: 8
PAINLEVEL_OUTOF10: 9
PAINLEVEL_OUTOF10: 9
PAINLEVEL_OUTOF10: 7

## 2017-10-13 ASSESSMENT — PAIN DESCRIPTION - FREQUENCY: FREQUENCY: CONTINUOUS

## 2017-10-13 ASSESSMENT — PAIN DESCRIPTION - ORIENTATION: ORIENTATION: RIGHT

## 2017-10-13 ASSESSMENT — PAIN DESCRIPTION - PAIN TYPE: TYPE: SURGICAL PAIN

## 2017-10-13 ASSESSMENT — PAIN DESCRIPTION - DESCRIPTORS: DESCRIPTORS: ACHING;SHARP

## 2017-10-13 NOTE — CARE COORDINATION
Pt has been accepted at [facility] with the insurance. Terese will notify SW once Pre-Cert has cleared.     Terese  [phone]  Electronically signed by Analy Freeman on 10/13/2017 at 12:55 PM

## 2017-10-13 NOTE — CARE COORDINATION
Pt has been accepted at [facility] and Pre-Cert has cleared. Pt is able to DC to the facility on Friday October 13th.     Terese  [phone]  Electronically signed by Matheus Cisse on 10/13/2017 at 2:20 PM

## 2017-10-13 NOTE — PROGRESS NOTES
Physical Therapy  [id number]     10/13/17 2577   Subjective   Subjective Pt states she wants to go back to bed   Bed Mobility   Sit to Supine Maximal assistance  (x2)   Scooting Maximal assistance  (x2)   Transfers   Sit to Stand Maximum Assistance  (x2)   Stand to sit Maximum Assistance  (x2)   Bed to Chair Maximum assistance  (x2)   Ambulation   Ambulation? Yes   Ambulation 1   Surface level tile   Device Rolling Walker   Assistance Maximum assistance  (x2)   Distance 2'   Short term goals   Time Frame for Short term goals 14 DAYS BEFORE RE EVAL   Short term goal 1 SUP<>SIT MIN A 1   Short term goal 2 SIT<>STAND MIN A 1   Short term goal 3 STAND STEP TF'S WITH WALKER AND MIN A 1   Short term goal 4 AMB 50 FT WITH RW AND MIN A 1   Activity Tolerance   Activity Tolerance Patient limited by pain; Patient limited by endurance   Electronically signed by Andrez Dempsey PTA on 10/13/2017 at 2:00 PM

## 2017-10-13 NOTE — PROGRESS NOTES
Nutrition Assessment    Type and Reason for Visit: Reassess    Nutrition Recommendations: continue POC    Malnutrition Assessment:  · Malnutrition Status: At risk for malnutrition    Nutrition Diagnosis:   · Problem: Increased nutrient needs  · Etiology: related to Increased demand for energy/nutrients due to     Signs and symptoms:  as evidenced by  (anticipated surgery)    Nutrition Assessment:  · Subjective Assessment: Intake varies, generally poor. Pt states appetite is improving. States she drinks supplement, has one at bedside she is planning to drink later. · Nutrition-Focused Physical Findings:    · Wound Type:    · Current Nutrition Therapies:  · Oral Diet Orders: General   · Oral Diet intake: 26-50%  · Oral Nutrition Supplement (ONS) Orders: Standard High Calorie Oral Supplement  · ONS intake: Unable to assess  · Anthropometric Measures:  · Ht: 5' 4\" (162.6 cm)   · Current Body Wt: 138 lb (62.6 kg)  · Admission Body Wt: 136 lb (61.7 kg)  · Usual Body Wt:    · % Weight Change:  ,     · Ideal Body Wt: 120 lb (54.4 kg), % Ideal Body    · BMI Classification: BMI 18.5 - 24.9 Normal Weight    Estimated Intake vs Estimated Needs: Intake Less Than Needs, Intake Improving    Nutrition Risk Level: Moderate    Nutrition Interventions:   Continue current diet, Continue current ONS  Continued Inpatient Monitoring    Nutrition Evaluation:   · Evaluation: Progressing toward goals   · Goals: PO 50% or more of meals and ONS. · Monitoring: Meal Intake, Supplement Intake, Weight, Pertinent Labs, Wound Healing    See Adult Nutrition Doc Flowsheet for more detail.      Electronically signed by Bozena Dixon MS, RD, LD on 10/13/17 at 2:06 PM    Contact Number: [phone]

## 2017-10-13 NOTE — PROGRESS NOTES
Department of Urology  Attending Progress Note      SUBJECTIVE:  Hip fracture    OBJECTIVE:  Decreased urine output    Physical  VITALS:  BP (!) 91/50   Pulse 56   Temp 97 °F (36.1 °C) (Temporal)   Resp 16   Ht 5' 4\" (1.626 m)   Wt 164 lb (74.4 kg)   SpO2 91%   BMI 28.15 kg/m²   ABDOMEN:  No scars, normal bowel sounds, soft, non-distended, non-tender, no masses palpated, no hepatosplenomegally    Data  CBC:   Lab Results   Component Value Date    WBC 5.8 10/13/2017    RBC 2.53 10/13/2017    HGB 7.8 10/13/2017    HCT 24.7 10/13/2017    MCV 97.6 10/13/2017    MCH 30.8 10/13/2017    MCHC 31.6 10/13/2017    RDW 16.4 10/13/2017     10/13/2017    MPV 10.2 10/13/2017       ASSESSMENT AND PLAN    Still decreased output. Continue present treatment.   Dr. Stan Aponte will be back tomorrow

## 2017-10-13 NOTE — PROGRESS NOTES
assistance (Mod A of 2.)  Bed mobility  Sit to Supine: Maximum assistance (Max of 2)  Scooting: Maximal assistance (Max of 2)  Transfers  Stand Pivot Transfers: Maximum assistance (Max of 2.)                                                                 Assessment   Performance deficits / Impairments: Decreased functional mobility ; Decreased ADL status  Assessment: Patient will need skilled intervention to progress ADL and mobility. Pt limited by pain. Treatment Diagnosis: Right TFN s/p intertrochanteric femoral fracture  Patient Education: Transfer training. Discharge Recommendations: Patient would benefit from continued therapy after discharge  REQUIRES OT FOLLOW UP: Yes  Activity Tolerance  Activity Tolerance: Patient limited by pain       Discharge Recommendations:  Patient would benefit from continued therapy after discharge     Plan   Plan  Times per week: 4-8 visits weekly qd to bid as tolerated  Current Treatment Recommendations: Functional Mobility Training, Patient/Caregiver Education & Training, Self-Care / ADL, Safety Education & Training  G-Code     OutComes Score                                             Goals  Short term goals  Short term goal 1: Min A for dressing and toileting  Short term goal 2: Min A for transfers  Short term goal 3: Verbalize DME options  Short term goal 4:  Independent with bed/chair level therapuetic  exercises  Long term goals  Long term goal 1: Modified independent with personal care and light household activity       Therapy Time   Individual Concurrent Group Co-treatment   Time In           Time Out           Minutes                   SHANIA Mcdonald

## 2017-10-13 NOTE — DISCHARGE SUMMARY
frontal and lateral radiographs of the  RIGHT hip demonstrate near normal alignment status post ORIF of the  intertrochanteric fracture through the right femur. Displacement of  the lesser trochanter is again noted. Soft tissue air is seen.     Impression:       1. Status post RIGHT hip ORIF without evidence of unexpected  complication. XR HIP 2-3 Darlen Edmond PELVIS RIGHT [[id number]] Resulted: 10/08/17 2216      Order Status: Completed Updated: 10/08/17 2118     Narrative:       EXAMINATION: XR HIP 2-3 VW W PELVIS RIGHT 10/8/2017 9:14 PM  HISTORY: Trauma. Right hip fracture. Report: An AP view the pelvis, 2 separate views of the right hip were  obtained. There are no comparison studies. There is an acute comminuted right intertrochanteric hip fracture was  disclosed a mildly elevated laterally. The lesser trochanter is  sheared off and displaced mildly medially. The left hip is intact. There is diffuse osteopenia. A left-sided ureteral stent is present.     Impression:       Acute closed comminuted intertrochanteric right hip  fracture with mild lateral angulation and displacement. XR Knee 2 VW Right [[id number]] Resulted: 10/08/17 2214      Order Status: Completed Updated: 10/08/17 2116     Narrative:       EXAMINATION: XR KNEE 2 VW RIGHT 10/8/2017 9:13 PM  HISTORY: Trauma. Right hip fracture. Report: AP and crosstable lateral views of the right knee were  obtained. There are no comparison studies. There is evidence of previous right knee arthroplasty. Alignment is  normal. The prosthesis is well seated. No fractures identified. There  is diffuse osteopenia.     Impression:       Previous right knee arthroplasty is noted. No fracture or  acute abnormality is identified. XR FEMUR RIGHT (MIN 2 VIEWS) [[id number]] Resulted: 10/08/17 2213      Order Status: Completed Updated: 10/08/17 2115     Narrative:       EXAMINATION: XR FEMUR RIGHT STANDARD 10/8/2017 9:12 PM  HISTORY: Trauma, right hip pain.   Report: 5

## 2017-10-16 LAB
EKG P AXIS: 9 DEGREES
EKG P-R INTERVAL: 200 MS
EKG Q-T INTERVAL: 402 MS
EKG QRS DURATION: 96 MS
EKG QTC CALCULATION (BAZETT): 402 MS
EKG T AXIS: 67 DEGREES

## 2017-10-18 ENCOUNTER — LAB REQUISITION (OUTPATIENT)
Dept: LAB | Facility: HOSPITAL | Age: 82
End: 2017-10-18

## 2017-10-18 DIAGNOSIS — Z00.00 ENCOUNTER FOR GENERAL ADULT MEDICAL EXAMINATION WITHOUT ABNORMAL FINDINGS: ICD-10-CM

## 2017-10-18 LAB
ALBUMIN SERPL-MCNC: 2.2 G/DL (ref 3.5–5)
ALP SERPL-CCNC: 58 U/L (ref 24–120)
ALT SERPL W P-5'-P-CCNC: 28 U/L (ref 0–54)
ALT SERPL W P-5'-P-CCNC: 28 U/L (ref 0–54)
ANION GAP SERPL CALCULATED.3IONS-SCNC: 7 MMOL/L (ref 4–13)
ARTICHOKE IGE QN: 63 MG/DL (ref 0–99)
AST SERPL-CCNC: 20 U/L (ref 7–45)
AST SERPL-CCNC: 20 U/L (ref 7–45)
BASOPHILS # BLD AUTO: 0.01 10*3/MM3 (ref 0–0.2)
BASOPHILS NFR BLD AUTO: 0.2 % (ref 0–2)
BILIRUB CONJ SERPL-MCNC: 0 MG/DL (ref 0–0.3)
BILIRUB INDIRECT SERPL-MCNC: 0.3 MG/DL (ref 0–1.1)
BILIRUB SERPL-MCNC: 0.8 MG/DL (ref 0.1–1)
BUN BLD-MCNC: 12 MG/DL (ref 5–21)
BUN/CREAT SERPL: 23.5 (ref 7–25)
CALCIUM SPEC-SCNC: 8.6 MG/DL (ref 8.4–10.4)
CHLORIDE SERPL-SCNC: 102 MMOL/L (ref 98–110)
CHOLEST SERPL-MCNC: 113 MG/DL (ref 130–200)
CO2 SERPL-SCNC: 28 MMOL/L (ref 24–31)
CREAT BLD-MCNC: 0.51 MG/DL (ref 0.5–1.4)
DEPRECATED RDW RBC AUTO: 56.4 FL (ref 40–54)
EOSINOPHIL # BLD AUTO: 0.23 10*3/MM3 (ref 0–0.7)
EOSINOPHIL NFR BLD AUTO: 4.4 % (ref 0–4)
ERYTHROCYTE [DISTWIDTH] IN BLOOD BY AUTOMATED COUNT: 16.6 % (ref 12–15)
GFR SERPL CREATININE-BSD FRML MDRD: 115 ML/MIN/1.73
GFR SERPL CREATININE-BSD FRML MDRD: 139 ML/MIN/1.73
GLUCOSE BLD-MCNC: 79 MG/DL (ref 70–100)
HBA1C MFR BLD: 4.6 %
HCT VFR BLD AUTO: 30.1 % (ref 37–47)
HDLC SERPL-MCNC: 33 MG/DL
HGB BLD-MCNC: 9.7 G/DL (ref 12–16)
IMM GRANULOCYTES # BLD: 0.02 10*3/MM3 (ref 0–0.03)
IMM GRANULOCYTES NFR BLD: 0.4 % (ref 0–5)
LDLC/HDLC SERPL: 1.82 {RATIO}
LYMPHOCYTES # BLD AUTO: 1.29 10*3/MM3 (ref 0.72–4.86)
LYMPHOCYTES NFR BLD AUTO: 24.9 % (ref 15–45)
MCH RBC QN AUTO: 31 PG (ref 28–32)
MCHC RBC AUTO-ENTMCNC: 32.2 G/DL (ref 33–36)
MCV RBC AUTO: 96.2 FL (ref 82–98)
MONOCYTES # BLD AUTO: 0.52 10*3/MM3 (ref 0.19–1.3)
MONOCYTES NFR BLD AUTO: 10 % (ref 4–12)
NEUTROPHILS # BLD AUTO: 3.11 10*3/MM3 (ref 1.87–8.4)
NEUTROPHILS NFR BLD AUTO: 60.1 % (ref 39–78)
PLATELET # BLD AUTO: 431 10*3/MM3 (ref 130–400)
PMV BLD AUTO: 10.1 FL (ref 6–12)
POTASSIUM BLD-SCNC: 4.1 MMOL/L (ref 3.5–5.3)
PREALB SERPL-MCNC: 8.9 MG/DL (ref 18–36)
PROT SERPL-MCNC: 4.6 G/DL (ref 6.3–8.7)
RBC # BLD AUTO: 3.13 10*6/MM3 (ref 4.2–5.4)
SODIUM BLD-SCNC: 137 MMOL/L (ref 135–145)
TRIGL SERPL-MCNC: 100 MG/DL (ref 0–149)
TSH SERPL DL<=0.05 MIU/L-ACNC: 3.89 MIU/ML (ref 0.47–4.68)
VIT B12 BLD-MCNC: 347 PG/ML (ref 239–931)
WBC NRBC COR # BLD: 5.18 10*3/MM3 (ref 4.8–10.8)

## 2017-10-18 PROCEDURE — 85025 COMPLETE CBC W/AUTO DIFF WBC: CPT | Performed by: NURSE PRACTITIONER

## 2017-10-18 PROCEDURE — 84134 ASSAY OF PREALBUMIN: CPT | Performed by: NURSE PRACTITIONER

## 2017-10-18 PROCEDURE — 84443 ASSAY THYROID STIM HORMONE: CPT | Performed by: NURSE PRACTITIONER

## 2017-10-18 PROCEDURE — 80048 BASIC METABOLIC PNL TOTAL CA: CPT | Performed by: NURSE PRACTITIONER

## 2017-10-18 PROCEDURE — 80076 HEPATIC FUNCTION PANEL: CPT | Performed by: NURSE PRACTITIONER

## 2017-10-18 PROCEDURE — 84450 TRANSFERASE (AST) (SGOT): CPT | Performed by: NURSE PRACTITIONER

## 2017-10-18 PROCEDURE — 80061 LIPID PANEL: CPT | Performed by: NURSE PRACTITIONER

## 2017-10-18 PROCEDURE — 83036 HEMOGLOBIN GLYCOSYLATED A1C: CPT | Performed by: NURSE PRACTITIONER

## 2017-10-18 PROCEDURE — 82607 VITAMIN B-12: CPT | Performed by: NURSE PRACTITIONER

## 2017-10-18 PROCEDURE — 84460 ALANINE AMINO (ALT) (SGPT): CPT | Performed by: NURSE PRACTITIONER

## 2017-10-18 PROCEDURE — 36415 COLL VENOUS BLD VENIPUNCTURE: CPT | Performed by: NURSE PRACTITIONER

## 2017-10-27 ENCOUNTER — LAB REQUISITION (OUTPATIENT)
Dept: LAB | Facility: HOSPITAL | Age: 82
End: 2017-10-27

## 2017-10-27 DIAGNOSIS — Z00.00 ENCOUNTER FOR GENERAL ADULT MEDICAL EXAMINATION WITHOUT ABNORMAL FINDINGS: ICD-10-CM

## 2017-10-27 LAB
ANION GAP SERPL CALCULATED.3IONS-SCNC: 3 MMOL/L (ref 4–13)
BUN BLD-MCNC: 18 MG/DL (ref 5–21)
BUN/CREAT SERPL: 27.3 (ref 7–25)
CALCIUM SPEC-SCNC: 9.1 MG/DL (ref 8.4–10.4)
CHLORIDE SERPL-SCNC: 106 MMOL/L (ref 98–110)
CO2 SERPL-SCNC: 28 MMOL/L (ref 24–31)
CREAT BLD-MCNC: 0.66 MG/DL (ref 0.5–1.4)
DEPRECATED RDW RBC AUTO: 62.5 FL (ref 40–54)
ERYTHROCYTE [DISTWIDTH] IN BLOOD BY AUTOMATED COUNT: 17.9 % (ref 12–15)
GFR SERPL CREATININE-BSD FRML MDRD: 103 ML/MIN/1.73
GFR SERPL CREATININE-BSD FRML MDRD: 85 ML/MIN/1.73
GLUCOSE BLD-MCNC: 73 MG/DL (ref 70–100)
HCT VFR BLD AUTO: 28.3 % (ref 37–47)
HGB BLD-MCNC: 9.3 G/DL (ref 12–16)
MCH RBC QN AUTO: 33.1 PG (ref 28–32)
MCHC RBC AUTO-ENTMCNC: 32.9 G/DL (ref 33–36)
MCV RBC AUTO: 100.7 FL (ref 82–98)
PLATELET # BLD AUTO: 497 10*3/MM3 (ref 130–400)
PMV BLD AUTO: 9.9 FL (ref 6–12)
POTASSIUM BLD-SCNC: 4.5 MMOL/L (ref 3.5–5.3)
RBC # BLD AUTO: 2.81 10*6/MM3 (ref 4.2–5.4)
SODIUM BLD-SCNC: 137 MMOL/L (ref 135–145)
WBC NRBC COR # BLD: 4.52 10*3/MM3 (ref 4.8–10.8)

## 2017-10-27 PROCEDURE — 80048 BASIC METABOLIC PNL TOTAL CA: CPT | Performed by: NURSE PRACTITIONER

## 2017-10-27 PROCEDURE — 36415 COLL VENOUS BLD VENIPUNCTURE: CPT | Performed by: NURSE PRACTITIONER

## 2017-10-27 PROCEDURE — 85027 COMPLETE CBC AUTOMATED: CPT | Performed by: NURSE PRACTITIONER

## 2017-10-28 ENCOUNTER — LAB REQUISITION (OUTPATIENT)
Dept: LAB | Facility: HOSPITAL | Age: 82
End: 2017-10-28

## 2017-10-28 DIAGNOSIS — Z00.00 ENCOUNTER FOR GENERAL ADULT MEDICAL EXAMINATION WITHOUT ABNORMAL FINDINGS: ICD-10-CM

## 2017-10-28 LAB
ANION GAP SERPL CALCULATED.3IONS-SCNC: 7 MMOL/L (ref 4–13)
BUN BLD-MCNC: 22 MG/DL (ref 5–21)
BUN/CREAT SERPL: 31 (ref 7–25)
CALCIUM SPEC-SCNC: 9.1 MG/DL (ref 8.4–10.4)
CHLORIDE SERPL-SCNC: 106 MMOL/L (ref 98–110)
CO2 SERPL-SCNC: 25 MMOL/L (ref 24–31)
CREAT BLD-MCNC: 0.71 MG/DL (ref 0.5–1.4)
DEPRECATED RDW RBC AUTO: 62.3 FL (ref 40–54)
ERYTHROCYTE [DISTWIDTH] IN BLOOD BY AUTOMATED COUNT: 17.6 % (ref 12–15)
GFR SERPL CREATININE-BSD FRML MDRD: 78 ML/MIN/1.73
GFR SERPL CREATININE-BSD FRML MDRD: 95 ML/MIN/1.73
GLUCOSE BLD-MCNC: 77 MG/DL (ref 70–100)
HCT VFR BLD AUTO: 29.5 % (ref 37–47)
HGB BLD-MCNC: 9.4 G/DL (ref 12–16)
MCH RBC QN AUTO: 31.4 PG (ref 28–32)
MCHC RBC AUTO-ENTMCNC: 31.9 G/DL (ref 33–36)
MCV RBC AUTO: 98.7 FL (ref 82–98)
PLATELET # BLD AUTO: 414 10*3/MM3 (ref 130–400)
PMV BLD AUTO: 9.6 FL (ref 6–12)
POTASSIUM BLD-SCNC: 4.2 MMOL/L (ref 3.5–5.3)
RBC # BLD AUTO: 2.99 10*6/MM3 (ref 4.2–5.4)
SODIUM BLD-SCNC: 138 MMOL/L (ref 135–145)
WBC NRBC COR # BLD: 4.21 10*3/MM3 (ref 4.8–10.8)

## 2017-10-28 PROCEDURE — 80048 BASIC METABOLIC PNL TOTAL CA: CPT | Performed by: NURSE PRACTITIONER

## 2017-10-28 PROCEDURE — 85027 COMPLETE CBC AUTOMATED: CPT | Performed by: NURSE PRACTITIONER

## 2017-10-30 ENCOUNTER — PROCEDURE VISIT (OUTPATIENT)
Dept: UROLOGY | Age: 82
End: 2017-10-30
Payer: MEDICARE

## 2017-10-30 DIAGNOSIS — N28.89 PARAURETERIC URINOMA: ICD-10-CM

## 2017-10-30 DIAGNOSIS — N20.1 LEFT URETERAL CALCULUS: Primary | ICD-10-CM

## 2017-10-30 PROCEDURE — 52310 CYSTOSCOPY AND TREATMENT: CPT | Performed by: UROLOGY

## 2017-10-30 PROCEDURE — 99999 PR OFFICE/OUTPT VISIT,PROCEDURE ONLY: CPT | Performed by: UROLOGY

## 2017-10-30 NOTE — PROGRESS NOTES
Elly Miller is a 80 y.o. female who presents today   Chief Complaint   Patient presents with    Cystoscopy     I'm here for cysto stent removal     Patient is here for follow-up after undergoing a left ureteroscopy laser lithotripsy and stent placement on 08/31/17 for a distal left ureteral calculus. She was found on her initial CT scan done on August 29 to have left hydronephrosis with a significant. Nephric fluid collection consistent with urinoma and a distal left ureteral calculus. In addition she has some nonobstructing stones in the lower pole of the left kidney. She comes in today after having a follow-up CT scan showed some decrease in the urinoma. She is here to have her stent removed.       Past Medical History:   Diagnosis Date    A-fib ([address]     Anemia     CAD (coronary artery disease)     Conjunctivitis     COPD (chronic obstructive pulmonary disease) (HCC)     Depression     Fatigue     Kidney stone     Leukopenia     Low back pain     Mild CAD 5/15/2017    Osteoporosis     Paroxysmal a-fib ([address]     Sinus pause     10/2014    Urinary tract infection     Weakness        Past Surgical History:   Procedure Laterality Date    BACK SURGERY      CARDIAC CATHETERIZATION  8/14/14  [facility]    Mild, non-occlusive CAD, EF 60%    CYSTOSCOPY Left 8/31/2017    CYSTOSCOPY; LEFT URETEROSCOPY; LEFT URETERAL LASER LITHOTRIPSY AND STONE EXTRACTION; INSERTION LEFT URETERAL DOUBLE J STENT performed by Pedro sEcalera MD at [place] [id number] Right 10/11/2017    FEMUR IM NAIL MICHELLE INSERTION performed by Eliana Diop DO at [address] [place] [address]      HYSTERECTOMY      KNEE ARTHROPLASTY      x 2    LAMINECTOMY      2 lumbar segments    SHOULDER ARTHROPLASTY         Current Outpatient Prescriptions   Medication Sig Dispense Refill    omeprazole (PRILOSEC) 10 MG delayed release capsule Take 10 mg by mouth daily      propafenone (RYTHMOL) 225 MG tablet TAKE ONE TABLET BY

## 2017-11-09 ENCOUNTER — TELEPHONE (OUTPATIENT)
Dept: UROLOGY | Age: 82
End: 2017-11-09

## 2017-11-09 NOTE — TELEPHONE ENCOUNTER
pt is confused about the xray. she was told a week ahead of the OV. A CT scan is ordered and not an Xray. Please call the pt and advise what to do.

## 2017-11-10 NOTE — TELEPHONE ENCOUNTER
I spoke with pts daughter and advised her that it was a ct that he had ordered.  I looked at the last office note

## 2017-11-13 ENCOUNTER — LAB REQUISITION (OUTPATIENT)
Dept: LAB | Facility: HOSPITAL | Age: 82
End: 2017-11-13

## 2017-11-13 DIAGNOSIS — Z00.00 ENCOUNTER FOR GENERAL ADULT MEDICAL EXAMINATION WITHOUT ABNORMAL FINDINGS: ICD-10-CM

## 2017-11-13 LAB — FOLATE SERPL-MCNC: 7.26 NG/ML

## 2017-11-13 PROCEDURE — 36415 COLL VENOUS BLD VENIPUNCTURE: CPT | Performed by: NURSE PRACTITIONER

## 2017-11-13 PROCEDURE — 82746 ASSAY OF FOLIC ACID SERUM: CPT | Performed by: NURSE PRACTITIONER

## 2017-11-16 ENCOUNTER — LAB REQUISITION (OUTPATIENT)
Dept: LAB | Facility: HOSPITAL | Age: 82
End: 2017-11-16

## 2017-11-16 DIAGNOSIS — Z00.00 ENCOUNTER FOR GENERAL ADULT MEDICAL EXAMINATION WITHOUT ABNORMAL FINDINGS: ICD-10-CM

## 2017-11-16 LAB
ANION GAP SERPL CALCULATED.3IONS-SCNC: 17 MMOL/L (ref 4–13)
BUN BLD-MCNC: 20 MG/DL (ref 5–21)
BUN/CREAT SERPL: 33.9 (ref 7–25)
CALCIUM SPEC-SCNC: 10.9 MG/DL (ref 8.4–10.4)
CHLORIDE SERPL-SCNC: 102 MMOL/L (ref 98–110)
CO2 SERPL-SCNC: 22 MMOL/L (ref 24–31)
CREAT BLD-MCNC: 0.59 MG/DL (ref 0.5–1.4)
DEPRECATED RDW RBC AUTO: 55.8 FL (ref 40–54)
ERYTHROCYTE [DISTWIDTH] IN BLOOD BY AUTOMATED COUNT: 15.9 % (ref 12–15)
GFR SERPL CREATININE-BSD FRML MDRD: 97 ML/MIN/1.73
GLUCOSE BLD-MCNC: 124 MG/DL (ref 70–100)
HCT VFR BLD AUTO: 35.4 % (ref 37–47)
HGB BLD-MCNC: 12.7 G/DL (ref 12–16)
MCH RBC QN AUTO: 34.6 PG (ref 28–32)
MCHC RBC AUTO-ENTMCNC: 35.9 G/DL (ref 33–36)
MCV RBC AUTO: 96.5 FL (ref 82–98)
PLATELET # BLD AUTO: 446 10*3/MM3 (ref 130–400)
PMV BLD AUTO: 10.8 FL (ref 6–12)
POTASSIUM BLD-SCNC: 4.6 MMOL/L (ref 3.5–5.3)
RBC # BLD AUTO: 3.67 10*6/MM3 (ref 4.2–5.4)
SODIUM BLD-SCNC: 141 MMOL/L (ref 135–145)
WBC NRBC COR # BLD: 7.58 10*3/MM3 (ref 4.8–10.8)

## 2017-11-16 PROCEDURE — 80048 BASIC METABOLIC PNL TOTAL CA: CPT | Performed by: NURSE PRACTITIONER

## 2017-11-16 PROCEDURE — 85027 COMPLETE CBC AUTOMATED: CPT | Performed by: NURSE PRACTITIONER

## 2017-11-16 PROCEDURE — 36415 COLL VENOUS BLD VENIPUNCTURE: CPT | Performed by: NURSE PRACTITIONER

## 2017-11-22 ENCOUNTER — LAB REQUISITION (OUTPATIENT)
Dept: LAB | Facility: HOSPITAL | Age: 82
End: 2017-11-22

## 2017-11-22 DIAGNOSIS — Z00.00 ENCOUNTER FOR GENERAL ADULT MEDICAL EXAMINATION WITHOUT ABNORMAL FINDINGS: ICD-10-CM

## 2017-11-22 LAB
ANION GAP SERPL CALCULATED.3IONS-SCNC: 10 MMOL/L (ref 4–13)
BUN BLD-MCNC: 26 MG/DL (ref 5–21)
BUN/CREAT SERPL: 32.9 (ref 7–25)
CALCIUM SPEC-SCNC: 10.2 MG/DL (ref 8.4–10.4)
CHLORIDE SERPL-SCNC: 100 MMOL/L (ref 98–110)
CO2 SERPL-SCNC: 26 MMOL/L (ref 24–31)
CREAT BLD-MCNC: 0.79 MG/DL (ref 0.5–1.4)
DEPRECATED RDW RBC AUTO: 54.2 FL (ref 40–54)
ERYTHROCYTE [DISTWIDTH] IN BLOOD BY AUTOMATED COUNT: 15.3 % (ref 12–15)
GFR SERPL CREATININE-BSD FRML MDRD: 69 ML/MIN/1.73
GLUCOSE BLD-MCNC: 82 MG/DL (ref 70–100)
HCT VFR BLD AUTO: 32.4 % (ref 37–47)
HGB BLD-MCNC: 10.6 G/DL (ref 12–16)
MCH RBC QN AUTO: 32.4 PG (ref 28–32)
MCHC RBC AUTO-ENTMCNC: 32.7 G/DL (ref 33–36)
MCV RBC AUTO: 99.1 FL (ref 82–98)
PLATELET # BLD AUTO: 369 10*3/MM3 (ref 130–400)
PMV BLD AUTO: 10.9 FL (ref 6–12)
POTASSIUM BLD-SCNC: 4.8 MMOL/L (ref 3.5–5.3)
RBC # BLD AUTO: 3.27 10*6/MM3 (ref 4.2–5.4)
SODIUM BLD-SCNC: 136 MMOL/L (ref 135–145)
WBC NRBC COR # BLD: 5.86 10*3/MM3 (ref 4.8–10.8)

## 2017-11-22 PROCEDURE — 85027 COMPLETE CBC AUTOMATED: CPT

## 2017-11-22 PROCEDURE — 36415 COLL VENOUS BLD VENIPUNCTURE: CPT

## 2017-11-22 PROCEDURE — 80048 BASIC METABOLIC PNL TOTAL CA: CPT

## 2017-11-24 ENCOUNTER — TELEPHONE (OUTPATIENT)
Dept: INTERNAL MEDICINE CLINIC | Age: 82
End: 2017-11-24

## 2017-11-27 ENCOUNTER — HOSPITAL ENCOUNTER (OUTPATIENT)
Dept: GENERAL RADIOLOGY | Age: 82
Discharge: HOME | End: 2017-11-27
Payer: MEDICARE

## 2017-11-27 ENCOUNTER — OFFICE VISIT (OUTPATIENT)
Dept: INTERNAL MEDICINE | Age: 82
End: 2017-11-27
Payer: MEDICARE

## 2017-11-27 VITALS
SYSTOLIC BLOOD PRESSURE: 78 MMHG | BODY MASS INDEX: 22.02 KG/M2 | WEIGHT: 129 LBS | HEART RATE: 72 BPM | HEIGHT: 64 IN | TEMPERATURE: 98.9 F | OXYGEN SATURATION: 94 % | DIASTOLIC BLOOD PRESSURE: 49 MMHG | RESPIRATION RATE: 18 BRPM

## 2017-11-27 DIAGNOSIS — S72.001S CLOSED FRACTURE OF RIGHT HIP, SEQUELA: ICD-10-CM

## 2017-11-27 DIAGNOSIS — R11.0 NAUSEA: ICD-10-CM

## 2017-11-27 DIAGNOSIS — I95.9 HYPOTENSION, UNSPECIFIED HYPOTENSION TYPE: ICD-10-CM

## 2017-11-27 DIAGNOSIS — K59.03 DRUG-INDUCED CONSTIPATION: ICD-10-CM

## 2017-11-27 DIAGNOSIS — K59.03 DRUG-INDUCED CONSTIPATION: Primary | ICD-10-CM

## 2017-11-27 DIAGNOSIS — I48.0 PAROXYSMAL A-FIB (HCC): ICD-10-CM

## 2017-11-27 PROCEDURE — G8400 PT W/DXA NO RESULTS DOC: HCPCS | Performed by: NURSE PRACTITIONER

## 2017-11-27 PROCEDURE — 74022 RADEX COMPL AQT ABD SERIES: CPT

## 2017-11-27 PROCEDURE — 99214 OFFICE O/P EST MOD 30 MIN: CPT | Performed by: NURSE PRACTITIONER

## 2017-11-27 PROCEDURE — 4040F PNEUMOC VAC/ADMIN/RCVD: CPT | Performed by: NURSE PRACTITIONER

## 2017-11-27 PROCEDURE — 1123F ACP DISCUSS/DSCN MKR DOCD: CPT | Performed by: NURSE PRACTITIONER

## 2017-11-27 PROCEDURE — G8598 ASA/ANTIPLAT THER USED: HCPCS | Performed by: NURSE PRACTITIONER

## 2017-11-27 PROCEDURE — 1090F PRES/ABSN URINE INCON ASSESS: CPT | Performed by: NURSE PRACTITIONER

## 2017-11-27 PROCEDURE — G8427 DOCREV CUR MEDS BY ELIG CLIN: HCPCS | Performed by: NURSE PRACTITIONER

## 2017-11-27 PROCEDURE — G8420 CALC BMI NORM PARAMETERS: HCPCS | Performed by: NURSE PRACTITIONER

## 2017-11-27 PROCEDURE — G8484 FLU IMMUNIZE NO ADMIN: HCPCS | Performed by: NURSE PRACTITIONER

## 2017-11-27 PROCEDURE — 1036F TOBACCO NON-USER: CPT | Performed by: NURSE PRACTITIONER

## 2017-11-27 RX ORDER — HYDROCODONE BITARTRATE AND ACETAMINOPHEN 7.5; 325 MG/1; MG/1
1 TABLET ORAL EVERY 6 HOURS PRN
COMMUNITY
Start: 2017-11-22 | End: 2018-01-18

## 2017-11-27 RX ORDER — MIDODRINE HYDROCHLORIDE 10 MG/1
10 TABLET ORAL 3 TIMES DAILY
COMMUNITY
Start: 2017-11-22 | End: 2018-01-04 | Stop reason: SDUPTHER

## 2017-11-27 RX ORDER — ONDANSETRON 4 MG/1
4 TABLET, FILM COATED ORAL EVERY 8 HOURS PRN
Qty: 30 TABLET | Refills: 0 | Status: SHIPPED | OUTPATIENT
Start: 2017-11-27 | End: 2020-02-28 | Stop reason: ALTCHOICE

## 2017-11-27 RX ORDER — TIZANIDINE 4 MG/1
4 TABLET ORAL EVERY 8 HOURS PRN
COMMUNITY
Start: 2017-11-22 | End: 2018-01-02 | Stop reason: SDUPTHER

## 2017-11-27 ASSESSMENT — ENCOUNTER SYMPTOMS
WHEEZING: 0
BACK PAIN: 1
CHOKING: 0
VOMITING: 0
SORE THROAT: 0
COLOR CHANGE: 0
COUGH: 0
STRIDOR: 0
EYE ITCHING: 0
TROUBLE SWALLOWING: 0
EYE DISCHARGE: 0
NAUSEA: 1
BLOOD IN STOOL: 0
CONSTIPATION: 0
ABDOMINAL PAIN: 0
NAUSEA: 0
DIARRHEA: 0
ABDOMINAL DISTENTION: 0
SHORTNESS OF BREATH: 0
CONSTIPATION: 1

## 2017-11-27 NOTE — PROGRESS NOTES
[facility]  [address] [address]  Dept: [phone]  Dept Fax: [phone]  Loc: [phone]    Rose Mary Herman is a 80 y.o. female who presents today for her medical conditions/complaints as noted below. Rose Mary Herman is c/o of Hip Injury (Fall with right hip fracture. Patient was in [facility] for rehab and is now back home with home health.); Constipation (Patient is having increased symptoms.); and Nausea & Vomiting (Patient is unable to eat due to vomiting. Per caregiver patient has not ate in 2 days.)        HPI:     HPI   1. Right hip fracture; She had a fall after Stubbing her toe at home. This resulted in a right hip fracture that was repaired by Dr. Little Ibarra. She subsequently went to [facility] for rehab. She now has home health adjustment her visit on Friday and she will be having nursing and PT/OT \  #2 constipation she has had constipation since being on narcotics since June with a compression fracture of back and hip fracture. She had enemas at [facility] for constipation. Since getting on her daughter gave her glycerin suppositories and is trying to increase her fiber. She did have good results from the glycerin  #3 nausea she is having nausea and vomiting to the point that she cannot keep her foods and has not even been able to take her medications. She is taking meds at the nursing home but did not seem to help her nausea much  #4 atrial fibrillation with rapid ventricular response. Heart rate is up but her daughter says she's been unable take her Betapace or her Xarelto  #5 hypotension. This is been chronic for her but worse lately. She started on ProAmatine at the nursing home. Orders given to her the last couple days at home because her pressures been around 140.  78 with a heart rate of 115. They do not want to the hospital and wanted to try to get this fixed at home.   Chief Complaint   Patient presents delayed release capsule Take 10 mg by mouth daily      propafenone (RYTHMOL) 225 MG tablet TAKE ONE TABLET BY MOUTH EVERY EIGHT HOURS 270 tablet 2    levothyroxine (SYNTHROID) 75 MCG tablet TAKE ONE TABLET BY MOUTH DAILY AS DIRECTED 30 tablet 5    XARELTO 20 MG TABS tablet TAKE 1 TABLET DAILY WITH BREAKFAST 90 tablet 3    meclizine (ANTIVERT) 12.5 MG tablet Take 12.5 mg by mouth as needed      Calcium Carbonate-Vitamin D (CALTRATE 600+D PO) Take 600 mg by mouth 2 times daily.  furosemide (LASIX) 20 MG tablet Take 20 mg by mouth daily as needed        No current facility-administered medications for this visit. Allergies   Allergen Reactions    Nitrofuran Derivatives Swelling    Pcn [Penicillins]     Sulfa Antibiotics        Health Maintenance   Topic Date Due    DTaP/Tdap/Td vaccine (1 - Tdap) 01/11/1951    Zostavax vaccine  01/11/1992    DEXA (modify frequency per FRAX score)  01/11/1997    Pneumococcal low/med risk (1 of 2 - PCV13) 01/11/1997    Flu vaccine  Completed       Subjective:      Review of Systems   Constitutional: Negative for fatigue, fever and unexpected weight change. HENT: Negative for ear discharge, ear pain, mouth sores, sore throat and trouble swallowing. Eyes: Negative for discharge, itching and visual disturbance. Respiratory: Negative for cough, choking, shortness of breath, wheezing and stridor. Cardiovascular: Negative for chest pain, palpitations and leg swelling. Gastrointestinal: Positive for constipation and nausea. Negative for abdominal distention, abdominal pain, blood in stool, diarrhea and vomiting. Endocrine: Negative for cold intolerance, polydipsia and polyuria. Genitourinary: Negative for difficulty urinating, dysuria, frequency and urgency. Musculoskeletal: Negative for arthralgias and gait problem. Chronic back pain now with right hip pain status post fracture and repair   Skin: Negative for color change and rash.

## 2017-11-27 NOTE — PROGRESS NOTES
back pain     Mild CAD 5/15/2017    Osteoporosis     Paroxysmal a-fib ([address]     Sinus pause     10/2014    Urinary tract infection     Weakness       Past Surgical History:   Procedure Laterality Date    BACK SURGERY      CARDIAC CATHETERIZATION  8/14/14  [facility]    Mild, non-occlusive CAD, EF 60%    CYSTOSCOPY Left 8/31/2017    CYSTOSCOPY; LEFT URETEROSCOPY; LEFT URETERAL LASER LITHOTRIPSY AND STONE EXTRACTION; INSERTION LEFT URETERAL DOUBLE J STENT performed by Ktae Olivier MD at [place] [address] Right 10/11/2017    FEMUR IM NAIL MICHELLE INSERTION performed by Cristiane Bennett DO at [facility] 5747      x 2    LAMINECTOMY      2 lumbar segments    SHOULDER ARTHROPLASTY         Family History   Problem Relation Age of Onset    Cancer Father      lung    Stroke Mother      mini    Hypotension Mother        Social History   Substance Use Topics    Smoking status: Never Smoker    Smokeless tobacco: Never Used    Alcohol use No      Current Outpatient Prescriptions   Medication Sig Dispense Refill    midodrine (PROAMATINE) 10 MG tablet       tiZANidine (ZANAFLEX) 4 MG tablet       HYDROcodone-acetaminophen (NORCO) 7.5-325 MG per tablet       ondansetron (ZOFRAN) 4 MG tablet Take 1 tablet by mouth every 8 hours as needed for Nausea or Vomiting 30 tablet 0    omeprazole (PRILOSEC) 10 MG delayed release capsule Take 10 mg by mouth daily      propafenone (RYTHMOL) 225 MG tablet TAKE ONE TABLET BY MOUTH EVERY EIGHT HOURS 270 tablet 2    levothyroxine (SYNTHROID) 75 MCG tablet TAKE ONE TABLET BY MOUTH DAILY AS DIRECTED 30 tablet 5    XARELTO 20 MG TABS tablet TAKE 1 TABLET DAILY WITH BREAKFAST 90 tablet 3    meclizine (ANTIVERT) 12.5 MG tablet Take 12.5 mg by mouth as needed      Calcium Carbonate-Vitamin D (CALTRATE 600+D PO) Take 600 mg by mouth 2 times daily.       furosemide (LASIX) 20 MG tablet Take 20 mg by mouth daily as no discharge. Left eye exhibits no discharge. No scleral icterus. Neck: Normal range of motion. Neck supple. No JVD present. No thyromegaly present. Cardiovascular: Normal heart sounds. No murmur heard. afib with RVR;     Pulmonary/Chest: Effort normal and breath sounds normal. No respiratory distress. She has no wheezes. She has no rales. Abdominal: Soft. Bowel sounds are normal. She exhibits no distension and no mass. There is no tenderness. There is no rebound and no guarding. Musculoskeletal: Normal range of motion. She exhibits no edema or tenderness. Neurological: She is alert and oriented to person, place, and time. She has normal reflexes. No cranial nerve deficit. Coordination normal.   Skin: Skin is warm and dry. No rash noted. No erythema. Psychiatric: Her behavior is normal. Judgment and thought content normal. She does not exhibit a depressed mood. BP (!) 78/49   Pulse 72   Temp 98.9 °F (37.2 °C)   Resp 18   Ht 5' 4\" (1.626 m)   Wt 129 lb (58.5 kg)   SpO2 94%   BMI 22.14 kg/m²     Assessment:      1. Drug-induced constipation  XR Acute Abd Series Chest 1 VW   2. Closed fracture of right hip, sequela     3. Nausea  XR Acute Abd Series Chest 1 VW   4. Paroxysmal a-fib (HCC)     5. Hypotension, unspecified hypotension type         Plan:        Patient given educational materials - see patient instructions. Discussed use, benefit, and side effects of prescribed medications. All patient questions answered. Pt voiced understanding. Reviewed health maintenance. Instructed to continue current medications, diet and exercise. Patient agreed with treatment plan. Follow up as directed.    MEDICATIONS:  Orders Placed This Encounter   Medications    ondansetron (ZOFRAN) 4 MG tablet     Sig: Take 1 tablet by mouth every 8 hours as needed for Nausea or Vomiting     Dispense:  30 tablet     Refill:  0         ORDERS:  Orders Placed This Encounter   Procedures    XR Acute Abd Series Chest 1 VW       Follow-up:  No Follow-up on file. PATIENT INSTRUCTIONS:  Patient Instructions   1. Right hip fracture; Stable  Keep fu with DR Loi Jj   2.  COnstipation;  Try to increase the miralax to twice daily   If that does not produce stool in 2 days, then you can use the glycerin but may prefer mag citrate ; We will get obstructive series today   3. Nausea;  rx for zofran as needed; this is likely related tot he constipation;    #4 atrial fibrillation with rapid ventricular response. Again the family would like to be able to take her home and not interested in hospitalizing her now  #5 hypotension this is chronic for her but we will try to get her nausea present she can take the ProAmatine hopefully to prevent any syncope or falls    If she does not seem to improve quickly with the constipation nausea and by mouth intake, he will need to let us know if she likely will need to be admitted to the hospital for heart rate blood pressure control as well as DVT prevention.  Admission was offered  Keep up with Dr. Marycarmen Lynch in 3 months  I will call you later with the results    Electronically signed by ROCAEL Diane on 11/27/2017 at 12:14 PM

## 2017-11-27 NOTE — PATIENT INSTRUCTIONS
1.  Right hip fracture; Stable  Keep fu with DR Higinio Lucas   2.  COnstipation;  Try to increase the miralax to twice daily   If that does not produce stool in 2 days, then you can use the glycerin but may prefer mag citrate ; We will get obstructive series today   3. Nausea;  rx for zofran as needed; this is likely related tot he constipation;    #4 atrial fibrillation with rapid ventricular response. Again the family would like to be able to take her home and not interested in hospitalizing her now  #5 hypotension this is chronic for her but we will try to get her nausea present she can take the ProAmatine hopefully to prevent any syncope or falls    If she does not seem to improve quickly with the constipation nausea and by mouth intake, he will need to let us know if she likely will need to be admitted to the hospital for heart rate blood pressure control as well as DVT prevention.  Admission was offered  Keep up with Dr. Berenice Goldman in 3 months  I will call you later with the results

## 2017-11-30 ENCOUNTER — TELEPHONE (OUTPATIENT)
Dept: INTERNAL MEDICINE CLINIC | Age: 82
End: 2017-11-30

## 2017-11-30 NOTE — TELEPHONE ENCOUNTER
[facility] OT eval completed and requests visits 2X/week for ADLs/IADLs, functional mobility, transfers, activity tolerance, safety, DME/AE recomendations and training and caregiver instruction.    Please advise if approved

## 2017-12-11 ENCOUNTER — HOSPITAL ENCOUNTER (INPATIENT)
Age: 82
LOS: 8 days | Discharge: HOME HEALTH | DRG: 871 | End: 2017-12-22
Attending: UROLOGY | Admitting: UROLOGY
Payer: MEDICARE

## 2017-12-11 ENCOUNTER — HOSPITAL ENCOUNTER (OUTPATIENT)
Dept: CT IMAGING | Age: 82
Discharge: HOME | DRG: 871 | End: 2017-12-11
Payer: MEDICARE

## 2017-12-11 DIAGNOSIS — N28.89 PARAURETERIC URINOMA: ICD-10-CM

## 2017-12-11 LAB
GFR NON-AFRICAN AMERICAN: 59
PERFORMED ON: ABNORMAL
POC CREATININE: 0.9 MG/DL (ref 0.3–1.3)
POC SAMPLE TYPE: ABNORMAL

## 2017-12-11 PROCEDURE — 74178 CT ABD&PLV WO CNTR FLWD CNTR: CPT

## 2017-12-11 PROCEDURE — 6360000004 HC RX CONTRAST MEDICATION: Performed by: UROLOGY

## 2017-12-11 PROCEDURE — 82565 ASSAY OF CREATININE: CPT

## 2017-12-11 RX ADMIN — IOPAMIDOL 75 ML: 755 INJECTION, SOLUTION INTRAVENOUS at 10:38

## 2017-12-14 ENCOUNTER — PREP FOR PROCEDURE (OUTPATIENT)
Dept: UROLOGY | Age: 82
End: 2017-12-14

## 2017-12-14 ENCOUNTER — APPOINTMENT (OUTPATIENT)
Dept: GENERAL RADIOLOGY | Age: 82
DRG: 871 | End: 2017-12-14
Attending: UROLOGY
Payer: MEDICARE

## 2017-12-14 ENCOUNTER — ANESTHESIA (OUTPATIENT)
Dept: OPERATING ROOM | Age: 82
DRG: 871 | End: 2017-12-14
Payer: MEDICARE

## 2017-12-14 ENCOUNTER — ANESTHESIA EVENT (OUTPATIENT)
Dept: OPERATING ROOM | Age: 82
DRG: 871 | End: 2017-12-14
Payer: MEDICARE

## 2017-12-14 ENCOUNTER — OFFICE VISIT (OUTPATIENT)
Dept: UROLOGY | Age: 82
End: 2017-12-14
Payer: MEDICARE

## 2017-12-14 VITALS — HEIGHT: 64 IN | BODY MASS INDEX: 22.02 KG/M2 | WEIGHT: 129 LBS

## 2017-12-14 VITALS
OXYGEN SATURATION: 100 % | DIASTOLIC BLOOD PRESSURE: 63 MMHG | RESPIRATION RATE: 24 BRPM | SYSTOLIC BLOOD PRESSURE: 120 MMHG | TEMPERATURE: 97 F

## 2017-12-14 DIAGNOSIS — R57.9 SHOCK (HCC): Primary | ICD-10-CM

## 2017-12-14 DIAGNOSIS — N20.1 RIGHT URETERAL CALCULUS: ICD-10-CM

## 2017-12-14 DIAGNOSIS — N20.0 RENAL CALCULUS, LEFT: ICD-10-CM

## 2017-12-14 DIAGNOSIS — N28.89 PARAURETERIC URINOMA: Primary | ICD-10-CM

## 2017-12-14 DIAGNOSIS — N20.0 RENAL CALCULUS, RIGHT: ICD-10-CM

## 2017-12-14 PROBLEM — N11.1 OBSTRUCTIVE PYELONEPHRITIS: Status: ACTIVE | Noted: 2017-12-14

## 2017-12-14 PROBLEM — N10 ACUTE PYELONEPHRITIS: Status: ACTIVE | Noted: 2017-12-14

## 2017-12-14 LAB
ANION GAP SERPL CALCULATED.3IONS-SCNC: 12 MMOL/L (ref 7–19)
APTT: 46.8 SEC (ref 26–36.2)
BASOPHILS ABSOLUTE: 0 K/UL (ref 0–0.2)
BASOPHILS RELATIVE PERCENT: 0.5 % (ref 0–1)
BUN BLDV-MCNC: 18 MG/DL (ref 8–23)
CALCIUM SERPL-MCNC: 9.8 MG/DL (ref 8.8–10.2)
CHLORIDE BLD-SCNC: 100 MMOL/L (ref 98–111)
CO2: 26 MMOL/L (ref 22–29)
CREAT SERPL-MCNC: 0.8 MG/DL (ref 0.5–0.9)
EOSINOPHILS ABSOLUTE: 0.1 K/UL (ref 0–0.6)
EOSINOPHILS RELATIVE PERCENT: 0.9 % (ref 0–5)
GFR NON-AFRICAN AMERICAN: >60
GLUCOSE BLD-MCNC: 88 MG/DL (ref 74–109)
HCT VFR BLD CALC: 38.3 % (ref 37–47)
HEMOGLOBIN: 12.1 G/DL (ref 12–16)
INR BLD: 2.33 (ref 0.88–1.18)
LYMPHOCYTES ABSOLUTE: 1.6 K/UL (ref 1.1–4.5)
LYMPHOCYTES RELATIVE PERCENT: 18.8 % (ref 20–40)
MCH RBC QN AUTO: 31.3 PG (ref 27–31)
MCHC RBC AUTO-ENTMCNC: 31.6 G/DL (ref 33–37)
MCV RBC AUTO: 99 FL (ref 81–99)
MONOCYTES ABSOLUTE: 0.6 K/UL (ref 0–0.9)
MONOCYTES RELATIVE PERCENT: 6.9 % (ref 0–10)
NEUTROPHILS ABSOLUTE: 6 K/UL (ref 1.5–7.5)
NEUTROPHILS RELATIVE PERCENT: 72.7 % (ref 50–65)
PDW BLD-RTO: 14.2 % (ref 11.5–14.5)
PLATELET # BLD: 427 K/UL (ref 130–400)
PMV BLD AUTO: 10.4 FL (ref 9.4–12.3)
POTASSIUM SERPL-SCNC: 4.5 MMOL/L (ref 3.5–4.9)
PROTHROMBIN TIME: 25.8 SEC (ref 12–14.6)
RBC # BLD: 3.87 M/UL (ref 4.2–5.4)
SODIUM BLD-SCNC: 138 MMOL/L (ref 136–145)
WBC # BLD: 8.2 K/UL (ref 4.8–10.8)

## 2017-12-14 PROCEDURE — 99999 PR OFFICE/OUTPT VISIT,PROCEDURE ONLY: CPT | Performed by: UROLOGY

## 2017-12-14 PROCEDURE — 6370000000 HC RX 637 (ALT 250 FOR IP): Performed by: UROLOGY

## 2017-12-14 PROCEDURE — 6360000002 HC RX W HCPCS: Performed by: NURSE ANESTHETIST, CERTIFIED REGISTERED

## 2017-12-14 PROCEDURE — 2580000003 HC RX 258: Performed by: NURSE ANESTHETIST, CERTIFIED REGISTERED

## 2017-12-14 PROCEDURE — 6360000002 HC RX W HCPCS: Performed by: CLINICAL NURSE SPECIALIST

## 2017-12-14 PROCEDURE — 3700000000 HC ANESTHESIA ATTENDED CARE: Performed by: UROLOGY

## 2017-12-14 PROCEDURE — 2000000000 HC ICU R&B

## 2017-12-14 PROCEDURE — 52332 CYSTOSCOPY AND TREATMENT: CPT | Performed by: UROLOGY

## 2017-12-14 PROCEDURE — 93005 ELECTROCARDIOGRAM TRACING: CPT

## 2017-12-14 PROCEDURE — 99214 OFFICE O/P EST MOD 30 MIN: CPT | Performed by: UROLOGY

## 2017-12-14 PROCEDURE — 87186 SC STD MICRODIL/AGAR DIL: CPT

## 2017-12-14 PROCEDURE — 7100000001 HC PACU RECOVERY - ADDTL 15 MIN: Performed by: UROLOGY

## 2017-12-14 PROCEDURE — 2580000003 HC RX 258: Performed by: UROLOGY

## 2017-12-14 PROCEDURE — 3600000004 HC SURGERY LEVEL 4 BASE: Performed by: UROLOGY

## 2017-12-14 PROCEDURE — 6360000002 HC RX W HCPCS

## 2017-12-14 PROCEDURE — 2500000003 HC RX 250 WO HCPCS: Performed by: UROLOGY

## 2017-12-14 PROCEDURE — G8427 DOCREV CUR MEDS BY ELIG CLIN: HCPCS | Performed by: UROLOGY

## 2017-12-14 PROCEDURE — 99291 CRITICAL CARE FIRST HOUR: CPT | Performed by: FAMILY MEDICINE

## 2017-12-14 PROCEDURE — 0T788DZ DILATION OF BILATERAL URETERS WITH INTRALUMINAL DEVICE, VIA NATURAL OR ARTIFICIAL OPENING ENDOSCOPIC: ICD-10-PCS | Performed by: UROLOGY

## 2017-12-14 PROCEDURE — 87077 CULTURE AEROBIC IDENTIFY: CPT

## 2017-12-14 PROCEDURE — 3209999900 FLUORO FOR SURGICAL PROCEDURES

## 2017-12-14 PROCEDURE — 3700000001 HC ADD 15 MINUTES (ANESTHESIA): Performed by: UROLOGY

## 2017-12-14 PROCEDURE — 2700000000 HC OXYGEN THERAPY PER DAY

## 2017-12-14 PROCEDURE — P9045 ALBUMIN (HUMAN), 5%, 250 ML: HCPCS | Performed by: ANESTHESIOLOGY

## 2017-12-14 PROCEDURE — 6360000002 HC RX W HCPCS: Performed by: UROLOGY

## 2017-12-14 PROCEDURE — 7100000000 HC PACU RECOVERY - FIRST 15 MIN: Performed by: UROLOGY

## 2017-12-14 PROCEDURE — 87205 SMEAR GRAM STAIN: CPT

## 2017-12-14 PROCEDURE — G8598 ASA/ANTIPLAT THER USED: HCPCS | Performed by: UROLOGY

## 2017-12-14 PROCEDURE — 4040F PNEUMOC VAC/ADMIN/RCVD: CPT | Performed by: UROLOGY

## 2017-12-14 PROCEDURE — 1123F ACP DISCUSS/DSCN MKR DOCD: CPT | Performed by: UROLOGY

## 2017-12-14 PROCEDURE — 1036F TOBACCO NON-USER: CPT | Performed by: UROLOGY

## 2017-12-14 PROCEDURE — 2580000003 HC RX 258: Performed by: CLINICAL NURSE SPECIALIST

## 2017-12-14 PROCEDURE — C2617 STENT, NON-COR, TEM W/O DEL: HCPCS | Performed by: UROLOGY

## 2017-12-14 PROCEDURE — 6360000002 HC RX W HCPCS: Performed by: ANESTHESIOLOGY

## 2017-12-14 PROCEDURE — 3600000014 HC SURGERY LEVEL 4 ADDTL 15MIN: Performed by: UROLOGY

## 2017-12-14 PROCEDURE — 87070 CULTURE OTHR SPECIMN AEROBIC: CPT

## 2017-12-14 PROCEDURE — BT140ZZ FLUOROSCOPY OF KIDNEYS, URETERS AND BLADDER USING HIGH OSMOLAR CONTRAST: ICD-10-PCS | Performed by: UROLOGY

## 2017-12-14 PROCEDURE — 87086 URINE CULTURE/COLONY COUNT: CPT

## 2017-12-14 PROCEDURE — G8484 FLU IMMUNIZE NO ADMIN: HCPCS | Performed by: UROLOGY

## 2017-12-14 PROCEDURE — 2500000003 HC RX 250 WO HCPCS: Performed by: NURSE ANESTHETIST, CERTIFIED REGISTERED

## 2017-12-14 PROCEDURE — 85025 COMPLETE CBC W/AUTO DIFF WBC: CPT

## 2017-12-14 PROCEDURE — 36415 COLL VENOUS BLD VENIPUNCTURE: CPT

## 2017-12-14 PROCEDURE — C1758 CATHETER, URETERAL: HCPCS | Performed by: UROLOGY

## 2017-12-14 PROCEDURE — 71010 XR CHEST PORTABLE: CPT

## 2017-12-14 PROCEDURE — 87040 BLOOD CULTURE FOR BACTERIA: CPT

## 2017-12-14 PROCEDURE — 80048 BASIC METABOLIC PNL TOTAL CA: CPT

## 2017-12-14 PROCEDURE — 85610 PROTHROMBIN TIME: CPT

## 2017-12-14 PROCEDURE — 85730 THROMBOPLASTIN TIME PARTIAL: CPT

## 2017-12-14 PROCEDURE — C1769 GUIDE WIRE: HCPCS | Performed by: UROLOGY

## 2017-12-14 PROCEDURE — G8420 CALC BMI NORM PARAMETERS: HCPCS | Performed by: UROLOGY

## 2017-12-14 PROCEDURE — 1090F PRES/ABSN URINE INCON ASSESS: CPT | Performed by: UROLOGY

## 2017-12-14 PROCEDURE — G8400 PT W/DXA NO RESULTS DOC: HCPCS | Performed by: UROLOGY

## 2017-12-14 DEVICE — URETERAL STENT
Type: IMPLANTABLE DEVICE | Site: URETER | Status: FUNCTIONAL
Brand: POLARIS™ ULTRA

## 2017-12-14 RX ORDER — ENALAPRILAT 2.5 MG/2ML
1.25 INJECTION INTRAVENOUS
Status: DISCONTINUED | OUTPATIENT
Start: 2017-12-14 | End: 2017-12-14 | Stop reason: HOSPADM

## 2017-12-14 RX ORDER — SODIUM CHLORIDE, SODIUM LACTATE, POTASSIUM CHLORIDE, CALCIUM CHLORIDE 600; 310; 30; 20 MG/100ML; MG/100ML; MG/100ML; MG/100ML
INJECTION, SOLUTION INTRAVENOUS CONTINUOUS PRN
Status: DISCONTINUED | OUTPATIENT
Start: 2017-12-14 | End: 2017-12-14 | Stop reason: SDUPTHER

## 2017-12-14 RX ORDER — LIDOCAINE HYDROCHLORIDE 10 MG/ML
INJECTION, SOLUTION EPIDURAL; INFILTRATION; INTRACAUDAL; PERINEURAL PRN
Status: DISCONTINUED | OUTPATIENT
Start: 2017-12-14 | End: 2017-12-14 | Stop reason: SDUPTHER

## 2017-12-14 RX ORDER — ALBUMIN, HUMAN INJ 5% 5 %
25 SOLUTION INTRAVENOUS ONCE
Status: COMPLETED | OUTPATIENT
Start: 2017-12-14 | End: 2017-12-14

## 2017-12-14 RX ORDER — NALOXONE HYDROCHLORIDE 0.4 MG/ML
INJECTION, SOLUTION INTRAMUSCULAR; INTRAVENOUS; SUBCUTANEOUS
Status: COMPLETED
Start: 2017-12-14 | End: 2017-12-14

## 2017-12-14 RX ORDER — TIZANIDINE 4 MG/1
4 TABLET ORAL EVERY 8 HOURS PRN
Status: DISCONTINUED | OUTPATIENT
Start: 2017-12-14 | End: 2017-12-22 | Stop reason: HOSPADM

## 2017-12-14 RX ORDER — DIPHENHYDRAMINE HYDROCHLORIDE 50 MG/ML
12.5 INJECTION INTRAMUSCULAR; INTRAVENOUS
Status: DISCONTINUED | OUTPATIENT
Start: 2017-12-14 | End: 2017-12-14 | Stop reason: HOSPADM

## 2017-12-14 RX ORDER — METOCLOPRAMIDE HYDROCHLORIDE 5 MG/ML
10 INJECTION INTRAMUSCULAR; INTRAVENOUS
Status: DISCONTINUED | OUTPATIENT
Start: 2017-12-14 | End: 2017-12-14 | Stop reason: HOSPADM

## 2017-12-14 RX ORDER — HYDROCODONE BITARTRATE AND ACETAMINOPHEN 7.5; 325 MG/1; MG/1
1 TABLET ORAL EVERY 6 HOURS PRN
Status: DISCONTINUED | OUTPATIENT
Start: 2017-12-14 | End: 2017-12-22 | Stop reason: HOSPADM

## 2017-12-14 RX ORDER — SODIUM CHLORIDE 0.9 % (FLUSH) 0.9 %
10 SYRINGE (ML) INJECTION PRN
Status: DISCONTINUED | OUTPATIENT
Start: 2017-12-14 | End: 2017-12-22 | Stop reason: HOSPADM

## 2017-12-14 RX ORDER — MORPHINE SULFATE 1 MG/ML
2 INJECTION, SOLUTION EPIDURAL; INTRATHECAL; INTRAVENOUS EVERY 5 MIN PRN
Status: DISCONTINUED | OUTPATIENT
Start: 2017-12-14 | End: 2017-12-14 | Stop reason: HOSPADM

## 2017-12-14 RX ORDER — SODIUM CHLORIDE 0.9 % (FLUSH) 0.9 %
10 SYRINGE (ML) INJECTION EVERY 12 HOURS SCHEDULED
Status: DISCONTINUED | OUTPATIENT
Start: 2017-12-14 | End: 2017-12-22 | Stop reason: HOSPADM

## 2017-12-14 RX ORDER — ROCURONIUM BROMIDE 10 MG/ML
INJECTION, SOLUTION INTRAVENOUS PRN
Status: DISCONTINUED | OUTPATIENT
Start: 2017-12-14 | End: 2017-12-14 | Stop reason: SDUPTHER

## 2017-12-14 RX ORDER — MORPHINE SULFATE 10 MG/ML
4 INJECTION, SOLUTION INTRAMUSCULAR; INTRAVENOUS
Status: DISCONTINUED | OUTPATIENT
Start: 2017-12-14 | End: 2017-12-16 | Stop reason: SDUPTHER

## 2017-12-14 RX ORDER — 0.9 % SODIUM CHLORIDE 0.9 %
500 INTRAVENOUS SOLUTION INTRAVENOUS ONCE
Status: COMPLETED | OUTPATIENT
Start: 2017-12-14 | End: 2017-12-14

## 2017-12-14 RX ORDER — HYDRALAZINE HYDROCHLORIDE 20 MG/ML
5 INJECTION INTRAMUSCULAR; INTRAVENOUS EVERY 10 MIN PRN
Status: DISCONTINUED | OUTPATIENT
Start: 2017-12-14 | End: 2017-12-14 | Stop reason: HOSPADM

## 2017-12-14 RX ORDER — PROMETHAZINE HYDROCHLORIDE 25 MG/ML
6.25 INJECTION, SOLUTION INTRAMUSCULAR; INTRAVENOUS
Status: DISCONTINUED | OUTPATIENT
Start: 2017-12-14 | End: 2017-12-14 | Stop reason: HOSPADM

## 2017-12-14 RX ORDER — ONDANSETRON 2 MG/ML
INJECTION INTRAMUSCULAR; INTRAVENOUS PRN
Status: DISCONTINUED | OUTPATIENT
Start: 2017-12-14 | End: 2017-12-14 | Stop reason: SDUPTHER

## 2017-12-14 RX ORDER — MORPHINE SULFATE 1 MG/ML
4 INJECTION, SOLUTION EPIDURAL; INTRATHECAL; INTRAVENOUS EVERY 5 MIN PRN
Status: DISCONTINUED | OUTPATIENT
Start: 2017-12-14 | End: 2017-12-14 | Stop reason: HOSPADM

## 2017-12-14 RX ORDER — EPHEDRINE SULFATE 50 MG/ML
INJECTION, SOLUTION INTRAVENOUS PRN
Status: DISCONTINUED | OUTPATIENT
Start: 2017-12-14 | End: 2017-12-14 | Stop reason: SDUPTHER

## 2017-12-14 RX ORDER — LEVOTHYROXINE SODIUM 0.07 MG/1
75 TABLET ORAL DAILY
Status: DISCONTINUED | OUTPATIENT
Start: 2017-12-15 | End: 2017-12-17

## 2017-12-14 RX ORDER — MEPERIDINE HYDROCHLORIDE 50 MG/ML
12.5 INJECTION INTRAMUSCULAR; INTRAVENOUS; SUBCUTANEOUS EVERY 5 MIN PRN
Status: DISCONTINUED | OUTPATIENT
Start: 2017-12-14 | End: 2017-12-14 | Stop reason: HOSPADM

## 2017-12-14 RX ORDER — PANTOPRAZOLE SODIUM 40 MG/1
40 TABLET, DELAYED RELEASE ORAL
Status: DISCONTINUED | OUTPATIENT
Start: 2017-12-15 | End: 2017-12-17

## 2017-12-14 RX ORDER — ONDANSETRON 4 MG/1
4 TABLET, FILM COATED ORAL EVERY 8 HOURS PRN
Status: DISCONTINUED | OUTPATIENT
Start: 2017-12-14 | End: 2017-12-14

## 2017-12-14 RX ORDER — MECLIZINE HYDROCHLORIDE 25 MG/1
12.5 TABLET ORAL PRN
Status: DISCONTINUED | OUTPATIENT
Start: 2017-12-14 | End: 2017-12-22 | Stop reason: HOSPADM

## 2017-12-14 RX ORDER — ONDANSETRON 4 MG/1
4 TABLET, FILM COATED ORAL EVERY 8 HOURS PRN
Qty: 30 TABLET | Refills: 0 | Status: CANCELLED | OUTPATIENT
Start: 2017-12-14

## 2017-12-14 RX ORDER — OYSTER SHELL CALCIUM WITH VITAMIN D 500; 200 MG/1; [IU]/1
1 TABLET, FILM COATED ORAL 2 TIMES DAILY
Status: DISCONTINUED | OUTPATIENT
Start: 2017-12-14 | End: 2017-12-22 | Stop reason: HOSPADM

## 2017-12-14 RX ORDER — ACETAMINOPHEN 325 MG/1
650 TABLET ORAL EVERY 4 HOURS PRN
Status: DISCONTINUED | OUTPATIENT
Start: 2017-12-14 | End: 2017-12-22 | Stop reason: HOSPADM

## 2017-12-14 RX ORDER — FENTANYL CITRATE 50 UG/ML
INJECTION, SOLUTION INTRAMUSCULAR; INTRAVENOUS PRN
Status: DISCONTINUED | OUTPATIENT
Start: 2017-12-14 | End: 2017-12-14 | Stop reason: SDUPTHER

## 2017-12-14 RX ORDER — MORPHINE SULFATE 10 MG/ML
2 INJECTION, SOLUTION INTRAMUSCULAR; INTRAVENOUS
Status: DISCONTINUED | OUTPATIENT
Start: 2017-12-14 | End: 2017-12-16 | Stop reason: SDUPTHER

## 2017-12-14 RX ORDER — PROPAFENONE HYDROCHLORIDE 150 MG/1
225 TABLET, FILM COATED ORAL EVERY 8 HOURS SCHEDULED
Status: DISCONTINUED | OUTPATIENT
Start: 2017-12-14 | End: 2017-12-22 | Stop reason: HOSPADM

## 2017-12-14 RX ORDER — NALOXONE HYDROCHLORIDE 0.4 MG/ML
0.4 INJECTION, SOLUTION INTRAMUSCULAR; INTRAVENOUS; SUBCUTANEOUS ONCE
Status: COMPLETED | OUTPATIENT
Start: 2017-12-14 | End: 2017-12-14

## 2017-12-14 RX ORDER — GLYCOPYRROLATE 0.2 MG/ML
INJECTION INTRAMUSCULAR; INTRAVENOUS PRN
Status: DISCONTINUED | OUTPATIENT
Start: 2017-12-14 | End: 2017-12-14 | Stop reason: SDUPTHER

## 2017-12-14 RX ORDER — SODIUM CHLORIDE 9 MG/ML
INJECTION, SOLUTION INTRAVENOUS CONTINUOUS
Status: DISCONTINUED | OUTPATIENT
Start: 2017-12-14 | End: 2017-12-19

## 2017-12-14 RX ORDER — ONDANSETRON 2 MG/ML
4 INJECTION INTRAMUSCULAR; INTRAVENOUS EVERY 4 HOURS PRN
Status: DISCONTINUED | OUTPATIENT
Start: 2017-12-14 | End: 2017-12-22 | Stop reason: HOSPADM

## 2017-12-14 RX ORDER — FUROSEMIDE 20 MG/1
20 TABLET ORAL DAILY PRN
Status: DISCONTINUED | OUTPATIENT
Start: 2017-12-14 | End: 2017-12-22 | Stop reason: HOSPADM

## 2017-12-14 RX ORDER — SODIUM CHLORIDE 0.9 % (FLUSH) 0.9 %
10 SYRINGE (ML) INJECTION EVERY 12 HOURS
Status: DISCONTINUED | OUTPATIENT
Start: 2017-12-14 | End: 2017-12-22

## 2017-12-14 RX ORDER — PROPOFOL 10 MG/ML
INJECTION, EMULSION INTRAVENOUS PRN
Status: DISCONTINUED | OUTPATIENT
Start: 2017-12-14 | End: 2017-12-14 | Stop reason: SDUPTHER

## 2017-12-14 RX ORDER — ATROPA BELLADONNA AND OPIUM 16.2; 6 MG/1; MG/1
SUPPOSITORY RECTAL PRN
Status: DISCONTINUED | OUTPATIENT
Start: 2017-12-14 | End: 2017-12-14 | Stop reason: HOSPADM

## 2017-12-14 RX ORDER — LABETALOL HYDROCHLORIDE 5 MG/ML
5 INJECTION, SOLUTION INTRAVENOUS EVERY 10 MIN PRN
Status: DISCONTINUED | OUTPATIENT
Start: 2017-12-14 | End: 2017-12-14 | Stop reason: HOSPADM

## 2017-12-14 RX ORDER — MIDODRINE HYDROCHLORIDE 5 MG/1
10 TABLET ORAL 2 TIMES DAILY
Status: DISCONTINUED | OUTPATIENT
Start: 2017-12-14 | End: 2017-12-17

## 2017-12-14 RX ADMIN — HYDRALAZINE HYDROCHLORIDE 5 MG: 20 INJECTION INTRAMUSCULAR; INTRAVENOUS at 16:46

## 2017-12-14 RX ADMIN — Medication 2 MG: at 16:58

## 2017-12-14 RX ADMIN — NOREPINEPHRINE BITARTRATE 2 MCG/MIN: 1 INJECTION INTRAVENOUS at 19:46

## 2017-12-14 RX ADMIN — EPHEDRINE SULFATE 25 MG: 50 INJECTION, SOLUTION INTRAMUSCULAR; INTRAVENOUS; SUBCUTANEOUS at 15:31

## 2017-12-14 RX ADMIN — FENTANYL CITRATE 25 MCG: 50 INJECTION, SOLUTION INTRAMUSCULAR; INTRAVENOUS at 15:25

## 2017-12-14 RX ADMIN — SODIUM CHLORIDE 500 ML: 9 INJECTION, SOLUTION INTRAVENOUS at 18:30

## 2017-12-14 RX ADMIN — OYSTER SHELL CALCIUM WITH VITAMIN D 1 TABLET: 500; 200 TABLET, FILM COATED ORAL at 21:07

## 2017-12-14 RX ADMIN — PROPOFOL 90 MG: 10 INJECTION, EMULSION INTRAVENOUS at 15:25

## 2017-12-14 RX ADMIN — MIDODRINE HYDROCHLORIDE 10 MG: 5 TABLET ORAL at 21:07

## 2017-12-14 RX ADMIN — ONDANSETRON 4 MG: 2 INJECTION INTRAMUSCULAR; INTRAVENOUS at 18:40

## 2017-12-14 RX ADMIN — ACETAMINOPHEN 650 MG: 325 TABLET, FILM COATED ORAL at 22:10

## 2017-12-14 RX ADMIN — Medication 10 ML: at 21:08

## 2017-12-14 RX ADMIN — LIDOCAINE HYDROCHLORIDE 5 ML: 10 INJECTION, SOLUTION EPIDURAL; INFILTRATION; INTRACAUDAL; PERINEURAL at 15:25

## 2017-12-14 RX ADMIN — NALOXONE HYDROCHLORIDE 0.4 MG: 0.4 INJECTION, SOLUTION INTRAMUSCULAR; INTRAVENOUS; SUBCUTANEOUS at 18:30

## 2017-12-14 RX ADMIN — ONDANSETRON HYDROCHLORIDE 4 MG: 2 SOLUTION INTRAMUSCULAR; INTRAVENOUS at 15:32

## 2017-12-14 RX ADMIN — ALBUMIN (HUMAN) 25 G: 12.5 INJECTION, SOLUTION INTRAVENOUS at 17:52

## 2017-12-14 RX ADMIN — CEFTRIAXONE SODIUM 1 G: 1 INJECTION, POWDER, FOR SOLUTION INTRAMUSCULAR; INTRAVENOUS at 15:28

## 2017-12-14 RX ADMIN — Medication 4 MG: at 16:41

## 2017-12-14 RX ADMIN — HYDRALAZINE HYDROCHLORIDE 5 MG: 20 INJECTION INTRAMUSCULAR; INTRAVENOUS at 16:52

## 2017-12-14 RX ADMIN — SODIUM CHLORIDE: 9 INJECTION, SOLUTION INTRAVENOUS at 19:46

## 2017-12-14 RX ADMIN — Medication 10 ML: at 19:47

## 2017-12-14 RX ADMIN — ROCURONIUM BROMIDE 30 MG: 10 INJECTION INTRAVENOUS at 15:25

## 2017-12-14 RX ADMIN — SODIUM CHLORIDE, SODIUM LACTATE, POTASSIUM CHLORIDE, AND CALCIUM CHLORIDE: 600; 310; 30; 20 INJECTION, SOLUTION INTRAVENOUS at 15:18

## 2017-12-14 RX ADMIN — SODIUM CHLORIDE: 9 INJECTION, SOLUTION INTRAVENOUS at 17:55

## 2017-12-14 RX ADMIN — GLYCOPYRROLATE 0.4 MG: 0.2 INJECTION, SOLUTION INTRAMUSCULAR; INTRAVENOUS at 15:56

## 2017-12-14 RX ADMIN — NEOSTIGMINE METHYLSULFATE 3 MG: 1 INJECTION, SOLUTION INTRAMUSCULAR; INTRAVENOUS; SUBCUTANEOUS at 15:56

## 2017-12-14 RX ADMIN — Medication 4 MG: at 16:24

## 2017-12-14 RX ADMIN — SODIUM CHLORIDE, SODIUM LACTATE, POTASSIUM CHLORIDE, AND CALCIUM CHLORIDE: 600; 310; 30; 20 INJECTION, SOLUTION INTRAVENOUS at 15:55

## 2017-12-14 RX ADMIN — PROPAFENONE HYDROCHLORIDE 225 MG: 150 TABLET, FILM COATED ORAL at 21:07

## 2017-12-14 RX ADMIN — MEROPENEM 1 G: 1 INJECTION, POWDER, FOR SOLUTION INTRAVENOUS at 21:18

## 2017-12-14 ASSESSMENT — ENCOUNTER SYMPTOMS
DOUBLE VISION: 0
SHORTNESS OF BREATH: 0
NAUSEA: 1
BLURRED VISION: 0
NAUSEA: 1
EYES NEGATIVE: 1
RESPIRATORY NEGATIVE: 1
SORE THROAT: 0
RESPIRATORY NEGATIVE: 1
HEARTBURN: 0
EYES NEGATIVE: 1
WHEEZING: 0

## 2017-12-14 ASSESSMENT — COPD QUESTIONNAIRES: CAT_SEVERITY: MILD

## 2017-12-14 ASSESSMENT — PAIN SCALES - GENERAL
PAINLEVEL_OUTOF10: 0
PAINLEVEL_OUTOF10: 0
PAINLEVEL_OUTOF10: 4
PAINLEVEL_OUTOF10: 6
PAINLEVEL_OUTOF10: 6

## 2017-12-14 NOTE — ANESTHESIA PRE PROCEDURE
List:    Patient Active Problem List   Diagnosis Code    Fast heart beat R00.0    Sinus pause I45.5    Paroxysmal a-fib ([facility]) I48.0    Mild CAD I25.10    Left ureteral calculus N20.1    Renal calculus, left N20.0    Perinephric fluid collection N28.89    Closed right hip fracture ([facility]) S72.001A    Acute cystitis with hematuria N30.01    Gastroesophageal reflux disease without esophagitis K21.9    Chronic obstructive pulmonary disease ([facility]) J44.9    Coronary artery disease involving native heart without angina pectoris I25.10       Past Medical History:        Diagnosis Date    A-fib ([facility])     Anemia     CAD (coronary artery disease)     Conjunctivitis     COPD (chronic obstructive pulmonary disease) ([facility])     Depression     Fatigue     Kidney stone     Leukopenia     Low back pain     Mild CAD 5/15/2017    Osteoporosis     Paroxysmal a-fib ([address]     Sinus pause     10/2014    Urinary tract infection     Weakness        Past Surgical History:        Procedure Laterality Date    BACK SURGERY      CARDIAC CATHETERIZATION  8/14/14  [facility]    Mild, non-occlusive CAD, EF 60%    CYSTOSCOPY Left 8/31/2017    CYSTOSCOPY; LEFT URETEROSCOPY; LEFT URETERAL LASER LITHOTRIPSY AND STONE EXTRACTION; INSERTION LEFT URETERAL DOUBLE J STENT performed by Jackson Marcano MD at [place] [address] Right 10/11/2017    FEMUR IM NAIL MICHELLE INSERTION performed by Faizan Clarke DO at [facility] [address]      x 2    LAMINECTOMY      2 lumbar segments    SHOULDER ARTHROPLASTY         Social History:    Social History   Substance Use Topics    Smoking status: Never Smoker    Smokeless tobacco: Never Used    Alcohol use No                                Counseling given: Not Answered      Vital Signs (Current):   Vitals:    12/14/17 1122   BP: (!) 140/82   Pulse: 57   Resp: 16   Temp: 98.2 °F (36.8 °C)   TempSrc: Tympanic   SpO2: 97%   Weight: 129 Cardiovascular:    (+) dysrhythmias: atrial fibrillation,                   Neuro/Psych:               GI/Hepatic/Renal:   (+) GERD:,           Endo/Other:    (+) blood dyscrasia: anticoagulation therapy, arthritis:., .                 Abdominal:           Vascular:                                        Anesthesia Plan      general     ASA 3     (Cereal/apple sauce 7 am)  Induction: intravenous. MIPS: Postoperative opioids intended and Prophylactic antiemetics administered. Plan discussed with CRNA.                   Buffy Donnelly MD   12/14/2017

## 2017-12-14 NOTE — PROGRESS NOTES
Dr. Melvi Fairbanks made aware that patient way hypertensive and received hydralazine to control blood pressure.

## 2017-12-14 NOTE — PROGRESS NOTES
Eugenio Pierre MD at [facility] [address] Right 10/11/2017    FEMUR IM NAIL MICHELLE INSERTION performed by Jack Barlow DO at [facility] 5747      x 2    LAMINECTOMY      2 lumbar segments    SHOULDER ARTHROPLASTY         No current facility-administered medications for this visit. No current outpatient prescriptions on file.      Facility-Administered Medications Ordered in Other Visits   Medication Dose Route Frequency Provider Last Rate Last Dose    morphine injection 2 mg  2 mg Intravenous Q2H PRN Eugenio Pierre MD        Or    morphine injection 4 mg  4 mg Intravenous Q2H PRN Eugenio Pierre MD        bisacodyl (DULCOLAX) EC tablet 5 mg  5 mg Oral Daily PRN Eugenio Pierre MD        bisacodyl (DULCOLAX) suppository 10 mg  10 mg Rectal Daily PRN Eugenio Pierre MD        acetaminophen (TYLENOL) suppository 650 mg  650 mg Rectal Q4H PRN Kameron Pena MD   650 mg at 12/15/17 0952    levofloxacin (LEVAQUIN) 750 MG/150ML infusion 750 mg  750 mg Intravenous Q48H Kameron Pena MD   Stopped at 12/15/17 1236    0.9 % sodium chloride bolus  500 mL Intravenous Q12H PRN Kameron Pena MD   Stopped at 12/15/17 1821    furosemide (LASIX) tablet 20 mg  20 mg Oral Daily PRN Eugenio Pierre MD        calcium-vitamin D (OSCAL-500) 500-200 MG-UNIT per tablet 1 tablet  1 tablet Oral BID Eugenio Pierre MD   1 tablet at 12/16/17 4514    meclizine (ANTIVERT) tablet 12.5 mg  12.5 mg Oral PRN Eugenio Pierre MD        levothyroxine (SYNTHROID) tablet 75 mcg  75 mcg Oral Daily Eugenio Pierre MD   75 mcg at 12/16/17 0518    propafenone (RYTHMOL) tablet 225 mg  225 mg Oral 3 times per day Eugenio Pierre MD   225 mg at 12/16/17 0518    pantoprazole (PROTONIX) tablet 40 mg  40 mg Oral QAM AC Eugenio Pierre MD   40 mg at 12/16/17 0518    midodrine (PROAMATINE) tablet 10 mg  10 mg Oral BID Eugenio Pierre MD   10 mg at 12/16/17 0926    tiZANidine (ZANAFLEX) tablet 4 mg  4 mg Oral Q8H PRN Nirmala Mooney MD        HYDROcodone-acetaminophen [facility]) 7.5-325 MG per tablet 1 tablet  1 tablet Oral Q6H PRN Nirmala Mooney MD        0.9 % sodium chloride infusion   Intravenous Continuous Nirmala Mooeny  mL/hr at 12/16/17 1259      sodium chloride flush 0.9 % injection 10 mL  10 mL Intravenous 2 times per day Nirmala Mooney MD   10 mL at 12/16/17 0929    sodium chloride flush 0.9 % injection 10 mL  10 mL Intravenous PRN Nirmala Mooney MD        acetaminophen (TYLENOL) tablet 650 mg  650 mg Oral Q4H PRN Nirmala Mooney MD   650 mg at 12/15/17 0403    magnesium hydroxide (MILK OF MAGNESIA) 400 MG/5ML suspension 30 mL  30 mL Oral Daily PRN Nirmala Mooney MD        ondansetron [facility]) injection 4 mg  4 mg Intravenous Q4H PRN Nirmala Mooney MD   4 mg at 12/14/17 1840    norepinephrine (LEVOPHED) 16 mg in dextrose 5 % 250 mL infusion  2 mcg/min Intravenous Continuous Nirmala Mooney MD 9.4 mL/hr at 12/16/17 0900 10 mcg/min at 12/16/17 0900    rivaroxaban (XARELTO) tablet 15 mg  15 mg Oral Daily Nirmala Mooney MD   15 mg at 12/15/17 2119    sodium chloride flush 0.9 % injection 10 mL  10 mL Intravenous Q12H Marinus Seed, APRN   10 mL at 12/16/17 0767    sodium chloride flush 0.9 % injection 10 mL  10 mL Intravenous PRN Marinus Seed, APRN        meropenem [facility]) 1 g in sterile water 20 mL IV syringe  1 g Intravenous Q12H Marinus Seed, APRN   1 g at 12/16/17 9607       Allergies   Allergen Reactions    Nitrofuran Derivatives Swelling    Sulfa Antibiotics     Pcn [Penicillins] Rash       Social History     Social History    Marital status:       Spouse name: N/A    Number of children: N/A    Years of education: N/A     Social History Main Topics    Smoking status: Never Smoker    Smokeless tobacco: Never Used    Alcohol use No    Drug use: No    Sexual activity: Yes     Other Topics Concern    None     Social History Narrative    None       Family History   Problem Relation Age of Onset    Cancer Father      lung    Stroke Mother      mini    Hypotension Mother        REVIEW OF SYSTEMS:  Review of Systems   Constitutional: Negative for chills and fever. HENT: Negative for congestion and sore throat. Eyes: Negative for blurred vision and double vision. Respiratory: Negative for shortness of breath and wheezing. Cardiovascular: Negative for chest pain and palpitations. Gastrointestinal: Positive for nausea. Negative for heartburn. Genitourinary: Positive for frequency and urgency. Negative for dysuria, flank pain and hematuria. Musculoskeletal: Positive for joint pain. Negative for falls and neck pain. Skin: Negative for itching and rash. Neurological: Negative for dizziness and tingling. Endo/Heme/Allergies: Bruises/bleeds easily. Psychiatric/Behavioral: The patient does not have insomnia. PHYSICAL EXAM:  Ht 5' 4\" (1.626 m)   Wt 129 lb (58.5 kg)   BMI 22.14 kg/m²   Physical Exam   Constitutional: She is oriented to person, place, and time. She appears well-developed and well-nourished. HENT:   Head: Normocephalic and atraumatic. Eyes: Conjunctivae and EOM are normal. Pupils are equal, round, and reactive to light. No scleral icterus. Neck: Normal range of motion. Neck supple. Cardiovascular: Normal rate, regular rhythm and intact distal pulses. Pulmonary/Chest: Effort normal and breath sounds normal. No respiratory distress. She exhibits no tenderness. Abdominal: Soft. She exhibits no distension and no mass. There is no tenderness. There is no CVA tenderness. Musculoskeletal: Normal range of motion. She exhibits no edema or tenderness. Lymphadenopathy:     She has no cervical adenopathy. Neurological: She is alert and oriented to person, place, and time. Skin: Skin is warm and dry.    Psychiatric: She has a normal

## 2017-12-14 NOTE — BRIEF OP NOTE
Urology Brief Op Note    Patient Name: Elly Miller    : 1932    MRN: [id number]    Pre-operative Diagnosis: 1. Right renal calculus. 2. Right distal ureteral calculus. 3. Left renal calculus. 4. Obstructing pyelonephritis, urinary tract infection, acute, nephritis    Post-operative Diagnosis: Same     Procedure: Procedure(s):  Cystoscopy, bilateral ureteral catheter insertion. Bilateral retrograde pyelograms. Right 6-[place] by 22 cm double-J ureteral stent insertion  Left 5-[place] by 22 cm double-J ureteral stent insertion    Anesthesia: General    Surgeon: Pedro Escalera MD    Assistants: Scrub Person First: Tanna Beam    Estimated Blood Loss: * No values recorded between 2017  3:19 PM and 2017  6:27 PM *    Complications: none    Findings: Cloudy urine from the bladder sent for culture. Cloudy urine from the left kidney sent for culture. Nonobstructing stone in the left renal pelvis without hydronephrosis stent placed on the left. Right distal ureteral calculus, right renal pelvis calculus with hydronephrosis and obstruction and grossly purulent urine from the right renal pelvis consistent with a pyelonephrosis, obstructing pyelonephritis. Urine from the right kidney sent for culture. Stent placed on the right  18-[place] catheter placed for maximum drainage    Specimens:    ID Type Source Tests Collected by Time Destination   1 : urine from bladder  Urine Bladder URINE CULTURE Pedro Escalera MD 2017 1545    2 : urine from left kidney  Urine Kidney URINE CULTURE Pedro Escalera MD 2017 1545    3 : bloody pus from right kidney  Body Fluid Kidney SURGICAL CULTURE Pedro Escalera MD 2017 1556        Disposition/Plan:  To PACU then will be admitted for IV antibiotic therapy and follow up on cultures and for close monitoring    Pedro Escalera MD  2017  4:18 PM

## 2017-12-14 NOTE — H&P
Vijay Galan is an 80 y.o.  female. Patient is here for follow-up after undergoing a left ureteroscopy laser lithotripsy and stent placement on 08/31/17 for a distal left ureteral calculus. She was found on her initial CT scan done on August 29 to have left hydronephrosis with a significant. Nephric fluid collection consistent with urinoma and a distal left ureteral calculus. In addition she has some nonobstructing stones in the lower pole of the left kidney. She comes in today after having her stent removed back in October for a follow-up CT scan. She is complaining of some blood in the urine and some cloudy urine. She denies any fevers no nausea vomiting no back pain.     Past Medical History:   Diagnosis Date    A-fib ([address]     Anemia     CAD (coronary artery disease)     Conjunctivitis     [facility]     Depression     Fatigue     Kidney stone     Leukopenia     Low back pain     Mild CAD 5/15/2017    Osteoporosis     Paroxysmal a-fib ([address]     Sinus pause     10/2014    Urinary tract infection     Weakness        Allergies: Allergies   Allergen Reactions    Nitrofuran Derivatives Swelling    Pcn [Penicillins]     Sulfa Antibiotics        Active Problems:    * No active hospital problems. *    There were no vitals taken for this visit. Review of Systems   Constitutional: Negative. HENT: Negative. Eyes: Negative. Respiratory: Negative. Cardiovascular: Negative. Genitourinary: Positive for hematuria. Musculoskeletal: Positive for joint pain. Skin: Negative. Neurological: Negative. Endo/Heme/Allergies: Negative. Psychiatric/Behavioral: Negative. Physical Exam   Constitutional: She is oriented to person, place, and time. She appears well-developed and well-nourished. HENT:   Head: Normocephalic and atraumatic. Eyes: Conjunctivae and EOM are normal. Pupils are equal, round, and reactive to light.  No

## 2017-12-15 ENCOUNTER — APPOINTMENT (OUTPATIENT)
Dept: GENERAL RADIOLOGY | Age: 82
DRG: 871 | End: 2017-12-15
Attending: UROLOGY
Payer: MEDICARE

## 2017-12-15 PROBLEM — R65.21 SEPTIC SHOCK (HCC): Status: ACTIVE | Noted: 2017-12-14

## 2017-12-15 PROBLEM — A41.9 SEPTIC SHOCK (HCC): Status: ACTIVE | Noted: 2017-12-14

## 2017-12-15 PROBLEM — I36.1 NON-RHEUMATIC TRICUSPID VALVE INSUFFICIENCY: Status: ACTIVE | Noted: 2017-12-15

## 2017-12-15 PROBLEM — D56.3 THALASSEMIA MINOR: Status: ACTIVE | Noted: 2017-12-15

## 2017-12-15 LAB
ALBUMIN SERPL-MCNC: 2.7 G/DL (ref 3.5–5.2)
ALP BLD-CCNC: 53 U/L (ref 35–104)
ALT SERPL-CCNC: <5 U/L (ref 5–33)
ANION GAP SERPL CALCULATED.3IONS-SCNC: 13 MMOL/L (ref 7–19)
AST SERPL-CCNC: 12 U/L (ref 5–32)
BASOPHILS ABSOLUTE: 0 K/UL (ref 0–0.2)
BASOPHILS RELATIVE PERCENT: 0.1 % (ref 0–1)
BILIRUB SERPL-MCNC: 0.7 MG/DL (ref 0.2–1.2)
BUN BLDV-MCNC: 18 MG/DL (ref 8–23)
CALCIUM SERPL-MCNC: 8.5 MG/DL (ref 8.8–10.2)
CHLORIDE BLD-SCNC: 105 MMOL/L (ref 98–111)
CO2: 21 MMOL/L (ref 22–29)
CREAT SERPL-MCNC: 0.9 MG/DL (ref 0.5–0.9)
EOSINOPHILS ABSOLUTE: 0 K/UL (ref 0–0.6)
EOSINOPHILS RELATIVE PERCENT: 0 % (ref 0–5)
GFR NON-AFRICAN AMERICAN: 59
GLUCOSE BLD-MCNC: 87 MG/DL (ref 74–109)
HCT VFR BLD CALC: 27.5 % (ref 37–47)
HEMOGLOBIN: 8.6 G/DL (ref 12–16)
INR BLD: 1.81 (ref 0.88–1.18)
LYMPHOCYTES ABSOLUTE: 0.1 K/UL (ref 1.1–4.5)
LYMPHOCYTES RELATIVE PERCENT: 0.7 % (ref 20–40)
MAGNESIUM: 1.3 MG/DL (ref 1.6–2.4)
MCH RBC QN AUTO: 31.3 PG (ref 27–31)
MCHC RBC AUTO-ENTMCNC: 31.3 G/DL (ref 33–37)
MCV RBC AUTO: 100 FL (ref 81–99)
MONOCYTES ABSOLUTE: 0.2 K/UL (ref 0–0.9)
MONOCYTES RELATIVE PERCENT: 1 % (ref 0–10)
NEUTROPHILS ABSOLUTE: 15.4 K/UL (ref 1.5–7.5)
NEUTROPHILS RELATIVE PERCENT: 97.6 % (ref 50–65)
PDW BLD-RTO: 14.2 % (ref 11.5–14.5)
PHOSPHORUS: 2.3 MG/DL (ref 2.5–4.5)
PLATELET # BLD: 239 K/UL (ref 130–400)
PMV BLD AUTO: 10.5 FL (ref 9.4–12.3)
POTASSIUM SERPL-SCNC: 3.6 MMOL/L (ref 3.5–5)
PROTHROMBIN TIME: 21.1 SEC (ref 12–14.6)
RBC # BLD: 2.75 M/UL (ref 4.2–5.4)
SODIUM BLD-SCNC: 139 MMOL/L (ref 136–145)
TOTAL PROTEIN: 4.9 G/DL (ref 6.6–8.7)
WBC # BLD: 15.7 K/UL (ref 4.8–10.8)

## 2017-12-15 PROCEDURE — 2580000003 HC RX 258: Performed by: FAMILY MEDICINE

## 2017-12-15 PROCEDURE — 2580000003 HC RX 258: Performed by: UROLOGY

## 2017-12-15 PROCEDURE — 6370000000 HC RX 637 (ALT 250 FOR IP): Performed by: UROLOGY

## 2017-12-15 PROCEDURE — 2000000000 HC ICU R&B

## 2017-12-15 PROCEDURE — 99233 SBSQ HOSP IP/OBS HIGH 50: CPT | Performed by: UROLOGY

## 2017-12-15 PROCEDURE — 92526 ORAL FUNCTION THERAPY: CPT

## 2017-12-15 PROCEDURE — 83735 ASSAY OF MAGNESIUM: CPT

## 2017-12-15 PROCEDURE — 80053 COMPREHEN METABOLIC PANEL: CPT

## 2017-12-15 PROCEDURE — G8996 SWALLOW CURRENT STATUS: HCPCS

## 2017-12-15 PROCEDURE — 6360000002 HC RX W HCPCS: Performed by: FAMILY MEDICINE

## 2017-12-15 PROCEDURE — 85025 COMPLETE CBC W/AUTO DIFF WBC: CPT

## 2017-12-15 PROCEDURE — 99291 CRITICAL CARE FIRST HOUR: CPT | Performed by: FAMILY MEDICINE

## 2017-12-15 PROCEDURE — 84100 ASSAY OF PHOSPHORUS: CPT

## 2017-12-15 PROCEDURE — 92610 EVALUATE SWALLOWING FUNCTION: CPT

## 2017-12-15 PROCEDURE — 2700000000 HC OXYGEN THERAPY PER DAY

## 2017-12-15 PROCEDURE — 85610 PROTHROMBIN TIME: CPT

## 2017-12-15 PROCEDURE — 6370000000 HC RX 637 (ALT 250 FOR IP): Performed by: FAMILY MEDICINE

## 2017-12-15 PROCEDURE — 2580000003 HC RX 258: Performed by: CLINICAL NURSE SPECIALIST

## 2017-12-15 PROCEDURE — 2500000003 HC RX 250 WO HCPCS: Performed by: UROLOGY

## 2017-12-15 PROCEDURE — 71010 XR CHEST PORTABLE: CPT

## 2017-12-15 PROCEDURE — 6360000002 HC RX W HCPCS: Performed by: CLINICAL NURSE SPECIALIST

## 2017-12-15 PROCEDURE — G8997 SWALLOW GOAL STATUS: HCPCS

## 2017-12-15 RX ORDER — NOREPINEPHRINE BITARTRATE 1 MG/ML
INJECTION, SOLUTION INTRAVENOUS
Status: DISPENSED
Start: 2017-12-15 | End: 2017-12-15

## 2017-12-15 RX ORDER — LEVOFLOXACIN 5 MG/ML
750 INJECTION, SOLUTION INTRAVENOUS
Status: CANCELLED | OUTPATIENT
Start: 2017-12-15

## 2017-12-15 RX ORDER — LEVOFLOXACIN 5 MG/ML
750 INJECTION, SOLUTION INTRAVENOUS
Status: DISCONTINUED | OUTPATIENT
Start: 2017-12-15 | End: 2017-12-17

## 2017-12-15 RX ORDER — 0.9 % SODIUM CHLORIDE 0.9 %
500 INTRAVENOUS SOLUTION INTRAVENOUS EVERY 12 HOURS PRN
Status: DISCONTINUED | OUTPATIENT
Start: 2017-12-15 | End: 2017-12-22 | Stop reason: HOSPADM

## 2017-12-15 RX ORDER — 0.9 % SODIUM CHLORIDE 0.9 %
1000 INTRAVENOUS SOLUTION INTRAVENOUS ONCE
Status: COMPLETED | OUTPATIENT
Start: 2017-12-15 | End: 2017-12-15

## 2017-12-15 RX ORDER — ACETAMINOPHEN 650 MG/1
650 SUPPOSITORY RECTAL EVERY 4 HOURS PRN
Status: DISCONTINUED | OUTPATIENT
Start: 2017-12-15 | End: 2017-12-22 | Stop reason: HOSPADM

## 2017-12-15 RX ADMIN — SODIUM CHLORIDE 1000 ML: 9 INJECTION, SOLUTION INTRAVENOUS at 09:46

## 2017-12-15 RX ADMIN — MIDODRINE HYDROCHLORIDE 10 MG: 5 TABLET ORAL at 08:52

## 2017-12-15 RX ADMIN — PROPAFENONE HYDROCHLORIDE 225 MG: 150 TABLET, FILM COATED ORAL at 21:16

## 2017-12-15 RX ADMIN — OYSTER SHELL CALCIUM WITH VITAMIN D 1 TABLET: 500; 200 TABLET, FILM COATED ORAL at 21:16

## 2017-12-15 RX ADMIN — MEROPENEM 1 G: 1 INJECTION, POWDER, FOR SOLUTION INTRAVENOUS at 07:44

## 2017-12-15 RX ADMIN — MEROPENEM 1 G: 1 INJECTION, POWDER, FOR SOLUTION INTRAVENOUS at 21:18

## 2017-12-15 RX ADMIN — PANTOPRAZOLE SODIUM 40 MG: 40 TABLET, DELAYED RELEASE ORAL at 06:00

## 2017-12-15 RX ADMIN — SODIUM CHLORIDE: 9 INJECTION, SOLUTION INTRAVENOUS at 22:05

## 2017-12-15 RX ADMIN — LEVOFLOXACIN 750 MG: 750 INJECTION, SOLUTION INTRAVENOUS at 11:06

## 2017-12-15 RX ADMIN — Medication 10 ML: at 21:25

## 2017-12-15 RX ADMIN — RIVAROXABAN 15 MG: 15 TABLET, FILM COATED ORAL at 21:19

## 2017-12-15 RX ADMIN — SODIUM CHLORIDE 500 ML: 9 INJECTION, SOLUTION INTRAVENOUS at 16:21

## 2017-12-15 RX ADMIN — Medication 10 ML: at 07:57

## 2017-12-15 RX ADMIN — Medication 10 ML: at 21:19

## 2017-12-15 RX ADMIN — ACETAMINOPHEN 650 MG: 325 TABLET, FILM COATED ORAL at 04:03

## 2017-12-15 RX ADMIN — Medication 10 ML: at 08:53

## 2017-12-15 RX ADMIN — MIDODRINE HYDROCHLORIDE 10 MG: 5 TABLET ORAL at 21:16

## 2017-12-15 RX ADMIN — LEVOTHYROXINE SODIUM 75 MCG: 75 TABLET ORAL at 06:00

## 2017-12-15 RX ADMIN — PROPAFENONE HYDROCHLORIDE 225 MG: 150 TABLET, FILM COATED ORAL at 05:55

## 2017-12-15 RX ADMIN — ACETAMINOPHEN 650 MG: 650 SUPPOSITORY RECTAL at 09:52

## 2017-12-15 RX ADMIN — SODIUM CHLORIDE: 9 INJECTION, SOLUTION INTRAVENOUS at 16:23

## 2017-12-15 RX ADMIN — NOREPINEPHRINE BITARTRATE 30 MCG/MIN: 1 INJECTION INTRAVENOUS at 22:00

## 2017-12-15 ASSESSMENT — PAIN SCALES - GENERAL
PAINLEVEL_OUTOF10: 0

## 2017-12-15 ASSESSMENT — ENCOUNTER SYMPTOMS
ABDOMINAL PAIN: 0
VOMITING: 0
EYES NEGATIVE: 1
RESPIRATORY NEGATIVE: 1
NAUSEA: 0
BACK PAIN: 1

## 2017-12-15 NOTE — PROGRESS NOTES
notified patient hypotensive systolic blood pressure in the 70's. Order received and to transfer patient to ICU.

## 2017-12-15 NOTE — CONSULTS
2    LAMINECTOMY      2 lumbar segments    SHOULDER ARTHROPLASTY         Medications: Scheduled Meds:   calcium-vitamin D  1 tablet Oral BID    rivaroxaban  20 mg Oral Daily    levothyroxine  75 mcg Oral Daily    propafenone  225 mg Oral 3 times per day    [START ON 12/15/2017] pantoprazole  40 mg Oral QAM AC    midodrine  10 mg Oral BID    sodium chloride flush  10 mL Intravenous 2 times per day    [START ON 12/15/2017] cefTRIAXone  1 g Intravenous Q24H    ertapenem (INVANZ) IVPB  1 g Intravenous Q24H    sodium chloride  500 mL Intravenous Once     Continuous Infusions:   sodium chloride 75 mL/hr at 12/14/17 1755    norepinephrine       PRN Meds:.furosemide, meclizine, tiZANidine, HYDROcodone-acetaminophen, ondansetron, sodium chloride flush, acetaminophen, morphine **OR** morphine, magnesium hydroxide, ondansetron    Allergies:  Nitrofuran derivatives; Pcn [penicillins]; and Sulfa antibiotics    Social History:   TOBACCO:   reports that she has never smoked. She has never used smokeless tobacco.  ETOH:   reports that she does not drink alcohol. OCCUPATION:      Family History:       Problem Relation Age of Onset    Cancer Father      lung    Stroke Mother      mini    Hypotension Mother       Review of Systems   Constitutional: Positive for chills. HENT: Negative. Eyes: Negative. Respiratory: Negative. Cardiovascular: Negative. Gastrointestinal: Positive for nausea. Genitourinary: Negative. Musculoskeletal: Negative. Skin: Negative for itching and rash. Neurological: Negative. Endo/Heme/Allergies: Negative. Psychiatric/Behavioral: The patient is nervous/anxious.           Physical Exam:    Vitals: BP (!) 70/42   Pulse 95   Temp 98.1 °F (36.7 °C)   Resp 14   Ht 5' 4\" (1.626 m)   Wt 129 lb (58.5 kg)   SpO2 93%   BMI 22.14 kg/m²   General appearance: alert, appears stated age and cooperative  Skin: Skin color, texture, turgor normal. No rashes or lesions  HEENT: and plan. Will continue to follow. Critical care time 55 minutes, including procedure: Central line placement ( See separate note).     Kathya Galvez MD  Hospitalist service  12/14/2017   7:45 PM

## 2017-12-15 NOTE — OP NOTE
[facility] [employer] [facility] [address], [address] [facility]                                 OPERATIVE REPORT    PATIENT NAME: Jose Kelly                :        1932  MED REC NO:   [id number]                              ROOM:       [facility]  ACCOUNT NO:   [de-identified]                           ADMIT DATE: 2017  PROVIDER:     Edin Jiang MD          DATE OF PROCEDURE:  2017    TITLE OF OPERATION:  1. Cystoscopy, bilateral ureteral catheterization for bilateral retrograde  pyelograms. 2.  Placement of a left 5 Lao X 22 cm double-J ureteral stent. 3.  Placement of a right 6 Lao x 22 cm right double-J ureteral stent. PREOPERATIVE DIAGNOSIS:  Bilateral UPJ renal calculi. POSTOPERATIVE DIAGNOSES:  1.  Bilateral UPJ renal calculi. 2.  Obstructing pyelonephritis of the right kidney. ANESTHETIC:  General anesthetic. ATTENDING SURGEON:  Dr. Tayler Camargo. HISTORY:  The patient is an 78-year-old female who had a distal left  ureteral calculus that was complicated by a left urinoma. She has known  nonobstructing stones in the left kidney and this was managed with a left  ureteral stent. This was left in place until October where at that time a  followup CT scan showed a resolving urinoma. She was seen in my office  today for followup CT scan to see if the urinoma has completely resolved  and she was complaining of passing some cloudy and grossly purulent and  bloody urine. She had no fevers or back pain. CT scan basically showed  that the urinoma of the left kidney had resolved. However, there appeared  to be a large stone in the left UPJ. There was a second large stone in the  left renal pelvis and left lower pole. In addition, there was a distal  right ureteral calculus with hydronephrosis on the right and a right large  UPJ calculus.   Given her prior presentation, I was very concerned that she  may have developing obstructing pyelonephritis and pyonephrosis with  secondary development of urinoma, so I felt that bilateral ureteral stent  placements were warranted. This was discussed with her and her daughter  and they agreed to proceed. I did tell her if I found plus or grossly  purulent drainage from the kidney that she may require hospitalization  after the procedure. They understood and agreed to proceed. DESCRIPTION OF PROCEDURE:  The patient was brought to the operating room,  underwent general anesthetic. She was placed on the table in lithotomy  position. Her genitalia was prepped and draped in routine sterile fashion. She received preoperative antibiotics that consisted of ceftriaxone. A  22-Belarusian cystoscope was inserted into the patient's bladder. The urine in  the bladder was cloudy. This was collected through the cystoscope and was  sent for culture. I then inspected the bladder. The mucosa was somewhat  injected, but it did not appear to be inflamed. She did have some shallow  cellules and some moderate trabeculation, but no other papillary or  ulcerative lesions in the bladder. The right and left ureteral orifices  appeared to be normal in the normal anatomic position. I then did bilateral ureteral catheterization to inject contrast for  retrograde pyelograms. First beginning on the left side, the left orifice  was identified. This was intubated with a 5-Belarusian ureteral catheter and  contrast injected retrograde. This showed that the left ureter was  actually pretty normal in size and she had a filling defect in the left  renal pelvis without significant hydronephrosis. I then passed a 0.035  sensor tip guidewire up into the renal pelvis and advanced 5-Belarusian  catheter up into the patient's kidney and then aspirated some urine and  this was cloudy. This was collected and sent for culture.   I then replaced  the guidewire, removed the catheter and then over the guidewire, I placed a  5 Belarusian x 22 cm

## 2017-12-15 NOTE — PROGRESS NOTES
[facility] Hospitalists      Patient:  Marcy Drummond  YOB: 1932  Date of Service: 12/15/2017  MRN: [id number]   Acct: [de-identified]   Primary Care Physician: Brannon Bhardwaj MD  Advance Directive: Full Code  Admit Date: 12/14/2017       Hospital Day: 1    CHIEF COMPLAINT Hypotension  Consult for Hypotension, requested by Dr Lamin Stevens. HPI this admission:  The patient is a 80 y.o. female who went for follow up appointment with Dr. Lmain Stevens due to obstructive ureter stones and placement of stents. Patient then went to OR due to need for addition double J-stent placements secondary to bilateral obstructing renal calculi. Due to patients multiple co-morbidities, a hospitalist consult was ordered. Prior to consult, patients BP down in the 70's with HR up to 110 . Nursing staff placed call to attending who ordered patient to be moved to ICU with additional orders. On arrival, patient lethargic with SBP @ 62 per NIBP cuff. Narcan given as well as IV fluids and albumin. Patient now awake with complaints of nausea and being cold. Grullon catheter to BSD with dark hematuria. See physical system assessment. Interim note:    12/15 Patient is requiring Levophed, CVP less than 10. Bolus ordered. This morning she presents lung rales, CXR shows infiltrates in left lung. Fever remains elevated. Swallowing is impaired, will need SLP. Urine is dark, not bloody.     Review of Systems:   Constitutional / general:  + fever/chills / sweats  HEENT: + sore throat / - hoarseness / - vision changes  CV:  Denies chest pain / palpitations/ orthopnea    Respiratory:  + cough / shortness of breath / sputum / - hemoptysis  GI: Denies nausea / vomiting / abdominal pain / diarrhea / constipation  :  Denies dysuria / hesitancy / urgency / hematuria    Neuro: + muscle weakness / - dysphagia / - headache / - paresthesias  Musculoskeletal:  Denies edema / cyanosis / pain    Objective:   VITALS:  BP (!) 95/36   Pulse 92   Temp 101.4 Intravenous 2 times per day    cefTRIAXone  1 g Intravenous Q24H    rivaroxaban  15 mg Oral Daily    sodium chloride flush  10 mL Intravenous Q12H    meropenem  1 g Intravenous Q12H     furosemide, meclizine, tiZANidine, HYDROcodone-acetaminophen, sodium chloride flush, acetaminophen, morphine **OR** morphine, magnesium hydroxide, ondansetron, sodium chloride flush  DIET GENERAL;     DVT Prophylaxis: Xarelto will need adjusting if renal function changes    Lab and other Data:     Recent Labs      12/14/17   1131  12/15/17   0500   WBC  8.2  15.7*   HGB  12.1  8.6*   PLT  427*  239     Recent Labs      12/14/17   1131  12/15/17   0500   NA  138  139   K  4.5  3.6   CL  100  105   CO2  26  21*   BUN  18  18   CREATININE  0.8  0.9   GLUCOSE  88  87     Recent Labs      12/15/17   0500   AST  12   ALT  <5*   BILITOT  0.7   ALKPHOS  53     Troponin T: No results for input(s): TROPONINI in the last 72 hours. Pro-BNP: No results for input(s): BNP in the last 72 hours. INR:   Recent Labs      12/14/17   1131  12/15/17   0500   INR  2.33*  1.81*     ABGs: No results found for: PHART, PO2ART, SAY8SER  UA:No results for input(s): NITRITE, COLORU, PHUR, LABCAST, WBCUA, RBCUA, MUCUS, TRICHOMONAS, YEAST, BACTERIA, CLARITYU, SPECGRAV, LEUKOCYTESUR, UROBILINOGEN, BILIRUBINUR, BLOODU, GLUCOSEU, AMORPHOUS in the last 72 hours. Invalid input(s): Bal Duval    RAD:   CXR 12/15/17  Impression:        Opacification of the left lower lobe may represent  atelectasis, pneumonia, or a combination of these. Trace pleural fluid  also suspected.      Echo: 2014 LVEF 55%    Micro:   Last culture of record:     Blood Culture:   Recent Labs      12/14/17   1456   ORG  Proteus species*       GRAM STAIN  Recent Labs      12/14/17   1456   LABGRAM  Many Gram negative rods present  Many WBC's (Polymorphonuclear) present     ORG  Proteus species*       Resp Culture Brief : No results found for: CULTRESP    Body Fluid : No results found for:

## 2017-12-15 NOTE — PROGRESS NOTES
Pt arrived to ICU from 5th floor accompanied by 2 RNs; bedside report given. Pt lethargic, O2 sat 87%, BP 62/33. O2 increased to 4L NC. Albumen infusing. NS infusing at 75ml/hr. Dr. Alok Vanegas and Tyler COTE at bedside. Narcan and Zofran ordered and given (see MAR). 500cc NS bolus started per Dr. Reema Carmichael. Peripheral IV started in right hand; 1L infusing per Dr. Alok Vanegas. Central line started in right groin by Dr. Alok Vanegas.

## 2017-12-15 NOTE — PROCEDURES
and risk of bleeding  Patient position: flat  Catheter type: triple lumen  Catheter size: 7 Fr  Pre-procedure: landmarks identified  Ultrasound guidance: yes  Sterile ultrasound techniques: sterile gel and sterile probe covers were used  Number of attempts: 2  Successful placement: yes  Post-procedure: line sutured and dressing applied  Assessment: blood return through all ports and free fluid flow  Patient tolerance: Patient tolerated the procedure well with no immediate complications          Jose Maria Swartz MD  12/14/2017

## 2017-12-15 NOTE — CONSULTS
INFECTIOUS DISEASES CONSULT NOTE    Patient:  Dion Niño 80 y.o. female  ROOM # [unfilled]  YOB: 1932  MRN: [id number]  [facility]:  [id number]  Admit date: 12/14/2017   Admitting Physician: Nirmala Mooney MD  Primary Care Physician: Doc Araujo MD  REFERRING PROVIDER: Bambi Patton MD    Reason for Consultation: Recommendations for antibiotic management of gram-negative sepsis. History of Present Illness/Chief Complaint: Pleasant 80-year-old woman. She is currently in ICU. She has had difficulties with nephrolithiasis. She's had previous stent in her kidney. She underwent stent change yesterday. She was admitted postoperatively. There is growth of Proteus from purulent material obtained from intraoperative sampling from the right kidney. Per discussion with nursing she had received a saline bolus yesterday. She received another 1 this morning. She is on saline at 75 per hour. She had possible aspiration of upper airway contents hollowing taking oral medications morning. She is breathing comfortably currently. She is awake and alert. She has some very mild confusion. No family at bedside at present. Infectious disease asked to evaluate make recommendations for antimicrobial treatment. She listed a history of penicillin allergy but received ceftriaxone in the emergency room without reaction. She is also received meropenem without evidence of reaction.     Current Scheduled Medications:    sodium chloride  1,000 mL Intravenous Once    vancomycin  750 mg Intravenous Q12H    levofloxacin  250 mg Intravenous Q24H    calcium-vitamin D  1 tablet Oral BID    levothyroxine  75 mcg Oral Daily    propafenone  225 mg Oral 3 times per day    pantoprazole  40 mg Oral QAM AC    midodrine  10 mg Oral BID    sodium chloride flush  10 mL Intravenous 2 times per day    rivaroxaban  15 mg Oral Daily    sodium chloride flush  10 mL Intravenous Q12H    meropenem  1 g Intravenous Q12H     Current MONOPCT  6.9  1.0     CMP:   Recent Labs      12/14/17   1131  12/15/17   0500   NA  138  139   K  4.5  3.6   CL  100  105   CO2  26  21*   BUN  18  18   CREATININE  0.8  0.9   CALCIUM  9.8  8.5*   BILITOT   --   0.7   ALKPHOS   --   53   ALT   --   <5*   AST   --   12   GLUCOSE  88  87     Culture:   Operative sample - purulent material from right kidney:  Gram Stain Result 12/14/2017  2:56 PM [facility]   Many Gram negative rods present   Many WBC's (Polymorphonuclear) present     Organism  (Abnormal) 12/14/2017  2:56 PM [address] [facility]   Proteus species     Culture Surgical 12/14/2017  2:56 PM [address] [facility]   Moderate growth   ID and sensitivity to follow      Radiology:     Abdomen and pelvis CT 12/11/2017:  Impression   Marked improvement in size of the left perinephric fluid   accumulation/urinoma. No fluid or infiltration around the ureters   bilaterally. Bilateral renal calculi. These are stable since the previous study. A larger calculus in the left proximal ureter, above the UP junction   with moderate left hydronephrosis. A calculus in the right proximal ureter below the UP junction and   another tiny calculus in the intramural part of right distal ureter. There is right hydronephrosis and hydroureter. Other findings as above. Signed by Dr Alexandria Perez on 12/11/2017 1:01 PM     Chest x-ray from today:  Impression   Opacification of the left lower lobe may represent   atelectasis, pneumonia, or a combination of these. Trace pleural fluid   also suspected. Signed by Dr Hermelindo Acosta on 12/15/2017 9:33 AM     Impression:   1. Gram-negative sepsis from urinary source  2. Possible aspiration    Recommendations:    Feel she is receiving adequate by resuscitation  Would continue empiric meropenem and levofloxacin-await susceptibility of Proteus recovered from right kidney sample.   Merrem and levofloxacin would provide excellent broad coverage for aspiration  Feel vancomycin can be discontinued  Will follow closely    Ceci Brar MD  12/15/17  9:56 AM

## 2017-12-15 NOTE — PROGRESS NOTES
Called consult to [facility] at 08:52.  Patient was placed on Dr. Varsha Jimenez list.

## 2017-12-15 NOTE — PROGRESS NOTES
Swallowing  Swallow Current Status (): At least 80 percent but less than 100 percent impaired, limited or restricted  Swallow Goal Status ():  At least 40 percent but less than 60 percent impaired, limited or restricted    Electronically signed by JAMI Townsend on 12/15/2017 at 12:07 PM

## 2017-12-15 NOTE — PROGRESS NOTES
pacing;Remain upright for 30-45 minutes after meals    Treatment/Goals  Timeframe for Short-term Goals: 1-2x/day for 1 wk  Goal 1: Patient will tolerate puree consistency and honey thick liquids with min S/S penetration/aspiration during PO intake. Goal 2: Patient staff will follow swallow recommendations to decrease risk of penetration/aspiration during PO intake. Goal 3: Patient will complete oral motor, lingual, and pharyngeal strengthening exercises with provision of moderate cues/prompts. Goal 4: Re-assessment of swallowing function for potential diet upgrade. General  Chart Reviewed: Yes  Behavior/Cognition: Alert; Cooperative  Communication Observation: Dysarthria   Follows Directions: Simple  Dentition: Dentures top;Dentures bottom  Patient Positioning: Upright in bed  Volitional Cough: Weak;Congested  Volitional Swallow: Absent  Consistencies Trialed: Puree;Mechanical soft; Honey - cup;Nectar - cup    Observed patient's swallowing function with the following observations noted:    Oral Phase Dysfunction  Oral Phase: Exceptions  Oral Phase Dysfunction  Impaired Mastication: Mechanical soft (Patient exhibited min vertical jaw movement during mechanical soft consistency trials (with added gravy texture) presented by SLP.)  Decreased Anterior to Posterior Transit: Mechanical soft  Suspected Premature Bolus Loss: Honey - cup;Nectar - cup (Patient exhibited inconsistently fast oral transit of honey thick H2O trials presented via cup by SLP. Patient exhibited consistently fast oral transit of nectar thick H2O trials presented via cup by SLP.)  Lingual/Palatal Residue: Mechanical soft (Significant oral cavity residue was noted post swallows that was slow to clear with additional swallows.)  Oral Phase - Comment: Oral transit of puree consistency trials, presented by SLP, primarily measured 1 second in length and min oral cavity residue was noted post swallows that cleared with additional swallows.  Oral

## 2017-12-16 ENCOUNTER — APPOINTMENT (OUTPATIENT)
Dept: GENERAL RADIOLOGY | Age: 82
DRG: 871 | End: 2017-12-16
Attending: UROLOGY
Payer: MEDICARE

## 2017-12-16 PROBLEM — D64.9 ANEMIA: Status: ACTIVE | Noted: 2017-12-16

## 2017-12-16 LAB
ANION GAP SERPL CALCULATED.3IONS-SCNC: 14 MMOL/L (ref 7–19)
ATYPICAL LYMPHOCYTE RELATIVE PERCENT: 1 % (ref 0–8)
BANDED NEUTROPHILS RELATIVE PERCENT: 19 % (ref 0–5)
BASOPHILS ABSOLUTE: 0 K/UL (ref 0–0.2)
BASOPHILS MANUAL: 0 %
BASOPHILS RELATIVE PERCENT: 0 % (ref 0–1)
BUN BLDV-MCNC: 20 MG/DL (ref 8–23)
CALCIUM SERPL-MCNC: 7.9 MG/DL (ref 8.8–10.2)
CHLORIDE BLD-SCNC: 112 MMOL/L (ref 98–111)
CO2: 18 MMOL/L (ref 22–29)
CREAT SERPL-MCNC: 0.9 MG/DL (ref 0.5–0.9)
EOSINOPHILS ABSOLUTE: 0 K/UL (ref 0–0.6)
EOSINOPHILS RELATIVE PERCENT: 0 % (ref 0–5)
GFR NON-AFRICAN AMERICAN: 59
GLUCOSE BLD-MCNC: 85 MG/DL (ref 74–109)
HCT VFR BLD CALC: 29.6 % (ref 37–47)
HEMOGLOBIN: 9.4 G/DL (ref 12–16)
LYMPHOCYTES ABSOLUTE: 0.6 K/UL (ref 1.1–4.5)
LYMPHOCYTES RELATIVE PERCENT: 2 % (ref 20–40)
MCH RBC QN AUTO: 31.3 PG (ref 27–31)
MCHC RBC AUTO-ENTMCNC: 31.8 G/DL (ref 33–37)
MCV RBC AUTO: 98.7 FL (ref 81–99)
METAMYELOCYTES RELATIVE PERCENT: 1 %
MONOCYTES ABSOLUTE: 0.4 K/UL (ref 0–0.9)
MONOCYTES RELATIVE PERCENT: 2 % (ref 0–10)
NEUTROPHILS ABSOLUTE: 18.7 K/UL (ref 1.5–7.5)
NEUTROPHILS MANUAL: 75 %
NEUTROPHILS RELATIVE PERCENT: 75 % (ref 50–65)
PDW BLD-RTO: 14.5 % (ref 11.5–14.5)
PLATELET # BLD: 201 K/UL (ref 130–400)
PLATELET SLIDE REVIEW: ADEQUATE
PMV BLD AUTO: 10.5 FL (ref 9.4–12.3)
POTASSIUM SERPL-SCNC: 3.8 MMOL/L (ref 3.5–5)
RBC # BLD: 3 M/UL (ref 4.2–5.4)
SODIUM BLD-SCNC: 144 MMOL/L (ref 136–145)
VACUOLATED NEUTROPHILS: ABNORMAL
WBC # BLD: 19.7 K/UL (ref 4.8–10.8)

## 2017-12-16 PROCEDURE — 6360000002 HC RX W HCPCS: Performed by: CLINICAL NURSE SPECIALIST

## 2017-12-16 PROCEDURE — 71010 XR CHEST PORTABLE: CPT

## 2017-12-16 PROCEDURE — 2000000000 HC ICU R&B

## 2017-12-16 PROCEDURE — 99233 SBSQ HOSP IP/OBS HIGH 50: CPT | Performed by: UROLOGY

## 2017-12-16 PROCEDURE — 2700000000 HC OXYGEN THERAPY PER DAY

## 2017-12-16 PROCEDURE — 2500000003 HC RX 250 WO HCPCS: Performed by: UROLOGY

## 2017-12-16 PROCEDURE — 85025 COMPLETE CBC W/AUTO DIFF WBC: CPT

## 2017-12-16 PROCEDURE — 6370000000 HC RX 637 (ALT 250 FOR IP): Performed by: UROLOGY

## 2017-12-16 PROCEDURE — 51702 INSERT TEMP BLADDER CATH: CPT

## 2017-12-16 PROCEDURE — 92526 ORAL FUNCTION THERAPY: CPT

## 2017-12-16 PROCEDURE — 80048 BASIC METABOLIC PNL TOTAL CA: CPT

## 2017-12-16 PROCEDURE — 99291 CRITICAL CARE FIRST HOUR: CPT | Performed by: FAMILY MEDICINE

## 2017-12-16 PROCEDURE — 2580000003 HC RX 258: Performed by: CLINICAL NURSE SPECIALIST

## 2017-12-16 PROCEDURE — 2580000003 HC RX 258: Performed by: UROLOGY

## 2017-12-16 RX ORDER — MORPHINE SULFATE 4 MG/ML
4 INJECTION, SOLUTION INTRAMUSCULAR; INTRAVENOUS
Status: DISCONTINUED | OUTPATIENT
Start: 2017-12-16 | End: 2017-12-19 | Stop reason: SDUPTHER

## 2017-12-16 RX ORDER — MORPHINE SULFATE 4 MG/ML
2 INJECTION, SOLUTION INTRAMUSCULAR; INTRAVENOUS
Status: DISCONTINUED | OUTPATIENT
Start: 2017-12-16 | End: 2017-12-19 | Stop reason: SDUPTHER

## 2017-12-16 RX ORDER — BISACODYL 10 MG
10 SUPPOSITORY, RECTAL RECTAL DAILY PRN
Status: DISCONTINUED | OUTPATIENT
Start: 2017-12-16 | End: 2017-12-22 | Stop reason: HOSPADM

## 2017-12-16 RX ADMIN — Medication 10 ML: at 09:37

## 2017-12-16 RX ADMIN — LEVOTHYROXINE SODIUM 75 MCG: 75 TABLET ORAL at 05:18

## 2017-12-16 RX ADMIN — MEROPENEM 1 G: 1 INJECTION, POWDER, FOR SOLUTION INTRAVENOUS at 09:37

## 2017-12-16 RX ADMIN — PANTOPRAZOLE SODIUM 40 MG: 40 TABLET, DELAYED RELEASE ORAL at 05:18

## 2017-12-16 RX ADMIN — OYSTER SHELL CALCIUM WITH VITAMIN D 1 TABLET: 500; 200 TABLET, FILM COATED ORAL at 09:26

## 2017-12-16 RX ADMIN — PROPAFENONE HYDROCHLORIDE 225 MG: 150 TABLET, FILM COATED ORAL at 05:18

## 2017-12-16 RX ADMIN — SODIUM CHLORIDE: 9 INJECTION, SOLUTION INTRAVENOUS at 12:59

## 2017-12-16 RX ADMIN — RIVAROXABAN 15 MG: 15 TABLET, FILM COATED ORAL at 17:39

## 2017-12-16 RX ADMIN — OYSTER SHELL CALCIUM WITH VITAMIN D 1 TABLET: 500; 200 TABLET, FILM COATED ORAL at 21:00

## 2017-12-16 RX ADMIN — MIDODRINE HYDROCHLORIDE 10 MG: 5 TABLET ORAL at 09:26

## 2017-12-16 RX ADMIN — SODIUM CHLORIDE: 9 INJECTION, SOLUTION INTRAVENOUS at 20:16

## 2017-12-16 RX ADMIN — MIDODRINE HYDROCHLORIDE 10 MG: 5 TABLET ORAL at 21:00

## 2017-12-16 RX ADMIN — MEROPENEM 1 G: 1 INJECTION, POWDER, FOR SOLUTION INTRAVENOUS at 19:56

## 2017-12-16 RX ADMIN — NOREPINEPHRINE BITARTRATE 30 MCG/MIN: 1 INJECTION INTRAVENOUS at 06:11

## 2017-12-16 RX ADMIN — Medication 10 ML: at 09:29

## 2017-12-16 RX ADMIN — SODIUM CHLORIDE: 9 INJECTION, SOLUTION INTRAVENOUS at 05:19

## 2017-12-16 RX ADMIN — Medication 10 ML: at 20:16

## 2017-12-16 RX ADMIN — PROPAFENONE HYDROCHLORIDE 225 MG: 150 TABLET, FILM COATED ORAL at 16:07

## 2017-12-16 RX ADMIN — PROPAFENONE HYDROCHLORIDE 225 MG: 150 TABLET, FILM COATED ORAL at 22:49

## 2017-12-16 ASSESSMENT — ENCOUNTER SYMPTOMS
EYES NEGATIVE: 1
COUGH: 1
BACK PAIN: 1
ABDOMINAL PAIN: 0
VOMITING: 0
NAUSEA: 0

## 2017-12-16 ASSESSMENT — PAIN SCALES - GENERAL
PAINLEVEL_OUTOF10: 0
PAINLEVEL_OUTOF10: 0

## 2017-12-16 NOTE — PROGRESS NOTES
Speech Language Pathology  Facility/Department: [facility] ICU  Dysphagia Treatment    NAME: Kavita Payton  : 1932  MRN: [id number]  ADMISSION DATE: 2017   Date of Eval: 2017  Evaluating Therapist: Winnie Forrester    ADMITTING DIAGNOSIS: has Fast heart beat; Sinus pause; Paroxysmal a-fib ([address] Mild CAD; Renal calculus, left; Closed right hip fracture ([address] Acute cystitis with hematuria; Gastroesophageal reflux disease without esophagitis; Chronic obstructive pulmonary disease ([address] Coronary artery disease involving native heart without angina pectoris; Renal calculus, right; Right ureteral calculus; Obstructive pyelonephritis; Acute pyelonephritis; Septic shock ([address] Non-rheumatic tricuspid valve insufficiency; and Thalassemia minor on her problem list.      Recent Chest Xray/CT of Chest:   EXAMINATION: XR CHEST PORTABLE 12/15/2017 9:31 AM   HISTORY: Possible aspiration   COMPARISON: 2017   FINDINGS:   Heart is magnified but felt to be normal in size. Aorta is tortuous   with atherosclerotic calcifications. There is mild perihilar   prominence bilaterally. There is obscuration the left hemidiaphragm   with some blunting of the left costophrenic angle. No pneumothorax is   appreciated. There is mild central bronchial wall thickening which   appears stable. Bilateral ureteral stents are also noted.       Impression   Opacification of the left lower lobe may represent   atelectasis, pneumonia, or a combination of these. Trace pleural fluid   also suspected. Signed by Dr Brandy Zambrano on 12/15/2017 9:33 AM         Current Diet level:  Current Diet : Puree  Current Liquid Diet : Honey      Impression  The patient remains at high risk for aspiration due to generalized weakness, overt s/s of aspiration with thin and nectar, pneumonia diagnosis, leukocytosis, and hx of COPD. The patient exhibits consistent overt s/s of aspiration with nectar thick liquids as well as thin liquids.  The safest recommendation at this time is to continue honey thick liquids. She is recommended to receive a few ice chips and sips of water between meals and following oral hygiene. She did tolerate mechancial soft foods, and recommend upgrading to a dysphagia 2 diet. Dysphagia Outcome Severity Scale:   Level 4: Mild moderate dysphagia- Intermittent supervision/cueing. One - two diet consistencies restricted     Dysphagia Diagnosis: Mild to moderate pharyngeal stage dysphagia;Mild oral stage dysphagia    Treatment Plan  Requires SLP Intervention: Yes     D/C Recommendations: To be determined       Recommended Diet and Intervention  Diet Solids Recommendation: Dysphagia II Mechanically Altered  Liquid Consistency Recommendation: Honey  Recommended Form of Meds: Crushed in puree as able  Therapeutic Interventions: Diet tolerance monitoring, Oral care, Therapeutic PO trials with SLP, Oral motor exercises, Pharyngeal exercises    Compensatory Swallowing Strategies  Compensatory Swallowing Strategies: Upright as possible for all oral intake; No straws;Eat/Feed slowly; Alternate solids and liquids; Remain upright for 30-45 minutes after meals; Total feed;Swallow 2 times per bite/sip;Small bites/sips; External pacing    Treatment/Goals  Short-term Goals  Timeframe for Short-term Goals: 1-2x/day for 1 wk  Goal 1: Patient will tolerate dysphagia II diet and honey thick liquids with min S/S penetration/aspiration during PO intake. Goal 2: Patient staff will follow swallow recommendations to decrease risk of penetration/aspiration during PO intake. Goal 3: Patient will complete oral motor, lingual, and pharyngeal strengthening exercises with provision of moderate cues/prompts. Goal 4: Re-assessment of swallowing function for potential diet upgrade. General  Chart Reviewed: Yes  Behavior/Cognition: Alert; Cooperative  Temperature Spikes Noted: Yes  Respiratory Status: O2 via nasual cannula  O2 Device: Nasal cannula  Communication Observation: Dysarthria  Follows Directions: Simple  Patient Positioning: Upright in bed  Volitional Cough: Weak;Congested  Volitional Swallow:  (Present)  Consistencies Trialed: Mechanical soft;Reg solid;Pudding - teaspoon;Nectar - cup;Honey - cup; Thin - cup  WBC: 19.7      Oral Phase Dysfunction  Oral Phase  Oral Phase - Comment: Mild oral phase of the swallow noted. No oral pocketing was observed this date. Premature loss of the bolus was suspected with thin liquids via cup and nectar thick liquids via cup. Indicators of Pharyngeal Phase Dysfunction   Pharyngeal Phase   Pharyngeal: Mild to moderate pharyngeal phase suspected. Consistent delayed congested cough response following thin liquids via cup and nectar thick liquids via cup. Delayed swallow response was observed with all presentations. Prognosis  Prognosis  Prognosis for safe diet advancement: fair  Individuals consulted  Consulted and agree with results and recommendations: Patient; Family member    Education  Patient Education Response: Verbalizes understanding    *Diet was upgraded in Epic to Dysphagia 2, with honey thick liquids    Pain:  Pain Assessment  Patient Currently in Pain: Denies  Pain Assessment: 0-10  Pain Level: 0        JAMI Cleveland  12/16/2017 9:57 AM

## 2017-12-16 NOTE — PROGRESS NOTES
[id number]      Patient:  Chino Partida  YOB: 1932  Date of Service: 12/16/2017  MRN: [id number]   Acct: [de-identified]   Primary Care Physician: Faraz Serrano MD  Advance Directive: Full Code  Admit Date: 12/14/2017       Hospital Day: 2    CHIEF COMPLAINT Hypotension  Consult for Hypotension, requested by Dr Clarke Nathan. HPI this admission:  The patient is a 80 y.o. female who went for follow up appointment with Dr. Clarke Nathan due to obstructive ureter stones and placement of stents. Patient then went to OR due to need for addition double J-stent placements secondary to bilateral obstructing renal calculi. Due to patients multiple co-morbidities, a hospitalist consult was ordered. Prior to consult, patients BP down in the 70's with HR up to 110 . Nursing staff placed call to attending who ordered patient to be moved to ICU with additional orders. On arrival, patient lethargic with SBP @ 62 per NIBP cuff. Narcan given as well as IV fluids and albumin. Patient now awake with complaints of nausea and being cold. Grullon catheter to BSD with dark hematuria. See physical system assessment. Interim note:    12/15 Patient is requiring Levophed, CVP less than 10. Bolus ordered. This morning she presents lung rales, CXR shows infiltrates in left lung. Fever remains elevated. Swallowing is impaired, will need SLP. Urine is dark, not bloody. 12/16 Patient is confused, appears debilitated. Cultures noted. Case discussed with Dr Sosa Vallejo, agreed to discontinue Vancomycin.      Review of Systems:   Constitutional / general:  + fever/chills / sweats  HEENT: + sore throat / - hoarseness / - vision changes  CV:  Denies chest pain / palpitations/ orthopnea    Respiratory:  + cough / shortness of breath / sputum / - hemoptysis  GI: Denies nausea / vomiting / abdominal pain / diarrhea / constipation  :  Denies dysuria / hesitancy / urgency / hematuria    Neuro: + muscle weakness / - dysphagia / - headache / - paresthesias  Musculoskeletal:  Denies edema / cyanosis / pain    Objective:   VITALS:  BP 88/61   Pulse 103   Temp 99.2 °F (37.3 °C)   Resp (!) 35   Ht 5' 4\" (1.626 m)   Wt 129 lb (58.5 kg)   SpO2 96%   BMI 22.14 kg/m²   24HR INTAKE/OUTPUT:      Intake/Output Summary (Last 24 hours) at 12/16/17 1533  Last data filed at 12/16/17 1200   Gross per 24 hour   Intake          3397.23 ml   Output             1991 ml   Net          1406.23 ml     Constitutional: Oriented to person. Well-developed, debilitated, acutely ill. HENT:  Head: Normocephalic and atraumatic.    Mouth/Throat: Oropharynx is clear and dry. No oropharyngeal exudate. No tonsillar enlargement. Eyes: Conjunctivae and EOM are normal. Pupils are equal, round, and reactive to light. No scleral icterus. Neck: Normal range of motion. Neck supple. + JVD present with breathing. No tracheal deviation present. No thyromegaly present. Cardiovascular: Normal rate, regular rhythm and normal heart sounds.  Exam reveals no gallop and no friction rub.     Pulmonary/Chest: Effort normal and breath sounds normal. No stridor. No respiratory distress. Bilateral rales, left greater than right. Abdominal: Soft. Bowel sounds are hypoactive. No distension and no mass. There is no tenderness. There is no rebound and no guarding. Musculoskeletal: Normal range of motion. There is no edema or tenderness. Lymphadenopathy: No cervical adenopathy. Neurological: Alert and oriented to person, place, and time. No cranial nerve deficit. Skin: Skin is hot. Ecchymotic area right forearm. Not diaphoretic. No erythema. No pallor. Psychiatric: Disoriented. GYN: Grullon in place with dark yellow urine.     Medications:      sodium chloride 125 mL/hr at 12/16/17 1259    norepinephrine 10 mcg/min (12/16/17 0900)      levofloxacin  750 mg Intravenous Q48H    calcium-vitamin D  1 tablet Oral BID    levothyroxine  75 mcg Oral Daily    propafenone  225 mg Oral 3 times per day    pantoprazole  40 mg Oral QAM AC    midodrine  10 mg Oral BID    sodium chloride flush  10 mL Intravenous 2 times per day    rivaroxaban  15 mg Oral Daily    sodium chloride flush  10 mL Intravenous Q12H    meropenem  1 g Intravenous Q12H     morphine **OR** morphine, bisacodyl, bisacodyl, acetaminophen, sodium chloride, furosemide, meclizine, tiZANidine, HYDROcodone-acetaminophen, sodium chloride flush, acetaminophen, magnesium hydroxide, ondansetron, sodium chloride flush  DIET DYSPHAGIA II MECHANICALLY ALTERED; Honey Thick     DVT Prophylaxis: Xarelto will need adjusting if renal function changes    Lab and other Data:     Recent Labs      12/14/17   1131  12/15/17   0500  12/16/17   0430   WBC  8.2  15.7*  19.7*   HGB  12.1  8.6*  9.4*   PLT  427*  239  201     Recent Labs      12/14/17   1131  12/15/17   0500  12/16/17   0430   NA  138  139  144   K  4.5  3.6  3.8   CL  100  105  112*   CO2  26  21*  18*   BUN  18  18  20   CREATININE  0.8  0.9  0.9   GLUCOSE  88  87  85     Recent Labs      12/15/17   0500   AST  12   ALT  <5*   BILITOT  0.7   ALKPHOS  53     Troponin T: No results for input(s): TROPONINI in the last 72 hours. Pro-BNP: No results for input(s): BNP in the last 72 hours. INR:   Recent Labs      12/14/17   1131  12/15/17   0500   INR  2.33*  1.81*     ABGs: No results found for: PHART, PO2ART, HFA9QHF  UA:No results for input(s): NITRITE, COLORU, PHUR, LABCAST, WBCUA, RBCUA, MUCUS, TRICHOMONAS, YEAST, BACTERIA, CLARITYU, SPECGRAV, LEUKOCYTESUR, UROBILINOGEN, BILIRUBINUR, BLOODU, GLUCOSEU, AMORPHOUS in the last 72 hours. Invalid input(s): Kyra Camp    RAD:   CXR 12/15/17  Impression:        Opacification of the left lower lobe may represent  atelectasis, pneumonia, or a combination of these. Trace pleural fluid  also suspected.      Echo: 2014 LVEF 55%    Micro:   Last culture of record:     Blood Culture:   Recent Labs      12/14/17 2104 12/14/17 2115   BC  Gram stain native heart without angina pectoris     Renal calculus, left 09/22/2017    Mild CAD 05/15/2017    Fast heart beat 10/23/2014     \"racing heart\" that wakes patient up      Sinus pause      10/2014      Paroxysmal a-fib (HCC)      Assessment/plan:   Principal Problem:    Septic shock ([address] due to pseudomonas bacteremia  Active Problems:    Paroxysmal a-fib (HCC)    Renal calculus, left    Chronic obstructive pulmonary disease ([address]    Renal calculus, right    Right ureteral calculus    Obstructive pyelonephritis    Acute pyelonephritis    Non-rheumatic tricuspid valve insufficiency    Thalassemia minor    Anemia    Plan:     1. Continue care, see above and orders. 2. Sepsis treatment> Cultures gram negative rods. Merrem. Discontinue Vanco. Follow urine and blood cultures. 3. Shock> IVF, Bolus, Levophed to MAP greater than 60. Check CVP. 4. SLP evaluation for dysphagia  5. Anemia > Chronic, transfuse prn Hb less than 7.  6. Smoking cessation. Oxygen as needed. 7. Urolithiasis per Dr Christopher Shaikh. 8. Prognosis guarded  9. Disposition continue ICU care. Discharge disposition to be determined. Critical care time 36 minutes, excluding procedures. See all orders.       Umm Pulido MD  Hospitalist Service  12/16/2017  3:33 PM

## 2017-12-16 NOTE — PROGRESS NOTES
Mild CAD    Renal calculus, left    Closed right hip fracture (HCC)    Acute cystitis with hematuria    Gastroesophageal reflux disease without esophagitis    Chronic obstructive pulmonary disease (HCC)    Coronary artery disease involving native heart without angina pectoris    Renal calculus, right    Right ureteral calculus    Obstructive pyelonephritis    Acute pyelonephritis    Septic shock (HCC)    Non-rheumatic tricuspid valve insufficiency    Thalassemia minor     1. Sepsis,  secondary to obstructive  pyelonephritis of the right kidney. Cultures positive for Pseudomonas and Proteus. Patient on Merrem and Levaquin infectious disease consulted. Patient continues to require Levaphed but at much lower dose hopefully will continue to wean this off as clinically indicated. Otherwise continue supportive care and current management. Hospitalists also consulted appreciate their help. 2. Right and left UPJ obstructing calculi. Status post bilateral ureteral stent placement. We'll address in the future as clinically indicated when this patient recovers from her sepsis    3. Renal: Urine output improved. Creatinine stable. Continue to monitor    4. Pulmonary:  chest x-ray as noted O2 sats okay with minimal oxygen supplementation. Question aspiration. Swallowing study noted and appropriate changes in diet made. We'll defer further management to our excellent hospitalists service. 5. Anemia. No signs of acute blood loss or bleeding.  Defer to the hospitalist    Ruddy Fishman MD

## 2017-12-16 NOTE — PROGRESS NOTES
suspension 30 mL Daily PRN   ondansetron (ZOFRAN) injection 4 mg Q4H PRN   norepinephrine (LEVOPHED) 16 mg in dextrose 5 % 250 mL infusion Continuous   rivaroxaban (XARELTO) tablet 15 mg Daily   sodium chloride flush 0.9 % injection 10 mL Q12H   sodium chloride flush 0.9 % injection 10 mL PRN   meropenem (MERREM) 1 g in sterile water 20 mL IV syringe Q12H     ROS unobtainable from patient    Vital Signs:  BP 88/61   Pulse 103   Temp 99.2 °F (37.3 °C)   Resp 30   Ht 5' 4\" (1.626 m)   Wt 129 lb (58.5 kg)   SpO2 97%   BMI 22.14 kg/m²     Physical Exam   Abdomen soft and nontender  Extremities 1+ edema  Lungs rare scattered crackle  Neurological examination nonfocal  Line/IV site: No erythema, warmth, induration, or tenderness. Lab Results:  CBC:   Recent Labs      12/14/17   1131  12/15/17   0500  12/16/17   0430   WBC  8.2  15.7*  19.7*   HGB  12.1  8.6*  9.4*   PLT  427*  239  201     BMP:  Recent Labs      12/14/17   1131  12/15/17   0500  12/16/17   0430   NA  138  139  144   K  4.5  3.6  3.8   CL  100  105  112*   CO2  26  21*  18*   BUN  18  18  20   CREATININE  0.8  0.9  0.9   GLUCOSE  88  87  85     Culture Results:  Blood culture 12/14/2017-pseudomonas aeruginosa  Urine culture from 12/14/2017-pseudomonas aeruginosa and Proteus mirabilis    Radiology:   Chest x-ray from today:  Impression   Impression:   Bilateral basilar predominant airspace disease and bilateral small   effusions. Findings on the right are new from one day ago. Consider   edema with effusions or pneumonia with parapneumonic effusions. Signed by Dr Stephenie Méndez on 12/16/2017 4:10 PM     Additional Studies Reviewed:  None    Impression:  1. Gram-negative sepsis of urinary source (pseudomonas aeruginosa and Proteus mirabilis)  2. Mild confusion-sepsis, age, ICU, illness  3.   HypotensionLooking at her overall low pressure pattern, hypotension seems to be showing some improvement  She may not have a very high blood pressure at baseline

## 2017-12-17 ENCOUNTER — APPOINTMENT (OUTPATIENT)
Dept: GENERAL RADIOLOGY | Age: 82
DRG: 871 | End: 2017-12-17
Attending: UROLOGY
Payer: MEDICARE

## 2017-12-17 LAB
ANION GAP SERPL CALCULATED.3IONS-SCNC: 14 MMOL/L (ref 7–19)
BANDED NEUTROPHILS RELATIVE PERCENT: 14 % (ref 0–5)
BASOPHILS ABSOLUTE: 0 K/UL (ref 0–0.2)
BASOPHILS MANUAL: 0 %
BASOPHILS RELATIVE PERCENT: 0 % (ref 0–1)
BLOOD CULTURE, ROUTINE: ABNORMAL
BLOOD CULTURE, ROUTINE: ABNORMAL
BUN BLDV-MCNC: 20 MG/DL (ref 8–23)
BURR CELLS: ABNORMAL
CALCIUM SERPL-MCNC: 7.8 MG/DL (ref 8.8–10.2)
CHLORIDE BLD-SCNC: 115 MMOL/L (ref 98–111)
CO2: 17 MMOL/L (ref 22–29)
CREAT SERPL-MCNC: 0.6 MG/DL (ref 0.5–0.9)
CULTURE, BLOOD 2: ABNORMAL
CULTURE, BLOOD 2: ABNORMAL
EKG P AXIS: NORMAL DEGREES
EKG P-R INTERVAL: NORMAL MS
EKG Q-T INTERVAL: 484 MS
EKG QRS DURATION: 136 MS
EKG QTC CALCULATION (BAZETT): 462 MS
EKG T AXIS: 28 DEGREES
EOSINOPHILS ABSOLUTE: 0 K/UL (ref 0–0.6)
EOSINOPHILS RELATIVE PERCENT: 0 % (ref 0–5)
GFR NON-AFRICAN AMERICAN: >60
GLUCOSE BLD-MCNC: 69 MG/DL (ref 74–109)
HCT VFR BLD CALC: 29.4 % (ref 37–47)
HEMOGLOBIN: 9.3 G/DL (ref 12–16)
HYPOCHROMIA: ABNORMAL
LYMPHOCYTES ABSOLUTE: 0.7 K/UL (ref 1.1–4.5)
LYMPHOCYTES RELATIVE PERCENT: 5 % (ref 20–40)
MACROCYTES: ABNORMAL
MCH RBC QN AUTO: 30.8 PG (ref 27–31)
MCHC RBC AUTO-ENTMCNC: 31.6 G/DL (ref 33–37)
MCV RBC AUTO: 97.4 FL (ref 81–99)
MONOCYTES ABSOLUTE: 0.4 K/UL (ref 0–0.9)
MONOCYTES RELATIVE PERCENT: 3 % (ref 0–10)
NEUTROPHILS ABSOLUTE: 12.7 K/UL (ref 1.5–7.5)
NEUTROPHILS MANUAL: 78 %
NEUTROPHILS RELATIVE PERCENT: 78 % (ref 50–65)
ORGANISM: ABNORMAL
OVALOCYTES: ABNORMAL
PDW BLD-RTO: 14.5 % (ref 11.5–14.5)
PLATELET # BLD: 138 K/UL (ref 130–400)
PLATELET SLIDE REVIEW: ADEQUATE
PMV BLD AUTO: 11.3 FL (ref 9.4–12.3)
POTASSIUM SERPL-SCNC: 3.3 MMOL/L (ref 3.5–5)
RBC # BLD: 3.02 M/UL (ref 4.2–5.4)
SODIUM BLD-SCNC: 146 MMOL/L (ref 136–145)
URINE CULTURE, ROUTINE: ABNORMAL
WBC # BLD: 13.8 K/UL (ref 4.8–10.8)

## 2017-12-17 PROCEDURE — 36415 COLL VENOUS BLD VENIPUNCTURE: CPT

## 2017-12-17 PROCEDURE — 2700000000 HC OXYGEN THERAPY PER DAY

## 2017-12-17 PROCEDURE — 99291 CRITICAL CARE FIRST HOUR: CPT | Performed by: FAMILY MEDICINE

## 2017-12-17 PROCEDURE — 85025 COMPLETE CBC W/AUTO DIFF WBC: CPT

## 2017-12-17 PROCEDURE — 2580000003 HC RX 258: Performed by: CLINICAL NURSE SPECIALIST

## 2017-12-17 PROCEDURE — 2000000000 HC ICU R&B

## 2017-12-17 PROCEDURE — 6360000002 HC RX W HCPCS: Performed by: CLINICAL NURSE SPECIALIST

## 2017-12-17 PROCEDURE — 80048 BASIC METABOLIC PNL TOTAL CA: CPT

## 2017-12-17 PROCEDURE — 6360000002 HC RX W HCPCS: Performed by: UROLOGY

## 2017-12-17 PROCEDURE — 2580000003 HC RX 258: Performed by: UROLOGY

## 2017-12-17 PROCEDURE — 87040 BLOOD CULTURE FOR BACTERIA: CPT

## 2017-12-17 PROCEDURE — 6370000000 HC RX 637 (ALT 250 FOR IP): Performed by: FAMILY MEDICINE

## 2017-12-17 PROCEDURE — 71010 XR CHEST PORTABLE: CPT

## 2017-12-17 PROCEDURE — 6360000002 HC RX W HCPCS: Performed by: FAMILY MEDICINE

## 2017-12-17 PROCEDURE — 99232 SBSQ HOSP IP/OBS MODERATE 35: CPT | Performed by: UROLOGY

## 2017-12-17 PROCEDURE — 2580000003 HC RX 258: Performed by: FAMILY MEDICINE

## 2017-12-17 PROCEDURE — 6370000000 HC RX 637 (ALT 250 FOR IP): Performed by: UROLOGY

## 2017-12-17 RX ORDER — FUROSEMIDE 10 MG/ML
20 INJECTION INTRAMUSCULAR; INTRAVENOUS ONCE
Status: COMPLETED | OUTPATIENT
Start: 2017-12-17 | End: 2017-12-17

## 2017-12-17 RX ORDER — POTASSIUM CHLORIDE 29.8 MG/ML
20 INJECTION INTRAVENOUS
Status: COMPLETED | OUTPATIENT
Start: 2017-12-17 | End: 2017-12-17

## 2017-12-17 RX ORDER — FUROSEMIDE 10 MG/ML
20 INJECTION INTRAMUSCULAR; INTRAVENOUS ONCE
Status: DISCONTINUED | OUTPATIENT
Start: 2017-12-17 | End: 2017-12-17

## 2017-12-17 RX ORDER — AMIODARONE HYDROCHLORIDE 50 MG/ML
150 INJECTION, SOLUTION INTRAVENOUS ONCE
Status: DISCONTINUED | OUTPATIENT
Start: 2017-12-17 | End: 2017-12-17

## 2017-12-17 RX ORDER — AMIODARONE HYDROCHLORIDE 50 MG/ML
300 INJECTION, SOLUTION INTRAVENOUS ONCE
Status: DISCONTINUED | OUTPATIENT
Start: 2017-12-17 | End: 2017-12-17

## 2017-12-17 RX ORDER — POTASSIUM CHLORIDE 29.8 MG/ML
40 INJECTION INTRAVENOUS
Status: DISCONTINUED | OUTPATIENT
Start: 2017-12-17 | End: 2017-12-17

## 2017-12-17 RX ORDER — DOPAMINE HYDROCHLORIDE 160 MG/100ML
2.5 INJECTION, SOLUTION INTRAVENOUS CONTINUOUS
Status: DISCONTINUED | OUTPATIENT
Start: 2017-12-17 | End: 2017-12-18

## 2017-12-17 RX ORDER — LORAZEPAM 2 MG/ML
0.5 INJECTION INTRAMUSCULAR EVERY 4 HOURS PRN
Status: DISCONTINUED | OUTPATIENT
Start: 2017-12-17 | End: 2017-12-22 | Stop reason: HOSPADM

## 2017-12-17 RX ADMIN — AMIODARONE HYDROCHLORIDE 1 MG/MIN: 50 INJECTION, SOLUTION INTRAVENOUS at 19:22

## 2017-12-17 RX ADMIN — MEROPENEM 1 G: 1 INJECTION, POWDER, FOR SOLUTION INTRAVENOUS at 08:40

## 2017-12-17 RX ADMIN — DEXTROSE MONOHYDRATE 150 MG: 50 INJECTION, SOLUTION INTRAVENOUS at 17:56

## 2017-12-17 RX ADMIN — POTASSIUM CHLORIDE 20 MEQ: 400 INJECTION, SOLUTION INTRAVENOUS at 16:44

## 2017-12-17 RX ADMIN — ENOXAPARIN SODIUM 60 MG: 60 INJECTION SUBCUTANEOUS at 18:11

## 2017-12-17 RX ADMIN — MEROPENEM 1 G: 1 INJECTION, POWDER, FOR SOLUTION INTRAVENOUS at 19:41

## 2017-12-17 RX ADMIN — PROPAFENONE HYDROCHLORIDE 225 MG: 150 TABLET, FILM COATED ORAL at 22:33

## 2017-12-17 RX ADMIN — POTASSIUM CHLORIDE 20 MEQ: 400 INJECTION, SOLUTION INTRAVENOUS at 16:47

## 2017-12-17 RX ADMIN — Medication 10 ML: at 08:51

## 2017-12-17 RX ADMIN — POTASSIUM CHLORIDE 20 MEQ: 400 INJECTION, SOLUTION INTRAVENOUS at 12:43

## 2017-12-17 RX ADMIN — SODIUM CHLORIDE: 9 INJECTION, SOLUTION INTRAVENOUS at 10:44

## 2017-12-17 RX ADMIN — Medication 10 ML: at 18:12

## 2017-12-17 RX ADMIN — Medication 10 ML: at 19:57

## 2017-12-17 RX ADMIN — SODIUM CHLORIDE: 9 INJECTION, SOLUTION INTRAVENOUS at 03:49

## 2017-12-17 RX ADMIN — LORAZEPAM 0.5 MG: 2 INJECTION INTRAMUSCULAR; INTRAVENOUS at 19:57

## 2017-12-17 RX ADMIN — ACETAMINOPHEN 650 MG: 650 SUPPOSITORY RECTAL at 17:13

## 2017-12-17 RX ADMIN — POTASSIUM CHLORIDE 20 MEQ: 400 INJECTION, SOLUTION INTRAVENOUS at 13:51

## 2017-12-17 RX ADMIN — FUROSEMIDE 20 MG: 10 INJECTION INTRAMUSCULAR; INTRAVENOUS at 18:06

## 2017-12-17 ASSESSMENT — PAIN SCALES - GENERAL
PAINLEVEL_OUTOF10: 0
PAINLEVEL_OUTOF10: 0
PAINLEVEL_OUTOF10: 2
PAINLEVEL_OUTOF10: 0
PAINLEVEL_OUTOF10: 0

## 2017-12-17 ASSESSMENT — PAIN SCALES - WONG BAKER
WONGBAKER_NUMERICALRESPONSE: 0
WONGBAKER_NUMERICALRESPONSE: 2

## 2017-12-17 ASSESSMENT — ENCOUNTER SYMPTOMS
ABDOMINAL PAIN: 0
BACK PAIN: 1
EYES NEGATIVE: 1
NAUSEA: 0
VOMITING: 0
COUGH: 1

## 2017-12-17 NOTE — PROGRESS NOTES
[facility] Hospitalists      Patient:  Karen Olmstead  YOB: 1932  Date of Service: 12/17/2017  MRN: [id number]   Acct: [de-identified]   Primary Care Physician: Consuelo Enciso MD  Advance Directive: Full Code  Admit Date: 12/14/2017       Hospital Day: 3    CHIEF COMPLAINT Hypotension  Consult for Hypotension, requested by Dr Estevan Juarez. HPI this admission:  The patient is a 80 y.o. female who went for follow up appointment with Dr. Estevan Juarez due to obstructive ureter stones and placement of stents. Patient then went to OR due to need for addition double J-stent placements secondary to bilateral obstructing renal calculi. Due to patients multiple co-morbidities, a hospitalist consult was ordered. Prior to consult, patients BP down in the 70's with HR up to 110 . Nursing staff placed call to attending who ordered patient to be moved to ICU with additional orders. On arrival, patient lethargic with SBP @ 62 per NIBP cuff. Narcan given as well as IV fluids and albumin. Patient now awake with complaints of nausea and being cold. Grullon catheter to BSD with dark hematuria. See physical system assessment. Interim note:    12/15 Patient is requiring Levophed, CVP less than 10. Bolus ordered. This morning she presents lung rales, CXR shows infiltrates in left lung. Fever remains elevated. Swallowing is impaired, will need SLP. Urine is dark, not bloody. 12/16 Patient is confused, appears debilitated. Cultures noted. Case discussed with Dr Karen Elizabeth, agreed to discontinue Vancomycin. 12/17 Very disoriented, having hallucinations. HR is now over 120, blood pressure above normal off pressors. Urine output is declining.     Review of Systems: Unable to obtain due to delirium   (Constitutional / general:  + fever/chills / sweats  HEENT: + sore throat / - hoarseness / - vision changes  CV:  Denies chest pain / palpitations/ orthopnea    Respiratory:  + cough / shortness of breath / sputum / - hemoptysis  GI: Denies nausea / vomiting / abdominal pain / diarrhea / constipation  :  Denies dysuria / hesitancy / urgency / hematuria    Neuro: + muscle weakness / - dysphagia / - headache / - paresthesias  Musculoskeletal:  Denies edema / cyanosis / pain)    Objective:   VITALS:  BP (!) 135/90   Pulse 145   Temp 96.5 °F (35.8 °C) (Temporal)   Resp 20   Ht 5' 4\" (1.626 m)   Wt 134 lb 4.8 oz (60.9 kg)   SpO2 92%   BMI 23.05 kg/m²   24HR INTAKE/OUTPUT:      Intake/Output Summary (Last 24 hours) at 12/17/17 1742  Last data filed at 12/17/17 1400   Gross per 24 hour   Intake          2833.04 ml   Output              620 ml   Net          2213.04 ml     Constitutional: Disoriented, febrile. Well-developed, debilitated, acutely ill. HENT:  Head: Normocephalic and atraumatic.    Mouth/Throat: Oropharynx is clear and dry. No oropharyngeal exudate. No tonsillar enlargement. Eyes: Conjunctivae and EOM are normal. Pupils are equal, round, and reactive to light. No scleral icterus. Neck: Normal range of motion. Neck supple. + JVD present with breathing. No tracheal deviation present. No thyromegaly present. Cardiovascular: Normal rate, regular rhythm and normal heart sounds.  Exam reveals no gallop and no friction rub.     Pulmonary/Chest: Effort normal and breath sounds normal. No stridor. No respiratory distress. Bilateral rales, left greater than right. Abdominal: Soft. Bowel sounds are hypoactive. No distension and no mass. There is no tenderness. There is no rebound and no guarding. Musculoskeletal: Normal range of motion. There is no edema or tenderness. Lymphadenopathy: No cervical adenopathy. Neurological: Disoriented. No cranial nerve deficit. Skin: Skin is hot. Ecchymotic area right forearm. Not diaphoretic. No erythema. No pallor. Psychiatric: Disoriented. GYN: Grullon in place with slightly blood tinged concentrated urine.     Medications:      DOPamine      sodium chloride 75 mL/hr at 12/17/17 1243      furosemide  20 mg Intravenous Once    enoxaparin  1 mg/kg Subcutaneous Daily    amiodarone bolus  150 mg Intravenous Once    calcium-vitamin D  1 tablet Oral BID    propafenone  225 mg Oral 3 times per day    sodium chloride flush  10 mL Intravenous 2 times per day    sodium chloride flush  10 mL Intravenous Q12H    meropenem  1 g Intravenous Q12H     LORazepam, morphine **OR** morphine, bisacodyl, bisacodyl, acetaminophen, sodium chloride, furosemide, meclizine, tiZANidine, HYDROcodone-acetaminophen, sodium chloride flush, acetaminophen, magnesium hydroxide, ondansetron, sodium chloride flush  Diet NPO Effective Now     DVT Prophylaxis: Xarelto will need adjusting if renal function changes    Lab and other Data:     Recent Labs      12/15/17   0500  12/16/17   0430  12/17/17   0430   WBC  15.7*  19.7*  13.8*   HGB  8.6*  9.4*  9.3*   PLT  239  201  138     Recent Labs      12/15/17   0500  12/16/17   0430  12/17/17   0430   NA  139  144  146*   K  3.6  3.8  3.3*   CL  105  112*  115*   CO2  21*  18*  17*   BUN  18  20  20   CREATININE  0.9  0.9  0.6   GLUCOSE  87  85  69*     Recent Labs      12/15/17   0500   AST  12   ALT  <5*   BILITOT  0.7   ALKPHOS  53     Troponin T: No results for input(s): TROPONINI in the last 72 hours. Pro-BNP: No results for input(s): BNP in the last 72 hours. INR:   Recent Labs      12/15/17   0500   INR  1.81*     ABGs: No results found for: PHART, PO2ART, PDA0DYP  UA:No results for input(s): NITRITE, COLORU, PHUR, LABCAST, WBCUA, RBCUA, MUCUS, TRICHOMONAS, YEAST, BACTERIA, CLARITYU, SPECGRAV, LEUKOCYTESUR, UROBILINOGEN, BILIRUBINUR, BLOODU, GLUCOSEU, AMORPHOUS in the last 72 hours. Invalid input(s): Nikunj Ahn    RAD:   CXR 12/15/17  Impression:        Opacification of the left lower lobe may represent  atelectasis, pneumonia, or a combination of these. Trace pleural fluid  also suspected.      Echo: 2014 LVEF 55%    Micro:   Last culture of record:     Blood Culture:

## 2017-12-17 NOTE — PROGRESS NOTES
Attempted to give pt po meds crushed in pudding. Pt became choked and spit out most of meds. Unsure how much she swallowed.  Will notify hospitalist.

## 2017-12-17 NOTE — PROGRESS NOTES
Infectious Diseases Progress Note    Patient:  Karan Young  YOB: 1932  MRN: [id number]   Admit date: 12/14/2017   Admitting Physician: Loulou Nunez MD  Primary Care Physician: Eduard Johnson MD    Chief Complaint/Interval History:  She is without fever. She is off Levophed. Has mild confusion. Grandson at bedside. Her Pseudomonas is susceptible to meropenem as his heat Proteus. She has urine output. No new localizing symptoms. In/Out    Intake/Output Summary (Last 24 hours) at 12/17/17 1236  Last data filed at 12/17/17 1000   Gross per 24 hour   Intake          2834.81 ml   Output              740 ml   Net          2094.81 ml     Allergies:    Allergies   Allergen Reactions    Nitrofuran Derivatives Swelling    Sulfa Antibiotics     Pcn [Penicillins] Rash     Current Meds:   potassium chloride 20 mEq/50 mL IVPB (Central Line) Q1H   morphine injection 2 mg Q2H PRN   Or    morphine injection 4 mg Q2H PRN   bisacodyl (DULCOLAX) EC tablet 5 mg Daily PRN   bisacodyl (DULCOLAX) suppository 10 mg Daily PRN   acetaminophen (TYLENOL) suppository 650 mg Q4H PRN   levofloxacin (LEVAQUIN) 750 MG/150ML infusion 750 mg Q48H   0.9 % sodium chloride bolus Q12H PRN   furosemide (LASIX) tablet 20 mg Daily PRN   calcium-vitamin D (OSCAL-500) 500-200 MG-UNIT per tablet 1 tablet BID   meclizine (ANTIVERT) tablet 12.5 mg PRN   levothyroxine (SYNTHROID) tablet 75 mcg Daily   propafenone (RYTHMOL) tablet 225 mg 3 times per day   pantoprazole (PROTONIX) tablet 40 mg QAM AC   midodrine (PROAMATINE) tablet 10 mg BID   tiZANidine (ZANAFLEX) tablet 4 mg Q8H PRN   HYDROcodone-acetaminophen (NORCO) 7.5-325 MG per tablet 1 tablet Q6H PRN   0.9 % sodium chloride infusion Continuous   sodium chloride flush 0.9 % injection 10 mL 2 times per day   sodium chloride flush 0.9 % injection 10 mL PRN   acetaminophen (TYLENOL) tablet 650 mg Q4H PRN   magnesium hydroxide (MILK OF MAGNESIA) 400 MG/5ML suspension 30 mL Daily PRN ondansetron (ZOFRAN) injection 4 mg Q4H PRN   norepinephrine (LEVOPHED) 16 mg in dextrose 5 % 250 mL infusion Continuous   rivaroxaban (XARELTO) tablet 15 mg Daily   sodium chloride flush 0.9 % injection 10 mL Q12H   sodium chloride flush 0.9 % injection 10 mL PRN   meropenem (MERREM) 1 g in sterile water 20 mL IV syringe Q12H     ROS , diarrhea or rash    Vital Signs:  /74   Pulse 116   Temp 96.7 °F (35.9 °C) (Temporal)   Resp (!) 36   Ht 5' 4\" (1.626 m)   Wt 134 lb 4.8 oz (60.9 kg)   SpO2 100%   BMI 23.05 kg/m²     Physical Exam   Lungs rare coarse crackles  Heart without murmur  Abdomen soft and nontender  Line/IV site: No erythema, warmth, induration, or tenderness.     Lab Results:  CBC:   Recent Labs      12/15/17   0500  12/16/17   0430  12/17/17   0430   WBC  15.7*  19.7*  13.8*   HGB  8.6*  9.4*  9.3*   PLT  239  201  138     BMP:  Recent Labs      12/15/17   0500  12/16/17   0430  12/17/17   0430   NA  139  144  146*   K  3.6  3.8  3.3*   CL  105  112*  115*   CO2  21*  18*  17*   BUN  18  20  20   CREATININE  0.9  0.9  0.6   GLUCOSE  87  85  69*     Culture Results:  Blood culture December 14, 2017:  Culture & Susceptibility     PSEUDOMONAS AERUGINOSA     Antibiotic Interpretation ELLE Unit   cefepime Sensitive <=1 mcg/mL   gentamicin Sensitive <=1 mcg/mL   levofloxacin Sensitive 0.25 mcg/mL   meropenem Sensitive <=0.25 mcg/mL   piperacillin-tazobactam Sensitive 8 mcg/mL   tobramycin Sensitive <=1 mcg/mL        Surgical culture taken from right kidney purulent material on December 14, 2017:  Culture & Susceptibility     PROTEUS MIRABILIS     Antibiotic Interpretation ELLE Unit   ampicillin Sensitive <=2 mcg/mL   aztreonam Sensitive <=1 mcg/mL   ceFAZolin Sensitive <=4 mcg/mL   cefTRIAXone Sensitive <=1 mcg/mL   cefepime Sensitive <=1 mcg/mL   gentamicin Sensitive <=1 mcg/mL   levofloxacin Sensitive 0.5 mcg/mL   meropenem Sensitive <=0.25 mcg/mL   piperacillin-tazobactam Sensitive <=4 mcg/mL

## 2017-12-17 NOTE — PROGRESS NOTES
Urology Progress Note      SUBJECTIVE:  Patient more confused today. She doesn't voice any complaints in doesn't voice any specific pain. Nurse reports she is able to swallow her oral medication    OBJECTIVE:  Now off levaphed still with productive cough, marginal urine output. volume in his much greater than out put. REVIEW OF SYSTEMS:   Review of Systems   Constitutional: Positive for fever. HENT: Positive for congestion and hearing loss. Difficulty swallowing   Eyes: Negative. Respiratory: Positive for cough. Cardiovascular: Negative for chest pain and leg swelling. A. fib on Xarelto   Gastrointestinal: Negative for abdominal pain, nausea and vomiting. Genitourinary: Positive for flank pain and hematuria. Musculoskeletal: Positive for back pain, joint pain and myalgias. Skin: Negative. Neurological: Positive for weakness. Endo/Heme/Allergies:        Patient on anticoagulation Xarelto for A. fib   Psychiatric/Behavioral: Negative. Physical  VITALS:  /74   Pulse 116   Temp 96.7 °F (35.9 °C) (Temporal)   Resp (!) 36   Ht 5' 4\" (1.626 m)   Wt 134 lb 4.8 oz (60.9 kg)   SpO2 100%   BMI 23.05 kg/m²   TEMPERATURE:  Current - Temp: 96.7 °F (35.9 °C); Max - Temp  Av °F (36.7 °C)  Min: 96.7 °F (35.9 °C)  Max: 99.4 °F (37.4 °C)   24 HR I&O     Intake/Output Summary (Last 24 hours) at 17 1140  Last data filed at 17 1000   Gross per 24 hour   Intake          3131.81 ml   Output              980 ml   Net          2151.81 ml     BACK:  flank tenderness: bilateral mild   ABDOMEN:  soft, non-distended and non-tender Positive bowel sounds  HEART:  A. fib, irregular rate in the 120s. Some lower extremity edema  CHEST:  Normal effort.  Some rhonchi congestion, Decreased breath sounds on the left  GENITAL/URINARY:  Grullon catheter urine cleared to just blood tinged    Data       CBC:   Recent Labs      12/15/17   0500  17   0430  17   0430   WBC 15.7*  19.7*  13.8*   HGB  8.6*  9.4*  9.3*   HCT  27.5*  29.6*  29.4*   PLT  239  201  138     BMP:    Recent Labs      12/15/17   0500  12/16/17   0430  12/17/17   0430   NA  139  144  146*   K  3.6  3.8  3.3*   CL  105  112*  115*   CO2  21*  18*  17*   BUN  18  20  20   CREATININE  0.9  0.9  0.6   GLUCOSE  87  85  69*       Recent Labs      12/14/17   1445   LABURIN  >50,000 CFU/ml*  Light growth  Moderate growth  >100,000 CFU/ml*  Moderate growth     Recent Labs      12/14/17   2104   BC  Peripheral draw*     Recent Labs      12/14/17   2115   R Cortinhas Lu 106 draw*  No further workup  Refer to Blood culture drawn 12/14/17 at 21:04 for sensitivity results       ORDER#: [id number]                          ORDERED BY: Tae Strong  SOURCE: Blood Peripheral                   COLLECTED:  12/14/17 21:04  ANTIBIOTICS AT [facility]. :                      RECEIVED :  12/14/17 28:71  CALL  [place]  IMTIA[facility] tel. ,  Microbiology results called to and read back by Nabila Stark/RN/ICU,  12/15/2017 19:51, by [facility]   Culture & Susceptibility     PSEUDOMONAS AERUGINOSA     Antibiotic Interpretation ELLE Unit   cefepime Sensitive <=1 mcg/mL   gentamicin Sensitive <=1 mcg/mL   levofloxacin Sensitive 0.25 mcg/mL   meropenem Sensitive <=0.25 mcg/mL   piperacillin-tazobactam Sensitive 8 mcg/mL   tobramycin Sensitive <=1 mcg/mL        Narrative     ORDER#: [id number]                          ORDERED BY: Tae Strong  SOURCE: Kidney Pus from Right Kidney       COLLECTED:  12/14/17 14:56  ANTIBIOTICS AT [facility]. :                      RECEIVED :  12/14/17 16:34   Culture & Susceptibility     PROTEUS MIRABILIS     Antibiotic Interpretation ELLE Unit   ampicillin Sensitive <=2 mcg/mL   aztreonam Sensitive <=1 mcg/mL   ceFAZolin Sensitive <=4 mcg/mL   cefTRIAXone Sensitive <=1 mcg/mL   cefepime Sensitive <=1 mcg/mL   gentamicin Sensitive <=1 mcg/mL   levofloxacin Sensitive 0.5 mcg/mL   meropenem Sensitive <=0.25 mcg/mL piperacillin-tazobactam Sensitive <=4 mcg/mL   trimethoprim-sulfamethoxazole Sensitive <=20 mcg/mL         PSEUDOMONAS AERUGINOSA     Antibiotic Interpretation ELLE Unit   cefepime Sensitive <=1 mcg/mL   gentamicin Sensitive <=1 mcg/mL   levofloxacin Sensitive 0.5 mcg/mL   meropenem Sensitive <=0.25 mcg/mL   piperacillin-tazobactam Sensitive <=4 mcg/mL   tobramycin Sensitive <=1 mcg/mL          Radiology:    Narrative   EXAMINATION: XR CHEST PORTABLE 12/15/2017 9:31 AM   HISTORY: Possible aspiration   COMPARISON: 12/14/2017   FINDINGS:   Heart is magnified but felt to be normal in size. Aorta is tortuous   with atherosclerotic calcifications. There is mild perihilar   prominence bilaterally. There is obscuration the left hemidiaphragm   with some blunting of the left costophrenic angle. No pneumothorax is   appreciated. There is mild central bronchial wall thickening which   appears stable. Bilateral ureteral stents are also noted.       Impression   Opacification of the left lower lobe may represent   atelectasis, pneumonia, or a combination of these. Trace pleural fluid   also suspected. Signed by Dr Burak Meyer on 12/15/2017 9:33 AM              ASSESSMENT AND PLAN    Patient Active Problem List   Diagnosis    Fast heart beat    Sinus pause    Paroxysmal a-fib (HCC)    Mild CAD    Renal calculus, left    Closed right hip fracture (HCC)    Acute cystitis with hematuria    Gastroesophageal reflux disease without esophagitis    Chronic obstructive pulmonary disease (HCC)    Coronary artery disease involving native heart without angina pectoris    Renal calculus, right    Right ureteral calculus    Obstructive pyelonephritis    Acute pyelonephritis    Septic shock (HCC) due to pseudomonas bacteremia    Non-rheumatic tricuspid valve insufficiency    Thalassemia minor    Anemia     1. Sepsis,  secondary to obstructive  pyelonephritis of the right kidney.  Blood and right kidney Cultures positive for Pseudomonas and Proteus. Patient on Merrem and Levaquin infectious disease consulted. Both organisms are sensitive to meropenem and Levaquin. Patient clinically improve regarding her hemodynamics now off Levophed. Continue supportive care and current management. Hospitalists also consulted appreciate their help. 2. Right and left UPJ obstructing calculi. Status post bilateral ureteral stent placement. We'll address in the future as clinically indicated when this patient recovers from her sepsis    3. Renal: Urine output has been marginal last several hours. Creatinine stable. Continue to monitor. She has a intake surplus by almost 6.5 L. Watch carefully for fluid overload. Consider Lasix. Discussed with Dr. Yudith Payan will defer to him., Hypokalemia replace per hospitalist    4. Pulmonary:  chest x-ray as noted O2 sats okay with minimal oxygen supplementation. Question aspiration. Swallowing study noted and appropriate changes in diet made. We'll defer further management to our excellent hospitalists service. 5. Anemia. No signs of acute blood loss or bleeding.  Defer to the hospitalist    Rik Rodriguez MD

## 2017-12-18 PROBLEM — R57.9 SHOCK (HCC): Status: ACTIVE | Noted: 2017-12-14

## 2017-12-18 LAB
ANAEROBIC CULTURE: ABNORMAL
ANION GAP SERPL CALCULATED.3IONS-SCNC: 15 MMOL/L (ref 7–19)
BANDED NEUTROPHILS RELATIVE PERCENT: 3 % (ref 0–5)
BASOPHILS ABSOLUTE: 0 K/UL (ref 0–0.2)
BASOPHILS MANUAL: 0 %
BASOPHILS RELATIVE PERCENT: 0 % (ref 0–1)
BUN BLDV-MCNC: 21 MG/DL (ref 8–23)
BURR CELLS: ABNORMAL
CALCIUM SERPL-MCNC: 7.8 MG/DL (ref 8.8–10.2)
CHLORIDE BLD-SCNC: 113 MMOL/L (ref 98–111)
CO2: 16 MMOL/L (ref 22–29)
CREAT SERPL-MCNC: 0.6 MG/DL (ref 0.5–0.9)
CULTURE SURGICAL: ABNORMAL
CULTURE SURGICAL: ABNORMAL
EOSINOPHILS ABSOLUTE: 0 K/UL (ref 0–0.6)
EOSINOPHILS RELATIVE PERCENT: 0 % (ref 0–5)
GFR NON-AFRICAN AMERICAN: >60
GLUCOSE BLD-MCNC: 94 MG/DL (ref 74–109)
GRAM STAIN RESULT: ABNORMAL
HCT VFR BLD CALC: 31.2 % (ref 37–47)
HEMOGLOBIN: 10 G/DL (ref 12–16)
LYMPHOCYTES ABSOLUTE: 1.6 K/UL (ref 1.1–4.5)
LYMPHOCYTES RELATIVE PERCENT: 10 % (ref 20–40)
MACROCYTES: ABNORMAL
MCH RBC QN AUTO: 31.4 PG (ref 27–31)
MCHC RBC AUTO-ENTMCNC: 32.1 G/DL (ref 33–37)
MCV RBC AUTO: 98.1 FL (ref 81–99)
MONOCYTES ABSOLUTE: 0.5 K/UL (ref 0–0.9)
MONOCYTES RELATIVE PERCENT: 3 % (ref 0–10)
NEUTROPHILS ABSOLUTE: 13.6 K/UL (ref 1.5–7.5)
NEUTROPHILS MANUAL: 84 %
NEUTROPHILS RELATIVE PERCENT: 84 % (ref 50–65)
ORGANISM: ABNORMAL
ORGANISM: ABNORMAL
OVALOCYTES: ABNORMAL
PDW BLD-RTO: 14.7 % (ref 11.5–14.5)
PLATELET # BLD: 130 K/UL (ref 130–400)
PLATELET SLIDE REVIEW: ADEQUATE
PMV BLD AUTO: 11.4 FL (ref 9.4–12.3)
POTASSIUM SERPL-SCNC: 4.1 MMOL/L (ref 3.5–5)
RBC # BLD: 3.18 M/UL (ref 4.2–5.4)
SODIUM BLD-SCNC: 144 MMOL/L (ref 136–145)
WBC # BLD: 15.6 K/UL (ref 4.8–10.8)

## 2017-12-18 PROCEDURE — 2580000003 HC RX 258: Performed by: FAMILY MEDICINE

## 2017-12-18 PROCEDURE — 2580000003 HC RX 258: Performed by: CLINICAL NURSE SPECIALIST

## 2017-12-18 PROCEDURE — 2580000003 HC RX 258: Performed by: UROLOGY

## 2017-12-18 PROCEDURE — 6360000002 HC RX W HCPCS: Performed by: UROLOGY

## 2017-12-18 PROCEDURE — 6360000002 HC RX W HCPCS: Performed by: CLINICAL NURSE SPECIALIST

## 2017-12-18 PROCEDURE — 6360000002 HC RX W HCPCS: Performed by: FAMILY MEDICINE

## 2017-12-18 PROCEDURE — 99233 SBSQ HOSP IP/OBS HIGH 50: CPT | Performed by: HOSPITALIST

## 2017-12-18 PROCEDURE — 2000000000 HC ICU R&B

## 2017-12-18 PROCEDURE — 80048 BASIC METABOLIC PNL TOTAL CA: CPT

## 2017-12-18 PROCEDURE — 36592 COLLECT BLOOD FROM PICC: CPT

## 2017-12-18 PROCEDURE — 2700000000 HC OXYGEN THERAPY PER DAY

## 2017-12-18 PROCEDURE — 6370000000 HC RX 637 (ALT 250 FOR IP): Performed by: HOSPITALIST

## 2017-12-18 PROCEDURE — 85025 COMPLETE CBC W/AUTO DIFF WBC: CPT

## 2017-12-18 PROCEDURE — 6370000000 HC RX 637 (ALT 250 FOR IP): Performed by: UROLOGY

## 2017-12-18 PROCEDURE — 92526 ORAL FUNCTION THERAPY: CPT

## 2017-12-18 PROCEDURE — 99233 SBSQ HOSP IP/OBS HIGH 50: CPT | Performed by: UROLOGY

## 2017-12-18 RX ORDER — FUROSEMIDE 10 MG/ML
20 INJECTION INTRAMUSCULAR; INTRAVENOUS ONCE
Status: COMPLETED | OUTPATIENT
Start: 2017-12-18 | End: 2017-12-18

## 2017-12-18 RX ORDER — ATROPA BELLADONNA AND OPIUM 16.2; 6 MG/1; MG/1
60 SUPPOSITORY RECTAL EVERY 8 HOURS PRN
Status: DISCONTINUED | OUTPATIENT
Start: 2017-12-18 | End: 2017-12-22 | Stop reason: HOSPADM

## 2017-12-18 RX ADMIN — Medication 10 ML: at 20:27

## 2017-12-18 RX ADMIN — PROPAFENONE HYDROCHLORIDE 225 MG: 150 TABLET, FILM COATED ORAL at 06:29

## 2017-12-18 RX ADMIN — OYSTER SHELL CALCIUM WITH VITAMIN D 1 TABLET: 500; 200 TABLET, FILM COATED ORAL at 20:27

## 2017-12-18 RX ADMIN — AMIODARONE HYDROCHLORIDE 0.5 MG/MIN: 50 INJECTION, SOLUTION INTRAVENOUS at 00:15

## 2017-12-18 RX ADMIN — PROPAFENONE HYDROCHLORIDE 225 MG: 150 TABLET, FILM COATED ORAL at 13:48

## 2017-12-18 RX ADMIN — ENOXAPARIN SODIUM 60 MG: 60 INJECTION SUBCUTANEOUS at 20:27

## 2017-12-18 RX ADMIN — OYSTER SHELL CALCIUM WITH VITAMIN D 1 TABLET: 500; 200 TABLET, FILM COATED ORAL at 08:41

## 2017-12-18 RX ADMIN — ATROPA BELLADONNA AND OPIUM 60 MG: 16.2; 6 SUPPOSITORY RECTAL at 15:08

## 2017-12-18 RX ADMIN — LORAZEPAM 0.5 MG: 2 INJECTION INTRAMUSCULAR; INTRAVENOUS at 22:45

## 2017-12-18 RX ADMIN — SODIUM CHLORIDE: 9 INJECTION, SOLUTION INTRAVENOUS at 02:08

## 2017-12-18 RX ADMIN — Medication 10 ML: at 20:28

## 2017-12-18 RX ADMIN — PROPAFENONE HYDROCHLORIDE 225 MG: 150 TABLET, FILM COATED ORAL at 22:30

## 2017-12-18 RX ADMIN — SODIUM CHLORIDE: 9 INJECTION, SOLUTION INTRAVENOUS at 15:08

## 2017-12-18 RX ADMIN — MEROPENEM 1 G: 1 INJECTION, POWDER, FOR SOLUTION INTRAVENOUS at 08:41

## 2017-12-18 RX ADMIN — FUROSEMIDE 20 MG: 10 INJECTION, SOLUTION INTRAMUSCULAR; INTRAVENOUS at 08:41

## 2017-12-18 RX ADMIN — Medication 10 ML: at 06:33

## 2017-12-18 RX ADMIN — Medication 10 ML: at 08:41

## 2017-12-18 RX ADMIN — MEROPENEM 1 G: 1 INJECTION, POWDER, FOR SOLUTION INTRAVENOUS at 20:27

## 2017-12-18 RX ADMIN — AMIODARONE HYDROCHLORIDE 0.5 MG/MIN: 50 INJECTION, SOLUTION INTRAVENOUS at 06:22

## 2017-12-18 ASSESSMENT — PAIN SCALES - GENERAL
PAINLEVEL_OUTOF10: 0

## 2017-12-18 ASSESSMENT — ENCOUNTER SYMPTOMS
EYES NEGATIVE: 1
ABDOMINAL PAIN: 0
BACK PAIN: 1
COUGH: 1
NAUSEA: 0
VOMITING: 0

## 2017-12-18 ASSESSMENT — PAIN SCALES - WONG BAKER
WONGBAKER_NUMERICALRESPONSE: 0

## 2017-12-18 NOTE — PROGRESS NOTES
Infectious Diseases Progress Note    Patient:  Belkis Parekh  YOB: 1932  MRN: [id number]   Admit date: 12/14/2017   Admitting Physician: Daniela Aguirre MD  Primary Care Physician: Mattie Mary MD    Chief Complaint/Interval History:  She remains off Levophed. She has been hemodynamically stable. She is without fever. She still has some confusion but slightly better. She is not having any rash or skin itching. She developed elevated heart rate and is now on amiodarone. She has urine output. In/Out    Intake/Output Summary (Last 24 hours) at 12/18/17 1145  Last data filed at 12/18/17 0800   Gross per 24 hour   Intake          2327.88 ml   Output             2080 ml   Net           247.88 ml     Allergies:    Allergies   Allergen Reactions    Nitrofuran Derivatives Swelling    Sulfa Antibiotics     Pcn [Penicillins] Rash     Current Meds:   DOPamine (INTROPIN) 400 mg in dextrose 5 % 250 mL infusion Continuous   LORazepam (ATIVAN) injection 0.5 mg Q4H PRN   enoxaparin (LOVENOX) injection 60 mg Daily   amiodarone (CORDARONE) 150 mg in dextrose 5 % 100 mL bolus Once   Followed by    amiodarone (CORDARONE) 450 mg in dextrose 5 % 250 mL infusion Continuous   morphine injection 2 mg Q2H PRN   Or    morphine injection 4 mg Q2H PRN   bisacodyl (DULCOLAX) EC tablet 5 mg Daily PRN   bisacodyl (DULCOLAX) suppository 10 mg Daily PRN   acetaminophen (TYLENOL) suppository 650 mg Q4H PRN   0.9 % sodium chloride bolus Q12H PRN   furosemide (LASIX) tablet 20 mg Daily PRN   calcium-vitamin D (OSCAL-500) 500-200 MG-UNIT per tablet 1 tablet BID   meclizine (ANTIVERT) tablet 12.5 mg PRN   propafenone (RYTHMOL) tablet 225 mg 3 times per day   tiZANidine (ZANAFLEX) tablet 4 mg Q8H PRN   HYDROcodone-acetaminophen (NORCO) 7.5-325 MG per tablet 1 tablet Q6H PRN   0.9 % sodium chloride infusion Continuous   sodium chloride flush 0.9 % injection 10 mL 2 times per day   sodium chloride flush 0.9 % injection 10 mL PRN   acetaminophen (TYLENOL) tablet 650 mg Q4H PRN   magnesium hydroxide (MILK OF MAGNESIA) 400 MG/5ML suspension 30 mL Daily PRN   ondansetron (ZOFRAN) injection 4 mg Q4H PRN   sodium chloride flush 0.9 % injection 10 mL Q12H   sodium chloride flush 0.9 % injection 10 mL PRN   meropenem (MERREM) 1 g in sterile water 20 mL IV syringe Q12H     ROS no diarrhea. No productive cough or sputum production. Vital Signs:  BP (!) 91/52   Pulse 79   Temp 97.5 °F (36.4 °C) (Temporal)   Resp 21   Ht 5' 4\" (1.626 m)   Wt 135 lb 6 oz (61.4 kg)   SpO2 99%   BMI 23.24 kg/m²     Physical Exam   Lungs without wheezing. Rare crackle in bases bilaterally. Abdomen soft and nontender  Extremities slightly less edema in comparison to yesterday  Line/IV site: No erythema, warmth, induration, or tenderness. Lab Results:  CBC:   Recent Labs      12/16/17   0430  12/17/17   0430  12/18/17   0200   WBC  19.7*  13.8*  15.6*   HGB  9.4*  9.3*  10.0*   PLT  201  138  130     BMP:  Recent Labs      12/16/17   0430  12/17/17   0430  12/18/17   0200   NA  144  146*  144   K  3.8  3.3*  4.1   CL  112*  115*  113*   CO2  18*  17*  16*   BUN  20  20  21   CREATININE  0.9  0.6  0.6   GLUCOSE  85  69*  94     Culture Results:  Blood cultures 12/14/2017-pseudomonas aeruginosa  Blood cultures yesterday no growth  Urine cultures 12/14/2017-Pseudomonas aeruginosa and Proteus    Radiology: None    Additional Studies Reviewed:  None    Impression:  Gram-negative sepsis secondary to nephrolithiasis. She seems to be showing some improvement with antibiotic treatment. Current therapy with Merrem should provide excellent coverage. She seems to be stabilizing.     Recommendations:  Given her age and comorbidities she remains critically ill however she does seem to be improving  Continue Elena Hernadezing with me to floor when ready per others     Dimitri Sanchez MD

## 2017-12-18 NOTE — PROGRESS NOTES
Nutrition Assessment    Type and Reason for Visit: Reassess    Nutrition Recommendations: follow for SLP recommendations    Malnutrition Assessment:  · Malnutrition Status: At risk for malnutrition  · Context: Acute illness or injury    Nutrition Diagnosis:   · Problem: Inadequate oral intake  · Etiology: related to Difficulty swallowing     Signs and symptoms:  as evidenced by NPO status due to medical condition    Nutrition Assessment:  · Subjective Assessment: Aware waiting for new evaluation from SLP as pt choked. So at this time, pt is NPO. Aware of weight inicrease, pt is +6L on I&O  · Nutrition-Focused Physical Findings:    · Wound Type: Open Wounds (redness)  · Current Nutrition Therapies:  · Oral Diet Orders: NPO   · Oral Diet intake: NPO  · Oral Nutrition Supplement (ONS) Orders: None  · ONS intake: NPO     · Anthropometric Measures:  · Ht: 5' 4\" (162.6 cm)   · Current Body Wt: 135 lb 6 oz (61.4 kg)  · Admission Body Wt: 119 lb 9 oz (54.2 kg)  · Ideal Body Wt: 120 lb (54.4 kg),   · BMI Classification: BMI 18.5 - 24.9 Normal Weight    Estimated Intake vs Estimated Needs: Intake Less Than Needs    Nutrition Risk Level: High    Nutrition Interventions:   Continue NPO  Continued Inpatient Monitoring    Nutrition Evaluation:   · Evaluation: No progress toward goals   · Goals: meeet nutritional needs through po or MISHA    · Monitoring: NPO Status, Chewing/Swallowing, Weight, Pertinent Labs, Skin Integrity, Wound Healing    See Adult Nutrition Doc Flowsheet for more detail.      Electronically signed by Audie Dietz MS, RD, LD on 12/18/17 at 9:14 AM    Contact Number: [phone]

## 2017-12-18 NOTE — PROGRESS NOTES
650 mg Rectal Q4H PRN Agus Nguyen MD   650 mg at 12/17/17 1713    0.9 % sodium chloride bolus  500 mL Intravenous Q12H PRN Agus Nguyen MD   Stopped at 12/15/17 1821    furosemide (LASIX) tablet 20 mg  20 mg Oral Daily PRN Layne Middleton MD        calcium-vitamin D (OSCAL-500) 500-200 MG-UNIT per tablet 1 tablet  1 tablet Oral BID Layne Middleton MD   1 tablet at 12/18/17 2964    meclizine (ANTIVERT) tablet 12.5 mg  12.5 mg Oral PRN Layne Middleton MD        propafenone [facility]) tablet 225 mg  225 mg Oral 3 times per day Layne Middleton MD   225 mg at 12/18/17 4616    tiZANidine (ZANAFLEX) tablet 4 mg  4 mg Oral Q8H PRN Layne Middleton MD        HYDROcodone-acetaminophen [facility]) 7.5-325 MG per tablet 1 tablet  1 tablet Oral Q6H PRN Layne Middleton MD        0.9 % sodium chloride infusion   Intravenous Continuous Layne Middleton MD 75 mL/hr at 12/18/17 0208      sodium chloride flush 0.9 % injection 10 mL  10 mL Intravenous 2 times per day Layne Middleton MD   10 mL at 12/18/17 0841    sodium chloride flush 0.9 % injection 10 mL  10 mL Intravenous PRN Layne Middleton MD        acetaminophen (TYLENOL) tablet 650 mg  650 mg Oral Q4H PRN Layne Middleton MD   650 mg at 12/15/17 0403    magnesium hydroxide (MILK OF MAGNESIA) 400 MG/5ML suspension 30 mL  30 mL Oral Daily PRN Layne Middleton MD        ondansetron [facility]) injection 4 mg  4 mg Intravenous Q4H PRN Layne Middleton MD   4 mg at 12/14/17 1840    sodium chloride flush 0.9 % injection 10 mL  10 mL Intravenous Q12H ROCAEL Montes   10 mL at 12/18/17 7540    sodium chloride flush 0.9 % injection 10 mL  10 mL Intravenous PRN ROCAEL Montes        meropenem [facility]) 1 g in sterile water 20 mL IV syringe  1 g Intravenous Q12H ROCAEL Montes   1 g at 12/18/17 5915       Physical Exam:  BP (!) 91/52   Pulse 79   Temp 97.5 °F (36.4 °C) (Temporal)   Resp 21   Ht 5' 4\" (1.626 m)   Wt 135 lb 6 ALB, BILITOT, ALKPHOS in the last 72 hours. HgBA1c:  No components found for: HGBA1C  Troponin T: No results for input(s): TROPONINI in the last 72 hours. Pro-BNP: No results for input(s): BNP in the last 72 hours. INR: No results for input(s): INR in the last 72 hours. ABGs: No results found for: PHART, PO2ART, VFZ8HGE  UA:No results for input(s): NITRITE, COLORU, PHUR, LABCAST, WBCUA, RBCUA, MUCUS, TRICHOMONAS, YEAST, BACTERIA, CLARITYU, SPECGRAV, LEUKOCYTESUR, UROBILINOGEN, BILIRUBINUR, BLOODU, GLUCOSEU, AMORPHOUS in the last 72 hours. Invalid input(s): Danial Mcclure    RAD:   Impression:        . Stable appearance of the chest from previous study of one  day earlier with findings of congestive heart failure with pulmonary  edema and bilateral effusions. Signed by Dr Blanca Kirkland on 12/17/2017 8:46 PM       EKG/Telemetry: atrial fibrillation    Echo: none    Micro:  Component Results     Component Collected Lab   Gram Stain Result 12/14/2017  2:56 PM [address] [facility]   Many Gram negative rods present   Many WBC's (Polymorphonuclear) present     Anaerobic Culture 12/14/2017  2:56 PM [address] [facility]   No anaerobes isolated  4 days    Organism  (Abnormal) 12/14/2017  2:56 PM [address] [facility]   Proteus mirabilis     Culture Surgical 12/14/2017  2:56 PM [address] [facility]   Moderate growth    Organism  (Abnormal) 12/14/2017  2:56 PM [address] [facility]   Pseudomonas aeruginosa     Culture Surgical 12/14/2017  2:56 PM [address] [facility]   Moderate growth      ORDER#: [id number]                          ORDERED BY: Delores Fregoso  SOURCE: Kidney Pus from Right Kidney       COLLECTED:  12/14/17 14:56  ANTIBIOTICS AT [facility]. :                      RECEIVED :  12/14/17 16:34   Culture & Susceptibility     PROTEUS MIRABILIS     Antibiotic Interpretation ELLE Unit   ampicillin Sensitive <=2 mcg/mL   aztreonam Sensitive <=1 mcg/mL   ceFAZolin Sensitive <=4 mcg/mL   cefTRIAXone

## 2017-12-18 NOTE — PROGRESS NOTES
Recent Labs      12/16/17   0430  12/17/17   0430  12/18/17   0200   NA  144  146*  144   K  3.8  3.3*  4.1   CL  112*  115*  113*   CO2  18*  17*  16*   BUN  20  20  21   CREATININE  0.9  0.6  0.6   GLUCOSE  85  69*  94       No results for input(s): LABURIN in the last 72 hours. No results for input(s): BC in the last 72 hours. No results for input(s): Vik Satchel in the last 72 hours. ORDER#: [id number]                          ORDERED BY: Bonifacio Berger  SOURCE: Blood Peripheral                   COLLECTED:  12/14/17 21:04  ANTIBIOTICS AT [facility]. :                      RECEIVED :  12/14/17 47:23  CALL  [place]  IMTIAZ cool ,  Microbiology results called to and read back by Nabila Stark/RN/ICU,  12/15/2017 19:51, by [facility]   Culture & Susceptibility     PSEUDOMONAS AERUGINOSA     Antibiotic Interpretation ELLE Unit   cefepime Sensitive <=1 mcg/mL   gentamicin Sensitive <=1 mcg/mL   levofloxacin Sensitive 0.25 mcg/mL   meropenem Sensitive <=0.25 mcg/mL   piperacillin-tazobactam Sensitive 8 mcg/mL   tobramycin Sensitive <=1 mcg/mL        Narrative     ORDER#: [id number]                          ORDERED BY: Bonifacio Berger  SOURCE: Kidney Pus from Right Kidney       COLLECTED:  12/14/17 14:56  ANTIBIOTICS AT [facility]. :                      RECEIVED :  12/14/17 16:34   Culture & Susceptibility     PROTEUS MIRABILIS     Antibiotic Interpretation ELLE Unit   ampicillin Sensitive <=2 mcg/mL   aztreonam Sensitive <=1 mcg/mL   ceFAZolin Sensitive <=4 mcg/mL   cefTRIAXone Sensitive <=1 mcg/mL   cefepime Sensitive <=1 mcg/mL   gentamicin Sensitive <=1 mcg/mL   levofloxacin Sensitive 0.5 mcg/mL   meropenem Sensitive <=0.25 mcg/mL   piperacillin-tazobactam Sensitive <=4 mcg/mL   trimethoprim-sulfamethoxazole Sensitive <=20 mcg/mL         PSEUDOMONAS AERUGINOSA     Antibiotic Interpretation ELLE Unit   cefepime Sensitive <=1 mcg/mL   gentamicin Sensitive <=1 mcg/mL   levofloxacin Sensitive 0.5 mcg/mL   meropenem Sensitive <=0.25

## 2017-12-19 PROBLEM — E87.0 HYPERNATREMIA: Status: ACTIVE | Noted: 2017-12-19

## 2017-12-19 LAB
ANION GAP SERPL CALCULATED.3IONS-SCNC: 14 MMOL/L (ref 7–19)
BANDED NEUTROPHILS RELATIVE PERCENT: 2 % (ref 0–5)
BASOPHILS ABSOLUTE: 0 K/UL (ref 0–0.2)
BASOPHILS MANUAL: 0 %
BASOPHILS RELATIVE PERCENT: 0 % (ref 0–1)
BUN BLDV-MCNC: 21 MG/DL (ref 8–23)
BURR CELLS: ABNORMAL
CALCIUM SERPL-MCNC: 7.9 MG/DL (ref 8.8–10.2)
CHLORIDE BLD-SCNC: 116 MMOL/L (ref 98–111)
CO2: 17 MMOL/L (ref 22–29)
CREAT SERPL-MCNC: 0.5 MG/DL (ref 0.5–0.9)
EOSINOPHILS ABSOLUTE: 0 K/UL (ref 0–0.6)
EOSINOPHILS RELATIVE PERCENT: 0 % (ref 0–5)
GFR NON-AFRICAN AMERICAN: >60
GLUCOSE BLD-MCNC: 69 MG/DL (ref 74–109)
HCT VFR BLD CALC: 32.3 % (ref 37–47)
HEMOGLOBIN: 10.1 G/DL (ref 12–16)
HYPOCHROMIA: ABNORMAL
LYMPHOCYTES ABSOLUTE: 1.1 K/UL (ref 1.1–4.5)
LYMPHOCYTES RELATIVE PERCENT: 11 % (ref 20–40)
MACROCYTES: ABNORMAL
MCH RBC QN AUTO: 30.6 PG (ref 27–31)
MCHC RBC AUTO-ENTMCNC: 31.3 G/DL (ref 33–37)
MCV RBC AUTO: 97.9 FL (ref 81–99)
MONOCYTES ABSOLUTE: 0.5 K/UL (ref 0–0.9)
MONOCYTES RELATIVE PERCENT: 5 % (ref 0–10)
NEUTROPHILS ABSOLUTE: 8.2 K/UL (ref 1.5–7.5)
NEUTROPHILS MANUAL: 82 %
NEUTROPHILS RELATIVE PERCENT: 82 % (ref 50–65)
ORGANISM: ABNORMAL
ORGANISM: ABNORMAL
OVALOCYTES: ABNORMAL
PDW BLD-RTO: 14.8 % (ref 11.5–14.5)
PLATELET # BLD: 126 K/UL (ref 130–400)
PLATELET SLIDE REVIEW: ADEQUATE
PMV BLD AUTO: 11.9 FL (ref 9.4–12.3)
POTASSIUM SERPL-SCNC: 3.6 MMOL/L (ref 3.5–5)
RBC # BLD: 3.3 M/UL (ref 4.2–5.4)
SODIUM BLD-SCNC: 147 MMOL/L (ref 136–145)
URINE CULTURE, ROUTINE: ABNORMAL
WBC # BLD: 9.8 K/UL (ref 4.8–10.8)

## 2017-12-19 PROCEDURE — 97162 PT EVAL MOD COMPLEX 30 MIN: CPT

## 2017-12-19 PROCEDURE — 80048 BASIC METABOLIC PNL TOTAL CA: CPT

## 2017-12-19 PROCEDURE — 6370000000 HC RX 637 (ALT 250 FOR IP): Performed by: UROLOGY

## 2017-12-19 PROCEDURE — 6360000002 HC RX W HCPCS: Performed by: UROLOGY

## 2017-12-19 PROCEDURE — 6360000002 HC RX W HCPCS: Performed by: CLINICAL NURSE SPECIALIST

## 2017-12-19 PROCEDURE — 2580000003 HC RX 258: Performed by: UROLOGY

## 2017-12-19 PROCEDURE — 2580000003 HC RX 258: Performed by: HOSPITALIST

## 2017-12-19 PROCEDURE — 2580000003 HC RX 258: Performed by: CLINICAL NURSE SPECIALIST

## 2017-12-19 PROCEDURE — 99233 SBSQ HOSP IP/OBS HIGH 50: CPT | Performed by: HOSPITALIST

## 2017-12-19 PROCEDURE — 1210000000 HC MED SURG R&B

## 2017-12-19 PROCEDURE — G8979 MOBILITY GOAL STATUS: HCPCS

## 2017-12-19 PROCEDURE — 36415 COLL VENOUS BLD VENIPUNCTURE: CPT

## 2017-12-19 PROCEDURE — G8978 MOBILITY CURRENT STATUS: HCPCS

## 2017-12-19 PROCEDURE — 2700000000 HC OXYGEN THERAPY PER DAY

## 2017-12-19 PROCEDURE — 85025 COMPLETE CBC W/AUTO DIFF WBC: CPT

## 2017-12-19 PROCEDURE — 92526 ORAL FUNCTION THERAPY: CPT

## 2017-12-19 PROCEDURE — 6360000002 HC RX W HCPCS: Performed by: FAMILY MEDICINE

## 2017-12-19 PROCEDURE — 99233 SBSQ HOSP IP/OBS HIGH 50: CPT | Performed by: UROLOGY

## 2017-12-19 RX ORDER — DEXTROSE MONOHYDRATE 50 MG/ML
INJECTION, SOLUTION INTRAVENOUS CONTINUOUS
Status: DISCONTINUED | OUTPATIENT
Start: 2017-12-19 | End: 2017-12-21

## 2017-12-19 RX ORDER — MORPHINE SULFATE 4 MG/ML
2 INJECTION, SOLUTION INTRAMUSCULAR; INTRAVENOUS
Status: DISCONTINUED | OUTPATIENT
Start: 2017-12-19 | End: 2017-12-22 | Stop reason: HOSPADM

## 2017-12-19 RX ORDER — POTASSIUM CHLORIDE 7.45 MG/ML
10 INJECTION INTRAVENOUS PRN
Status: DISCONTINUED | OUTPATIENT
Start: 2017-12-19 | End: 2017-12-22 | Stop reason: HOSPADM

## 2017-12-19 RX ORDER — POTASSIUM CHLORIDE 20MEQ/15ML
40 LIQUID (ML) ORAL PRN
Status: DISCONTINUED | OUTPATIENT
Start: 2017-12-19 | End: 2017-12-22 | Stop reason: HOSPADM

## 2017-12-19 RX ORDER — FUROSEMIDE 10 MG/ML
20 INJECTION INTRAMUSCULAR; INTRAVENOUS ONCE
Status: COMPLETED | OUTPATIENT
Start: 2017-12-19 | End: 2017-12-19

## 2017-12-19 RX ORDER — POTASSIUM CHLORIDE 20 MEQ/1
40 TABLET, EXTENDED RELEASE ORAL PRN
Status: DISCONTINUED | OUTPATIENT
Start: 2017-12-19 | End: 2017-12-22 | Stop reason: HOSPADM

## 2017-12-19 RX ORDER — POTASSIUM CHLORIDE 20MEQ/15ML
40 LIQUID (ML) ORAL ONCE
Status: COMPLETED | OUTPATIENT
Start: 2017-12-19 | End: 2017-12-19

## 2017-12-19 RX ORDER — MORPHINE SULFATE 4 MG/ML
4 INJECTION, SOLUTION INTRAMUSCULAR; INTRAVENOUS
Status: DISCONTINUED | OUTPATIENT
Start: 2017-12-19 | End: 2017-12-22 | Stop reason: HOSPADM

## 2017-12-19 RX ADMIN — MEROPENEM 1 G: 1 INJECTION, POWDER, FOR SOLUTION INTRAVENOUS at 21:57

## 2017-12-19 RX ADMIN — MEROPENEM 1 G: 1 INJECTION, POWDER, FOR SOLUTION INTRAVENOUS at 08:56

## 2017-12-19 RX ADMIN — POTASSIUM CHLORIDE 40 MEQ: 20 SOLUTION ORAL at 08:55

## 2017-12-19 RX ADMIN — Medication 10 ML: at 08:57

## 2017-12-19 RX ADMIN — PROPAFENONE HYDROCHLORIDE 225 MG: 150 TABLET, FILM COATED ORAL at 21:57

## 2017-12-19 RX ADMIN — FUROSEMIDE 20 MG: 10 INJECTION, SOLUTION INTRAMUSCULAR; INTRAVENOUS at 08:56

## 2017-12-19 RX ADMIN — OYSTER SHELL CALCIUM WITH VITAMIN D 1 TABLET: 500; 200 TABLET, FILM COATED ORAL at 08:56

## 2017-12-19 RX ADMIN — OYSTER SHELL CALCIUM WITH VITAMIN D 1 TABLET: 500; 200 TABLET, FILM COATED ORAL at 21:57

## 2017-12-19 RX ADMIN — ENOXAPARIN SODIUM 60 MG: 60 INJECTION SUBCUTANEOUS at 17:47

## 2017-12-19 RX ADMIN — Medication 10 ML: at 07:30

## 2017-12-19 RX ADMIN — PROPAFENONE HYDROCHLORIDE 225 MG: 150 TABLET, FILM COATED ORAL at 15:17

## 2017-12-19 RX ADMIN — PROPAFENONE HYDROCHLORIDE 225 MG: 150 TABLET, FILM COATED ORAL at 05:13

## 2017-12-19 RX ADMIN — SODIUM CHLORIDE: 9 INJECTION, SOLUTION INTRAVENOUS at 03:10

## 2017-12-19 RX ADMIN — DEXTROSE MONOHYDRATE: 50 INJECTION, SOLUTION INTRAVENOUS at 21:57

## 2017-12-19 ASSESSMENT — PAIN SCALES - WONG BAKER
WONGBAKER_NUMERICALRESPONSE: 0

## 2017-12-19 ASSESSMENT — ENCOUNTER SYMPTOMS
BACK PAIN: 1
EYES NEGATIVE: 1
ABDOMINAL PAIN: 0
COUGH: 1
VOMITING: 0
NAUSEA: 0

## 2017-12-19 ASSESSMENT — PAIN SCALES - GENERAL
PAINLEVEL_OUTOF10: 0

## 2017-12-19 NOTE — PROGRESS NOTES
Urology Progress Note      SUBJECTIVE:  Patient alert states she feels good today    OBJECTIVE:  Off amiodarone, NGT placed and NPO for swallowing difficulty. Good response to lasix    REVIEW OF SYSTEMS:   Review of Systems   HENT: Positive for congestion and hearing loss. Difficulty swallowing   Eyes: Negative. Respiratory: Positive for cough. Cardiovascular: Positive for leg swelling. Negative for chest pain. A. fib on Xarelto    edema   Gastrointestinal: Negative for abdominal pain, nausea and vomiting. Genitourinary: Positive for flank pain and hematuria. Musculoskeletal: Positive for back pain, joint pain and myalgias. Skin: Negative. Neurological: Positive for weakness. Endo/Heme/Allergies:        Patient on anticoagulation Xarelto for A. fib   Psychiatric/Behavioral: Negative. Physical  VITALS:  BP (!) 89/56   Pulse 95   Temp 97.4 °F (36.3 °C) (Temporal)   Resp 16   Ht 5' 4\" (1.626 m)   Wt 137 lb 5 oz (62.3 kg)   SpO2 100%   BMI 23.57 kg/m²   TEMPERATURE:  Current - Temp: 97.4 °F (36.3 °C); Max - Temp  Av.6 °F (36.4 °C)  Min: 97 °F (36.1 °C)  Max: 98.4 °F (36.9 °C)   24 HR I&O     Intake/Output Summary (Last 24 hours) at 17 0808  Last data filed at 17 0600   Gross per 24 hour   Intake           1973.7 ml   Output             1530 ml   Net            443.7 ml     BACK:No flank tenderness: bilateral mild   ABDOMEN:  soft, non-distended and non-tender Positive bowel sounds  HEART:  A. fib, irregular rate in the 120s. lower extremity edema, with dependent edema up to lower trunk  CHEST:  Normal effort.  Some rhonchi congestion, Decreased breath sounds on the left  GENITAL/URINARY:  Grullon catheter urine cleared to just blood tinged    Data       CBC:   Recent Labs      17   0430  17   0200  17   0114   WBC  13.8*  15.6*  9.8   HGB  9.3*  10.0*  10.1*   HCT  29.4*  31.2*  32.3*   PLT  138  130  126*     BMP:    Recent Labs

## 2017-12-19 NOTE — PROGRESS NOTES
claudication / thrombosis / varicosities  Heme / endocrine: No easy bruising / bleeding / excessive sweating / heat or cold intolerance  Psychiatric:  No depression / anxiety / insomnia / mood changes  Skin:  No new rashes / lesions / skin hair or nail changes      OBJECTIVE:  Hemodynamic Monitoring  CVP (Mean): 13 mmHg     Additional Respiratory  Assessments  Pulse: 95  Resp: 17  SpO2: 100 %  Oral Care: Mouth suctioned, Mouth moisturizer, Mouth swabbed, Lip moisturizer applied    IV Access: 12/14/2017 Right femoral central line  Diet:. Begin tube feeds  Prophylaxis:  DVT - lovenox weight based  GI - none    Current Medications:  Current Facility-Administered Medications   Medication Dose Route Frequency Provider Last Rate Last Dose    potassium chloride (KLOR-CON M) extended release tablet 40 mEq  40 mEq Oral PRN Ml Donovan MD        Or    potassium chloride 20 MEQ/15ML (10%) oral solution 40 mEq  40 mEq Oral PRN Ml Donovan MD        Or    potassium chloride 10 mEq/100 mL IVPB (Peripheral Line)  10 mEq Intravenous PRN Ml Donovan MD        opium-belladonna (B&O SUPPRETTES) 16.2-60 MG suppository 60 mg  60 mg Rectal Q8H PRN Chelo Atkins MD   60 mg at 12/18/17 1508    LORazepam (ATIVAN) injection 0.5 mg  0.5 mg Intravenous Q4H PRN Bo Martinez MD   0.5 mg at 12/18/17 2245    enoxaparin (LOVENOX) injection 60 mg  1 mg/kg Subcutaneous Daily Bo Martinez MD   60 mg at 12/18/17 2027    morphine injection 2 mg  2 mg Intravenous Q2H PRN Ml Donovan MD        Or    morphine injection 4 mg  4 mg Intravenous Q2H PRN Ml Donovan MD        bisacodyl (DULCOLAX) EC tablet 5 mg  5 mg Oral Daily PRN Ml Donovan MD        bisacodyl (DULCOLAX) suppository 10 mg  10 mg Rectal Daily PRN Ml Donovan MD        acetaminophen (TYLENOL) suppository 650 mg  650 mg Rectal Q4H PRN Bo Martinez MD   650 mg at 12/17/17 1713    0.9 % sodium chloride bolus  500 mL Intravenous Q12H PRN Prema Bauer MD   Stopped at 12/15/17 1821    furosemide (LASIX) tablet 20 mg  20 mg Oral Daily PRN Estefania Quinones MD        calcium-vitamin D (OSCAL-500) 500-200 MG-UNIT per tablet 1 tablet  1 tablet Oral BID Estefania Quinones MD   1 tablet at 12/19/17 0856    meclizine (ANTIVERT) tablet 12.5 mg  12.5 mg Oral PRN Estefania Quinones MD        propafenone [facility]) tablet 225 mg  225 mg Oral 3 times per day Estefania Quinones MD   225 mg at 12/19/17 0513    tiZANidine (ZANAFLEX) tablet 4 mg  4 mg Oral Q8H PRN Estefania Quinones MD        HYDROcodone-acetaminophen [facility]) 7.5-325 MG per tablet 1 tablet  1 tablet Oral Q6H PRN Estefania Quinones MD        0.9 % sodium chloride infusion   Intravenous Continuous Estefania Quinones MD 50 mL/hr at 12/19/17 0856      sodium chloride flush 0.9 % injection 10 mL  10 mL Intravenous 2 times per day Estefania Quinones MD   10 mL at 12/19/17 0857    sodium chloride flush 0.9 % injection 10 mL  10 mL Intravenous PRN Estefania Quinones MD        acetaminophen (TYLENOL) tablet 650 mg  650 mg Oral Q4H PRN Estefania Quinones MD   650 mg at 12/15/17 0403    magnesium hydroxide (MILK OF MAGNESIA) 400 MG/5ML suspension 30 mL  30 mL Oral Daily PRN Estefania Quinones MD        ondansetron [facility]) injection 4 mg  4 mg Intravenous Q4H PRN Estefania Quinones MD   4 mg at 12/14/17 1840    sodium chloride flush 0.9 % injection 10 mL  10 mL Intravenous Q12H ROCAEL Cornejo   10 mL at 12/19/17 0730    sodium chloride flush 0.9 % injection 10 mL  10 mL Intravenous PRN ROCAEL Cornejo        meropenem (MERREM) 1 g in sterile water 20 mL IV syringe  1 g Intravenous Q12H Josue Mendoza APRN   1 g at 12/19/17 0856       Physical Exam:  BP (!) 103/54   Pulse 95   Temp 98.8 °F (37.1 °C)   Resp 17   Ht 5' 4\" (1.626 m)   Wt 137 lb 5 oz (62.3 kg)   SpO2 100%   BMI 23.57 kg/m²   24hr I & O:      Intake/Output Summary (Last 24 hours) at 12/19/17 0932  Last data filed at 12/19/17 0831   Gross per 24 hour   Intake          2162.45 ml   Output             1530 ml   Net           632.45 ml     General appearance: alert, appears stated age, cooperative and no distress  Head: Normocephalic, without obvious abnormality, atraumatic  Eyes: conjunctivae/corneas clear. PERRL, EOM's intact. Ears: normal external ears  Neck: no adenopathy, no carotid bruit, no JVD, supple, symmetrical, trachea midline and thyroid not enlarged, symmetric, no tenderness/mass/nodules  Lungs: clear to auscultation bilaterally,no rales or wheezes   Heart: Irregular, rate controlled S1, S2 normal, no murmur,  No thrill palpable   Abdomen:soft, non-tender; non-distended normal bowel sounds no masses, no organomegaly  Extremities:Pedal edema none  Homans sign is negative, no sign of DVT  Skin: Skin color, texture, turgor normal. No rashes or lesions  Lymphatic: No palpable lymph node enlargment  Neurologic: Alert and oriented X 3, normal strength and tone. Normal symmetric reflexes. Mental status: Alert, oriented, thought content appropriate  Cranial nerves:II-XII Grossly intact Sensory: normal Motor:grossly normal  Psychiatric:  normal insight and behavior, judgement and thought content normal, affect appropriate      Labs:   Recent Labs      12/17/17   0430  12/18/17   0200  12/19/17   0114   WBC  13.8*  15.6*  9.8   RBC  3.02*  3.18*  3.30*   HGB  9.3*  10.0*  10.1*   HCT  29.4*  31.2*  32.3*   PLT  138  130  126*     Recent Labs      12/17/17   0430  12/18/17   0200  12/19/17   0114   NA  146*  144  147*   K  3.3*  4.1  3.6   ANIONGAP  14  15  14   CL  115*  113*  116*   CO2  17*  16*  17*   BUN  20  21  21   CREATININE  0.6  0.6  0.5   GLUCOSE  69*  94  69*   CALCIUM  7.8*  7.8*  7.9*   No results for input(s): MG, PHOS in the last 72 hours. No results for input(s): AST, ALT, ALB, BILITOT, ALKPHOS in the last 72 hours.   HgBA1c:  No components found for: HGBA1C  Troponin T: No results Sensitive 0.5 mcg/mL   meropenem Sensitive <=0.25 mcg/mL   piperacillin-tazobactam Sensitive <=4 mcg/mL   trimethoprim-sulfamethoxazole Sensitive <=20 mcg/mL         PSEUDOMONAS AERUGINOSA     Antibiotic Interpretation [facility] Unit   cefepime Sensitive <=1 mcg/mL   gentamicin Sensitive <=1 mcg/mL   levofloxacin Sensitive 0.5 mcg/mL   meropenem Sensitive <=0.25 mcg/mL   piperacillin-tazobactam Sensitive <=4 mcg/mL   tobramycin Sensitive <=1 mcg/mL            IMPRESSION / PLAN:  Principal Problem:    Septic shock (HCC) due to pseudomonas bacteremia--resolving  Active Problems:    Paroxysmal a-fib--recurrent due to lack of po meds and sepsis, rate controlled, weight based lovenox    Renal calculus, left--treatment at a later date    Chronic obstructive pulmonary disease--no evidence of exacerbation    Renal calculus, right--treatment at a later date    Obstructive pyelonephritis--s/p bilateral stents    Acute pyelonephritis--continue Merrem, ID following. Blood cultures thus far cleared    Non-rheumatic tricuspid valve insufficiency--noted    Anemia--chronic and multifactorial    Dysphagia--tube feeds to start     Improved. Ok to the floor from my perspective. Pt discussed with ICU team during rounds. CC time excluding procedures: 0 minutes  Code Status: Full Code       HENRI Dorantes, D.O.   Internal Medicine Hospitalist

## 2017-12-19 NOTE — PROGRESS NOTES
Physical Therapy    Facility/Department: [facility] SURG SERVICES  Initial Assessment    NAME: Tenisha Crouch  : 1932  MRN: [id number]    Date of Service: 2017    Patient Diagnosis(es): The encounter diagnosis was Shock [facility]). has a past medical history of A-fib ([facility] [address] Anemia; CAD (coronary artery disease); Conjunctivitis; COPD (chronic obstructive pulmonary disease) ([facility] [address] Depression; Fatigue; Kidney stone; Leukopenia; Low back pain; Mild CAD; Osteoporosis; Paroxysmal a-fib ([facility] [address] Sinus pause; Urinary tract infection; and Weakness. has a past surgical history that includes Hysterectomy; laminectomy; Knee Arthroplasty; Total shoulder arthroplasty; hernia repair; Cardiac catheterization (14  [facility]); back surgery; Cystoscopy (Left, 2017); Femur fracture surgery (Right, 10/11/2017); and Cystoscopy (Bilateral, 2017). Restrictions     Vision/Hearing  Vision: Within Functional Limits  Hearing: Within functional limits     Subjective  General  Additional Pertinent Hx: FX R HIP OCT 2017, LUM DHALIWAL, COPD, CAD. Diagnosis: OBSTRUCTIVE PYLONEPHRITIS  Follows Commands: Within Functional Limits  General Comment  Comments: LYING IN BED.  R LE IN IR FROM RECENT R HIP REPAIR  Subjective  Subjective: Pt STATES SHE IS VERY WEAK BUT READY TO BEGIN WORK WITH PT. pt WAS AT [facility] FOLLOW HIP FX BUT HAD RECENTLY RETURNED [facility] SERVICES  Pain Screening  Patient Currently in Pain: No          Orientation  Orientation  Overall Orientation Status: Within Functional Limits    Social/Functional History  Social/Functional History  Lives With: Daughter  Type of Home: House  Home Layout: One level  Home Access: Ramped entrance  Bathroom Shower/Tub: Walk-in shower  Bathroom Equipment: 3-in-1 commode  Home Equipment: Wheelchair-manual, Rolling walker  ADL Assistance: Needs assistance  Ambulation Assistance: Needs assistance  Transfer Assistance: Needs assistance  Additional Comments: has home health services. Objective          AROM RLE (degrees)  RLE AROM: WFL  RLE General AROM: R LE IN IR IN SUPINE AND SITTING  AROM LLE (degrees)  LLE AROM : WNL  Strength RLE  Comment: GROSSLY 2 TO 3/5  Strength LLE  Comment: GROSSLY -3 TO 3+/5     Sensation  Overall Sensation Status: WFL  Bed mobility  Rolling to Left: Moderate assistance  Rolling to Right: Moderate assistance  Supine to Sit: Moderate assistance;Maximum assistance  Sit to Supine: Maximum assistance (X2)  Transfers  Sit to Stand: Moderate Assistance (BED ELEVATED INITIALLY AS FAR AS KATHERINERA JAMESDY WOULD ALLOW. THEN LOWERED FOR SUBSEQUENT STANDING)  Stand to sit: Contact guard assistance  Bed to Chair: Unable to assess  Comment: pt INITIALLY TRIED TO STAND WITH WALKER, BUT WAS UNABLE. GAVIN GUTIERREZ OBTAINED AND Pt WORKED ON SIT TO STAND FROM DIFFERENT HEIGHTS. ABLE TO  GAVIN JAMESDY FOR GROSSLY 20 SECONDS  Ambulation  Ambulation?: No     Balance  Sitting - Static: Fair;+  Sitting - Dynamic: Fair  Comments: LEANS BW AT TMES        Assessment   Body structures, Functions, Activity limitations: Decreased functional mobility ; Decreased strength;Decreased endurance;Decreased balance  Assessment: pt WORKING WELL WITH PT TO BEGIN PROGRESSING MOBILITY. UNABLE TO STAND FROM LOWERED SURFACE SO WAS NOT PLACED IN RECLINER AT THIS DATE. Prognosis: Good  Decision Making: Medium Complexity  Patient Education: MOBILITY TECHNIQUES.  POSITIONING R LE TO PROMOTE NEUTRAL ROTATION  Barriers to Learning: NONE NOTED  REQUIRES PT FOLLOW UP: Yes  Activity Tolerance  Activity Tolerance: Patient Tolerated treatment well;Patient limited by endurance  PT Equipment Recommendations  Equipment Needed: No     Discharge Recommendations:  Continue to assess pending progress, Patient would benefit from continued therapy after discharge      Plan   Plan  Times per week: PT AT LEAST ONCE DAILY X 14 DAYS  Current Treatment Recommendations: Strengthening, ROM, Functional Mobility Training, Transfer Training, Pain Management, Positioning, Gait Training, Safety Education & Training  Plan Comment: WORK ON SIT TO  GAVIN GUTIERREZ PROGRESS TO USE OF RW AS ABLE . TF TO RECLIENR WHEN ABLE TO STAND FROM LOWER SURFACE  Safety Devices  Type of devices: Call light within reach, Gait belt, Left in bed    G-Code  PT G-Codes  Functional Assessment Tool Used: SUP<>SIT  Score: CM, MAX ASSIST  Functional Limitation: Mobility: Walking and moving around  Mobility: Walking and Moving Around Current Status (): At least 80 percent but less than 100 percent impaired, limited or restricted  Mobility: Walking and Moving Around Goal Status ():  At least 40 percent but less than 60 percent impaired, limited or restricted  OutComes Score                                           AM-PAC Score             Goals  Short term goals  Time Frame for Short term goals: 14 DAYS BEFORE RE EVAL ON ACUTE CARE 1-2-18  Short term goal 1: SUP<>SIT MIN A 1  Short term goal 2: SIT<>STAND MIN A 1  Short term goal 3: STAND STEP TF WITH RW AND MIN A1       Therapy Time   Individual Concurrent Group Co-treatment   Time In           Time Out           Minutes                   Kinjal Vela PT    Electronically signed by Kinjal Vela PT on 12/19/2017 at 2:57 PM

## 2017-12-19 NOTE — PROGRESS NOTES
Infectious Diseases Progress Note    Patient:  Karen Olmstead  YOB: 1932  MRN: [id number]   Admit date: 12/14/2017   Admitting Physician: Melanie Trimble MD  Primary Care Physician: Consuelo Enciso MD    Chief Complaint/Interval History:  She seems to be getting better. She slept well overnight. No shortness of breath. She's had no fevers or chills. Her mental status is improving. Her voice is much stronger. Nurse indicates she has been oriented and following commands appropriately over the evening shift. In/Out    Intake/Output Summary (Last 24 hours) at 12/19/17 0541  Last data filed at 12/19/17 0400   Gross per 24 hour   Intake           2210.5 ml   Output             1670 ml   Net            540.5 ml     Allergies:    Allergies   Allergen Reactions    Nitrofuran Derivatives Swelling    Sulfa Antibiotics     Pcn [Penicillins] Rash     Current Meds:   opium-belladonna (B&O SUPPRETTES) 16.2-60 MG suppository 60 mg Q8H PRN   LORazepam (ATIVAN) injection 0.5 mg Q4H PRN   enoxaparin (LOVENOX) injection 60 mg Daily   morphine injection 2 mg Q2H PRN   Or    morphine injection 4 mg Q2H PRN   bisacodyl (DULCOLAX) EC tablet 5 mg Daily PRN   bisacodyl (DULCOLAX) suppository 10 mg Daily PRN   acetaminophen (TYLENOL) suppository 650 mg Q4H PRN   0.9 % sodium chloride bolus Q12H PRN   furosemide (LASIX) tablet 20 mg Daily PRN   calcium-vitamin D (OSCAL-500) 500-200 MG-UNIT per tablet 1 tablet BID   meclizine (ANTIVERT) tablet 12.5 mg PRN   propafenone (RYTHMOL) tablet 225 mg 3 times per day   tiZANidine (ZANAFLEX) tablet 4 mg Q8H PRN   HYDROcodone-acetaminophen (NORCO) 7.5-325 MG per tablet 1 tablet Q6H PRN   0.9 % sodium chloride infusion Continuous   sodium chloride flush 0.9 % injection 10 mL 2 times per day   sodium chloride flush 0.9 % injection 10 mL PRN   acetaminophen (TYLENOL) tablet 650 mg Q4H PRN   magnesium hydroxide (MILK OF MAGNESIA) 400 MG/5ML suspension 30 mL Daily PRN   ondansetron [facility]) injection 4 mg Q4H PRN   sodium chloride flush 0.9 % injection 10 mL Q12H   sodium chloride flush 0.9 % injection 10 mL PRN   meropenem (MERREM) 1 g in sterile water 20 mL IV syringe Q12H     ROS no nausea, diarrhea, rash. Vital Signs:  /65   Pulse 105   Temp 97.4 °F (36.3 °C) (Temporal)   Resp 23   Ht 5' 4\" (1.626 m)   Wt 135 lb 6 oz (61.4 kg)   SpO2 100%   BMI 23.24 kg/m²     Physical Exam   Lungs are clear without crackles  Extremities with trace edema  Abdomen soft and nontender. No suprapubic tenderness. No flank tenderness. Skin without rash. Line/IV site: No erythema, warmth, induration, or tenderness.     Lab Results:  CBC:   Recent Labs      12/17/17   0430  12/18/17   0200  12/19/17   0114   WBC  13.8*  15.6*  9.8   HGB  9.3*  10.0*  10.1*   PLT  138  130  126*     BMP:  Recent Labs      12/17/17   0430  12/18/17   0200  12/19/17   0114   NA  146*  144  147*   K  3.3*  4.1  3.6   CL  115*  113*  116*   CO2  17*  16*  17*   BUN  20  21  21   CREATININE  0.6  0.6  0.5   GLUCOSE  69*  94  69*     Culture Results:  Blood cultures December 14, 2017-pseudomonas aeruginosa  Blood cultures December 17, 2017-no growth  Urine cultures December 14, 2017 pseudomonas aeruginosa and Proteus mirabilis    Culture & Susceptibility     PROTEUS MIRABILIS     Antibiotic Interpretation [facility] Unit   ampicillin Sensitive <=2 mcg/mL   aztreonam Sensitive <=1 mcg/mL   ceFAZolin Sensitive <=4 mcg/mL   cefTRIAXone Sensitive <=1 mcg/mL   cefepime Sensitive <=1 mcg/mL   gentamicin Sensitive <=1 mcg/mL   levofloxacin Sensitive 0.5 mcg/mL   meropenem Sensitive <=0.25 mcg/mL   nitrofurantoin Resistant 128 mcg/mL   piperacillin-tazobactam Sensitive <=4 mcg/mL   trimethoprim-sulfamethoxazole Sensitive <=20 mcg/mL         PSEUDOMONAS AERUGINOSA     Antibiotic Interpretation [facility] Unit   cefepime Sensitive <=1 mcg/mL   gentamicin Sensitive <=1 mcg/mL   levofloxacin Sensitive 0.5 mcg/mL   meropenem Sensitive <=0.25

## 2017-12-19 NOTE — PROGRESS NOTES
Speech Language Pathology  Facility/Department: [facility] ICU  SWALLOW THERAPY    NAME: Ainsley Montaño  : 1932  MRN: [id number]    Date of Treat: 2017  Evaluating Therapist: Diego Pritchard    Current Diet level:  Current Diet : NPO  Current Liquid Diet : NPO    Reason for Referral  Ainsley Montaño was referred for a bedside swallow evaluation to assess the efficiency of her swallow function, rule out aspiration and make recommendations regarding safe dietary consistencies, effective compensatory strategies, and safe eating environment. Impression  Observed patient's swallowing function. Patient exhibits min oral prep of more solid consistencies (ice chips), fast oral transit and suspected swallow delay with even thickened liquids (S/S penetration/aspiration were noted with honey thick H2O trials with inconsistent wet vocal quality observed), and sluggish, moderate-severely decreased laryngeal elevation for swallow airway protection. Do suspect patient will fatigue quickly during meals. At this time, recommend [address] RECOMMEND PLEASURE FEEDING PUDDING THICK LIQUIDS AND PUREE CONSISTENCY. SWALLOW 2 TIMES WITH EVERY BITE (SUSPECT RESIDUE IN THE THROAT POST SWALLOWS). MEDS CRUSHED IN PUREE. TOTAL FEED. If patient receives mouth care prior to intake, okay for ice chips VIA STAFF in between meals for comfort. Treatment Plan  Requires SLP Intervention: Yes    Recommended Diet and Intervention  Diet Solids Recommendation: Dysphagia I Pureed  Liquid Consistency Recommendation: Pudding  Recommended Form of Meds: Crushed in puree as able  Therapeutic Interventions: Patient/Family education, Diet tolerance monitoring, Oral motor exercises, Pharyngeal exercises, Therapeutic PO trials with SLP    Compensatory Swallowing Strategies  Compensatory Swallowing Strategies: Upright as possible for all oral intake; Total feed;Swallow 2 times per bite/sip;Small bites/sips;Eat/Feed slowly; External pacing;Remain upright for 30-45 minutes after meals    Treatment/Goals  Timeframe for Short-term Goals: 1-2x/day for 1 wk  Goal 1: Patient will tolerate pleasure feeds of pudding thick liquids and puree consistency with min S/S penetration/aspiration during PO intake. Goal 2: Patient staff will follow swallow recommendations to decrease risk of penetration/aspiration during PO intake. Goal 3: Patient will complete oral motor, lingual, and pharyngeal strengthening exercises with provision of moderate cues/prompts. Goal 4: Re-assessment of swallowing function for potential diet upgrade. General  Chart Reviewed: Yes  Behavior/Cognition: Lethargic (Patient appeared lethargic as time and trials progressed.)  Respiratory Status: O2 via nasal cannula  Communication Observation:  (Patient exhibited low volume of speech during verbal expressions. Even so, SLP ranked functional intelligibility of speech for unfamiliar listeners at 100% during verbal expressions.)  Follows Directions: Simple  Dentition: Dentures top;Dentures bottom  Patient Positioning: Upright in bed  Volitional Cough: Weak;Congested  Consistencies Trialed: Ice Chips;Puree; Honey - teaspoon    Observed patient's swallowing function with the following observations noted:    Oral Phase Dysfunction  Oral Phase: Exceptions  Oral Phase Dysfunction  Impaired Mastication: Ice chips (Patient exhibited min oral prep of single ice chip trials presented at start of treatment session.)  Suspected Premature Bolus Loss: Ice chips;Honey - teaspoon (Patient exhibited fast oral transit of single ice chip trials. Patient exhibited fast oral transit of honey thick H2O trials presented via spoon by SLP.)  Oral Phase - Comment: Observed patient's oral cavity with min amount of sticky mucus noted and removed from the tongue via toothettes. Completed oral care.  Following oral care, patient was presented trials of puree consistency by SLP. With trials, patient exhibited oral holding. When the patient did swallow, timing of oral transit primarily measured 1-2 seconds in length. No puree consistency residue was noted in the mouth post swallows. Indicators of Pharyngeal Phase Dysfunction  Pharyngeal Phase: Exceptions  Indicators of Pharyngeal Phase Dysfunction  Delayed Swallow: Ice chips;Honey - teaspoon (Suspect secondary to fast oral transit times.)  Decreased Laryngeal Elevation: All (Laryngeal elevation was considered to be sluggish and moderate-severely decreased in strength. Strength of swallows appeared to decrease as time and trials progressed.)  Absent Swallow: Honey - teaspoon (Patient exhibited inconsistent wet vocal quality following honey thick H2O trials.)  Pharyngeal: No outward S/S penetration/aspiration was noted with any ice chip trial or puree consistency trial. As previously mentioned, patient exhibited S/S penetration/aspiration with honey thick H2O trials with inconsistent changes in vocal quality observed. Do suspect patient will fatigue quickly during meals. At this time, recommend [address]. RECOMMEND PLEASURE FEEDING PUDDING THICK LIQUIDS AND PUREE CONSISTENCY. SWALLOW 2 TIMES WITH EVERY BITE (SUSPECT RESIDUE IN THE THROAT POST SWALLOWS). MEDS CRUSHED IN PUREE. TOTAL FEED. If patient receives mouth care prior to intake, okay for ice chips VIA STAFF in between meals for comfort.      Electronically signed by JAMI Loja on 12/19/2017 at 9:32 AM

## 2017-12-19 NOTE — PROGRESS NOTES
Speech Language Pathology  Facility/Department: [facility] SURG SERVICES  Daily Treatment Note  NAME: Leticia Koehler  : 1932  MRN: [id number]    Date of Treat: 2017  Evaluating Therapist: Leilani Crews    Patient Treat:   Targeted patient's swallowing function. Introduced tongue base and pharyngeal strengthening exercises with emphasis on hard, double swallows and the Lauren. With head in straight position, patient completed 5 reps of the Lauren 3 times throughout the session with full laryngeal elevation being noted on 27% of opportunities; tongue primarily receded in the mouth when attempting to complete. With head in straight position, patient completed hard, double swallows without S/S penetration/aspiration being observed on 90% of opportunities (10/11); puree consistency was administered by SLP before each swallow attempt with 1 delayed cough noted on the 11 th presentation. Following 11 th presentation, patient reported she could not swallow anymore and further presentations were discontinued. Plan:  Continued daily Speech/Language treatment with goals per plan of care.     Recommendations  Requires SLP Intervention: Yes    Electronically signed by JAMI Willams on 2017 at 3:37 PM

## 2017-12-20 LAB
ANION GAP SERPL CALCULATED.3IONS-SCNC: 11 MMOL/L (ref 7–19)
BASOPHILS ABSOLUTE: 0 K/UL (ref 0–0.2)
BASOPHILS RELATIVE PERCENT: 0.3 % (ref 0–1)
BUN BLDV-MCNC: 23 MG/DL (ref 8–23)
CALCIUM SERPL-MCNC: 8.2 MG/DL (ref 8.8–10.2)
CHLORIDE BLD-SCNC: 113 MMOL/L (ref 98–111)
CO2: 18 MMOL/L (ref 22–29)
CREAT SERPL-MCNC: 0.5 MG/DL (ref 0.5–0.9)
EOSINOPHILS ABSOLUTE: 0.1 K/UL (ref 0–0.6)
EOSINOPHILS RELATIVE PERCENT: 0.8 % (ref 0–5)
GFR NON-AFRICAN AMERICAN: >60
GLUCOSE BLD-MCNC: 138 MG/DL (ref 74–109)
HCT VFR BLD CALC: 32.3 % (ref 37–47)
HEMOGLOBIN: 10.3 G/DL (ref 12–16)
LYMPHOCYTES ABSOLUTE: 1.3 K/UL (ref 1.1–4.5)
LYMPHOCYTES RELATIVE PERCENT: 16.4 % (ref 20–40)
MCH RBC QN AUTO: 30.9 PG (ref 27–31)
MCHC RBC AUTO-ENTMCNC: 31.9 G/DL (ref 33–37)
MCV RBC AUTO: 97 FL (ref 81–99)
MONOCYTES ABSOLUTE: 0.8 K/UL (ref 0–0.9)
MONOCYTES RELATIVE PERCENT: 9.7 % (ref 0–10)
NEUTROPHILS ABSOLUTE: 5.7 K/UL (ref 1.5–7.5)
NEUTROPHILS RELATIVE PERCENT: 71.3 % (ref 50–65)
PDW BLD-RTO: 14.9 % (ref 11.5–14.5)
PLATELET # BLD: 169 K/UL (ref 130–400)
PMV BLD AUTO: 12.2 FL (ref 9.4–12.3)
POTASSIUM SERPL-SCNC: 3.6 MMOL/L (ref 3.5–5)
RBC # BLD: 3.33 M/UL (ref 4.2–5.4)
SODIUM BLD-SCNC: 142 MMOL/L (ref 136–145)
WBC # BLD: 8 K/UL (ref 4.8–10.8)

## 2017-12-20 PROCEDURE — 97530 THERAPEUTIC ACTIVITIES: CPT

## 2017-12-20 PROCEDURE — 6370000000 HC RX 637 (ALT 250 FOR IP): Performed by: UROLOGY

## 2017-12-20 PROCEDURE — 2580000003 HC RX 258: Performed by: CLINICAL NURSE SPECIALIST

## 2017-12-20 PROCEDURE — 6360000002 HC RX W HCPCS: Performed by: CLINICAL NURSE SPECIALIST

## 2017-12-20 PROCEDURE — 92526 ORAL FUNCTION THERAPY: CPT

## 2017-12-20 PROCEDURE — 36415 COLL VENOUS BLD VENIPUNCTURE: CPT

## 2017-12-20 PROCEDURE — 99232 SBSQ HOSP IP/OBS MODERATE 35: CPT | Performed by: INTERNAL MEDICINE

## 2017-12-20 PROCEDURE — 6360000002 HC RX W HCPCS: Performed by: UROLOGY

## 2017-12-20 PROCEDURE — 1210000000 HC MED SURG R&B

## 2017-12-20 PROCEDURE — G8988 SELF CARE GOAL STATUS: HCPCS

## 2017-12-20 PROCEDURE — 2700000000 HC OXYGEN THERAPY PER DAY

## 2017-12-20 PROCEDURE — G8987 SELF CARE CURRENT STATUS: HCPCS

## 2017-12-20 PROCEDURE — 85025 COMPLETE CBC W/AUTO DIFF WBC: CPT

## 2017-12-20 PROCEDURE — 97165 OT EVAL LOW COMPLEX 30 MIN: CPT

## 2017-12-20 PROCEDURE — 99232 SBSQ HOSP IP/OBS MODERATE 35: CPT | Performed by: UROLOGY

## 2017-12-20 PROCEDURE — 80048 BASIC METABOLIC PNL TOTAL CA: CPT

## 2017-12-20 PROCEDURE — 6360000002 HC RX W HCPCS: Performed by: HOSPITALIST

## 2017-12-20 RX ORDER — FUROSEMIDE 10 MG/ML
20 INJECTION INTRAMUSCULAR; INTRAVENOUS ONCE
Status: COMPLETED | OUTPATIENT
Start: 2017-12-20 | End: 2017-12-20

## 2017-12-20 RX ADMIN — OYSTER SHELL CALCIUM WITH VITAMIN D 1 TABLET: 500; 200 TABLET, FILM COATED ORAL at 08:26

## 2017-12-20 RX ADMIN — OYSTER SHELL CALCIUM WITH VITAMIN D 1 TABLET: 500; 200 TABLET, FILM COATED ORAL at 22:13

## 2017-12-20 RX ADMIN — MEROPENEM 1 G: 1 INJECTION, POWDER, FOR SOLUTION INTRAVENOUS at 22:13

## 2017-12-20 RX ADMIN — PROPAFENONE HYDROCHLORIDE 225 MG: 150 TABLET, FILM COATED ORAL at 05:59

## 2017-12-20 RX ADMIN — MEROPENEM 1 G: 1 INJECTION, POWDER, FOR SOLUTION INTRAVENOUS at 08:25

## 2017-12-20 RX ADMIN — FUROSEMIDE 20 MG: 10 INJECTION, SOLUTION INTRAMUSCULAR; INTRAVENOUS at 08:26

## 2017-12-20 RX ADMIN — ENOXAPARIN SODIUM 90 MG: 100 INJECTION SUBCUTANEOUS at 22:14

## 2017-12-20 RX ADMIN — PROPAFENONE HYDROCHLORIDE 225 MG: 150 TABLET, FILM COATED ORAL at 22:13

## 2017-12-20 RX ADMIN — PROPAFENONE HYDROCHLORIDE 225 MG: 150 TABLET, FILM COATED ORAL at 14:43

## 2017-12-20 ASSESSMENT — PAIN SCALES - WONG BAKER
WONGBAKER_NUMERICALRESPONSE: 0

## 2017-12-20 ASSESSMENT — ENCOUNTER SYMPTOMS
EYES NEGATIVE: 1
COUGH: 1
NAUSEA: 0
BACK PAIN: 1
VOMITING: 0
ABDOMINAL PAIN: 0

## 2017-12-20 NOTE — PROGRESS NOTES
Nutrition Assessment    Type and Reason for Visit: Reassess    Nutrition Recommendations: Continue TF advancement to goal rate    Malnutrition Assessment:  · Malnutrition Status: At risk for malnutrition  · Context: Acute illness or injury  · Findings of the 6 clinical characteristics of malnutrition (Minimum of 2 out of 6 clinical characteristics is required to make the diagnosis of moderate or severe Protein Calorie Malnutrition based on AND/ASPEN Guidelines):  1. Energy Intake-Greater than 75%, not able to assess    2. Weight Loss-No significant weight loss,    3. Fat Loss-Unable to assess,    4. Muscle Loss-Unable to assess,    5. Fluid Accumulation-Unable to assess,    6.  Strength-Not measured    Nutrition Diagnosis:   · Problem: Inadequate oral intake  · Etiology: related to Difficulty swallowing     Signs and symptoms:  as evidenced by Nutrition support - EN    Nutrition Assessment:  · Subjective Assessment: Pt appeared to be sleeping upon entering room. Oxepa 1.5 was running at 30 mL/hr with 20 mL H2O flushes every 4 hrs. Pt continues to recieve ST services at this time. Further nutritiona interventions/recommendations will be dependent on clinical course.   · Wound Type:  (redness)  · Current Nutrition Therapies:  · Oral Diet Orders:  (plesure feeding-pureed with pudding thick liquids)   · Oral Diet intake: Unable to assess  · Oral Nutrition Supplement (ONS) Orders: None  · ONS intake: Unable to assess  · Tube Feeding (TF) Orders:   · Feeding Route: Nasogastric  · Formula: Acute Lung Injury (ALI/ARDS)  · Rate (ml/hr):Current rate: 30 mL/hr    · Volume (ml/day): Currently: 720 mL/day  · Duration: Continuous 24hrs  · TF Residuals: Less than or equal to 250ml  · Water Flushes:  (20 mL H2O every 4 hours)  · Current TF & Flush Orders Provides: 1080 kcals/day, 45 g/PRO/day, 685 mL H2O/day  · Goal TF & Flush Orders Provides: 43ml = 1548 kcals with 64 g protein, 108 g CHO and 810 ml free water from tf

## 2017-12-20 NOTE — PLAN OF CARE
Problem: Nutrition  Goal: Optimal nutrition therapy  Nutrition Problem: Inadequate oral intake  Intervention: Food and/or Nutrient Delivery: Continue current Tube Feeding  Nutritional Goals: Meet nutritional needs through PO/MISHA    Outcome: Ongoing

## 2017-12-20 NOTE — PLAN OF CARE
Problem: Falls - Risk of  Goal: Absence of falls  Outcome: Ongoing      Problem: Risk for Impaired Skin Integrity  Goal: Tissue integrity - skin and mucous membranes  Structural intactness and normal physiological function of skin and  mucous membranes.    Outcome: Ongoing      Problem: Nutrition  Goal: Optimal nutrition therapy  Nutrition Problem: Inadequate oral intake  Intervention: Food and/or Nutrient Delivery: EN and pleasure feeding  Nutritional Goals: meeet nutritional needs through po or MISHA      Outcome: Ongoing

## 2017-12-20 NOTE — PROGRESS NOTES
abnormality, atraumatic, NG tube in place  Eyes: conjunctivae/corneas clear. PERRL, EOM's intact. Ears: normal external ears and nose, throat without exudate  Neck: no adenopathy, no carotid bruit, no JVD, supple, symmetrical, trachea midline and thyroid not enlarged, symmetric, no tenderness/mass/nodules  Lungs: Diminished bilateral lower lobes, clear to auscultation bilaterally,no rales or wheezes   Heart: Irregular, no murmurs, rubs, gallops  Abdomen:soft, non-tender; non-distended, normal bowel sounds no masses, no organomegaly  Extremities:No lower extremity edema,  No erythema, no tenderness to palpation  Skin: Skin color, texture, turgor normal. No rashes or lesions  Lymphatic: No palpable lymph node enlargment  Neurologic: Alert and oriented X 3, generalized weakness and normal tone. Normal symmetric reflexes.   Cranial nerves:II-XII Grossly intact Sensory: normal Motor:grossly normal  Psychiatric: Alert and oriented, thought content appropriate, normal insight, mood appropriate    Medications:      dextrose 50 mL/hr at 12/19/17 2157      enoxaparin  1.5 mg/kg Subcutaneous Daily    calcium-vitamin D  1 tablet Oral BID    propafenone  225 mg Oral 3 times per day    sodium chloride flush  10 mL Intravenous 2 times per day    sodium chloride flush  10 mL Intravenous Q12H    meropenem  1 g Intravenous Q12H     potassium chloride **OR** potassium chloride **OR** potassium chloride, morphine **OR** morphine, opium-belladonna, LORazepam, bisacodyl, bisacodyl, acetaminophen, sodium chloride, furosemide, meclizine, tiZANidine, HYDROcodone-acetaminophen, sodium chloride flush, acetaminophen, magnesium hydroxide, ondansetron, sodium chloride flush  DIET DYSPHAGIA I PUREED; Spoon (Pudding) Thick     Lab and other Data:     Recent Labs      12/18/17   0200  12/19/17   0114 12/20/17   0259   WBC  15.6*  9.8  8.0   HGB  10.0*  10.1*  10.3*   PLT  130  126*  169     Recent Labs      12/18/17   0200  12/19/17   0114 12/20/17   0259   NA  144  147*  142   K  4.1  3.6  3.6   CL  113*  116*  113*   CO2  16*  17*  18*   BUN  21  21  23   CREATININE  0.6  0.5  0.5   GLUCOSE  94  69*  138*     RAD:   CXR 12/17/2017  Stable appearance of the chest from previous study of one  day earlier with findings of congestive heart failure with pulmonary  edema and bilateral effusions. 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 No fluid or infiltration around the ureters  bilaterally. Bilateral renal calculi. These are stable since the previous study. A larger calculus in the left proximal ureter, above the UP junction  with moderate left hydronephrosis. A calculus in the right proximal ureter below the UP junction and  another tiny calculus in the intramural part of right distal ureter. There is right hydronephrosis and hydroureter.     Micro:   Urine culture collected 12/14/2017  PROTEUS MIRABILIS   Antibiotic Interpretation ELLE Unit  ampicillin Sensitive <=2 mcg/mL  aztreonam Sensitive <=1 mcg/mL  ceFAZolin Sensitive <=4 mcg/mL  cefTRIAXone Sensitive <=1 mcg/mL  cefepime Sensitive <=1 mcg/mL  gentamicin Sensitive <=1 mcg/mL  levofloxacin Sensitive 0.5 mcg/mL  meropenem Sensitive <=0.25 mcg/mL  nitrofurantoin Resistant 128 mcg/mL  piperacillin-tazobactam Sensitive <=4 mcg/mL  trimethoprim-sulfamethoxazole Sensitive <=20 mcg/mL    PSEUDOMONAS AERUGINOSA   Antibiotic Interpretation ELLE Unit  cefepime Sensitive <=1 mcg/mL  gentamicin Sensitive <=1 mcg/mL  levofloxacin Sensitive 1 mcg/mL  meropenem Sensitive <=0.25 mcg/mL  piperacillin-tazobactam Sensitive 8 mcg/mL  tobramycin Sensitive <=1 Mcg/mL    Blood culture #1/#2 collected 12/14/2017  PSEUDOMONAS AERUGINOSA Antibiotic Interpretation ELLE Unit  cefepime Sensitive <=1 mcg/mL  gentamicin Sensitive <=1 mcg/mL  levofloxacin Sensitive 0.25 mcg/mL  meropenem Sensitive <=0.25 mcg/mL  piperacillin-tazobactam Sensitive 8 mcg/mL  tobramycin Sensitive <=1 Mcg/mL    Blood culture #1/#2 collected 12/17/2017  No growth to date    Assessment/Plan   Principal Problem:    Septic Shock (HCC)-resolving, 2' pseudomonas bacteremia, repeat blood culture negative    Active Problems:    Paroxysmal a-fib (HCC)-continue weight based Lovenox, monitor rate, Rythmol continued    Renal calculus, bilateral-treatment at later date    Chronic obstructive pulmonary disease (HCC)-no evidence exacerbation    Obstructive pyelonephritis-s/p bilateral stents    Acute pyelonephritis-Merrem continued, repeat blood cultures negative    Non-rheumatic tricuspid valve insufficiency    Anemia-chronic and multifactorial    Dysphagia-continue tube feeds, daily speech eval    Repeat AM labs. Renal function stable, hemoglobin stable. Leukocytosis resolved. Vital signs stable. Per SLP eval continue tube feedings secondary to quick onset of fatigue. Pudding thick liquids for pleasure with mouth care prior to intake. Continue to assess swallowing, if improves will stop TF's. Antibiotic: Merrem    DVT Prophylaxis: Lovenox    Refugio Olivas PA-C     ______________________________________________________________________  I have seen and evaluated the patient by myself. I agree with the plan and documentation per the APRN/PA. Please see addendum. Subjective:    Feeling better overall. Mild nausea. No chest pain or SOB. No cough. Mild swelling. No fevers or chills. No other complaints. Objective:   General:  NAD. Pleasant and interactive. Respiratory:  CTA without rhonchi or wheezes. Effort regular and unlabored. Cardiovascular:  Irregularly irregular without murmurs, rubs or gallops. Normal S1/S2. Extremities:  No clubbing or cyanosis.

## 2017-12-20 NOTE — PROGRESS NOTES
Infectious Diseases Progress Note    Patient:  Leticia Koehler  YOB: 1932  MRN: [id number]   Admit date: 12/14/2017   Admitting Physician: Jackson Marcano MD  Primary Care Physician: Xiao Solis MD    Chief Complaint/Interval History:   Transferred out of ICU. Breathing comfortably. No cough or shortness of breath. No nausea, vomiting, or abdominal pain. NG tube remains in place. I spoke with a pharmacist this morning. Drug drug interactions between ciprofloxacin and her current medications were reviewed. There are no significant drug drug interactions. In/Out    Intake/Output Summary (Last 24 hours) at 12/20/17 0506  Last data filed at 12/20/17 0434   Gross per 24 hour   Intake          1430.34 ml   Output             1710 ml   Net          -279.66 ml     Allergies:    Allergies   Allergen Reactions    Nitrofuran Derivatives Swelling    Sulfa Antibiotics     Pcn [Penicillins] Rash     Current Meds:   potassium chloride (KLOR-CON M) extended release tablet 40 mEq PRN   Or    potassium chloride 20 MEQ/15ML (10%) oral solution 40 mEq PRN   Or    potassium chloride 10 mEq/100 mL IVPB (Peripheral Line) PRN   morphine (PF) injection 2 mg Q2H PRN   Or    morphine (PF) injection 4 mg Q2H PRN   dextrose 5 % solution Continuous   enoxaparin (LOVENOX) injection 90 mg Daily   opium-belladonna (B&O SUPPRETTES) 16.2-60 MG suppository 60 mg Q8H PRN   LORazepam (ATIVAN) injection 0.5 mg Q4H PRN   bisacodyl (DULCOLAX) EC tablet 5 mg Daily PRN   bisacodyl (DULCOLAX) suppository 10 mg Daily PRN   acetaminophen (TYLENOL) suppository 650 mg Q4H PRN   0.9 % sodium chloride bolus Q12H PRN   furosemide (LASIX) tablet 20 mg Daily PRN   calcium-vitamin D (OSCAL-500) 500-200 MG-UNIT per tablet 1 tablet BID   meclizine (ANTIVERT) tablet 12.5 mg PRN   propafenone (RYTHMOL) tablet 225 mg 3 times per day   tiZANidine (ZANAFLEX) tablet 4 mg Q8H PRN   HYDROcodone-acetaminophen (NORCO) 7.5-325 MG per tablet 1 tablet Q6H PRN   sodium chloride flush 0.9 % injection 10 mL 2 times per day   sodium chloride flush 0.9 % injection 10 mL PRN   acetaminophen (TYLENOL) tablet 650 mg Q4H PRN   magnesium hydroxide (MILK OF MAGNESIA) 400 MG/5ML suspension 30 mL Daily PRN   ondansetron (ZOFRAN) injection 4 mg Q4H PRN   sodium chloride flush 0.9 % injection 10 mL Q12H   sodium chloride flush 0.9 % injection 10 mL PRN   meropenem (MERREM) 1 g in sterile water 20 mL IV syringe Q12H     ROS no chest pain or chest pressure    Vital Signs:  BP 92/60   Pulse 97   Temp 97 °F (36.1 °C) (Temporal)   Resp 18   Ht 5' 4\" (1.626 m)   Wt 138 lb 1.6 oz (62.6 kg)   SpO2 96%   BMI 23.70 kg/m²     Physical Exam   Lungs clear to auscultation without crackles  Abdomen soft and nontender  No suprapubic tenderness  No flank tenderness  Line/IV site: No erythema, warmth, induration, or tenderness.     Lab Results:  CBC:   Recent Labs      12/18/17   0200  12/19/17   0114  12/20/17   0259   WBC  15.6*  9.8  8.0   HGB  10.0*  10.1*  10.3*   PLT  130  126*  169     BMP:  Recent Labs      12/18/17   0200  12/19/17   0114  12/20/17   0259   NA  144  147*  142   K  4.1  3.6  3.6   CL  113*  116*  113*   CO2  16*  17*  18*   BUN  21  21  23   CREATININE  0.6  0.5  0.5   GLUCOSE  94  69*  138*     Culture Results:   Urine cultures Proteus and Pseudomonas with susceptibility below  Initial blood culture Pseudomonas  Culture & Susceptibility     PROTEUS MIRABILIS     Antibiotic Interpretation ELLE Unit   ampicillin Sensitive <=2 mcg/mL   aztreonam Sensitive <=1 mcg/mL   ceFAZolin Sensitive <=4 mcg/mL   cefTRIAXone Sensitive <=1 mcg/mL   cefepime Sensitive <=1 mcg/mL   gentamicin Sensitive <=1 mcg/mL   levofloxacin Sensitive 0.5 mcg/mL   meropenem Sensitive <=0.25 mcg/mL   nitrofurantoin Resistant 128 mcg/mL   piperacillin-tazobactam Sensitive <=4 mcg/mL   trimethoprim-sulfamethoxazole Sensitive <=20 mcg/mL         PSEUDOMONAS AERUGINOSA     Antibiotic

## 2017-12-20 NOTE — PROGRESS NOTES
Urology Progress Note      SUBJECTIVE:  Patient alert states she feels good today    OBJECTIVE:  Working with speech therapy. Still with NG tube due to poor swallowing now on tube feeds. Also physical therapy seeing    REVIEW OF SYSTEMS:   Review of Systems   HENT: Positive for congestion and hearing loss. Difficulty swallowing   Eyes: Negative. Respiratory: Positive for cough. Cardiovascular: Positive for leg swelling. Negative for chest pain. A. fib on Xarelto    edema   Gastrointestinal: Negative for abdominal pain, nausea and vomiting. Genitourinary: Positive for flank pain and hematuria. Musculoskeletal: Positive for back pain, joint pain and myalgias. Skin: Negative. Neurological: Positive for weakness. Endo/Heme/Allergies:        Patient on anticoagulation Xarelto for A. fib   Psychiatric/Behavioral: Negative. Physical  VITALS:  /69   Pulse 97   Temp 97.7 °F (36.5 °C) (Temporal)   Resp 16   Ht 5' 4\" (1.626 m)   Wt 138 lb 1.6 oz (62.6 kg)   SpO2 97%   BMI 23.70 kg/m²   TEMPERATURE:  Current - Temp: 97.7 °F (36.5 °C); Max - Temp  Av.4 °F (36.3 °C)  Min: 97 °F (36.1 °C)  Max: 98.2 °F (36.8 °C)   24 HR I&O     Intake/Output Summary (Last 24 hours) at 17 1028  Last data filed at 17 0943   Gross per 24 hour   Intake          1111.59 ml   Output             1650 ml   Net          -538.41 ml     BACK:No flank tenderness: bilateral   ABDOMEN:  soft, non-distended and non-tender Positive bowel sounds  HEART:  A. fib, irregular . lower extremity edema, with dependent edema up to lower trunk  CHEST:  Normal effort.  Some rhonchi congestion,   GENITAL/URINARY:  [place] catheter urine cleared to just blood tinged    Data       CBC:   Recent Labs      17   0200  17   0114  17   0259   WBC  15.6*  9.8  8.0   HGB  10.0*  10.1*  10.3*   HCT  31.2*  32.3*  32.3*   PLT  130  126*  169     BMP:    Recent Labs      17   0200  17 0114  12/20/17   0259   NA  144  147*  142   K  4.1  3.6  3.6   CL  113*  116*  113*   CO2  16*  17*  18*   BUN  21  21  23   CREATININE  0.6  0.5  0.5   GLUCOSE  94  69*  138*       No results for input(s): LABURIN in the last 72 hours. Recent Labs      12/17/17   2209   BC  No Growth to date. Any change in status will be called. Recent Labs      12/17/17 2015   BLOODCULT2  No Growth to date. Any change in status will be called. ORDER#: [id number]                          ORDERED BY: Jocelyn Arcos  SOURCE: Blood Peripheral                   COLLECTED:  12/14/17 21:04  ANTIBIOTICS AT [facility]. :                      RECEIVED :  12/14/17 29:36  CALL  [place]  [facility] tel. ,  Microbiology results called to and read back by Nabila Stark/RN/ICU,  12/15/2017 19:51, by [facility]   Culture & Susceptibility     PSEUDOMONAS AERUGINOSA     Antibiotic Interpretation ELLE Unit   cefepime Sensitive <=1 mcg/mL   gentamicin Sensitive <=1 mcg/mL   levofloxacin Sensitive 0.25 mcg/mL   meropenem Sensitive <=0.25 mcg/mL   piperacillin-tazobactam Sensitive 8 mcg/mL   tobramycin Sensitive <=1 mcg/mL        Narrative     ORDER#: [id number]                          ORDERED BY: Jocelyn Arcos  SOURCE: Kidney Pus from Right Kidney       COLLECTED:  12/14/17 14:56  ANTIBIOTICS AT [facility]. :                      RECEIVED :  12/14/17 16:34   Culture & Susceptibility     PROTEUS MIRABILIS     Antibiotic Interpretation ELLE Unit   ampicillin Sensitive <=2 mcg/mL   aztreonam Sensitive <=1 mcg/mL   ceFAZolin Sensitive <=4 mcg/mL   cefTRIAXone Sensitive <=1 mcg/mL   cefepime Sensitive <=1 mcg/mL   gentamicin Sensitive <=1 mcg/mL   levofloxacin Sensitive 0.5 mcg/mL   meropenem Sensitive <=0.25 mcg/mL   piperacillin-tazobactam Sensitive <=4 mcg/mL   trimethoprim-sulfamethoxazole Sensitive <=20 mcg/mL         PSEUDOMONAS AERUGINOSA     Antibiotic Interpretation ELLE Unit   cefepime Sensitive <=1 mcg/mL   gentamicin Sensitive <=1 mcg/mL   levofloxacin Sensitive 0.5 mcg/mL   meropenem Sensitive <=0.25 mcg/mL   piperacillin-tazobactam Sensitive <=4 mcg/mL   tobramycin Sensitive <=1 mcg/mL          Radiology:    Narrative   EXAMINATION: XR CHEST PORTABLE 12/15/2017 9:31 AM   HISTORY: Possible aspiration   COMPARISON: 12/14/2017   FINDINGS:   Heart is magnified but felt to be normal in size. Aorta is tortuous   with atherosclerotic calcifications. There is mild perihilar   prominence bilaterally. There is obscuration the left hemidiaphragm   with some blunting of the left costophrenic angle. No pneumothorax is   appreciated. There is mild central bronchial wall thickening which   appears stable. Bilateral ureteral stents are also noted.       Impression   Opacification of the left lower lobe may represent   atelectasis, pneumonia, or a combination of these. Trace pleural fluid   also suspected. Signed by Dr Rashad Moreland on 12/15/2017 9:33 AM              ASSESSMENT AND PLAN    Patient Active Problem List   Diagnosis    Fast heart beat    Sinus pause    Paroxysmal a-fib (HCC)    Mild CAD    Renal calculus, left    Closed right hip fracture (HCC)    Acute cystitis with hematuria    Gastroesophageal reflux disease without esophagitis    Chronic obstructive pulmonary disease (HCC)    Coronary artery disease involving native heart without angina pectoris    Renal calculus, right    Right ureteral calculus    Obstructive pyelonephritis    Acute pyelonephritis    Shock ([address]    Non-rheumatic tricuspid valve insufficiency    Thalassemia minor    Anemia    Urinary tract infection without hematuria    Hypernatremia     1. Sepsis,  secondary to obstructive  pyelonephritis of the right kidney. Blood and right kidney Cultures positive for Pseudomonas and Proteus. Patient on Merrem per  infectious disease. Follow-up blood cultures from 12/17/17 no growth. Patient clinically improved  Continue supportive care and current management.  When swallowing issues

## 2017-12-21 ENCOUNTER — APPOINTMENT (OUTPATIENT)
Dept: GENERAL RADIOLOGY | Age: 82
DRG: 871 | End: 2017-12-21
Attending: UROLOGY
Payer: MEDICARE

## 2017-12-21 LAB
ANION GAP SERPL CALCULATED.3IONS-SCNC: 11 MMOL/L (ref 7–19)
BASOPHILS ABSOLUTE: 0 K/UL (ref 0–0.2)
BASOPHILS RELATIVE PERCENT: 0.2 % (ref 0–1)
BUN BLDV-MCNC: 20 MG/DL (ref 8–23)
CALCIUM SERPL-MCNC: 8.8 MG/DL (ref 8.8–10.2)
CHLORIDE BLD-SCNC: 109 MMOL/L (ref 98–111)
CO2: 22 MMOL/L (ref 22–29)
CREAT SERPL-MCNC: <0.5 MG/DL (ref 0.5–0.9)
EOSINOPHILS ABSOLUTE: 0.2 K/UL (ref 0–0.6)
EOSINOPHILS RELATIVE PERCENT: 2.1 % (ref 0–5)
GFR NON-AFRICAN AMERICAN: >60
GLUCOSE BLD-MCNC: 133 MG/DL (ref 74–109)
HCT VFR BLD CALC: 31.2 % (ref 37–47)
HEMOGLOBIN: 10 G/DL (ref 12–16)
LYMPHOCYTES ABSOLUTE: 1.5 K/UL (ref 1.1–4.5)
LYMPHOCYTES RELATIVE PERCENT: 17.5 % (ref 20–40)
MCH RBC QN AUTO: 31 PG (ref 27–31)
MCHC RBC AUTO-ENTMCNC: 32.1 G/DL (ref 33–37)
MCV RBC AUTO: 96.6 FL (ref 81–99)
MONOCYTES ABSOLUTE: 0.8 K/UL (ref 0–0.9)
MONOCYTES RELATIVE PERCENT: 9.3 % (ref 0–10)
NEUTROPHILS ABSOLUTE: 6 K/UL (ref 1.5–7.5)
NEUTROPHILS RELATIVE PERCENT: 69.5 % (ref 50–65)
PDW BLD-RTO: 14.6 % (ref 11.5–14.5)
PLATELET # BLD: 176 K/UL (ref 130–400)
PMV BLD AUTO: 12.3 FL (ref 9.4–12.3)
POTASSIUM SERPL-SCNC: 3.4 MMOL/L (ref 3.5–5)
RBC # BLD: 3.23 M/UL (ref 4.2–5.4)
SODIUM BLD-SCNC: 142 MMOL/L (ref 136–145)
WBC # BLD: 8.7 K/UL (ref 4.8–10.8)

## 2017-12-21 PROCEDURE — 2580000003 HC RX 258: Performed by: HOSPITALIST

## 2017-12-21 PROCEDURE — 97116 GAIT TRAINING THERAPY: CPT

## 2017-12-21 PROCEDURE — 99232 SBSQ HOSP IP/OBS MODERATE 35: CPT | Performed by: UROLOGY

## 2017-12-21 PROCEDURE — 80048 BASIC METABOLIC PNL TOTAL CA: CPT

## 2017-12-21 PROCEDURE — 6370000000 HC RX 637 (ALT 250 FOR IP): Performed by: UROLOGY

## 2017-12-21 PROCEDURE — 2700000000 HC OXYGEN THERAPY PER DAY

## 2017-12-21 PROCEDURE — 51798 US URINE CAPACITY MEASURE: CPT

## 2017-12-21 PROCEDURE — 92526 ORAL FUNCTION THERAPY: CPT

## 2017-12-21 PROCEDURE — 85025 COMPLETE CBC W/AUTO DIFF WBC: CPT

## 2017-12-21 PROCEDURE — 36415 COLL VENOUS BLD VENIPUNCTURE: CPT

## 2017-12-21 PROCEDURE — 1210000000 HC MED SURG R&B

## 2017-12-21 PROCEDURE — 71010 XR CHEST PORTABLE: CPT

## 2017-12-21 PROCEDURE — 6370000000 HC RX 637 (ALT 250 FOR IP): Performed by: INTERNAL MEDICINE

## 2017-12-21 PROCEDURE — 99233 SBSQ HOSP IP/OBS HIGH 50: CPT | Performed by: HOSPITALIST

## 2017-12-21 PROCEDURE — 97530 THERAPEUTIC ACTIVITIES: CPT

## 2017-12-21 PROCEDURE — 6370000000 HC RX 637 (ALT 250 FOR IP): Performed by: HOSPITALIST

## 2017-12-21 PROCEDURE — 6360000002 HC RX W HCPCS: Performed by: HOSPITALIST

## 2017-12-21 RX ORDER — CIPROFLOXACIN 250 MG/1
500 TABLET, FILM COATED ORAL EVERY 12 HOURS SCHEDULED
Status: DISCONTINUED | OUTPATIENT
Start: 2017-12-21 | End: 2017-12-22 | Stop reason: HOSPADM

## 2017-12-21 RX ORDER — SODIUM CHLORIDE 450 MG/100ML
INJECTION, SOLUTION INTRAVENOUS CONTINUOUS
Status: DISCONTINUED | OUTPATIENT
Start: 2017-12-21 | End: 2017-12-22

## 2017-12-21 RX ORDER — POTASSIUM CHLORIDE 20MEQ/15ML
40 LIQUID (ML) ORAL ONCE
Status: COMPLETED | OUTPATIENT
Start: 2017-12-21 | End: 2017-12-21

## 2017-12-21 RX ADMIN — CIPROFLOXACIN HYDROCHLORIDE 500 MG: 250 TABLET, FILM COATED ORAL at 08:36

## 2017-12-21 RX ADMIN — CIPROFLOXACIN HYDROCHLORIDE 500 MG: 250 TABLET, FILM COATED ORAL at 22:27

## 2017-12-21 RX ADMIN — PROPAFENONE HYDROCHLORIDE 225 MG: 150 TABLET, FILM COATED ORAL at 07:00

## 2017-12-21 RX ADMIN — SODIUM CHLORIDE: 4.5 INJECTION, SOLUTION INTRAVENOUS at 13:31

## 2017-12-21 RX ADMIN — PROPAFENONE HYDROCHLORIDE 225 MG: 150 TABLET, FILM COATED ORAL at 14:34

## 2017-12-21 RX ADMIN — POTASSIUM CHLORIDE 40 MEQ: 20 SOLUTION ORAL at 08:36

## 2017-12-21 RX ADMIN — OYSTER SHELL CALCIUM WITH VITAMIN D 1 TABLET: 500; 200 TABLET, FILM COATED ORAL at 08:36

## 2017-12-21 RX ADMIN — PROPAFENONE HYDROCHLORIDE 225 MG: 150 TABLET, FILM COATED ORAL at 22:28

## 2017-12-21 RX ADMIN — POTASSIUM CHLORIDE 40 MEQ: 20 SOLUTION ORAL at 14:34

## 2017-12-21 RX ADMIN — OYSTER SHELL CALCIUM WITH VITAMIN D 1 TABLET: 500; 200 TABLET, FILM COATED ORAL at 22:27

## 2017-12-21 RX ADMIN — ENOXAPARIN SODIUM 90 MG: 100 INJECTION SUBCUTANEOUS at 20:35

## 2017-12-21 ASSESSMENT — ENCOUNTER SYMPTOMS
VOMITING: 0
BACK PAIN: 1
NAUSEA: 0
ABDOMINAL PAIN: 0
COUGH: 1
EYES NEGATIVE: 1

## 2017-12-21 NOTE — PROGRESS NOTES
[facility] Hospitalists      Patient:  Sima Vázquez  YOB: 1932  Date of Service: 12/21/2017  MRN: [id number]   Acct: [de-identified]   Primary Care Physician: Marilu Antonio MD  Advance Directive: Full Code  Admit Date: 12/14/2017       Hospital Day: 7    CHIEF COMPLAINT Difficulty swallowing, follow up Sepsis    SUBJECTIVE:  Ms. Yepez Lipnaren continues to have difficulty swallowing. Long discussion with family present in room. Will attempt placement in [facility] acute rehab, if she does not qualify family wish for her to return home with high level home health. Cumulative hospital history:               The patient is a 80 y. o. female who went for follow up appointment with Dr. Brett Temple due to obstructive ureter stones and placement of stents. Patient then went to OR due to need for addition double J-stent placements secondary to bilateral obstructing renal calculi. Due to patients multiple co-morbidities, a hospitalist consult was ordered. Prior to consult, patients BP down in the 70's with HR up to 110 . Nursing staff placed call to attending who ordered patient to be moved to ICU with additional orders. On arrival, patient lethargic with SBP @ 62 per NIBP cuff. Narcan given as well as IV fluids and albumin. Patient now awake with complaints of nausea and being cold. Grullon catheter to BSD with dark hematuria. Review of Systems:   14 point review of systems is negative except as specifically addressed above.     Objective:   VITALS:  /71   Pulse 67   Temp 97.8 °F (36.6 °C)   Resp 20   Ht 5' 4\" (1.626 m)   Wt 138 lb 1.6 oz (62.6 kg)   SpO2 97%   BMI 23.70 kg/m²   24HR INTAKE/OUTPUT:      Intake/Output Summary (Last 24 hours) at 12/21/17 1615  Last data filed at 12/21/17 1413   Gross per 24 hour   Intake          2168.41 ml   Output              440 ml   Net          1728.41 ml     General appearance: alert, appears stated age, cooperative and no distress, sitting up comfortably in bed  Head: Normocephalic, without obvious abnormality, atraumatic, NG tube in place  Eyes: conjunctivae/corneas clear. PERRL, EOM's intact. Ears: normal external ears and nose, throat without exudate  Neck: no adenopathy, no carotid bruit, no JVD, supple, symmetrical, trachea midline and thyroid not enlarged, symmetric, no tenderness/mass/nodules  Lungs: Diminished bilateral lower lobes, clear to auscultation bilaterally,no rales or wheezes   Heart: Irregular, no murmurs, rubs, gallops  Abdomen:soft, non-tender; non-distended, normal bowel sounds no masses, no organomegaly  Extremities:1+ bilateral upper extremity edema, trace lower extremity edema,  No erythema, no tenderness to palpation  Skin: Skin color, texture, turgor normal. No rashes or lesions  Lymphatic: No palpable lymph node enlargment  Neurologic: Alert and oriented X 3, generalized weakness and normal tone. Normal symmetric reflexes.   Cranial nerves:II-XII Grossly intact Sensory: normal Motor:grossly normal  Psychiatric: Alert and oriented, thought content appropriate, normal insight, mood appropriate    Medications:      sodium chloride 50 mL/hr at 12/21/17 1331      ciprofloxacin  500 mg Oral 2 times per day    enoxaparin  1.5 mg/kg Subcutaneous Daily    calcium-vitamin D  1 tablet Oral BID    propafenone  225 mg Oral 3 times per day    sodium chloride flush  10 mL Intravenous 2 times per day    sodium chloride flush  10 mL Intravenous Q12H     potassium chloride **OR** potassium chloride **OR** potassium chloride, morphine **OR** morphine, opium-belladonna, LORazepam, bisacodyl, bisacodyl, acetaminophen, sodium chloride, furosemide, meclizine, tiZANidine, HYDROcodone-acetaminophen, sodium chloride flush, acetaminophen, magnesium hydroxide, ondansetron, sodium chloride flush  DIET DYSPHAGIA I PUREED; Spoon (Pudding) Thick     Lab and other Data:     Recent Labs      12/19/17   0114  12/20/17   0259  12/21/17   0222   WBC  9.8 Antibiotic Interpretation ELLE Unit  cefepime Sensitive <=1 mcg/mL  gentamicin Sensitive <=1 mcg/mL  levofloxacin Sensitive 0.25 mcg/mL  meropenem Sensitive <=0.25 mcg/mL  piperacillin-tazobactam Sensitive 8 mcg/mL  tobramycin Sensitive <=1 Mcg/mL    Blood culture #1/#2 collected 12/17/2017  No growth to date    Assessment/Plan   Principal Problem:    Septic Shock ([facility])-resolving, 2' pseudomonas bacteremia, repeat blood culture negative    Active Problems:    Paroxysmal a-fib ([facility])-continue weight based Lovenox-will switch back to Xarelto once sure PEG tube not needed, monitor rate, Rythmol continued    Renal calculus, bilateral-treatment at later date    Chronic obstructive pulmonary disease ([facility])-no evidence exacerbation    Obstructive pyelonephritis-s/p bilateral stents    Acute pyelonephritis-Merrem continued, repeat blood cultures negative    Non-rheumatic tricuspid valve insufficiency    Anemia-chronic and multifactorial, stable    Dysphagia-will change to Dobhoff tube, GI evaluated cannot place PEG tube at this time as her swallowing has mildly improved. Continue to monitor. Consult dietary for tube feeding recommendations, continue pleasure feedings    Consult high level home health, consult dietary for tube feedings. Dobhoff placement confirmed via CXR. She does have 24 hr care at home. Antibiotic: Merrem    DVT Prophylaxis: Lovenox    Lucy Wakefield PA-C     ------------------------------------------------------------------------  I have independently interviewed and examined Temi Bach. I have discussed key elements of the care plan with the PA or APRN and I agree with the findings and care plan as stated above unless otherwise noted. Additional Comments:     Chief complaint:  Wants to go home as soon as possible. Eating her foods better but not drinking any of the liquids.   Discussed PEG, agreeable to go home with dobhoff temporarily    Objective:  RRR  Lungs clear  +BS soft nontender  Extr warm and dry  CN intact nonfocal    Impression / Plan:  Change NGT to dobhoff  Anticipate DC tomorrow with [facility] PT/OT/SLP  She has 24 hour care at home.   Arrange tube feeds / free water / kangaroo pump at home  Discussed with GI, daughter and son  She is not a candidate for our rehab due to insurance purposes    Electronically signed by HENRI Dorantes DO on 12/22/2017 at 8:10 AM

## 2017-12-21 NOTE — PROGRESS NOTES
Infectious Diseases Progress Note    Patient:  Sima Vázquez  YOB: 1932  MRN: [id number]   Admit date: 12/14/2017   Admitting Physician: Kate Olivier MD  Primary Care Physician: Marilu Antonio MD    Chief Complaint/Interval History:  Patient feel she is getting better. Daughter at her bedside. They indicate if she doesn't qualify for acute rehab they would like take her home and try intensive outpatient rehab at home. She is tolerating some oral intake. Spoke with nursing this morning. She was changed to oral antimicrobial treatment. She is not having nausea, rash, or diarrhea. She looks a little bit stronger each day. She indicates she is breathing comfortably. In/Out    Intake/Output Summary (Last 24 hours) at 12/21/17 1117  Last data filed at 12/21/17 0951   Gross per 24 hour   Intake          2218.41 ml   Output             1241 ml   Net           977.41 ml     Allergies:    Allergies   Allergen Reactions    Nitrofuran Derivatives Swelling    Sulfa Antibiotics     Pcn [Penicillins] Rash     Current Meds:   ciprofloxacin (CIPRO) tablet 500 mg 2 times per day   potassium chloride (KLOR-CON M) extended release tablet 40 mEq PRN   Or    potassium chloride 20 MEQ/15ML (10%) oral solution 40 mEq PRN   Or    potassium chloride 10 mEq/100 mL IVPB (Peripheral Line) PRN   morphine (PF) injection 2 mg Q2H PRN   Or    morphine (PF) injection 4 mg Q2H PRN   dextrose 5 % solution Continuous   enoxaparin (LOVENOX) injection 90 mg Daily   opium-belladonna (B&O SUPPRETTES) 16.2-60 MG suppository 60 mg Q8H PRN   LORazepam (ATIVAN) injection 0.5 mg Q4H PRN   bisacodyl (DULCOLAX) EC tablet 5 mg Daily PRN   bisacodyl (DULCOLAX) suppository 10 mg Daily PRN   acetaminophen (TYLENOL) suppository 650 mg Q4H PRN   0.9 % sodium chloride bolus Q12H PRN   furosemide (LASIX) tablet 20 mg Daily PRN   calcium-vitamin D (OSCAL-500) 500-200 MG-UNIT per tablet 1 tablet BID   meclizine (ANTIVERT) tablet 12.5 mg PRN release per others  Will sign off for now  Please call if questions  Follow-up with infectious diseases when necessary    Citlalli Talavera MD

## 2017-12-21 NOTE — PROGRESS NOTES
term goal 1: Return to PLOF       Therapy Time   Individual Concurrent Group Co-treatment   Time In           Time Out           Minutes                   Baudilio Feldman, OTR/L

## 2017-12-21 NOTE — PROGRESS NOTES
Speech Language Pathology  Facility/Department: [facility] SURG SERVICES  Daily Treatment Note  NAME: Elly Miller  : 1932  MRN: [id number]    Date of Treat: 2017  Evaluating Therapist: Sulaiman Ghosh    Patient Treat:  Observed patient's swallowing function with liquids. Patient was presented trials of honey thick H2O via spoon by SLP. On first 3 trials, oral transit primarily measured 1 second in length. Laryngeal elevation during swallow initiation was considered to be sluggish and moderately decreased in strength, but no outward S/S penetration/aspiration was noted. However, on following trials, oral transit of honey thick H2O was considered to be fast and uncontrolled. Laryngeal elevation during swallow initiation was still considered to be sluggish and moderately decreased in strength, and consistent delayed coughs were observed. At this time, would recommend continuation pleasured feeding of pudding thick liquids and puree consistency. Meds crushed in pudding or applesauce. If patient receives mouth care prior to intake, okay for ice chips IN BETWEEN MEALS for comfort. Plan:  Continued daily Speech/Language treatment with goals per plan of care.     Electronically signed by JAMI Atwood on 2017 at 8:46 AM

## 2017-12-21 NOTE — PROGRESS NOTES
also consulted for management. 2. Right and left UPJ obstructing calculi. Status post bilateral ureteral stent placement. We'll address in the future as clinically indicated when this patient recovers from her sepsis. Would need a minimum of 14 days of antibiotics prior to manipulating stones. However would recommend continuous antibiotic to doing get her on the schedule. 3. Renal: Urine output improved with lasix. Has significant edema/ anasarca. UOP  Marginal w/o lasix. Continue jay diuresis with lasix if OK with medicine. low potassium replace. Grullon out today    4. Dysphagia / Poor Swallowing. Now with NGT for oral medications. Speech therapy working with her. Tube feeds. If not able to take oral abx do we need to consider PICC Line for home IV abx?  further management by  hospitalists service. 5. Nutrition now on tube feeds      6. afib getting her Rythmol per NG tube. She was on oral Xarelto now switch to Lovenox again management per hospitalist.      Other wise management mostly now per Hospitalist for multiple medical issues  Need recommendations on disposion or placement, Rehab.      Rufina Isaac MD

## 2017-12-22 ENCOUNTER — TELEPHONE (OUTPATIENT)
Dept: UROLOGY | Age: 82
End: 2017-12-22

## 2017-12-22 VITALS
SYSTOLIC BLOOD PRESSURE: 99 MMHG | BODY MASS INDEX: 23.58 KG/M2 | RESPIRATION RATE: 17 BRPM | OXYGEN SATURATION: 95 % | DIASTOLIC BLOOD PRESSURE: 67 MMHG | WEIGHT: 138.1 LBS | HEART RATE: 76 BPM | HEIGHT: 64 IN | TEMPERATURE: 98.9 F

## 2017-12-22 PROBLEM — R13.11 ORAL PHASE DYSPHAGIA: Status: ACTIVE | Noted: 2017-12-22

## 2017-12-22 LAB
ANION GAP SERPL CALCULATED.3IONS-SCNC: 6 MMOL/L (ref 7–19)
BASOPHILS ABSOLUTE: 0 K/UL (ref 0–0.2)
BASOPHILS RELATIVE PERCENT: 0.1 % (ref 0–1)
BUN BLDV-MCNC: 18 MG/DL (ref 8–23)
CALCIUM SERPL-MCNC: 9.2 MG/DL (ref 8.8–10.2)
CHLORIDE BLD-SCNC: 106 MMOL/L (ref 98–111)
CO2: 26 MMOL/L (ref 22–29)
CREAT SERPL-MCNC: <0.5 MG/DL (ref 0.5–0.9)
CULTURE, BLOOD 2: NORMAL
EOSINOPHILS ABSOLUTE: 0.2 K/UL (ref 0–0.6)
EOSINOPHILS RELATIVE PERCENT: 2.8 % (ref 0–5)
GFR NON-AFRICAN AMERICAN: >60
GLUCOSE BLD-MCNC: 90 MG/DL (ref 74–109)
HCT VFR BLD CALC: 29.3 % (ref 37–47)
HEMOGLOBIN: 9.5 G/DL (ref 12–16)
LYMPHOCYTES ABSOLUTE: 1.6 K/UL (ref 1.1–4.5)
LYMPHOCYTES RELATIVE PERCENT: 20.2 % (ref 20–40)
MCH RBC QN AUTO: 30.9 PG (ref 27–31)
MCHC RBC AUTO-ENTMCNC: 32.4 G/DL (ref 33–37)
MCV RBC AUTO: 95.4 FL (ref 81–99)
MONOCYTES ABSOLUTE: 0.7 K/UL (ref 0–0.9)
MONOCYTES RELATIVE PERCENT: 8.2 % (ref 0–10)
NEUTROPHILS ABSOLUTE: 5.4 K/UL (ref 1.5–7.5)
NEUTROPHILS RELATIVE PERCENT: 67.4 % (ref 50–65)
PDW BLD-RTO: 14.5 % (ref 11.5–14.5)
PLATELET # BLD: 219 K/UL (ref 130–400)
PMV BLD AUTO: 12.5 FL (ref 9.4–12.3)
POTASSIUM SERPL-SCNC: 4.5 MMOL/L (ref 3.5–5)
RBC # BLD: 3.07 M/UL (ref 4.2–5.4)
SODIUM BLD-SCNC: 138 MMOL/L (ref 136–145)
WBC # BLD: 8 K/UL (ref 4.8–10.8)

## 2017-12-22 PROCEDURE — 36415 COLL VENOUS BLD VENIPUNCTURE: CPT

## 2017-12-22 PROCEDURE — 85025 COMPLETE CBC W/AUTO DIFF WBC: CPT

## 2017-12-22 PROCEDURE — 99221 1ST HOSP IP/OBS SF/LOW 40: CPT | Performed by: INTERNAL MEDICINE

## 2017-12-22 PROCEDURE — 80048 BASIC METABOLIC PNL TOTAL CA: CPT

## 2017-12-22 PROCEDURE — 6370000000 HC RX 637 (ALT 250 FOR IP): Performed by: UROLOGY

## 2017-12-22 PROCEDURE — 99232 SBSQ HOSP IP/OBS MODERATE 35: CPT | Performed by: UROLOGY

## 2017-12-22 PROCEDURE — 2580000003 HC RX 258: Performed by: UROLOGY

## 2017-12-22 PROCEDURE — 97530 THERAPEUTIC ACTIVITIES: CPT

## 2017-12-22 PROCEDURE — 6370000000 HC RX 637 (ALT 250 FOR IP): Performed by: INTERNAL MEDICINE

## 2017-12-22 RX ORDER — POTASSIUM CHLORIDE 20MEQ/15ML
20 LIQUID (ML) ORAL DAILY
Qty: 450 ML | Refills: 3 | Status: SHIPPED | OUTPATIENT
Start: 2017-12-22 | End: 2018-03-20

## 2017-12-22 RX ORDER — CIPROFLOXACIN 500 MG/1
500 TABLET, FILM COATED ORAL EVERY 12 HOURS SCHEDULED
Qty: 28 TABLET | Refills: 0 | Status: SHIPPED | OUTPATIENT
Start: 2017-12-22 | End: 2018-01-05

## 2017-12-22 RX ORDER — FUROSEMIDE 20 MG/1
20 TABLET ORAL DAILY
Qty: 60 TABLET | Refills: 3 | Status: SHIPPED | OUTPATIENT
Start: 2017-12-22 | End: 2022-05-09 | Stop reason: SDUPTHER

## 2017-12-22 RX ADMIN — Medication 10 ML: at 09:18

## 2017-12-22 RX ADMIN — PROPAFENONE HYDROCHLORIDE 225 MG: 150 TABLET, FILM COATED ORAL at 13:56

## 2017-12-22 RX ADMIN — PROPAFENONE HYDROCHLORIDE 225 MG: 150 TABLET, FILM COATED ORAL at 06:36

## 2017-12-22 RX ADMIN — OYSTER SHELL CALCIUM WITH VITAMIN D 1 TABLET: 500; 200 TABLET, FILM COATED ORAL at 09:18

## 2017-12-22 RX ADMIN — CIPROFLOXACIN HYDROCHLORIDE 500 MG: 250 TABLET, FILM COATED ORAL at 09:18

## 2017-12-22 ASSESSMENT — ENCOUNTER SYMPTOMS
NAUSEA: 0
BACK PAIN: 1
EYES NEGATIVE: 1
VOMITING: 0
COUGH: 1
ABDOMINAL PAIN: 0

## 2017-12-22 NOTE — TELEPHONE ENCOUNTER
Hospital call and needs a 2 week f/u with a kub,PS has no open appt to make this appt could you please call the pt with appt f/u.

## 2017-12-22 NOTE — CONSULTS
[facility] [address] [facility]                                   CONSULTATION    PATIENT NAME: Maribell Cristobal                :        1932  MED REC NO:   [id number]                              ROOM:       [facility]  ACCOUNT NO:   [de-identified]                           ADMIT DATE: 2017  PROVIDER:     Lynn Cruz MD    CONSULT DATE:  2017    HISTORY OF PRESENT ILLNESS:  This is a very pleasant 80-year-old white  female, who was admitted to the hospital for ureteral stent placement. She  was found on a CAT scan to have significant left hydronephrosis in 2017. Since having the stent placed, she has had a prolonged hospital course most  remarkable for weakness and infection. She has been seen by Infectious  Disease for gram-negative urosepsis. In the course of her hospitalization,  she has been having a difficult time with oral intake. Speech Therapy has  seen her and has recommended continued oral thickened feedings of pudding  and thick liquid. There was no overt aspiration or penetration on speech  therapy evaluation. She has had a NG tube placed and is successfully  receiving supplemental feeding tubes through the NG.  GI was asked to see  for consideration of PEG tube. The family is considering taking her home. She is not qualified for [facility]. PAST MEDICAL HISTORY:  The patient's past medical history is otherwise  significant for atrial fibrillation, anemia, coronary artery disease,  conjunctivitis, depression, renal stone, leukopenia, osteoporosis and AFib. MEDICATIONS:  At this time in hospital include Cipro, K-Dur, potassium,  morphine, Lovenox, Ativan, Dulcolax, Tylenol, Lasix, Antivert, Rythmol,  Zanaflex, hydrocodone, milk of magnesia and Zofran. ALLERGIES:  Include NITROFURANTOIN, SULFA ANTIBIOTICS and PENICILLINS.     SOCIAL AND FAMILY HISTORY:  Well outlined in medical record and reviewed. REVIEW OF SYSTEMS:  CONSTITUTIONAL:  Weak and tired, fatigued. EYES:  Denies eye pain or double vision. ENT:  Denies frequent nose bleeds or tinnitus. CARDIOVASCULAR:  Denies chest pain or tachyarrhythmia. RESPIRATORY:  Denies hemoptysis or shortness of breath. GASTROINTESTINAL:  Difficulty chewing and swallowing. GENITOURINARY:  Denies dysuria or pyuria. MUSCULOSKELETAL:  Denies arthritis or movement difficulties. INTEGUMENTARY:  Denies pruritus or easy bruising. NEUROLOGICAL:  Denies total loss of taste or numbness. PSYCHIATRIC:  Denies episodic change in personality or expansive  personality. ENDOCRINE:  Denies diabetes or adrenal insufficiency. HEMATOLOGIC/LYMPHATIC:  Denies swollen lymph glands or hemophilia. ALLERGIC/IMMUNOLOGIC:  Denies anaphylaxis or loss of immune function. PHYSICAL EXAMINATION:  GENERAL:  On exam, she is a weak, pale 41-year-old white female, in no  acute distress. VITAL SIGNS:  She is afebrile. Her vital signs are stable. Temperature is  99.5, pulse 75, respirations 17 and /68. HEENT:  Head is normocephalic, atraumatic. Pupils are equal, reactive to  light and accommodation. EOM intact. Conjunctiva is pink. Sclera is  nonicteric. NECK:  Supple. No thyromegaly. No carotid bruits. LUNGS:  Decreased breath sounds bilaterally. Respiratory excursion is  decreased bilaterally. No rales or rhonchi are heard. HEART:  Cardiovascular reveals regular rate and rhythm. ABDOMEN:  Soft with active bowel sounds. No masses are appreciated. No  areas of tenderness are noted. EXTREMITIES:  Without edema. IMPRESSION:  1. Urosepsis. 2.  Ureteral calculi with stent placement. 3.  Poor oral nutrition. PLAN:  She is certainly borderline; however, I believe her strength is  gradually improving. She does not have any overt aspiration or penetration  on swallowing study and she is taking oral nutrition.   I think the least  invasive approach at this

## 2017-12-22 NOTE — PROGRESS NOTES
Physical Therapy  [facility]  [id number]     12/22/17 0955   Subjective   Subjective Patient on bedpan and states she is finished. Bed Mobility   Rolling Moderate assistance   Supine to Sit Moderate assistance  (1+1)   Transfers   Sit to Stand Moderate Assistance   Stand to sit Minimal Assistance   Comment transferred bed to chair via Juanito Prime   Ambulation   Ambulation? No   Other Activities   Comment Upon arrival, patient on bedpan and states she is finished. PCA present to assist with rolling patient and clean up from BM. Patient then transferred supine to sit at EOB to take meds from nurse that was present. Patient did well with static sitting balance, occaisional need for correcting. Patient transferred sit to  Juanito Prime, transferred bed to chair via Juanito Prime. Patient's knees still tend to buckle unexpectedly and did so when standing in Juanito Prime to remove paddles for patient to sit in chair. Patient positioned for comfort with all needs in reach. Short term goals   Time Frame for Short term goals 14 DAYS BEFORE RE EVAL ON ACUTE CARE 1-2-18   Short term goal 1 SUP<>SIT MIN A 1   Short term goal 2 SIT<>STAND MIN A 1   Short term goal 3 STAND STEP TF WITH RW AND MIN A1   Activity Tolerance   Activity Tolerance Patient Tolerated treatment well   Safety Devices   Type of devices Call light within reach; Left in chair   Electronically signed by Td Eastman PTA on 12/22/2017 at 10:10 AM

## 2017-12-22 NOTE — CARE COORDINATION
[facility] notified of new tube feeding order. Order faxed.   Electronically signed by Pete Ham RN on 12/22/17 at 1:44 PM

## 2017-12-22 NOTE — PROGRESS NOTES
Urology Progress Note      SUBJECTIVE:  Patient alert states she feels good today, want  To go home    OBJECTIVE:  Now with DHT on oral meds and tube feeds    REVIEW OF SYSTEMS:   Review of Systems   HENT: Positive for congestion and hearing loss. Difficulty swallowing   Eyes: Negative. Respiratory: Positive for cough. Cardiovascular: Positive for leg swelling. Negative for chest pain. A. fib on Xarelto    edema   Gastrointestinal: Negative for abdominal pain, nausea and vomiting. Genitourinary: Positive for flank pain and hematuria. Musculoskeletal: Positive for back pain, joint pain and myalgias. Skin: Negative. Neurological: Positive for weakness. Endo/Heme/Allergies:        Patient on anticoagulation Xarelto for A. fib   Psychiatric/Behavioral: Negative. Physical  VITALS:  BP 96/62   Pulse 76   Temp 99.4 °F (37.4 °C) (Temporal)   Resp 16   Ht 5' 4\" (1.626 m)   Wt 138 lb 1.6 oz (62.6 kg)   SpO2 95%   BMI 23.70 kg/m²   TEMPERATURE:  Current - Temp: 99.4 °F (37.4 °C); Max - Temp  Av.2 °F (31.2 °C)  Min: 37.4 °F (3 °C)  Max: 99.4 °F (37.4 °C)   24 HR I&O     Intake/Output Summary (Last 24 hours) at 17 0743  Last data filed at 17 0649   Gross per 24 hour   Intake             1572 ml   Output              525 ml   Net             1047 ml     BACK:No flank tenderness: bilateral   ABDOMEN:  soft, non-distended and non-tender Positive bowel sounds  HEART:  A. fib, irregular . lower extremity edema, with dependent edema up to lower trunk  CHEST:  Normal effort.  Some rhonchi congestion,   GENITAL/URINARY:  Grullon catheter out voiding OK some blood tinged urine    Data       CBC:   Recent Labs      17   0259  17   0222  17   0230   WBC  8.0  8.7  8.0   HGB  10.3*  10.0*  9.5*   HCT  32.3*  31.2*  29.3*   PLT  169  176  219     BMP:    Recent Labs      17   0259  17   0222  17   0230   NA  142  142  138   K  3.6  3.4*  4.5 Radiology:    Narrative   EXAMINATION: XR CHEST PORTABLE 12/15/2017 9:31 AM   HISTORY: Possible aspiration   COMPARISON: 12/14/2017   FINDINGS:   Heart is magnified but felt to be normal in size. Aorta is tortuous   with atherosclerotic calcifications. There is mild perihilar   prominence bilaterally. There is obscuration the left hemidiaphragm   with some blunting of the left costophrenic angle. No pneumothorax is   appreciated. There is mild central bronchial wall thickening which   appears stable. Bilateral ureteral stents are also noted.       Impression   Opacification of the left lower lobe may represent   atelectasis, pneumonia, or a combination of these. Trace pleural fluid   also suspected. Signed by Dr Kashif Sousa on 12/15/2017 9:33 AM              ASSESSMENT AND PLAN    Patient Active Problem List   Diagnosis    Fast heart beat    Sinus pause    Paroxysmal a-fib (HCC)    Mild CAD    Renal calculus, left    Closed right hip fracture (HCC)    Acute cystitis with hematuria    Gastroesophageal reflux disease without esophagitis    Chronic obstructive pulmonary disease (HCC)    Coronary artery disease involving native heart without angina pectoris    Renal calculus, right    Right ureteral calculus    Obstructive pyelonephritis    Acute pyelonephritis    Shock ([address]    Non-rheumatic tricuspid valve insufficiency    Thalassemia minor    Anemia    Urinary tract infection without hematuria    Hypernatremia     1. Sepsis,  secondary to obstructive  pyelonephritis of the right kidney. Blood and right kidney Cultures positive for Pseudomonas and Proteus. Patient on Oral Cipro now per infectious disease. Follow-up blood cultures from 12/17/17 no growth. Home on oral Cipro for at least 14 days unless otherwise recommended by infectious disease. 2. Right and left UPJ obstructing calculi. Status post bilateral ureteral stent placement.  We'll address in the future as clinically indicated

## 2017-12-22 NOTE — PROGRESS NOTES
[facility] Hospitalists      Patient:  Krystin You  YOB: 1932  Date of Service: 12/22/2017  MRN: [id number]   Acct: [de-identified]   Primary Care Physician: Magen Burnette MD  Advance Directive: Full Code  Admit Date: 12/14/2017       Hospital Day: 8    CHIEF COMPLAINT Dysphagia    SUBJECTIVE:  Ms. Carina Prieto has no new complaints. She is hopeful for discharge today. Cumulative hospital history:               The patient is a 80 y. o. female who went for follow up appointment with Dr. Ashok Crockett due to obstructive ureter stones and placement of stents. Patient then went to OR due to need for addition double J-stent placements secondary to bilateral obstructing renal calculi. Due to patients multiple co-morbidities, a hospitalist consult was ordered. Prior to consult, patients BP down in the 70's with HR up to 110 . Nursing staff placed call to attending who ordered patient to be moved to ICU with additional orders. On arrival, patient lethargic with SBP @ 62 per NIBP cuff. Narcan given as well as IV fluids and albumin. Patient now awake with complaints of nausea and being cold. Grullon catheter to BSD with dark hematuria. Review of Systems:   14 point review of systems is negative except as specifically addressed above. Objective:   VITALS:  /60   Pulse 75   Temp 99.5 °F (37.5 °C) (Temporal)   Resp 17   Ht 5' 4\" (1.626 m)   Wt 138 lb 1.6 oz (62.6 kg)   SpO2 96%   BMI 23.70 kg/m²   24HR INTAKE/OUTPUT:      Intake/Output Summary (Last 24 hours) at 12/22/17 1140  Last data filed at 12/22/17 1049   Gross per 24 hour   Intake             1833 ml   Output              725 ml   Net             1108 ml     General appearance: alert, appears stated age, cooperative and no distress, sitting up comfortably in bedside chair  Head: Normocephalic, without obvious abnormality, atraumatic, Dobhoff tube in place  Eyes: conjunctivae/corneas clear. PERRL, EOM's intact.    Ears: normal

## 2017-12-22 NOTE — PROGRESS NOTES
Nutrition Assessment    Type and Reason for Visit: Reassess    Nutrition Recommendations: Diet at D/C per SLP recommendations for safety. TF to continue as ordered, but Dobhoff will not be able to be used long term. Malnutrition Assessment:  · Malnutrition Status: At risk for malnutrition  · Context: Acute illness or injury  · Findings of the 6 clinical characteristics of malnutrition (Minimum of 2 out of 6 clinical characteristics is required to make the diagnosis of moderate or severe Protein Calorie Malnutrition based on AND/ASPEN Guidelines):  1. Energy Intake-Greater than 75%, not able to assess    2. Weight Loss-No significant weight loss,    3. Fat Loss-Unable to assess,    4. Muscle Loss-Unable to assess,    5. Fluid Accumulation-Unable to assess,    6.  Strength-Not measured    Nutrition Diagnosis:   · Problem: Inadequate oral intake  · Etiology: related to Difficulty swallowing     Signs and symptoms:  as evidenced by Nutrition support - EN    Nutrition Interventions:   Continue current Tube Feeding  Continued Inpatient Monitoring    Nutrition Evaluation:   · Evaluation: Progressing toward goals   · Goals: Meet nutritional needs through PO/MISHA    · Monitoring: Meal Intake, TF Intake, Diet Tolerance, TF Tolerance, Gastric Residuals, Skin Integrity, Fluid Balance, Weight, Pertinent Labs    See Adult Nutrition Doc Flowsheet for more detail.      Electronically signed by Soy Shaw RD, CALEB on 12/22/17 at 8:43 AM    Contact Number: [phone]

## 2017-12-22 NOTE — PLAN OF CARE
Problem: Falls - Risk of  Goal: Absence of falls  Outcome: Ongoing      Problem: Risk for Impaired Skin Integrity  Goal: Tissue integrity - skin and mucous membranes  Structural intactness and normal physiological function of skin and  mucous membranes.    Outcome: Ongoing      Problem: Nutrition  Goal: Optimal nutrition therapy  Nutrition Problem: Inadequate oral intake  Intervention: Food and/or Nutrient Delivery: Continue current Tube Feeding  Nutritional Goals: Meet nutritional needs through PO/MISHA     Outcome: Ongoing

## 2017-12-23 LAB — BLOOD CULTURE, ROUTINE: NORMAL

## 2017-12-26 ENCOUNTER — TELEPHONE (OUTPATIENT)
Dept: CASE MANAGEMENT | Age: 82
End: 2017-12-26

## 2017-12-26 ENCOUNTER — TELEPHONE (OUTPATIENT)
Dept: INTERNAL MEDICINE | Age: 82
End: 2017-12-26

## 2017-12-26 DIAGNOSIS — N28.89 HYPERTENSION SECONDARY TO OTHER RENAL DISORDERS: Primary | ICD-10-CM

## 2017-12-26 DIAGNOSIS — I15.1 HYPERTENSION SECONDARY TO OTHER RENAL DISORDERS: Primary | ICD-10-CM

## 2017-12-27 ENCOUNTER — TELEPHONE (OUTPATIENT)
Dept: INTERNAL MEDICINE CLINIC | Age: 82
End: 2017-12-27

## 2017-12-27 NOTE — TELEPHONE ENCOUNTER
[facility] ST eval done and requests visits 2 X week for laryngeal strengthening exercises, OMEs, diet texture analysis and pt/cg education. PT eval  done and requests visits 1w1, 3w4, 1w1 for trunk/core and LE strengthening and muscular endurance activities; bed mobility, transfer, gait, and stair/ramp training; balance activities; home safety instruction. OT eval done and requests visits 1X/week 1 week, 2X/week 4 weeks for activity tolerance, ADLs, sitting balance, transfers, functional mobility, safety and caregiver education.     Please advise if all approved

## 2017-12-27 NOTE — TELEPHONE ENCOUNTER
Pt has dobhoff now = and has hospital bed that is borrowed from a family member   Requests order for Hospital bed be sent to DME

## 2017-12-29 ENCOUNTER — TELEPHONE (OUTPATIENT)
Dept: INTERNAL MEDICINE CLINIC | Age: 82
End: 2017-12-29

## 2018-01-02 DIAGNOSIS — R53.1 WEAKNESS: Primary | ICD-10-CM

## 2018-01-02 RX ORDER — TIZANIDINE 4 MG/1
4 TABLET ORAL EVERY 8 HOURS PRN
Qty: 90 TABLET | Refills: 0 | Status: SHIPPED | OUTPATIENT
Start: 2018-01-02 | End: 2018-01-02 | Stop reason: SDUPTHER

## 2018-01-02 RX ORDER — TIZANIDINE 4 MG/1
4 TABLET ORAL EVERY 8 HOURS PRN
Qty: 90 TABLET | Refills: 0 | Status: SHIPPED | OUTPATIENT
Start: 2018-01-02 | End: 2018-01-18

## 2018-01-02 RX ORDER — OMEPRAZOLE 10 MG/1
10 CAPSULE, DELAYED RELEASE ORAL DAILY
Qty: 30 CAPSULE | Refills: 5 | Status: SHIPPED | OUTPATIENT
Start: 2018-01-02 | End: 2018-03-20 | Stop reason: SDUPTHER

## 2018-01-02 RX ORDER — OMEPRAZOLE 10 MG/1
10 CAPSULE, DELAYED RELEASE ORAL DAILY
Qty: 30 CAPSULE | Refills: 5 | Status: SHIPPED | OUTPATIENT
Start: 2018-01-02 | End: 2018-01-02 | Stop reason: SDUPTHER

## 2018-01-02 NOTE — TELEPHONE ENCOUNTER
[facility] or [employer] should be able to provide that bed.    Or At [facility] in [place] maybe

## 2018-01-03 ENCOUNTER — TELEPHONE (OUTPATIENT)
Dept: INTERNAL MEDICINE CLINIC | Age: 83
End: 2018-01-03

## 2018-01-04 RX ORDER — MIDODRINE HYDROCHLORIDE 10 MG/1
10 TABLET ORAL 3 TIMES DAILY
Qty: 90 TABLET | Refills: 0 | Status: SHIPPED | OUTPATIENT
Start: 2018-01-04 | End: 2018-01-29 | Stop reason: SDUPTHER

## 2018-01-05 ENCOUNTER — HOSPITAL ENCOUNTER (OUTPATIENT)
Dept: GENERAL RADIOLOGY | Age: 83
Discharge: HOME | End: 2018-01-05
Payer: MEDICARE

## 2018-01-05 ENCOUNTER — TELEPHONE (OUTPATIENT)
Dept: GASTROENTEROLOGY | Age: 83
End: 2018-01-05

## 2018-01-05 ENCOUNTER — OFFICE VISIT (OUTPATIENT)
Dept: UROLOGY | Age: 83
End: 2018-01-05
Payer: MEDICARE

## 2018-01-05 VITALS
HEART RATE: 67 BPM | SYSTOLIC BLOOD PRESSURE: 120 MMHG | BODY MASS INDEX: 23.86 KG/M2 | WEIGHT: 139 LBS | DIASTOLIC BLOOD PRESSURE: 40 MMHG

## 2018-01-05 DIAGNOSIS — N20.0 RENAL CALCULUS, BILATERAL: Primary | ICD-10-CM

## 2018-01-05 DIAGNOSIS — N20.0 RENAL CALCULUS, LEFT: ICD-10-CM

## 2018-01-05 DIAGNOSIS — N11.1 OBSTRUCTIVE PYELONEPHRITIS: ICD-10-CM

## 2018-01-05 DIAGNOSIS — N20.1 RIGHT URETERAL CALCULUS: ICD-10-CM

## 2018-01-05 DIAGNOSIS — N20.0 RENAL CALCULUS, RIGHT: ICD-10-CM

## 2018-01-05 PROCEDURE — G8420 CALC BMI NORM PARAMETERS: HCPCS | Performed by: UROLOGY

## 2018-01-05 PROCEDURE — 1090F PRES/ABSN URINE INCON ASSESS: CPT | Performed by: UROLOGY

## 2018-01-05 PROCEDURE — G8427 DOCREV CUR MEDS BY ELIG CLIN: HCPCS | Performed by: UROLOGY

## 2018-01-05 PROCEDURE — 74018 RADEX ABDOMEN 1 VIEW: CPT

## 2018-01-05 PROCEDURE — G8598 ASA/ANTIPLAT THER USED: HCPCS | Performed by: UROLOGY

## 2018-01-05 PROCEDURE — 4040F PNEUMOC VAC/ADMIN/RCVD: CPT | Performed by: UROLOGY

## 2018-01-05 PROCEDURE — 1123F ACP DISCUSS/DSCN MKR DOCD: CPT | Performed by: UROLOGY

## 2018-01-05 PROCEDURE — 99214 OFFICE O/P EST MOD 30 MIN: CPT | Performed by: UROLOGY

## 2018-01-05 PROCEDURE — 1036F TOBACCO NON-USER: CPT | Performed by: UROLOGY

## 2018-01-05 PROCEDURE — G8400 PT W/DXA NO RESULTS DOC: HCPCS | Performed by: UROLOGY

## 2018-01-05 PROCEDURE — G8484 FLU IMMUNIZE NO ADMIN: HCPCS | Performed by: UROLOGY

## 2018-01-05 PROCEDURE — 1111F DSCHRG MED/CURRENT MED MERGE: CPT | Performed by: UROLOGY

## 2018-01-05 RX ORDER — LEVOFLOXACIN 500 MG/1
500 TABLET, FILM COATED ORAL DAILY
Qty: 7 TABLET | Refills: 0 | Status: SHIPPED | OUTPATIENT
Start: 2018-01-22 | End: 2018-01-29

## 2018-01-05 ASSESSMENT — ENCOUNTER SYMPTOMS
DOUBLE VISION: 0
BLURRED VISION: 0
SHORTNESS OF BREATH: 1
SORE THROAT: 0
NAUSEA: 1
HEARTBURN: 0
WHEEZING: 0

## 2018-01-05 NOTE — TELEPHONE ENCOUNTER
Okay, that sounds like a good plan. Let's be sure and scan it under media so that Dr. Sarah Fuentes will have that information at the patient's OV.

## 2018-01-05 NOTE — TELEPHONE ENCOUNTER
I spoke to Salo King with [facility] , she checked with [facility] [address] [facility] and she cannot pull the Dobbhoff nor can one of their RN's but the patient will be seeing her PCP soon  and they will have her pull the Dobbhoff tube out, again Shahzad Riggins confirmed that there was no aspiration with solid food or liquid, she will fax her report to our office since [facility] documents on [employer]   eugenio

## 2018-01-05 NOTE — LETTER
[id number] [facility]  [address] [place] [address] Drive, [address] [place] [address]  Phone: [phone]  Fax: [phone]    Cam Shell MD        January 5, 2018     Maximo Munoz MD  [address] [place] [address] Dr Barrington Cole [address] [place] [address]      Patient: Ramsey Nuñez   MR Number: [id number]   YOB: 1932   Date of Visit: 1/5/2018       Dear Provider: Thank you for referring Ashlee Boys to me for evaluation. Below are the relevant portions of my assessment and plan of care. If you have questions, please do not hesitate to call me. I look forward to following James Moise along with you.     Sincerely,        Cam Shell MD

## 2018-01-05 NOTE — PROGRESS NOTES
well-developed and well-nourished. HENT:   Head: Normocephalic and atraumatic. Eyes: Conjunctivae and EOM are normal. Pupils are equal, round, and reactive to light. No scleral icterus. Neck: Normal range of motion. Neck supple. Cardiovascular: Normal rate, regular rhythm and intact distal pulses. Pulmonary/Chest: Effort normal and breath sounds normal. No respiratory distress. She exhibits no tenderness. Abdominal: Soft. She exhibits no distension and no mass. There is no tenderness. Musculoskeletal: Normal range of motion. She exhibits no edema or tenderness. Lymphadenopathy:     She has no cervical adenopathy. Neurological: She is alert and oriented to person, place, and time. Skin: Skin is warm and dry. Psychiatric: She has a normal mood and affect. Her behavior is normal.   Vitals reviewed. DATA:  CBC:   Lab Results   Component Value Date    WBC 8.0 12/22/2017    RBC 3.07 12/22/2017    HGB 9.5 12/22/2017    HCT 29.3 12/22/2017    MCV 95.4 12/22/2017    MCH 30.9 12/22/2017    MCHC 32.4 12/22/2017    RDW 14.5 12/22/2017     12/22/2017    MPV 12.5 12/22/2017     CMP:    Lab Results   Component Value Date     12/22/2017    K 4.5 12/22/2017     12/22/2017    CO2 26 12/22/2017    BUN 18 12/22/2017    CREATININE <0.5 12/22/2017    LABGLOM >60 12/22/2017    GLUCOSE 90 12/22/2017    PROT 4.9 12/15/2017    LABALBU 2.7 12/15/2017    CALCIUM 9.2 12/22/2017    BILITOT 0.7 12/15/2017    ALKPHOS 53 12/15/2017    AST 12 12/15/2017    ALT <5 12/15/2017     IMAGING:   KUB done today unfortunately failed to show any obvious stones. I went back and reviewed the CT scan done on December 11 and this shows a 7 mm stone at the right UPJ with hydronephrosis. On the left she has a large stone in the left kidney in the renal pelvis measuring 1 cm x 9 mm. This does extend into the lower pole again these were not visible on KUB today.     1. Renal calculus, bilateral  We'll plan to give her a little bit longer time to recover over the plan is taken operating room and 1st treat the stone on the right side with right ureteroscopy laser lithotripsy stone extraction and with utilization of right ureteral access sheath since she's been stented. If this goes well she'll need treatment of the left-sided stones in the same fashion however I did tell her that this may require multiple staged procedures. They understood and wished to proceed. The goals try to get her stone free    2. Obstructive pyelonephritis  Resolved with antibiotics of the daughter tells me she only has a couple more doses left I've asked her to stop this but I want her to restart this antibiotic just a couple days prior to the procedure  - levofloxacin (LEVAQUIN) 500 MG tablet; Take 1 tablet by mouth daily for 7 days  Dispense: 7 tablet; Refill: 0      No orders of the defined types were placed in this encounter. Return for PT to be scheduled for Surgery.

## 2018-01-09 ENCOUNTER — TELEPHONE (OUTPATIENT)
Dept: INTERNAL MEDICINE CLINIC | Age: 83
End: 2018-01-09

## 2018-01-10 ENCOUNTER — TELEPHONE (OUTPATIENT)
Dept: INTERNAL MEDICINE | Age: 83
End: 2018-01-10

## 2018-01-15 ENCOUNTER — HOSPITAL ENCOUNTER (OUTPATIENT)
Dept: PREADMISSION TESTING | Age: 83
Discharge: HOME | End: 2018-01-15

## 2018-01-18 ENCOUNTER — HOSPITAL ENCOUNTER (OUTPATIENT)
Dept: PREADMISSION TESTING | Age: 83
Discharge: HOME | End: 2018-01-18
Payer: MEDICARE

## 2018-01-18 ENCOUNTER — HOSPITAL ENCOUNTER (OUTPATIENT)
Dept: GENERAL RADIOLOGY | Age: 83
Discharge: HOME | End: 2018-01-18
Payer: MEDICARE

## 2018-01-18 VITALS — WEIGHT: 130 LBS | HEIGHT: 64 IN | BODY MASS INDEX: 22.2 KG/M2

## 2018-01-18 LAB
ANION GAP SERPL CALCULATED.3IONS-SCNC: 15 MMOL/L (ref 7–19)
APTT: 48.6 SEC (ref 26–36.2)
BASOPHILS ABSOLUTE: 0 K/UL (ref 0–0.2)
BASOPHILS RELATIVE PERCENT: 0.4 % (ref 0–1)
BUN BLDV-MCNC: 19 MG/DL (ref 8–23)
CALCIUM SERPL-MCNC: 9.5 MG/DL (ref 8.8–10.2)
CHLORIDE BLD-SCNC: 101 MMOL/L (ref 98–111)
CO2: 24 MMOL/L (ref 22–29)
CREAT SERPL-MCNC: 0.8 MG/DL (ref 0.5–0.9)
EOSINOPHILS ABSOLUTE: 0.2 K/UL (ref 0–0.6)
EOSINOPHILS RELATIVE PERCENT: 3.2 % (ref 0–5)
GFR NON-AFRICAN AMERICAN: >60
GLUCOSE BLD-MCNC: 76 MG/DL (ref 74–109)
HCT VFR BLD CALC: 36.1 % (ref 37–47)
HEMOGLOBIN: 11.3 G/DL (ref 12–16)
INR BLD: 2.71 (ref 0.88–1.18)
LYMPHOCYTES ABSOLUTE: 1.2 K/UL (ref 1.1–4.5)
LYMPHOCYTES RELATIVE PERCENT: 23.7 % (ref 20–40)
MCH RBC QN AUTO: 32.8 PG (ref 27–31)
MCHC RBC AUTO-ENTMCNC: 31.3 G/DL (ref 33–37)
MCV RBC AUTO: 104.9 FL (ref 81–99)
MONOCYTES ABSOLUTE: 0.4 K/UL (ref 0–0.9)
MONOCYTES RELATIVE PERCENT: 7.4 % (ref 0–10)
NEUTROPHILS ABSOLUTE: 3.4 K/UL (ref 1.5–7.5)
NEUTROPHILS RELATIVE PERCENT: 65.1 % (ref 50–65)
PDW BLD-RTO: 15.7 % (ref 11.5–14.5)
PLATELET # BLD: 496 K/UL (ref 130–400)
PMV BLD AUTO: 10.6 FL (ref 9.4–12.3)
POTASSIUM SERPL-SCNC: 3.7 MMOL/L (ref 3.5–5)
PROTHROMBIN TIME: 29.1 SEC (ref 12–14.6)
RBC # BLD: 3.44 M/UL (ref 4.2–5.4)
SODIUM BLD-SCNC: 140 MMOL/L (ref 136–145)
WBC # BLD: 5.2 K/UL (ref 4.8–10.8)

## 2018-01-18 PROCEDURE — 85025 COMPLETE CBC W/AUTO DIFF WBC: CPT

## 2018-01-18 PROCEDURE — 85730 THROMBOPLASTIN TIME PARTIAL: CPT

## 2018-01-18 PROCEDURE — 80048 BASIC METABOLIC PNL TOTAL CA: CPT

## 2018-01-18 PROCEDURE — 85610 PROTHROMBIN TIME: CPT

## 2018-01-18 PROCEDURE — 93005 ELECTROCARDIOGRAM TRACING: CPT

## 2018-01-18 PROCEDURE — 71046 X-RAY EXAM CHEST 2 VIEWS: CPT

## 2018-01-19 LAB
EKG P AXIS: -2 DEGREES
EKG P-R INTERVAL: 164 MS
EKG Q-T INTERVAL: 532 MS
EKG QRS DURATION: 138 MS
EKG QTC CALCULATION (BAZETT): 497 MS
EKG T AXIS: 55 DEGREES

## 2018-01-22 ENCOUNTER — TELEPHONE (OUTPATIENT)
Dept: INTERNAL MEDICINE CLINIC | Age: 83
End: 2018-01-22

## 2018-01-22 NOTE — TELEPHONE ENCOUNTER
[facility] OT requesting to continue visits 2X/week 3 weeks for AM ADL routine   Patient has made significant progress since her last hospitalization. She was only able to sit edge of bed for less than one minute at last reassessment and is now able to transfer into shower and complete bathing and dressing with min A.

## 2018-01-22 NOTE — TELEPHONE ENCOUNTER
Also ST was not able to go last week due to snow - ST would like to extend orders another week as well

## 2018-01-23 ENCOUNTER — TELEPHONE (OUTPATIENT)
Dept: INTERNAL MEDICINE CLINIC | Age: 83
End: 2018-01-23

## 2018-01-23 ENCOUNTER — ANESTHESIA EVENT (OUTPATIENT)
Dept: OPERATING ROOM | Age: 83
End: 2018-01-23
Payer: MEDICARE

## 2018-01-24 ENCOUNTER — ANESTHESIA (OUTPATIENT)
Dept: OPERATING ROOM | Age: 83
End: 2018-01-24
Payer: MEDICARE

## 2018-01-24 ENCOUNTER — APPOINTMENT (OUTPATIENT)
Dept: GENERAL RADIOLOGY | Age: 83
End: 2018-01-24
Attending: UROLOGY
Payer: MEDICARE

## 2018-01-24 ENCOUNTER — HOSPITAL ENCOUNTER (OUTPATIENT)
Age: 83
Setting detail: OUTPATIENT SURGERY
Discharge: HOME | End: 2018-01-24
Attending: UROLOGY | Admitting: UROLOGY
Payer: MEDICARE

## 2018-01-24 VITALS
OXYGEN SATURATION: 96 % | WEIGHT: 130 LBS | RESPIRATION RATE: 18 BRPM | TEMPERATURE: 97.1 F | HEIGHT: 64 IN | DIASTOLIC BLOOD PRESSURE: 75 MMHG | HEART RATE: 70 BPM | BODY MASS INDEX: 22.2 KG/M2 | SYSTOLIC BLOOD PRESSURE: 122 MMHG

## 2018-01-24 VITALS
RESPIRATION RATE: 8 BRPM | OXYGEN SATURATION: 100 % | SYSTOLIC BLOOD PRESSURE: 115 MMHG | DIASTOLIC BLOOD PRESSURE: 71 MMHG

## 2018-01-24 DIAGNOSIS — N20.0 RENAL CALCULUS, RIGHT: ICD-10-CM

## 2018-01-24 DIAGNOSIS — N20.0 RENAL CALCULUS, LEFT: Primary | ICD-10-CM

## 2018-01-24 LAB
APTT: 30.1 SEC (ref 26–36.2)
INR BLD: 1.09 (ref 0.88–1.18)
PROTHROMBIN TIME: 14 SEC (ref 12–14.6)

## 2018-01-24 PROCEDURE — 6360000002 HC RX W HCPCS: Performed by: UROLOGY

## 2018-01-24 PROCEDURE — 2580000003 HC RX 258: Performed by: UROLOGY

## 2018-01-24 PROCEDURE — 36415 COLL VENOUS BLD VENIPUNCTURE: CPT

## 2018-01-24 PROCEDURE — 85610 PROTHROMBIN TIME: CPT

## 2018-01-24 PROCEDURE — C1769 GUIDE WIRE: HCPCS | Performed by: UROLOGY

## 2018-01-24 PROCEDURE — 52356 CYSTO/URETERO W/LITHOTRIPSY: CPT | Performed by: UROLOGY

## 2018-01-24 PROCEDURE — 3700000000 HC ANESTHESIA ATTENDED CARE: Performed by: UROLOGY

## 2018-01-24 PROCEDURE — 2720000000 HC MISC SURG SUPPLY STERILE $0-50: Performed by: UROLOGY

## 2018-01-24 PROCEDURE — 3600000014 HC SURGERY LEVEL 4 ADDTL 15MIN: Performed by: UROLOGY

## 2018-01-24 PROCEDURE — C2617 STENT, NON-COR, TEM W/O DEL: HCPCS | Performed by: UROLOGY

## 2018-01-24 PROCEDURE — 85730 THROMBOPLASTIN TIME PARTIAL: CPT

## 2018-01-24 PROCEDURE — 2720000010 HC SURG SUPPLY STERILE: Performed by: UROLOGY

## 2018-01-24 PROCEDURE — 6370000000 HC RX 637 (ALT 250 FOR IP): Performed by: UROLOGY

## 2018-01-24 PROCEDURE — 88300 SURGICAL PATH GROSS: CPT

## 2018-01-24 PROCEDURE — 7100000010 HC PHASE II RECOVERY - FIRST 15 MIN: Performed by: UROLOGY

## 2018-01-24 PROCEDURE — 7100000001 HC PACU RECOVERY - ADDTL 15 MIN: Performed by: UROLOGY

## 2018-01-24 PROCEDURE — 99999 PR OFFICE/OUTPT VISIT,PROCEDURE ONLY: CPT | Performed by: UROLOGY

## 2018-01-24 PROCEDURE — 82360 CALCULUS ASSAY QUANT: CPT

## 2018-01-24 PROCEDURE — 7100000011 HC PHASE II RECOVERY - ADDTL 15 MIN: Performed by: UROLOGY

## 2018-01-24 PROCEDURE — 3209999900 FLUORO FOR SURGICAL PROCEDURES

## 2018-01-24 PROCEDURE — 3700000001 HC ADD 15 MINUTES (ANESTHESIA): Performed by: UROLOGY

## 2018-01-24 PROCEDURE — 7100000000 HC PACU RECOVERY - FIRST 15 MIN: Performed by: UROLOGY

## 2018-01-24 PROCEDURE — C1773 RET DEV, INSERTABLE: HCPCS | Performed by: UROLOGY

## 2018-01-24 PROCEDURE — 3600000004 HC SURGERY LEVEL 4 BASE: Performed by: UROLOGY

## 2018-01-24 PROCEDURE — 6360000002 HC RX W HCPCS: Performed by: NURSE ANESTHETIST, CERTIFIED REGISTERED

## 2018-01-24 PROCEDURE — 2500000003 HC RX 250 WO HCPCS: Performed by: NURSE ANESTHETIST, CERTIFIED REGISTERED

## 2018-01-24 PROCEDURE — C1726 CATH, BAL DIL, NON-VASCULAR: HCPCS | Performed by: UROLOGY

## 2018-01-24 PROCEDURE — C1894 INTRO/SHEATH, NON-LASER: HCPCS | Performed by: UROLOGY

## 2018-01-24 DEVICE — URETERAL STENT
Type: IMPLANTABLE DEVICE | Site: URETER | Status: FUNCTIONAL
Brand: POLARIS™ ULTRA

## 2018-01-24 RX ORDER — HYDROCODONE BITARTRATE AND ACETAMINOPHEN 5; 325 MG/1; MG/1
1 TABLET ORAL EVERY 6 HOURS PRN
Qty: 20 TABLET | Refills: 0 | Status: SHIPPED | OUTPATIENT
Start: 2018-01-24 | End: 2018-01-29

## 2018-01-24 RX ORDER — HYDROMORPHONE HCL 110MG/55ML
0.5 PATIENT CONTROLLED ANALGESIA SYRINGE INTRAVENOUS EVERY 5 MIN PRN
Status: DISCONTINUED | OUTPATIENT
Start: 2018-01-24 | End: 2018-01-24 | Stop reason: HOSPADM

## 2018-01-24 RX ORDER — ENALAPRILAT 2.5 MG/2ML
1.25 INJECTION INTRAVENOUS
Status: DISCONTINUED | OUTPATIENT
Start: 2018-01-24 | End: 2018-01-24 | Stop reason: HOSPADM

## 2018-01-24 RX ORDER — EPHEDRINE SULFATE 50 MG/ML
INJECTION, SOLUTION INTRAVENOUS PRN
Status: DISCONTINUED | OUTPATIENT
Start: 2018-01-24 | End: 2018-01-24 | Stop reason: SDUPTHER

## 2018-01-24 RX ORDER — PROPOFOL 10 MG/ML
INJECTION, EMULSION INTRAVENOUS PRN
Status: DISCONTINUED | OUTPATIENT
Start: 2018-01-24 | End: 2018-01-24 | Stop reason: SDUPTHER

## 2018-01-24 RX ORDER — SODIUM CHLORIDE 9 MG/ML
INJECTION, SOLUTION INTRAVENOUS CONTINUOUS
Status: DISCONTINUED | OUTPATIENT
Start: 2018-01-24 | End: 2018-01-24 | Stop reason: HOSPADM

## 2018-01-24 RX ORDER — MEPERIDINE HYDROCHLORIDE 50 MG/ML
12.5 INJECTION INTRAMUSCULAR; INTRAVENOUS; SUBCUTANEOUS EVERY 5 MIN PRN
Status: DISCONTINUED | OUTPATIENT
Start: 2018-01-24 | End: 2018-01-24 | Stop reason: HOSPADM

## 2018-01-24 RX ORDER — ATROPA BELLADONNA AND OPIUM 16.2; 6 MG/1; MG/1
SUPPOSITORY RECTAL PRN
Status: DISCONTINUED | OUTPATIENT
Start: 2018-01-24 | End: 2018-01-24 | Stop reason: HOSPADM

## 2018-01-24 RX ORDER — LABETALOL HYDROCHLORIDE 5 MG/ML
5 INJECTION, SOLUTION INTRAVENOUS EVERY 10 MIN PRN
Status: DISCONTINUED | OUTPATIENT
Start: 2018-01-24 | End: 2018-01-24 | Stop reason: HOSPADM

## 2018-01-24 RX ORDER — HYDROMORPHONE HCL 110MG/55ML
0.25 PATIENT CONTROLLED ANALGESIA SYRINGE INTRAVENOUS EVERY 5 MIN PRN
Status: DISCONTINUED | OUTPATIENT
Start: 2018-01-24 | End: 2018-01-24 | Stop reason: HOSPADM

## 2018-01-24 RX ORDER — MORPHINE SULFATE 4 MG/ML
2 INJECTION, SOLUTION INTRAMUSCULAR; INTRAVENOUS EVERY 5 MIN PRN
Status: DISCONTINUED | OUTPATIENT
Start: 2018-01-24 | End: 2018-01-24 | Stop reason: HOSPADM

## 2018-01-24 RX ORDER — DIPHENHYDRAMINE HYDROCHLORIDE 50 MG/ML
12.5 INJECTION INTRAMUSCULAR; INTRAVENOUS
Status: DISCONTINUED | OUTPATIENT
Start: 2018-01-24 | End: 2018-01-24 | Stop reason: HOSPADM

## 2018-01-24 RX ORDER — SODIUM CHLORIDE, SODIUM LACTATE, POTASSIUM CHLORIDE, CALCIUM CHLORIDE 600; 310; 30; 20 MG/100ML; MG/100ML; MG/100ML; MG/100ML
INJECTION, SOLUTION INTRAVENOUS CONTINUOUS
Status: DISCONTINUED | OUTPATIENT
Start: 2018-01-24 | End: 2018-01-24 | Stop reason: ALTCHOICE

## 2018-01-24 RX ORDER — ROCURONIUM BROMIDE 10 MG/ML
INJECTION, SOLUTION INTRAVENOUS PRN
Status: DISCONTINUED | OUTPATIENT
Start: 2018-01-24 | End: 2018-01-24 | Stop reason: SDUPTHER

## 2018-01-24 RX ORDER — METOCLOPRAMIDE HYDROCHLORIDE 5 MG/ML
10 INJECTION INTRAMUSCULAR; INTRAVENOUS
Status: DISCONTINUED | OUTPATIENT
Start: 2018-01-24 | End: 2018-01-24 | Stop reason: HOSPADM

## 2018-01-24 RX ORDER — FENTANYL CITRATE 50 UG/ML
INJECTION, SOLUTION INTRAMUSCULAR; INTRAVENOUS PRN
Status: DISCONTINUED | OUTPATIENT
Start: 2018-01-24 | End: 2018-01-24 | Stop reason: SDUPTHER

## 2018-01-24 RX ORDER — PROMETHAZINE HYDROCHLORIDE 25 MG/ML
6.25 INJECTION, SOLUTION INTRAMUSCULAR; INTRAVENOUS
Status: DISCONTINUED | OUTPATIENT
Start: 2018-01-24 | End: 2018-01-24 | Stop reason: HOSPADM

## 2018-01-24 RX ORDER — MORPHINE SULFATE 4 MG/ML
4 INJECTION, SOLUTION INTRAMUSCULAR; INTRAVENOUS EVERY 5 MIN PRN
Status: DISCONTINUED | OUTPATIENT
Start: 2018-01-24 | End: 2018-01-24 | Stop reason: HOSPADM

## 2018-01-24 RX ORDER — PHENAZOPYRIDINE HYDROCHLORIDE 100 MG/1
100 TABLET, FILM COATED ORAL ONCE
Status: COMPLETED | OUTPATIENT
Start: 2018-01-24 | End: 2018-01-24

## 2018-01-24 RX ORDER — HYDRALAZINE HYDROCHLORIDE 20 MG/ML
5 INJECTION INTRAMUSCULAR; INTRAVENOUS EVERY 10 MIN PRN
Status: DISCONTINUED | OUTPATIENT
Start: 2018-01-24 | End: 2018-01-24 | Stop reason: HOSPADM

## 2018-01-24 RX ORDER — GLYCOPYRROLATE 0.2 MG/ML
INJECTION INTRAMUSCULAR; INTRAVENOUS PRN
Status: DISCONTINUED | OUTPATIENT
Start: 2018-01-24 | End: 2018-01-24 | Stop reason: SDUPTHER

## 2018-01-24 RX ORDER — OXYCODONE HYDROCHLORIDE AND ACETAMINOPHEN 5; 325 MG/1; MG/1
2 TABLET ORAL EVERY 4 HOURS PRN
Status: DISCONTINUED | OUTPATIENT
Start: 2018-01-24 | End: 2018-01-24 | Stop reason: HOSPADM

## 2018-01-24 RX ORDER — ONDANSETRON 2 MG/ML
4 INJECTION INTRAMUSCULAR; INTRAVENOUS EVERY 4 HOURS PRN
Status: DISCONTINUED | OUTPATIENT
Start: 2018-01-24 | End: 2018-01-24 | Stop reason: HOSPADM

## 2018-01-24 RX ORDER — LIDOCAINE HYDROCHLORIDE 10 MG/ML
INJECTION, SOLUTION INFILTRATION; PERINEURAL PRN
Status: DISCONTINUED | OUTPATIENT
Start: 2018-01-24 | End: 2018-01-24 | Stop reason: SDUPTHER

## 2018-01-24 RX ORDER — MORPHINE SULFATE 4 MG/ML
2 INJECTION, SOLUTION INTRAMUSCULAR; INTRAVENOUS EVERY 4 HOURS PRN
Status: DISCONTINUED | OUTPATIENT
Start: 2018-01-24 | End: 2018-01-24 | Stop reason: HOSPADM

## 2018-01-24 RX ADMIN — EPHEDRINE SULFATE 15 MG: 50 INJECTION, SOLUTION INTRAMUSCULAR; INTRAVENOUS; SUBCUTANEOUS at 08:36

## 2018-01-24 RX ADMIN — ROCURONIUM BROMIDE 20 MG: 10 INJECTION INTRAVENOUS at 08:41

## 2018-01-24 RX ADMIN — PROPOFOL 20 MG: 10 INJECTION, EMULSION INTRAVENOUS at 08:41

## 2018-01-24 RX ADMIN — EPHEDRINE SULFATE 10 MG: 50 INJECTION, SOLUTION INTRAMUSCULAR; INTRAVENOUS; SUBCUTANEOUS at 08:57

## 2018-01-24 RX ADMIN — SODIUM CHLORIDE, SODIUM LACTATE, POTASSIUM CHLORIDE, AND CALCIUM CHLORIDE: 600; 310; 30; 20 INJECTION, SOLUTION INTRAVENOUS at 07:10

## 2018-01-24 RX ADMIN — EPHEDRINE SULFATE 10 MG: 50 INJECTION, SOLUTION INTRAMUSCULAR; INTRAVENOUS; SUBCUTANEOUS at 08:14

## 2018-01-24 RX ADMIN — EPHEDRINE SULFATE 10 MG: 50 INJECTION, SOLUTION INTRAMUSCULAR; INTRAVENOUS; SUBCUTANEOUS at 08:17

## 2018-01-24 RX ADMIN — GLYCOPYRROLATE 0.2 MG: 0.2 INJECTION, SOLUTION INTRAMUSCULAR; INTRAVENOUS at 08:37

## 2018-01-24 RX ADMIN — EPHEDRINE SULFATE 10 MG: 50 INJECTION, SOLUTION INTRAMUSCULAR; INTRAVENOUS; SUBCUTANEOUS at 09:24

## 2018-01-24 RX ADMIN — FENTANYL CITRATE 10 MCG: 50 INJECTION, SOLUTION INTRAMUSCULAR; INTRAVENOUS at 08:42

## 2018-01-24 RX ADMIN — PHENAZOPYRIDINE HYDROCHLORIDE 100 MG: 100 TABLET ORAL at 11:33

## 2018-01-24 RX ADMIN — PROPOFOL 60 MG: 10 INJECTION, EMULSION INTRAVENOUS at 08:09

## 2018-01-24 RX ADMIN — SUGAMMADEX 118 MG: 100 INJECTION, SOLUTION INTRAVENOUS at 10:17

## 2018-01-24 RX ADMIN — LIDOCAINE HYDROCHLORIDE 50 MG: 10 INJECTION, SOLUTION INFILTRATION; PERINEURAL at 08:09

## 2018-01-24 RX ADMIN — ROCURONIUM BROMIDE 30 MG: 10 INJECTION INTRAVENOUS at 08:10

## 2018-01-24 RX ADMIN — ROCURONIUM BROMIDE 10 MG: 10 INJECTION INTRAVENOUS at 09:50

## 2018-01-24 RX ADMIN — MEROPENEM 1 G: 1 INJECTION, POWDER, FOR SOLUTION INTRAVENOUS at 07:55

## 2018-01-24 RX ADMIN — GLYCOPYRROLATE 0.2 MG: 0.2 INJECTION, SOLUTION INTRAMUSCULAR; INTRAVENOUS at 09:24

## 2018-01-24 ASSESSMENT — COPD QUESTIONNAIRES: CAT_SEVERITY: MILD

## 2018-01-24 ASSESSMENT — PAIN SCALES - GENERAL: PAINLEVEL_OUTOF10: 0

## 2018-01-24 NOTE — OP NOTE
[facility] [employer] [facility] [address], [address] [facility]                                 OPERATIVE REPORT    PATIENT NAME: Tali Bauer                :        1932  MED REC NO:   [id number]                              ROOM:  ACCOUNT NO:   [de-identified]                           ADMIT DATE: 2018  PROVIDER:     Oscar Peng MD    DATE OF PROCEDURE:  2018    TITLE OF OPERATION:  1. Right ureteroscopy, laser lithotripsy and placement of a right 6-Hebrew  x 22 cm double-J ureteral stent. 2.  Left ureteroscopy, laser lithotripsy, stone basket extraction and  placement of a left 5-Hebrew x 22 cm double-J ureteral stent. PREOPERATIVE DIAGNOSES:  1. Right renal calculus, a distinct separate stone. 2.  Left renal calculi, distinct separate stones. POSTOPERATIVE DIAGNOSES:  1. Right renal calculus, a distinct separate stone. 2.  Left renal calculi, distinct separate stones. ANESTHESIA:  General anesthetic. ATTENDING SURGEON:  Oscar Peng MD    HISTORY:  The patient is an 80-year-old female who has bilateral renal  calculi. She also has had a recent episode of sepsis requiring a long  course of antibiotics. She had bilateral ureteral obstructing  pyelonephritis, which was managed with bilateral ureteral stents placement. She now has completed her antibiotic course, is doing much better with some  rehab and some nutritional supplementation with tube feeds, which are now  off and she is doing much better. Now, she is ready to undergo definitive  treatment of her stones. She actually bilateral stones. She has a stone in the right UPJ and there  is also least 2 to 3 larger stones in the left kidney and a left ureteral  calculus.   Therefore, the game plan is to go up and treat the right stone  and provided we can treat this stone efficiently, we may then go ahead and  proceed to treat the separate distinct stones on the left as well.  In  addition, I did tell her if we could not treat her left stones today, we  would do this in a staged fashion. She understood and agreed to proceed. DESCRIPTION OF PROCEDURE:  The patient was brought to the operating room,  underwent general anesthetic. She was placed in lithotomy position. Her  genitalia was prepped and draped in routine sterile fashion. She did  receive antibiotic Merrem as her operative antibiotic. Appropriate  time-out was performed. The 22-[place] cystoscope was inserted in the meatus. _____ drain the  patient's bladder, the urine was sort of blood tinged. The bladder was  inspected and there was just some mild edema from the stents but otherwise  no real abnormality seen. Unfortunately, the stents were actually  interlocked, the coils in the bladder were actually interlocked so there is  no way I could remove the stent without going through the coil of the other  one or potentially pulling out the right or left stent while trying to pull  out the other. So at this point, I tried to place a call Sensor tip  guidewire alongside the right ureteral stent. I could not get it to go  past, so I just basically went ahead and pulled the right stent completely  out because again it was going through the coil of the left and that means  all my instrumentation would have to go through the coil of the left stent. So I just went ahead just pulled the right stent all the way out. I then  attempted to pass the 0.035 Sensor tip guidewire through the orifice and I  could not get past the UVJ, there was some sort of almost like a J hooking  there. Eventually with some manipulation with a 5-[place] catheter, I  injected some contrast to see the course of the ureter manipulating, I  finally eventually got the guidewire to go up.   Once I did this, I went  ahead and elected to dilate the distal ureter with a 5 mm balloon dilator  in the distal ureter including the ureteral orifice and this

## 2018-01-24 NOTE — ANESTHESIA POSTPROCEDURE EVALUATION
Department of Anesthesiology  Postprocedure Note    Patient: Dieter Lazaro  MRN: [id number]  YOB: 1932  Date of evaluation: 1/24/2018  Time:  10:41 AM     Procedure Summary     Date:  01/24/18 Room / Location:  [facility] / [facility] OR    Anesthesia Start:  0800 Anesthesia Stop:      Procedures:       CYSTOSCOPY BILATERAL URETEROSCOPIES WITH STENT REMOVAL AND RETROGRADE PYELOGRAMS WITH BILATERAL STONE MANIPULATION AND RETRIEVAL  PLACEMENT OF DOUBLE J URETERAL STENTS BILATERAL (Bilateral )      LITHOTRIPSY LASER (Left ) Diagnosis:  (STONE)    Surgeon:  Ilan Fajardo MD Responsible Provider:  Leroy Garcia CRNA    Anesthesia Type:  general ASA Status:  3          Anesthesia Type: general    Lisa Phase I:      Lisa Phase II:      Last vitals: Reviewed and per EMR flowsheets.        Anesthesia Post Evaluation    Patient location during evaluation: PACU  Patient participation: complete - patient participated  Level of consciousness: awake and alert  Pain score: 0  Airway patency: patent  Nausea & Vomiting: no nausea and no vomiting  Complications: no  Cardiovascular status: hemodynamically stable and blood pressure returned to baseline  Respiratory status: acceptable and nasal cannula  Hydration status: stable

## 2018-01-24 NOTE — BRIEF OP NOTE
Urology Brief Op Note    Patient Name: Dieter Lazaro    : 1932    MRN: [id number]    Pre-operative Diagnosis: 1. Right renal calculus, distinct separate stone. 2. Left renal calculi multiple distinct separate stones  Post-operative Diagnosis: Same     Procedure: Procedure(s):  1. Right ureteroscopy laser lithotripsy, stone basket extraction, and placement of a 6-German by [address] right double-J ureteral stent. Distinct separate procedure for distinct separate stones     2. Left ureteroscopy laser lithotripsy and stone basket extraction and placement of a 5-German by 22 cm right double-J ureteral stent, distinct separate procedure for distinct separate stones multiple stones treated on the left    Anesthesia: General    Surgeon: Ilan Fajardo MD    Assistants: Scrub Person First: Wilder Solomon    Estimated Blood Loss: * No values recorded between 2018  8:00 AM and 2018 14:78 AM *    Complications: none    Findings: Right renal stone appeared to be encrusted on the stent is removed was removed with the stent debris washed out no large stones seen on the right. Multiple stones on the left including stones in the upper pole was removed with basket and a larger stone in the left upper to mid pole completely fragmented with the laser and larger stone in the lower pole of the left kidney also fragmented completely with the laser. Specimens:    ID Type Source Tests Collected by Time Destination   1 : LEFT RENAL STONE Stone (Calculus) Kidney STONE ANALYSIS Ilan Fajardo MD 2018 1010        Disposition/Plan: To PACU in stable condition. Then to [facility] out patient. in 1 week for bilateral stent removal patient has strings attached. Left stent string is long right stent string is short.  CT scan for stone protocol and approximately 6-8 weeks    Ilan Fajardo MD  2018  11:19 AM

## 2018-01-24 NOTE — H&P
Ken Guerrero MD   Urology   Expand All Collapse All    []Hide copied text  []Zackver for attribution information  Jessenia Snell is a 80 y.o. female who presents today        Chief Complaint   Patient presents with    Follow-up       I'm here for a f/u with KUB      Patient is here with a chief complaint I'm here to follow the lung kidney infection in my kidney stones. Patient is 49-year-old lady who recently discharged from the hospital on December 22. She had obstructing pyelonephritis involving the right kidney. She had a right UPJ calculus. She also had known stone in the left kidney. She was taken operating room and underwent bilateral ureteral stent placement she developed sepsis after the procedure secondary to obstructing pyelonephritis. She was treated with IV Merrem in the hospital eventually this was switched over to oral fluoroquinolone, Levaquin based on the sensitivities which she now has completed at least another 2 weeks of this. She comes in today for follow-up she's feeling well he's not having any other fever. She is occasionally having some blood in her urine she does take Xarelto for A. fib.     Her hospital course was complicated by dysphagia requiring her to go home on a Dobbhoff tube and tube feedings. She is been evaluated by home health speech therapy and I'm not sure they have talked to Dr. Kiley Richardson about whether to okay to remove the Dobbhoff tube. She is gaining strength.      She comes in today to discuss further management regarding her stones. She did have a KUB. He currently is denying any flank pain. No fever.  Again she is having some hematuria probably related to the stents           Past Medical History        Past Medical History:   Diagnosis Date    A-fib ([address]      Anemia      CAD (coronary artery disease)      Conjunctivitis      COPD (chronic obstructive pulmonary disease) (HCC)      Depression      Fatigue      Kidney stone      Leukopenia      Low back pain      Mild CAD 5/15/2017    Osteoporosis      Paroxysmal a-fib [facility])      Sinus pause       10/2014    Urinary tract infection      Weakness              Past Surgical History         Past Surgical History:   Procedure Laterality Date    BACK SURGERY        CARDIAC CATHETERIZATION   [facility]14  [facility]     Mild, non-occlusive CAD, EF 60%    CYSTOSCOPY Left 8/31/2017     CYSTOSCOPY; LEFT URETEROSCOPY; LEFT URETERAL LASER LITHOTRIPSY AND STONE EXTRACTION; INSERTION LEFT URETERAL DOUBLE J STENT performed by Yue Sanders MD at [facility] Bilateral 12/14/2017     CYSTOSCOPY STENT INSERTION performed by Yue Sanders MD at [place] [id number] Right 10/11/2017     FEMUR IM NAIL MICHELLE INSERTION performed by Ethridge Paget, DO at [address]        HYSTERECTOMY        KNEE ARTHROPLASTY         x 2    LAMINECTOMY         2 lumbar segments    SHOULDER ARTHROPLASTY                Current Facility-Administered Medications          Current Outpatient Prescriptions   Medication Sig Dispense Refill    [START ON 1/22/2018] levofloxacin (LEVAQUIN) 500 MG tablet Take 1 tablet by mouth daily for 7 days 7 tablet 0    midodrine (PROAMATINE) 10 MG tablet Take 1 tablet by mouth 3 times daily 90 tablet 0    tiZANidine (ZANAFLEX) 4 MG tablet Take 1 tablet by mouth every 8 hours as needed (pain) 90 tablet 0    omeprazole (PRILOSEC) 10 MG delayed release capsule Take 1 capsule by mouth daily 30 capsule 5    furosemide (LASIX) 20 MG tablet Take 1 tablet by mouth daily 60 tablet 3    ciprofloxacin (CIPRO) 500 MG tablet Take 1 tablet by mouth every 12 hours for 14 days 28 tablet 0    potassium chloride 20 MEQ/15ML (10%) oral solution 15 mLs by Per NG tube route daily 450 mL 3    HYDROcodone-acetaminophen (NORCO) 7.5-325 MG per tablet Take 1 tablet by mouth every 6 hours as needed for Pain  .        ondansetron (ZOFRAN) 4 MG tablet Take 1 tablet by mouth every 8 hours as needed for Nausea or Vomiting 30 tablet 0    propafenone (RYTHMOL) 225 MG tablet TAKE ONE TABLET BY MOUTH EVERY EIGHT HOURS 270 tablet 2    levothyroxine (SYNTHROID) 75 MCG tablet TAKE ONE TABLET BY MOUTH DAILY AS DIRECTED (Patient taking differently: Take 75 mcg by mouth daily TAKE ONE TABLET BY MOUTH DAILY AS DIRECTED) 30 tablet 5    XARELTO 20 MG TABS tablet TAKE 1 TABLET DAILY WITH BREAKFAST 90 tablet 3    meclizine (ANTIVERT) 12.5 MG tablet Take 25 mg by mouth as needed Take 2 tablets (25 mg) as needed        Calcium Carbonate-Vitamin D (CALTRATE 600+D PO) Take 600 mg by mouth 2 times daily.          No current facility-administered medications for this visit.                  Allergies   Allergen Reactions    Nitrofuran Derivatives Swelling    Sulfa Antibiotics      Pcn [Penicillins] Rash         Social History   Social History            Social History    Marital status:        Spouse name: N/A    Number of children: N/A    Years of education: N/A           Social History Main Topics    Smoking status: Never Smoker    Smokeless tobacco: Never Used    Alcohol use No    Drug use: No    Sexual activity: Yes           Other Topics Concern    None          Social History Narrative    None            Family History          Family History   Problem Relation Age of Onset    Cancer Father         lung    Stroke Mother         mini    Hypotension Mother              REVIEW OF SYSTEMS:  Review of Systems   Constitutional: Negative for chills and fever. HENT: Negative for congestion and sore throat. Eyes: Negative for blurred vision and double vision. Respiratory: Positive for shortness of breath. Negative for wheezing. Cardiovascular: Negative for chest pain and palpitations. Gastrointestinal: Positive for nausea. Negative for heartburn. Genitourinary: Positive for hematuria. Negative for dysuria, flank pain, frequency and urgency.    Musculoskeletal: Negative for falls and neck    IMAGING:   KUB done today unfortunately failed to show any obvious stones. I went back and reviewed the CT scan done on December 11 and this shows a 7 mm stone at the right UPJ with hydronephrosis. On the left she has a large stone in the left kidney in the renal pelvis measuring 1 cm x 9 mm. This does extend into the lower pole again these were not visible on KUB today.     1. Renal calculus, bilateral  We'll plan to give her a little bit longer time to recover over the plan is taken operating room and 1st treat the stone on the right side with right ureteroscopy laser lithotripsy stone extraction and with utilization of right ureteral access sheath since she's been stented. If this goes well she'll need treatment of the left-sided stones in the same fashion however I did tell her that this may require multiple staged procedures. They understood and wished to proceed. The goals try to get her stone free     2. Obstructive pyelonephritis  Resolved with antibiotics of the daughter tells me she only has a couple more doses left I've asked her to stop this but I want her to restart this antibiotic just a couple days prior to the procedure  - levofloxacin (LEVAQUIN) 500 MG tablet; Take 1 tablet by mouth daily for 7 days  Dispense: 7 tablet; Refill: 0        No orders of the defined types were placed in this encounter.        Return for PT to be scheduled for Surgery. Office Visit on 1/5/2018            Revision History            Detailed Report           Note shared with patient   Progress Notes Info     Author Note Status Last Update User   Baron Aguilar MD Signed Baron Aguilar MD   Last Update Date/Time: 1/5/2018  5:24 PM   Chart Review Routing History     Routing history could not be found for this note. This is because the note has never been routed or because communication record creation was suppressed.

## 2018-01-24 NOTE — PROGRESS NOTES
Pt incontinent. Family and pt instructed on all follow ups, Pt encouraged to drink extra fluids. Pt was weaned off of O2 @ 2L. Pt oxygen on discharge 91%.

## 2018-01-25 ENCOUNTER — TELEPHONE (OUTPATIENT)
Dept: UROLOGY | Age: 83
End: 2018-01-25

## 2018-01-25 ENCOUNTER — TELEPHONE (OUTPATIENT)
Dept: INTERNAL MEDICINE | Age: 83
End: 2018-01-25

## 2018-01-25 NOTE — TELEPHONE ENCOUNTER
Patient called requesting Trevon Ratliff as she has been vomiting from surgery the day prior. I spoke to dr Ariadna Mijares. He ok'd zofran ODT 4mg 1 po q4 prn #10 x 1 refill. Called into [facility] pharmacy and notified patient.

## 2018-01-25 NOTE — TELEPHONE ENCOUNTER
Attempted to call back keily, the phone did not even ring. It just said the person you are trying to reach has a voicemail that has not been set up yet. Goodbye. Will try to call her back tomorrow and see what exactly she is needing from us.

## 2018-01-29 RX ORDER — MIDODRINE HYDROCHLORIDE 10 MG/1
10 TABLET ORAL 3 TIMES DAILY
Qty: 90 TABLET | Refills: 3 | Status: SHIPPED | OUTPATIENT
Start: 2018-01-29 | End: 2018-09-13 | Stop reason: SDUPTHER

## 2018-01-30 LAB
CALCULI COMPOSITION: NORMAL
MASS: 23 MG
STONE DESCRIPTION: NORMAL
STONE NUMBER: NORMAL
STONE SIZE: NORMAL MM

## 2018-01-31 ENCOUNTER — NURSE ONLY (OUTPATIENT)
Dept: UROLOGY | Age: 83
End: 2018-01-31

## 2018-01-31 VITALS — TEMPERATURE: 97.1 F | BODY MASS INDEX: 18.61 KG/M2 | HEIGHT: 64 IN | WEIGHT: 109 LBS

## 2018-01-31 DIAGNOSIS — N20.0 RENAL CALCULUS, RIGHT: ICD-10-CM

## 2018-01-31 DIAGNOSIS — N20.0 RENAL CALCULUS, LEFT: Primary | ICD-10-CM

## 2018-02-12 ENCOUNTER — OFFICE VISIT (OUTPATIENT)
Dept: INTERNAL MEDICINE | Age: 83
End: 2018-02-12
Payer: MEDICARE

## 2018-02-12 ENCOUNTER — HOSPITAL ENCOUNTER (OUTPATIENT)
Dept: GENERAL RADIOLOGY | Age: 83
Discharge: HOME | End: 2018-02-12
Payer: MEDICARE

## 2018-02-12 ENCOUNTER — TELEPHONE (OUTPATIENT)
Dept: INTERNAL MEDICINE | Age: 83
End: 2018-02-12

## 2018-02-12 VITALS
HEART RATE: 44 BPM | SYSTOLIC BLOOD PRESSURE: 110 MMHG | BODY MASS INDEX: 19.97 KG/M2 | WEIGHT: 117 LBS | OXYGEN SATURATION: 96 % | HEIGHT: 64 IN | DIASTOLIC BLOOD PRESSURE: 62 MMHG

## 2018-02-12 DIAGNOSIS — E78.5 HYPERLIPIDEMIA, UNSPECIFIED HYPERLIPIDEMIA TYPE: ICD-10-CM

## 2018-02-12 DIAGNOSIS — R09.02 HYPOXIA: ICD-10-CM

## 2018-02-12 DIAGNOSIS — K21.9 GASTROESOPHAGEAL REFLUX DISEASE WITHOUT ESOPHAGITIS: ICD-10-CM

## 2018-02-12 DIAGNOSIS — Z00.00 ROUTINE GENERAL MEDICAL EXAMINATION AT A HEALTH CARE FACILITY: ICD-10-CM

## 2018-02-12 DIAGNOSIS — E03.9 HYPOTHYROIDISM, UNSPECIFIED TYPE: ICD-10-CM

## 2018-02-12 DIAGNOSIS — R00.1 BRADYCARDIA: ICD-10-CM

## 2018-02-12 DIAGNOSIS — I10 ESSENTIAL HYPERTENSION: Primary | ICD-10-CM

## 2018-02-12 DIAGNOSIS — I48.91 ATRIAL FIBRILLATION, UNSPECIFIED TYPE (HCC): ICD-10-CM

## 2018-02-12 LAB
ALBUMIN SERPL-MCNC: 3.3 G/DL (ref 3.5–5.2)
ALP BLD-CCNC: 53 U/L (ref 35–104)
ALT SERPL-CCNC: 10 U/L (ref 5–33)
ANION GAP SERPL CALCULATED.3IONS-SCNC: 14 MMOL/L (ref 7–19)
AST SERPL-CCNC: 17 U/L (ref 5–32)
BASOPHILS ABSOLUTE: 0 K/UL (ref 0–0.2)
BASOPHILS RELATIVE PERCENT: 0.6 % (ref 0–1)
BILIRUB SERPL-MCNC: 0.3 MG/DL (ref 0.2–1.2)
BUN BLDV-MCNC: 23 MG/DL (ref 8–23)
CALCIUM SERPL-MCNC: 9.9 MG/DL (ref 8.8–10.2)
CHLORIDE BLD-SCNC: 105 MMOL/L (ref 98–111)
CHOLESTEROL, TOTAL: 181 MG/DL (ref 160–199)
CO2: 24 MMOL/L (ref 22–29)
CREAT SERPL-MCNC: 0.6 MG/DL (ref 0.5–0.9)
EOSINOPHILS ABSOLUTE: 0.2 K/UL (ref 0–0.6)
EOSINOPHILS RELATIVE PERCENT: 2.9 % (ref 0–5)
GFR NON-AFRICAN AMERICAN: >60
GLUCOSE BLD-MCNC: 95 MG/DL (ref 74–109)
HCT VFR BLD CALC: 32.9 % (ref 37–47)
HDLC SERPL-MCNC: 61 MG/DL (ref 65–121)
HEMOGLOBIN: 10.4 G/DL (ref 12–16)
LDL CHOLESTEROL CALCULATED: 91 MG/DL
LYMPHOCYTES ABSOLUTE: 1.4 K/UL (ref 1.1–4.5)
LYMPHOCYTES RELATIVE PERCENT: 27 % (ref 20–40)
MCH RBC QN AUTO: 32.4 PG (ref 27–31)
MCHC RBC AUTO-ENTMCNC: 31.6 G/DL (ref 33–37)
MCV RBC AUTO: 102.5 FL (ref 81–99)
MONOCYTES ABSOLUTE: 0.4 K/UL (ref 0–0.9)
MONOCYTES RELATIVE PERCENT: 6.7 % (ref 0–10)
NEUTROPHILS ABSOLUTE: 3.3 K/UL (ref 1.5–7.5)
NEUTROPHILS RELATIVE PERCENT: 62.6 % (ref 50–65)
PDW BLD-RTO: 15.3 % (ref 11.5–14.5)
PLATELET # BLD: 440 K/UL (ref 130–400)
PMV BLD AUTO: 10.4 FL (ref 9.4–12.3)
POTASSIUM SERPL-SCNC: 4.8 MMOL/L (ref 3.5–5)
RBC # BLD: 3.21 M/UL (ref 4.2–5.4)
SODIUM BLD-SCNC: 143 MMOL/L (ref 136–145)
TOTAL PROTEIN: 6.4 G/DL (ref 6.6–8.7)
TRIGL SERPL-MCNC: 145 MG/DL (ref 0–149)
TSH SERPL DL<=0.05 MIU/L-ACNC: 2.22 UIU/ML (ref 0.27–4.2)
WBC # BLD: 5.3 K/UL (ref 4.8–10.8)

## 2018-02-12 PROCEDURE — 93010 ELECTROCARDIOGRAM REPORT: CPT | Performed by: INTERNAL MEDICINE

## 2018-02-12 PROCEDURE — 1090F PRES/ABSN URINE INCON ASSESS: CPT | Performed by: INTERNAL MEDICINE

## 2018-02-12 PROCEDURE — G8427 DOCREV CUR MEDS BY ELIG CLIN: HCPCS | Performed by: INTERNAL MEDICINE

## 2018-02-12 PROCEDURE — 1123F ACP DISCUSS/DSCN MKR DOCD: CPT | Performed by: INTERNAL MEDICINE

## 2018-02-12 PROCEDURE — 1036F TOBACCO NON-USER: CPT | Performed by: INTERNAL MEDICINE

## 2018-02-12 PROCEDURE — 93000 ELECTROCARDIOGRAM COMPLETE: CPT | Performed by: INTERNAL MEDICINE

## 2018-02-12 PROCEDURE — G8484 FLU IMMUNIZE NO ADMIN: HCPCS | Performed by: INTERNAL MEDICINE

## 2018-02-12 PROCEDURE — G8420 CALC BMI NORM PARAMETERS: HCPCS | Performed by: INTERNAL MEDICINE

## 2018-02-12 PROCEDURE — 71046 X-RAY EXAM CHEST 2 VIEWS: CPT

## 2018-02-12 PROCEDURE — 99214 OFFICE O/P EST MOD 30 MIN: CPT | Performed by: INTERNAL MEDICINE

## 2018-02-12 PROCEDURE — 4040F PNEUMOC VAC/ADMIN/RCVD: CPT | Performed by: INTERNAL MEDICINE

## 2018-02-12 PROCEDURE — G8598 ASA/ANTIPLAT THER USED: HCPCS | Performed by: INTERNAL MEDICINE

## 2018-02-12 ASSESSMENT — ENCOUNTER SYMPTOMS
ABDOMINAL DISTENTION: 0
BLOOD IN STOOL: 0
COUGH: 0
RECTAL PAIN: 0
EYE PAIN: 0
SINUS PAIN: 0
TROUBLE SWALLOWING: 0
BACK PAIN: 0
ABDOMINAL PAIN: 0
SORE THROAT: 0
CONSTIPATION: 0
VOICE CHANGE: 0
SHORTNESS OF BREATH: 0
SINUS PRESSURE: 0
RHINORRHEA: 0
COLOR CHANGE: 0
EYE DISCHARGE: 0
NAUSEA: 0
VOMITING: 0
PHOTOPHOBIA: 0
WHEEZING: 0
CHEST TIGHTNESS: 0
ANAL BLEEDING: 0
DIARRHEA: 0

## 2018-02-12 ASSESSMENT — PATIENT HEALTH QUESTIONNAIRE - PHQ9: SUM OF ALL RESPONSES TO PHQ QUESTIONS 1-9: 0

## 2018-02-12 NOTE — PROGRESS NOTES
Chief Complaint   Patient presents with    Other     going to move to dr. Damien Romo in [facility]    Other     o2 getting very low and spikes of bp every once in a while       HPI: Fausto Veronica is a 80 y.o. female is here for Follow-up of GERD, hypothyroidism and hypertension. She is planning to switch her care to Dr. Marcia Johnson. His office is located about 8 minutes from her home. Additionally, her daughter goes to see Dr. Duarte Ortiz. It is becoming more difficult for the daughter to transfer Mrs. Mcgovern staff here for her office visits. Her blood pressure is well controlled. She does have a history of orthostatic hypotension. ProAmatine does seem to work well for hypotension. However, the daughter does have some concerns. At home, her oxygen level will drop to 60 something percent in the mornings. She has never been evaluated for the need for oxygen. He seems that her oxygen levels have been dropping since she had a kidney stone surgery in January. She denies a complaints of shortness of breath. She's not had any cough or congestion. Her heart rate is also very low. The daughter states that her heart rate has been low for a number of years. She states that she saw some doctor at one point just told her to drink coffee to get her heart rate up. She is on propafenone. She does see Dr. Miranda Gomez For history of atrial fibrillation. I will send a copy of this note in her EKG to Dr. Miranda Gomez For further evaluation. We'll also check a thyroid panel today. She denies he complaints of hematuria or dysuria. She did have a urinalysis on February 5 that was negative. Her acid reflux is stable.     Past Medical History:   Diagnosis Date    A-fib ([address]     Abnormal weight loss     Anemia     Arthritis     Bullae     CAD (coronary artery disease)     Conjunctivitis     COPD (chronic obstructive pulmonary disease) (HCC)     Depression     Fatigue     History of blood transfusion 2017    post op broken hip    Oropharynx is clear and moist. No oropharyngeal exudate. Eyes: Conjunctivae and EOM are normal. Pupils are equal, round, and reactive to light. Right eye exhibits no discharge. Left eye exhibits no discharge. No scleral icterus. Neck: Normal range of motion. Neck supple. No JVD present. No tracheal deviation present. No thyromegaly present. Cardiovascular: Regular rhythm, normal heart sounds and intact distal pulses. Exam reveals no gallop and no friction rub. No murmur heard. Rate is slow. Heart rate is in the 40s. Pulmonary/Chest: Effort normal and breath sounds normal. No respiratory distress. She has no wheezes. She has no rales. She exhibits no tenderness. Abdominal: Soft. Bowel sounds are normal. She exhibits no distension and no mass. There is no tenderness. There is no rebound and no guarding. Musculoskeletal: Normal range of motion. She exhibits no edema, tenderness or deformity. Lymphadenopathy:     She has no cervical adenopathy. Neurological: She is alert and oriented to person, place, and time. She has normal reflexes. She displays normal reflexes. No cranial nerve deficit. She exhibits normal muscle tone. Coordination normal.   Skin: Skin is warm and dry. No rash noted. She is not diaphoretic. No erythema. No pallor. Psychiatric: She has a normal mood and affect. Her behavior is normal. Judgment and thought content normal.   Nursing note and vitals reviewed. 1. Atrial fibrillation, unspecified type ([address]    2. Hyperlipidemia, unspecified hyperlipidemia type     3. Hypoxia        ASSESSMENT/PLAN:    80year-old woman here for follow-up    1. Hypertension: Blood pressure well controlled on current regimen    2. Bradycardia: Likely due to Rythmol. I will send a note to Dr. Rohan Ceballos. However, We Will Need to Rule Out Arrhythmia Versus Heart Block. EKG Has Been Ordered. We'll Also Check a TSH to Reevaluate Her Thyroid Function. EKG done.  Does show atrial fibrillation with a run of ventricular tachycardia. The very difficult tracing to analyze. Heart rate may be in the 80s according to this, but her heart rates in the 40s on her pulse ox. Get back with her cardiologist    3. Atrial fibrillation: On Xarelto for stroke prophylaxis. On post that the known for rate control. Her rate control does appear to be a little low at this time. We'll defer to Dr. Deangelo Bergman. 4. GERD: Stable    5. Hypoxemia: Likely nocturnal hypoxia. She seems to improve with use of incentive spirometer and later in the day. We'll have her see come out and check for her oxygen levels overnight. Also will check a chest x-ray. If possibly she could be developing some pulmonary fibrosis secondary to the antiarrhythmic. She may need oxygen therapy in the near future. 6. Hyperlipidemia: Check lipid panel    7. Hypothyroidism: Check TSH    She is planning to transfer her care to Dr. Rosa Madrigal. She can follow with me on an as-needed basis. Bailee Gabriel was seen today for other and other. Diagnoses and all orders for this visit:    Atrial fibrillation, unspecified type ([address]  -     CBC Auto Differential; Future  -     TSH without Reflex; Future  -     Comprehensive Metabolic Panel; Future  -     Lipid Panel; Future  -     EKG 12 Lead; Future    Hyperlipidemia, unspecified hyperlipidemia type   -     Lipid Panel; Future  -     XR CHEST STANDARD (2 VW); Future    Hypoxia  -     XR CHEST STANDARD (2 VW);  Future          Return in about 6 months (around 8/12/2018) for physical.     Orders Placed This Encounter   Procedures    XR CHEST STANDARD (2 VW)     Standing Status:   Future     Standing Expiration Date:   2/12/2019     Order Specific Question:   Reason for exam:     Answer:   hypoxia    CBC Auto Differential     Standing Status:   Future     Standing Expiration Date:   4/12/2018    TSH without Reflex     Standing Status:   Future     Standing Expiration Date:   4/12/2018    Comprehensive Metabolic Panel Standing Status:   Future     Standing Expiration Date:   4/12/2018    Lipid Panel     Standing Status:   Future     Standing Expiration Date:   4/12/2018     Order Specific Question:   Is Patient Fasting?/# of Hours     Answer:   12    EKG 12 Lead     Standing Status:   Future     Standing Expiration Date:   2/12/2019     Order Specific Question:   Reason for Exam?     Answer:   Bradycardia       Percy Reynoso MD

## 2018-02-22 ENCOUNTER — TELEPHONE (OUTPATIENT)
Dept: INTERNAL MEDICINE CLINIC | Age: 83
End: 2018-02-22

## 2018-02-22 NOTE — TELEPHONE ENCOUNTER
[facility] PT re assessment done and wish to continue pt PT orders 2-3 wk 4, effective 2/25, She is participating and progressing well and continues to be motivated and have excellent support, which gives her good prognosis for goal completion under current POC/orders.     please advise if approved

## 2018-03-07 ENCOUNTER — HOSPITAL ENCOUNTER (OUTPATIENT)
Dept: CT IMAGING | Age: 83
Discharge: HOME | End: 2018-03-07
Payer: MEDICARE

## 2018-03-07 DIAGNOSIS — N20.0 RENAL CALCULUS, RIGHT: ICD-10-CM

## 2018-03-07 DIAGNOSIS — N20.0 RENAL CALCULUS, LEFT: ICD-10-CM

## 2018-03-07 PROCEDURE — 74150 CT ABDOMEN W/O CONTRAST: CPT

## 2018-03-09 ENCOUNTER — OFFICE VISIT (OUTPATIENT)
Dept: UROLOGY | Age: 83
End: 2018-03-09
Payer: MEDICARE

## 2018-03-09 VITALS
TEMPERATURE: 96.8 F | SYSTOLIC BLOOD PRESSURE: 142 MMHG | BODY MASS INDEX: 19.16 KG/M2 | DIASTOLIC BLOOD PRESSURE: 74 MMHG | HEART RATE: 50 BPM | WEIGHT: 115 LBS | HEIGHT: 65 IN

## 2018-03-09 DIAGNOSIS — N20.0 RENAL CALCULUS, BILATERAL: Primary | ICD-10-CM

## 2018-03-09 LAB
BACTERIA URINE, POC: NORMAL
BILIRUBIN URINE: 0 MG/DL
BLOOD, URINE: NEGATIVE
CASTS URINE, POC: NORMAL
CLARITY: CLEAR
COLOR: YELLOW
CRYSTALS URINE, POC: NORMAL
EPI CELLS URINE, POC: NORMAL
GLUCOSE URINE: NORMAL
KETONES, URINE: NEGATIVE
LEUKOCYTE EST, POC: POSITIVE
NITRITE, URINE: NEGATIVE
PH UA: 7 (ref 4.5–8)
PROTEIN UA: NEGATIVE
RBC URINE, POC: NORMAL
SPECIFIC GRAVITY UA: 1.02 (ref 1–1.03)
UROBILINOGEN, URINE: NORMAL
WBC URINE, POC: 0
YEAST URINE, POC: NORMAL

## 2018-03-09 PROCEDURE — 4040F PNEUMOC VAC/ADMIN/RCVD: CPT | Performed by: UROLOGY

## 2018-03-09 PROCEDURE — 1036F TOBACCO NON-USER: CPT | Performed by: UROLOGY

## 2018-03-09 PROCEDURE — G8428 CUR MEDS NOT DOCUMENT: HCPCS | Performed by: UROLOGY

## 2018-03-09 PROCEDURE — G8482 FLU IMMUNIZE ORDER/ADMIN: HCPCS | Performed by: UROLOGY

## 2018-03-09 PROCEDURE — 1123F ACP DISCUSS/DSCN MKR DOCD: CPT | Performed by: UROLOGY

## 2018-03-09 PROCEDURE — 99214 OFFICE O/P EST MOD 30 MIN: CPT | Performed by: UROLOGY

## 2018-03-09 PROCEDURE — 81001 URINALYSIS AUTO W/SCOPE: CPT | Performed by: UROLOGY

## 2018-03-09 PROCEDURE — G8420 CALC BMI NORM PARAMETERS: HCPCS | Performed by: UROLOGY

## 2018-03-09 PROCEDURE — 1090F PRES/ABSN URINE INCON ASSESS: CPT | Performed by: UROLOGY

## 2018-03-09 PROCEDURE — G8598 ASA/ANTIPLAT THER USED: HCPCS | Performed by: UROLOGY

## 2018-03-09 ASSESSMENT — ENCOUNTER SYMPTOMS
SHORTNESS OF BREATH: 0
SORE THROAT: 0
BACK PAIN: 1
DOUBLE VISION: 0
WHEEZING: 0
BLURRED VISION: 0
HEARTBURN: 0
NAUSEA: 0

## 2018-03-09 NOTE — PROGRESS NOTES
Ramsey Nuñez is a 80 y.o. female who presents today   Chief Complaint   Patient presents with    Follow-up     I'm here for a 6 week f/u on bilateral kidney stone surgery with ct     Patient is here for follow-up after undergoing treatment of her kidney stones on 01/24/18. Patient had bilateral ureteral calculi the proximal right and left ureter and also some large stones in the mid to lower pole the left kidney. This was complicated by sepsis requiring stent placement treatment for obstructive pyelonephritis. She was taken the operating room on 01/24/18 where she underwent bilateral ureteroscopy laser lithotripsy. Stones were all treated on the right side on the left side she has some lower pole stones which were fragmented in small to small fragments could not get these displaced to the upper pole but all the other stones including large stone in the renal pelvis mid kidney and proximal ureter all treated.     She states she feels well today she's not having any gross hematuria no flank pain no fevers her stents were removed January 31 inches a 6 week follow-up visit         Past Medical History:   Diagnosis Date    A-fib ([address]     Abnormal weight loss     Anemia     Arthritis     Bullae     CAD (coronary artery disease)     Conjunctivitis     COPD (chronic obstructive pulmonary disease) (HCC)     Depression     Fatigue     History of blood transfusion 2017    post op broken hip    Hypertension     Hypothyroidism     Kidney stone     Leukopenia     Leukopenia     Low back pain     Mild CAD 5/15/2017    Osteoporosis     Paroxysmal a-fib (HCC)     Prolonged emergence from general anesthesia     Sinus pause     10/2014    Thyroid disease     Urinary tract infection     Weakness        Past Surgical History:   Procedure Laterality Date    BACK SURGERY      CARDIAC CATHETERIZATION  8/14/14  [facility]    Mild, non-occlusive CAD, EF 60%    CYSTOSCOPY Left 8/31/2017    CYSTOSCOPY; LEFT URETEROSCOPY; LEFT URETERAL LASER LITHOTRIPSY AND STONE EXTRACTION; [facility]    CYSTOSCOPY STENT INSERTION performed by [facility] MD at [facility] Right 10/11/2017    FEMUR IM NAIL MICHELLE INSERTION performed by Aylin Zuniga DO at [address]      HYSTERECTOMY      KNEE ARTHROPLASTY      x 2    LAMINECTOMY      2 lumbar segments    [place] CYSTO/URETERO/PYELOSCOPY W/LITHOTRIPSY Left 1/24/2018    LITHOTRIPSY LASER performed by [facility] Davis MD at [facility] CYSTO/URETERO/PYELOSCOPY, DX Bilateral 1/24/2018    CYSTOSCOPY BILATERAL URETEROSCOPIES WITH STENT REMOVAL AND RETROGRADE PYELOGRAMS WITH BILATERAL STONE MANIPULATION AND RETRIEVAL  PLACEMENT OF DOUBLE J URETERAL STENTS BILATERAL performed by Marina Davis MD at [address] [place] [address] [place] PL[facility]         Current Outpatient Prescriptions   Medication Sig Dispense Refill    midodrine (PROAMATINE) 10 MG tablet Take 1 tablet by mouth 3 times daily 90 tablet 3    omeprazole (PRILOSEC) 10 MG delayed release capsule Take 1 capsule by mouth daily 30 capsule 5    furosemide (LASIX) 20 MG tablet Take 1 tablet by mouth daily 60 tablet 3    potassium chloride 20 MEQ/15ML (10%) oral solution 15 mLs by Per NG tube route daily 450 mL 3    ondansetron (ZOFRAN) 4 MG tablet Take 1 tablet by mouth every 8 hours as needed for Nausea or Vomiting 30 tablet 0    propafenone (RYTHMOL) 225 MG tablet TAKE ONE TABLET BY MOUTH EVERY EIGHT HOURS 270 tablet 2    levothyroxine (SYNTHROID) 75 MCG tablet TAKE ONE TABLET BY MOUTH DAILY AS DIRECTED (Patient taking differently: Take 75 mcg by mouth daily TAKE ONE TABLET BY MOUTH DAILY AS DIRECTED) 30 tablet 5    XARELTO 20 MG TABS tablet TAKE 1 TABLET DAILY WITH BREAKFAST 90 tablet 3    meclizine (ANTIVERT) 12.5 MG tablet Take 25 mg by mouth as

## 2018-03-20 ENCOUNTER — OFFICE VISIT (OUTPATIENT)
Dept: PRIMARY CARE CLINIC | Age: 83
End: 2018-03-20
Payer: MEDICARE

## 2018-03-20 ENCOUNTER — TELEPHONE (OUTPATIENT)
Dept: PRIMARY CARE CLINIC | Age: 83
End: 2018-03-20

## 2018-03-20 VITALS
HEIGHT: 64 IN | WEIGHT: 121 LBS | SYSTOLIC BLOOD PRESSURE: 130 MMHG | OXYGEN SATURATION: 90 % | HEART RATE: 56 BPM | BODY MASS INDEX: 20.66 KG/M2 | TEMPERATURE: 97.6 F | DIASTOLIC BLOOD PRESSURE: 86 MMHG

## 2018-03-20 DIAGNOSIS — E03.9 HYPOTHYROIDISM, UNSPECIFIED TYPE: ICD-10-CM

## 2018-03-20 DIAGNOSIS — E87.6 HYPOKALEMIA: ICD-10-CM

## 2018-03-20 DIAGNOSIS — I48.0 PAROXYSMAL A-FIB (HCC): ICD-10-CM

## 2018-03-20 DIAGNOSIS — K21.9 GASTROESOPHAGEAL REFLUX DISEASE WITHOUT ESOPHAGITIS: Primary | Chronic | ICD-10-CM

## 2018-03-20 DIAGNOSIS — I48.0 PAROXYSMAL ATRIAL FIBRILLATION (HCC): ICD-10-CM

## 2018-03-20 DIAGNOSIS — R53.1 GENERAL WEAKNESS: ICD-10-CM

## 2018-03-20 PROCEDURE — 1036F TOBACCO NON-USER: CPT | Performed by: NURSE PRACTITIONER

## 2018-03-20 PROCEDURE — 1123F ACP DISCUSS/DSCN MKR DOCD: CPT | Performed by: NURSE PRACTITIONER

## 2018-03-20 PROCEDURE — G8598 ASA/ANTIPLAT THER USED: HCPCS | Performed by: NURSE PRACTITIONER

## 2018-03-20 PROCEDURE — 4040F PNEUMOC VAC/ADMIN/RCVD: CPT | Performed by: NURSE PRACTITIONER

## 2018-03-20 PROCEDURE — 99215 OFFICE O/P EST HI 40 MIN: CPT | Performed by: NURSE PRACTITIONER

## 2018-03-20 PROCEDURE — G8420 CALC BMI NORM PARAMETERS: HCPCS | Performed by: NURSE PRACTITIONER

## 2018-03-20 PROCEDURE — G8427 DOCREV CUR MEDS BY ELIG CLIN: HCPCS | Performed by: NURSE PRACTITIONER

## 2018-03-20 PROCEDURE — G8482 FLU IMMUNIZE ORDER/ADMIN: HCPCS | Performed by: NURSE PRACTITIONER

## 2018-03-20 PROCEDURE — 1090F PRES/ABSN URINE INCON ASSESS: CPT | Performed by: NURSE PRACTITIONER

## 2018-03-20 RX ORDER — PROPAFENONE HYDROCHLORIDE 225 MG/1
225 TABLET, FILM COATED ORAL EVERY 8 HOURS
Qty: 180 TABLET | Refills: 0 | Status: SHIPPED | OUTPATIENT
Start: 2018-03-20 | End: 2018-06-11 | Stop reason: SDUPTHER

## 2018-03-20 RX ORDER — POTASSIUM CHLORIDE 20 MEQ/1
20 TABLET, EXTENDED RELEASE ORAL DAILY
Qty: 30 TABLET | Refills: 5 | Status: SHIPPED | OUTPATIENT
Start: 2018-03-20 | End: 2022-05-09 | Stop reason: SDUPTHER

## 2018-03-20 RX ORDER — LEVOTHYROXINE SODIUM 0.07 MG/1
75 TABLET ORAL DAILY
Qty: 30 TABLET | Refills: 5 | Status: SHIPPED | OUTPATIENT
Start: 2018-03-20 | End: 2018-09-10 | Stop reason: SDUPTHER

## 2018-03-20 RX ORDER — OMEPRAZOLE 10 MG/1
10 CAPSULE, DELAYED RELEASE ORAL DAILY
Qty: 30 CAPSULE | Refills: 5 | Status: SHIPPED | OUTPATIENT
Start: 2018-03-20 | End: 2018-10-10 | Stop reason: SDUPTHER

## 2018-03-20 ASSESSMENT — ENCOUNTER SYMPTOMS
BLOOD IN STOOL: 0
TROUBLE SWALLOWING: 0
DIARRHEA: 0
CONSTIPATION: 0
ABDOMINAL PAIN: 0
EYE DISCHARGE: 0
SORE THROAT: 0
WHEEZING: 0
COUGH: 0
RHINORRHEA: 0
EYE REDNESS: 0

## 2018-03-20 NOTE — PROGRESS NOTES
Procedures    Amb External Referral To Physical Therapy     Referral Priority:   Routine     Referral Type:   Consult for Advice and Opinion     Referral Reason:   Specialty Services Required     Requested Specialty:   Outpatient Therapy     Number of Visits Requested:   1     Orders Placed This Encounter   Medications    potassium chloride (KLOR-CON M) 20 MEQ extended release tablet     Sig: Take 1 tablet by mouth daily     Dispense:  30 tablet     Refill:  5    levothyroxine (SYNTHROID) 75 MCG tablet     Sig: Take 1 tablet by mouth daily TAKE ONE TABLET BY MOUTH DAILY AS DIRECTED     Dispense:  30 tablet     Refill:  5    omeprazole (PRILOSEC) 10 MG delayed release capsule     Sig: Take 1 capsule by mouth daily     Dispense:  30 capsule     Refill:  5         Discussed use, benefit, and side effects of prescribed medications. All patient questions answered. Pt voiced understanding. Reviewed health maintenance. .  Patient agreed with treatment plan. Follow up as directed. There are no Patient Instructions on file for this visit.       Electronically signed by ROCAEL Gutierrez on 3/20/2018 at 12:21 PM

## 2018-03-26 ENCOUNTER — TELEPHONE (OUTPATIENT)
Dept: INTERNAL MEDICINE CLINIC | Age: 83
End: 2018-03-26

## 2018-06-11 ENCOUNTER — OFFICE VISIT (OUTPATIENT)
Dept: CARDIOLOGY | Age: 83
End: 2018-06-11
Payer: MEDICARE

## 2018-06-11 VITALS
SYSTOLIC BLOOD PRESSURE: 128 MMHG | DIASTOLIC BLOOD PRESSURE: 70 MMHG | BODY MASS INDEX: 21.68 KG/M2 | WEIGHT: 127 LBS | HEIGHT: 64 IN | HEART RATE: 45 BPM

## 2018-06-11 DIAGNOSIS — I48.0 PAROXYSMAL ATRIAL FIBRILLATION (HCC): ICD-10-CM

## 2018-06-11 DIAGNOSIS — I48.0 PAROXYSMAL A-FIB (HCC): Primary | ICD-10-CM

## 2018-06-11 PROCEDURE — 93000 ELECTROCARDIOGRAM COMPLETE: CPT | Performed by: NURSE PRACTITIONER

## 2018-06-11 PROCEDURE — 1090F PRES/ABSN URINE INCON ASSESS: CPT | Performed by: NURSE PRACTITIONER

## 2018-06-11 PROCEDURE — 99213 OFFICE O/P EST LOW 20 MIN: CPT | Performed by: NURSE PRACTITIONER

## 2018-06-11 PROCEDURE — G8598 ASA/ANTIPLAT THER USED: HCPCS | Performed by: NURSE PRACTITIONER

## 2018-06-11 PROCEDURE — G8427 DOCREV CUR MEDS BY ELIG CLIN: HCPCS | Performed by: NURSE PRACTITIONER

## 2018-06-11 PROCEDURE — G8420 CALC BMI NORM PARAMETERS: HCPCS | Performed by: NURSE PRACTITIONER

## 2018-06-11 PROCEDURE — 1123F ACP DISCUSS/DSCN MKR DOCD: CPT | Performed by: NURSE PRACTITIONER

## 2018-06-11 PROCEDURE — 4040F PNEUMOC VAC/ADMIN/RCVD: CPT | Performed by: NURSE PRACTITIONER

## 2018-06-11 PROCEDURE — 1036F TOBACCO NON-USER: CPT | Performed by: NURSE PRACTITIONER

## 2018-06-11 RX ORDER — PROPAFENONE HYDROCHLORIDE 225 MG/1
225 TABLET, FILM COATED ORAL EVERY 8 HOURS
Qty: 180 TABLET | Refills: 0 | Status: SHIPPED | OUTPATIENT
Start: 2018-06-11 | End: 2018-09-07 | Stop reason: SDUPTHER

## 2018-08-13 RX ORDER — MIDODRINE HYDROCHLORIDE 10 MG/1
TABLET ORAL
Qty: 90 TABLET | Refills: 3 | OUTPATIENT
Start: 2018-08-13

## 2018-09-07 DIAGNOSIS — I48.0 PAROXYSMAL ATRIAL FIBRILLATION (HCC): ICD-10-CM

## 2018-09-07 RX ORDER — PROPAFENONE HYDROCHLORIDE 225 MG/1
TABLET, FILM COATED ORAL
Qty: 270 TABLET | Refills: 3 | Status: SHIPPED | OUTPATIENT
Start: 2018-09-07 | End: 2018-12-19 | Stop reason: SDUPTHER

## 2018-09-10 ENCOUNTER — HOSPITAL ENCOUNTER (OUTPATIENT)
Dept: GENERAL RADIOLOGY | Age: 83
Discharge: HOME | End: 2018-09-10
Payer: MEDICARE

## 2018-09-10 ENCOUNTER — OFFICE VISIT (OUTPATIENT)
Dept: UROLOGY | Age: 83
End: 2018-09-10
Payer: MEDICARE

## 2018-09-10 VITALS — HEART RATE: 85 BPM | HEIGHT: 62 IN | WEIGHT: 131 LBS | TEMPERATURE: 97.8 F | BODY MASS INDEX: 24.11 KG/M2

## 2018-09-10 DIAGNOSIS — E03.9 HYPOTHYROIDISM, UNSPECIFIED TYPE: ICD-10-CM

## 2018-09-10 DIAGNOSIS — N20.0 RENAL CALCULUS, LEFT: Primary | ICD-10-CM

## 2018-09-10 DIAGNOSIS — N20.0 RENAL CALCULUS, BILATERAL: ICD-10-CM

## 2018-09-10 LAB
BACTERIA URINE, POC: ABNORMAL
BILIRUBIN URINE: 0 MG/DL
BLOOD, URINE: POSITIVE
CASTS URINE, POC: ABNORMAL
CLARITY: CLEAR
COLOR: YELLOW
CRYSTALS URINE, POC: ABNORMAL
EPI CELLS URINE, POC: ABNORMAL
GLUCOSE URINE: ABNORMAL
KETONES, URINE: NEGATIVE
LEUKOCYTE EST, POC: ABNORMAL
NITRITE, URINE: POSITIVE
PH UA: 6.5 (ref 4.5–8)
PROTEIN UA: NEGATIVE
RBC URINE, POC: ABNORMAL
SPECIFIC GRAVITY UA: 1.02 (ref 1–1.03)
UROBILINOGEN, URINE: NORMAL
WBC URINE, POC: ABNORMAL
YEAST URINE, POC: ABNORMAL

## 2018-09-10 PROCEDURE — 99213 OFFICE O/P EST LOW 20 MIN: CPT | Performed by: UROLOGY

## 2018-09-10 PROCEDURE — G8427 DOCREV CUR MEDS BY ELIG CLIN: HCPCS | Performed by: UROLOGY

## 2018-09-10 PROCEDURE — 1101F PT FALLS ASSESS-DOCD LE1/YR: CPT | Performed by: UROLOGY

## 2018-09-10 PROCEDURE — G8420 CALC BMI NORM PARAMETERS: HCPCS | Performed by: UROLOGY

## 2018-09-10 PROCEDURE — 1090F PRES/ABSN URINE INCON ASSESS: CPT | Performed by: UROLOGY

## 2018-09-10 PROCEDURE — G8598 ASA/ANTIPLAT THER USED: HCPCS | Performed by: UROLOGY

## 2018-09-10 PROCEDURE — 74018 RADEX ABDOMEN 1 VIEW: CPT

## 2018-09-10 PROCEDURE — 1036F TOBACCO NON-USER: CPT | Performed by: UROLOGY

## 2018-09-10 PROCEDURE — 4040F PNEUMOC VAC/ADMIN/RCVD: CPT | Performed by: UROLOGY

## 2018-09-10 PROCEDURE — 81001 URINALYSIS AUTO W/SCOPE: CPT | Performed by: UROLOGY

## 2018-09-10 PROCEDURE — 1123F ACP DISCUSS/DSCN MKR DOCD: CPT | Performed by: UROLOGY

## 2018-09-10 RX ORDER — LEVOTHYROXINE SODIUM 0.07 MG/1
75 TABLET ORAL DAILY
Qty: 30 TABLET | Refills: 5 | Status: SHIPPED | OUTPATIENT
Start: 2018-09-10 | End: 2019-03-07 | Stop reason: SDUPTHER

## 2018-09-10 ASSESSMENT — ENCOUNTER SYMPTOMS
SHORTNESS OF BREATH: 0
BACK PAIN: 0
SINUS PAIN: 0
EYE PAIN: 0
BLURRED VISION: 0
VOMITING: 0
ABDOMINAL PAIN: 0
WHEEZING: 0

## 2018-09-10 NOTE — PROGRESS NOTES
mouth 3 times daily ) 90 tablet 3    furosemide (LASIX) 20 MG tablet Take 1 tablet by mouth daily (Patient taking differently: Take 20 mg by mouth daily as needed ) 60 tablet 3    ondansetron (ZOFRAN) 4 MG tablet Take 1 tablet by mouth every 8 hours as needed for Nausea or Vomiting 30 tablet 0    meclizine (ANTIVERT) 12.5 MG tablet Take 25 mg by mouth as needed Take 2 tablets (25 mg) as needed      Calcium Carbonate-Vitamin D (CALTRATE 600+D PO) Take 600 mg by mouth 2 times daily. No current facility-administered medications for this visit. Allergies   Allergen Reactions    Nitrofuran Derivatives Swelling    Sulfa Antibiotics     Pcn [Penicillins] Rash       Social History     Social History    Marital status:      Spouse name: N/A    Number of children: N/A    Years of education: N/A     Social History Main Topics    Smoking status: Never Smoker    Smokeless tobacco: Never Used    Alcohol use No    Drug use: No    Sexual activity: Yes     Other Topics Concern    None     Social History Narrative    None       Family History   Problem Relation Age of Onset    Cancer Father         lung    Stroke Mother         mini    Hypotension Mother        REVIEW OF SYSTEMS:  Review of Systems   Constitutional: Negative for malaise/fatigue and weight loss. HENT: Negative for nosebleeds and sinus pain. Eyes: Negative for blurred vision and pain. Respiratory: Negative for shortness of breath and wheezing. Cardiovascular: Negative for palpitations and leg swelling. Gastrointestinal: Negative for abdominal pain and vomiting. Genitourinary: Negative for dysuria, flank pain, frequency, hematuria and urgency. Musculoskeletal: Negative for back pain and myalgias. Skin: Negative for itching and rash. Neurological: Negative for tremors and sensory change. Endo/Heme/Allergies: Negative for environmental allergies and polydipsia. Bruises/bleeds easily.    Psychiatric/Behavioral: Negative for hallucinations. The patient is not nervous/anxious. PHYSICAL EXAM:  Pulse 85   Temp 97.8 °F (36.6 °C) (Temporal)   Ht 5' 2\" (1.575 m)   Wt 131 lb (59.4 kg)   BMI 23.96 kg/m²   Physical Exam   Constitutional: She is oriented to person, place, and time. She appears well-developed and well-nourished. HENT:   Head: Normocephalic and atraumatic. Eyes: Pupils are equal, round, and reactive to light. Conjunctivae and EOM are normal. No scleral icterus. Neck: Normal range of motion. Neck supple. Cardiovascular: Normal rate, regular rhythm and intact distal pulses. Pulmonary/Chest: Effort normal and breath sounds normal. No respiratory distress. She exhibits no tenderness. Abdominal: Soft. She exhibits no distension and no mass. There is no tenderness. Musculoskeletal: Normal range of motion. She exhibits no edema or tenderness. Lymphadenopathy:     She has no cervical adenopathy. Neurological: She is alert and oriented to person, place, and time. Skin: Skin is warm and dry. Psychiatric: She has a normal mood and affect. Her behavior is normal.   Vitals reviewed. DATA:    Results for orders placed or performed in visit on 09/10/18   POCT Urinalysis Dipstick w/ Micro (Auto)   Result Value Ref Range    Color, UA Yellow     Clarity, UA Clear Clear    Glucose, Ur neg     Bilirubin Urine 0 mg/dL    Ketones, Urine Negative     Specific Gravity, UA 1.025 1.005 - 1.030    Blood, Urine Positive     pH, UA 6.5 4.5 - 8.0    Protein, UA Negative Negative    Nitrite, Urine Positive     Leukocytes, UA small     Urobilinogen, Urine Normal     rbc urine, poc 0-1     wbc urine, poc 2+     bacteria urine, poc      yeast urine, poc      casts urine, poc      epi cells urine, poc      crystals urine, poc         IMAGING:   KUB shows small 3-4 mm calcific did see projecting over the lower pole the left kidney. I see no stones in the course of the right or left ureter.  She has some pelvic

## 2018-09-10 NOTE — TELEPHONE ENCOUNTER
Received fax from pharmacy requesting refill on pts medication(s). Pt was last seen in office on 3/20/2018  and has a follow up scheduled for 9/20/2018. Will send request to  Roxann Carmen  for authorization.      Requested Prescriptions     Pending Prescriptions Disp Refills    levothyroxine (SYNTHROID) 75 MCG tablet [Pharmacy Med Name: LEVOTHYROXINE 75 MCG TABLET 75 TAB] 30 tablet 5     Sig: Take 1 tablet by mouth Daily

## 2018-09-13 RX ORDER — MIDODRINE HYDROCHLORIDE 10 MG/1
TABLET ORAL
Qty: 90 TABLET | Refills: 3 | Status: SHIPPED | OUTPATIENT
Start: 2018-09-13 | End: 2018-11-12 | Stop reason: SDUPTHER

## 2018-09-13 NOTE — TELEPHONE ENCOUNTER
Requested Prescriptions     Pending Prescriptions Disp Refills    midodrine (PROAMATINE) 10 MG tablet [Pharmacy Med Name: MIDODRINE HCL 10 MG TAB 10 TAB] 90 tablet 3     Sig: TAKE ONE TABLET BY MOUTH THREE TIMES DAILY

## 2018-09-20 ENCOUNTER — OFFICE VISIT (OUTPATIENT)
Dept: PRIMARY CARE CLINIC | Age: 83
End: 2018-09-20
Payer: MEDICARE

## 2018-09-20 VITALS
OXYGEN SATURATION: 93 % | SYSTOLIC BLOOD PRESSURE: 114 MMHG | DIASTOLIC BLOOD PRESSURE: 70 MMHG | WEIGHT: 133.8 LBS | HEART RATE: 76 BPM | TEMPERATURE: 98 F | HEIGHT: 64 IN | BODY MASS INDEX: 22.84 KG/M2

## 2018-09-20 DIAGNOSIS — E03.9 ACQUIRED HYPOTHYROIDISM: ICD-10-CM

## 2018-09-20 DIAGNOSIS — Z79.01 ON CONTINUOUS ORAL ANTICOAGULATION: ICD-10-CM

## 2018-09-20 DIAGNOSIS — K21.9 GASTROESOPHAGEAL REFLUX DISEASE, ESOPHAGITIS PRESENCE NOT SPECIFIED: ICD-10-CM

## 2018-09-20 DIAGNOSIS — I48.0 PAROXYSMAL A-FIB (HCC): ICD-10-CM

## 2018-09-20 DIAGNOSIS — R60.9 PERIPHERAL EDEMA: ICD-10-CM

## 2018-09-20 DIAGNOSIS — I95.0 IDIOPATHIC HYPOTENSION: Primary | ICD-10-CM

## 2018-09-20 PROCEDURE — 1036F TOBACCO NON-USER: CPT | Performed by: PEDIATRICS

## 2018-09-20 PROCEDURE — 99214 OFFICE O/P EST MOD 30 MIN: CPT | Performed by: PEDIATRICS

## 2018-09-20 PROCEDURE — 1101F PT FALLS ASSESS-DOCD LE1/YR: CPT | Performed by: PEDIATRICS

## 2018-09-20 PROCEDURE — 4040F PNEUMOC VAC/ADMIN/RCVD: CPT | Performed by: PEDIATRICS

## 2018-09-20 PROCEDURE — 1123F ACP DISCUSS/DSCN MKR DOCD: CPT | Performed by: PEDIATRICS

## 2018-09-20 PROCEDURE — G8598 ASA/ANTIPLAT THER USED: HCPCS | Performed by: PEDIATRICS

## 2018-09-20 PROCEDURE — G8427 DOCREV CUR MEDS BY ELIG CLIN: HCPCS | Performed by: PEDIATRICS

## 2018-09-20 PROCEDURE — 1090F PRES/ABSN URINE INCON ASSESS: CPT | Performed by: PEDIATRICS

## 2018-09-20 PROCEDURE — G8420 CALC BMI NORM PARAMETERS: HCPCS | Performed by: PEDIATRICS

## 2018-09-20 ASSESSMENT — ENCOUNTER SYMPTOMS
BACK PAIN: 0
WHEEZING: 0
SHORTNESS OF BREATH: 0
SORE THROAT: 0
CONSTIPATION: 0
ABDOMINAL PAIN: 0
NAUSEA: 0
CHEST TIGHTNESS: 0
DIARRHEA: 0
VOMITING: 0
EYE DISCHARGE: 0
COUGH: 0
SINUS PRESSURE: 0

## 2018-09-20 NOTE — PROGRESS NOTES
Leukocytes, UA 09/10/2018 small     Urobilinogen, Urine 09/10/2018 Normal     rbc urine, poc 09/10/2018 0-1     wbc urine, poc 09/10/2018 2+      Copies of these are in the chart. Current Outpatient Prescriptions   Medication Sig Dispense Refill    midodrine (PROAMATINE) 10 MG tablet TAKE ONE TABLET BY MOUTH THREE TIMES DAILY (Patient taking differently: Patient takes half tablet in the am and half tablet in the afternoon) 90 tablet 3    levothyroxine (SYNTHROID) 75 MCG tablet Take 1 tablet by mouth Daily 30 tablet 5    propafenone (RYTHMOL) 225 MG tablet TAKE ONE TABLET BY MOUTH EVERY EIGHT HOURS 270 tablet 3    rivaroxaban (XARELTO) 20 MG TABS tablet TAKE 1 TABLET DAILY WITH BREAKFAST 90 tablet 3    potassium chloride (KLOR-CON M) 20 MEQ extended release tablet Take 1 tablet by mouth daily 30 tablet 5    omeprazole (PRILOSEC) 10 MG delayed release capsule Take 1 capsule by mouth daily 30 capsule 5    furosemide (LASIX) 20 MG tablet Take 1 tablet by mouth daily (Patient taking differently: Take 20 mg by mouth daily as needed ) 60 tablet 3    Calcium Carbonate-Vitamin D (CALTRATE 600+D PO) Take 600 mg by mouth 2 times daily.  ondansetron (ZOFRAN) 4 MG tablet Take 1 tablet by mouth every 8 hours as needed for Nausea or Vomiting 30 tablet 0    meclizine (ANTIVERT) 12.5 MG tablet Take 25 mg by mouth as needed Take 2 tablets (25 mg) as needed       No current facility-administered medications for this visit.         Allergies: Nitrofuran derivatives; Sulfa antibiotics; and Pcn [penicillins]     Past Medical History:   Diagnosis Date    A-fib ([address]     Abnormal weight loss     Anemia     Arthritis     Bullae     CAD (coronary artery disease)     Conjunctivitis     COPD (chronic obstructive pulmonary disease) ([facility])     Depression     Fatigue     History of blood transfusion 2017    post op broken hip    Hypertension     Hypothyroidism     Kidney stone     Leukopenia     Leukopenia    

## 2018-10-10 DIAGNOSIS — K21.9 GASTROESOPHAGEAL REFLUX DISEASE WITHOUT ESOPHAGITIS: Chronic | ICD-10-CM

## 2018-10-10 RX ORDER — OMEPRAZOLE 10 MG/1
10 CAPSULE, DELAYED RELEASE ORAL DAILY
Qty: 30 CAPSULE | Refills: 11 | Status: SHIPPED | OUTPATIENT
Start: 2018-10-10 | End: 2019-09-26 | Stop reason: SDUPTHER

## 2018-11-12 RX ORDER — MIDODRINE HYDROCHLORIDE 10 MG/1
TABLET ORAL
Qty: 90 TABLET | Refills: 3 | Status: SHIPPED | OUTPATIENT
Start: 2018-11-12 | End: 2019-04-04 | Stop reason: SDUPTHER

## 2018-12-19 ENCOUNTER — OFFICE VISIT (OUTPATIENT)
Dept: CARDIOLOGY | Age: 83
End: 2018-12-19
Payer: MEDICARE

## 2018-12-19 VITALS
HEIGHT: 64 IN | WEIGHT: 132 LBS | DIASTOLIC BLOOD PRESSURE: 66 MMHG | BODY MASS INDEX: 22.53 KG/M2 | SYSTOLIC BLOOD PRESSURE: 136 MMHG | HEART RATE: 50 BPM

## 2018-12-19 DIAGNOSIS — I48.0 PAROXYSMAL A-FIB (HCC): Primary | ICD-10-CM

## 2018-12-19 DIAGNOSIS — I25.10 CORONARY ARTERY DISEASE INVOLVING NATIVE CORONARY ARTERY OF NATIVE HEART WITHOUT ANGINA PECTORIS: ICD-10-CM

## 2018-12-19 DIAGNOSIS — I48.0 PAROXYSMAL ATRIAL FIBRILLATION (HCC): ICD-10-CM

## 2018-12-19 PROCEDURE — G8598 ASA/ANTIPLAT THER USED: HCPCS | Performed by: NURSE PRACTITIONER

## 2018-12-19 PROCEDURE — 1090F PRES/ABSN URINE INCON ASSESS: CPT | Performed by: NURSE PRACTITIONER

## 2018-12-19 PROCEDURE — 93000 ELECTROCARDIOGRAM COMPLETE: CPT | Performed by: NURSE PRACTITIONER

## 2018-12-19 PROCEDURE — 1123F ACP DISCUSS/DSCN MKR DOCD: CPT | Performed by: NURSE PRACTITIONER

## 2018-12-19 PROCEDURE — G8484 FLU IMMUNIZE NO ADMIN: HCPCS | Performed by: NURSE PRACTITIONER

## 2018-12-19 PROCEDURE — G8420 CALC BMI NORM PARAMETERS: HCPCS | Performed by: NURSE PRACTITIONER

## 2018-12-19 PROCEDURE — 1101F PT FALLS ASSESS-DOCD LE1/YR: CPT | Performed by: NURSE PRACTITIONER

## 2018-12-19 PROCEDURE — 99213 OFFICE O/P EST LOW 20 MIN: CPT | Performed by: NURSE PRACTITIONER

## 2018-12-19 PROCEDURE — 4040F PNEUMOC VAC/ADMIN/RCVD: CPT | Performed by: NURSE PRACTITIONER

## 2018-12-19 PROCEDURE — G8427 DOCREV CUR MEDS BY ELIG CLIN: HCPCS | Performed by: NURSE PRACTITIONER

## 2018-12-19 PROCEDURE — 1036F TOBACCO NON-USER: CPT | Performed by: NURSE PRACTITIONER

## 2018-12-19 RX ORDER — PROPAFENONE HYDROCHLORIDE 225 MG/1
TABLET, FILM COATED ORAL
Qty: 270 TABLET | Refills: 3 | Status: SHIPPED | OUTPATIENT
Start: 2018-12-19 | End: 2019-06-17 | Stop reason: SDUPTHER

## 2018-12-19 NOTE — PATIENT INSTRUCTIONS
Atrial Fibrillation     Definition   Atrial fibrillation is an abnormal heart rhythm. The heart's electrical system normally sends regularly spaced signals telling the heart muscle to beat. The heart has two upper chambers, called atria, and two lower chambers, called ventricles. Each signal starts in the atria and travels to the rest of the heart. In atrial fibrillation, the electrical signals from the atria are fast and irregular. The atria quiver, rather than contract. Some signals do not reach the ventricles and the ventricles continue pumping, usually irregularly and sometimes rapidly. This uncoordinated rhythm can reduce the heart's efficiency at pumping blood out to the body. Blood left in the heart chambers can form clots. These clots may sometimes break away, travel to the brain, and cause a stroke . Atrial Fibrillation        2011 [address] [place] [address].   Causes   In most cases, atrial fibrillation is due to an existing heart condition. But atrial fibrillation can occur in people with no structural heart problems. A thyroid disorder or other condition may cause the abnormal rhythm. In some cases, the cause is unknown.    Risk Factors   Risk factors include:   · Cardiovascular diseases:   ¨ High blood pressure   ¨ Coronary artery disease   ¨ Congestive heart failure   ¨ Heart attack   ¨ Heart valve disease   ¨ Endocarditis (infection of a heart valve)   ¨ Cardiomyopathy (disease of the heart muscle)   ¨ Congenital heart disease   ¨ Prior episode of atrial fibrillation   · Lung diseases:   ¨ Emphysema   ¨ Asthma   ¨ Blood clots in the lungs   · Age: 54 or older   · Smoking   · Chronic conditions:   ¨ Overactive thyroid   ¨ Diabetes   · Excessive alcohol intake   · Use of stimulant drugs, including caffeine   · Sex: male   · Undergoing general anesthesia   · Stress, either emotional or physical   · Family history of atrial fibrillation     Symptoms   Symptoms can vary from mild to severe, allows the doctor to look for abnormalities in the arteries   Treatment   Treatment may include:   Nitroglycerin   This medicine is usually given during an attack of angina. It can be given as a tablet that dissolves under the tongue or as a spray. Longer-lasting types can be used to prevent angina before an activity known to cause it. These may be given as pills or applied as patches or ointments. Blood-Thinning Medications   A small, daily dose of aspirin has been shown to decrease the risk of heart attack. Ask your doctor before taking aspirin daily. Warfarin (Coumadin)   Ticlopidine (Ticlid)   Clopidogrel (Plavix)   Beta-Blockers, Calcium-Channel Blockers, and ACE-Inhibitors   These may help prevent angina. In some cases, they may lower the risk of heart attack. Medications to Lower Cholesterol   Medicines, like statins, are often prescribed to people who have CAD. Statins (eg, atorvastatin [Lipitor]) lower cholesterol levels, which can help to prevent CAD events. Revascularization   Patients with severe blockages in their coronary arteries may benefit from procedures to immediately improve blood flow to the heart muscle:   Percutaneous coronary interventions (PCI)such as balloon angioplasty , in some cases, a wire mesh stent is placed to hold the artery open   Coronary artery bypass grafting (CABG) segments of vessels are taken from other areas of the body and are sewn into the heart arteries to reroute blood flow around blockages   Some studies have shown that CABG may be more effective than PCI. Lifestyle changes and intensive medicine may also be just as effective as PCI. Options for Refractory Angina   For patients who are not candidates for revascularization procedures but have continued angina despite medicine, options include:   Enhanced external counterpulsation (EECP) large air bags are inflated around the legs in tune with the heart beat.  The patient receives 5 one-hour treatments per week substance in your blood. Our bodies make some cholesterol. It is also found in animal products, with the highest amounts in fatty meat, egg yolks, whole milk, cheese, shellfish, and organ meats. On a heart-healthy diet, you should limit your cholesterol intake to less than 200 mg per day. It is normal and important to have some cholesterol in your bloodstream. But too much cholesterol can cause plaque to build up within your arteries, which can eventually lead to a heart attack or stroke. The two types of cholesterol that are most commonly referred to are:   · Low-density lipoprotein (LDL) cholesterol Also known as bad cholesterol, this is the cholesterol that tends to build up along your arteries. Bad cholesterol levels are increased by eating fats that are saturated or hydrogenated. Optimal level of this cholesterol is less than 100. Over 130 starts to get risky for heart disease. · High-density lipoprotein (HDL) cholesterol Also known as good cholesterol, this type of cholesterol actually carries cholesterol away from your arteries and may, therefore, help lower your risk of having a heart attack. You want this level to be high (ideally greater than 60). It is a risk to have a level less than 40. You can raise this good cholesterol by eating olive oil, canola oil, avocados, or nuts. Exercise raises this level, too. Fat   Fat is calorie dense and packs a lot of calories into a small amount of food. Even though fats should be limited due to their high calorie content, not all fats are bad. In fact, some fats are quite healthful. Fat can be broken down into four main types.    · The good-for-you fats are:   ¨ Monounsaturated fat found in oils such as olive and canola, avocados, and nuts and natural nut butters; can decrease cholesterol levels, while keeping levels of HDL cholesterol high   ¨ Polyunsaturated fat found in oils such as safflower, sunflower, soybean, corn, and sesame; can decrease total

## 2019-03-07 DIAGNOSIS — E03.9 HYPOTHYROIDISM, UNSPECIFIED TYPE: ICD-10-CM

## 2019-03-07 RX ORDER — LEVOTHYROXINE SODIUM 0.07 MG/1
75 TABLET ORAL DAILY
Qty: 30 TABLET | Refills: 5 | Status: SHIPPED | OUTPATIENT
Start: 2019-03-07 | End: 2019-09-18 | Stop reason: SDUPTHER

## 2019-04-04 ENCOUNTER — OFFICE VISIT (OUTPATIENT)
Dept: PRIMARY CARE CLINIC | Age: 84
End: 2019-04-04
Payer: MEDICARE

## 2019-04-04 VITALS
OXYGEN SATURATION: 97 % | SYSTOLIC BLOOD PRESSURE: 108 MMHG | TEMPERATURE: 97.9 F | HEART RATE: 66 BPM | HEIGHT: 64 IN | DIASTOLIC BLOOD PRESSURE: 62 MMHG | WEIGHT: 141.8 LBS | BODY MASS INDEX: 24.21 KG/M2

## 2019-04-04 DIAGNOSIS — H91.91 DECREASED HEARING OF RIGHT EAR: ICD-10-CM

## 2019-04-04 DIAGNOSIS — I25.10 CORONARY ARTERY DISEASE INVOLVING NATIVE HEART WITHOUT ANGINA PECTORIS, UNSPECIFIED VESSEL OR LESION TYPE: ICD-10-CM

## 2019-04-04 DIAGNOSIS — I48.0 PAROXYSMAL A-FIB (HCC): ICD-10-CM

## 2019-04-04 DIAGNOSIS — J44.9 CHRONIC OBSTRUCTIVE PULMONARY DISEASE, UNSPECIFIED COPD TYPE (HCC): ICD-10-CM

## 2019-04-04 DIAGNOSIS — H65.21 RIGHT CHRONIC SEROUS OTITIS MEDIA: ICD-10-CM

## 2019-04-04 DIAGNOSIS — K21.9 GASTROESOPHAGEAL REFLUX DISEASE WITHOUT ESOPHAGITIS: Chronic | ICD-10-CM

## 2019-04-04 DIAGNOSIS — E03.9 ACQUIRED HYPOTHYROIDISM: ICD-10-CM

## 2019-04-04 DIAGNOSIS — I95.1 ORTHOSTATIC HYPOTENSION: Primary | ICD-10-CM

## 2019-04-04 PROCEDURE — G8598 ASA/ANTIPLAT THER USED: HCPCS | Performed by: PEDIATRICS

## 2019-04-04 PROCEDURE — 1090F PRES/ABSN URINE INCON ASSESS: CPT | Performed by: PEDIATRICS

## 2019-04-04 PROCEDURE — G8420 CALC BMI NORM PARAMETERS: HCPCS | Performed by: PEDIATRICS

## 2019-04-04 PROCEDURE — G8926 SPIRO NO PERF OR DOC: HCPCS | Performed by: PEDIATRICS

## 2019-04-04 PROCEDURE — 3023F SPIROM DOC REV: CPT | Performed by: PEDIATRICS

## 2019-04-04 PROCEDURE — 1123F ACP DISCUSS/DSCN MKR DOCD: CPT | Performed by: PEDIATRICS

## 2019-04-04 PROCEDURE — 4040F PNEUMOC VAC/ADMIN/RCVD: CPT | Performed by: PEDIATRICS

## 2019-04-04 PROCEDURE — 99214 OFFICE O/P EST MOD 30 MIN: CPT | Performed by: PEDIATRICS

## 2019-04-04 PROCEDURE — 1036F TOBACCO NON-USER: CPT | Performed by: PEDIATRICS

## 2019-04-04 PROCEDURE — G8427 DOCREV CUR MEDS BY ELIG CLIN: HCPCS | Performed by: PEDIATRICS

## 2019-04-04 RX ORDER — MIDODRINE HYDROCHLORIDE 10 MG/1
TABLET ORAL
Qty: 90 TABLET | Refills: 3 | Status: SHIPPED | OUTPATIENT
Start: 2019-04-04 | End: 2020-02-12

## 2019-04-04 ASSESSMENT — PATIENT HEALTH QUESTIONNAIRE - PHQ9
SUM OF ALL RESPONSES TO PHQ9 QUESTIONS 1 & 2: 0
2. FEELING DOWN, DEPRESSED OR HOPELESS: 0
SUM OF ALL RESPONSES TO PHQ QUESTIONS 1-9: 0
SUM OF ALL RESPONSES TO PHQ QUESTIONS 1-9: 0
1. LITTLE INTEREST OR PLEASURE IN DOING THINGS: 0

## 2019-04-04 ASSESSMENT — ENCOUNTER SYMPTOMS
DIARRHEA: 0
SHORTNESS OF BREATH: 0
NAUSEA: 0
BACK PAIN: 0
EYE DISCHARGE: 0
VOMITING: 0
SINUS PRESSURE: 0
CONSTIPATION: 0
WHEEZING: 0
CHEST TIGHTNESS: 0
COUGH: 0
ABDOMINAL PAIN: 0
SORE THROAT: 0

## 2019-04-04 NOTE — PROGRESS NOTES
[address] [facility], [address]  Phone [phone]   Fax [phone]      OFFICE VISIT: 2019    Lavell Brown Isreal-: 1932      HPI  Reason For Visit:  Britt Dasilva is a 80 y.o. Health Maintenance    Follow-up (Patient is here for 6 month follow up. Patient was last seen here on 18. ); Otalgia (Right ear pain and she has trouble hearing out of her right ear. ); Health Maintenance (Patient wanting RX for shingles vaccine to take to Pharmacy/ Pneumococcal Vaccine- Pt states she had them but not sure where she had it done at, she states it has been a long time ago. ); and Medication Refill (Midodrine)      Patient presents on 6 month follow-up. She has been having pain in her right ear recently as well as decreased hearing out of her right ear. This has been bothering her for a long time. She did have a foreign body in her ear      She needs refill of her Midodrine  She has been prescribed this for low blood pressure. Blood pressure today is 108/62. Home Blood pressure monitor: contolled      Atrial fibrillation:  Medication:              Propafenone 225 mg 3 times daily              Xarelto 20 mg daily  Symptoms: she does not sense rhythm changes. She has a regular rhythm today.        Hypothyroidism:  Medication:              Synthroid 75 µg daily  Symptoms: she is doing ok on present regimen.        GERD:  Medication:              Omeprazole 10 mg daily  Symptoms: controls symptoms well.        Peripheral edema:  Medications:              Lasix 20 mg daily when necessary  Symptoms: controlled right now. height is 5' 4\" (1.626 m) and weight is 141 lb 12.8 oz (64.3 kg). Her temporal temperature is 97.9 °F (36.6 °C). Her blood pressure is 108/62 and her pulse is 66. Her oxygen saturation is 97%. Body mass index is 24.34 kg/m². I have reviewed the following with the Ms. Bach   Lab Review  No visits with results within 6 Month(s) from this visit. Latest known visit with results is:   Office Visit on 09/10/2018   Component Date Value    Color, UA 09/10/2018 Yellow     Clarity, UA 09/10/2018 Clear     Glucose, Ur 09/10/2018 neg     Bilirubin Urine 09/10/2018 0     Ketones, Urine 09/10/2018 Negative     Specific Gravity, UA 09/10/2018 1.025     Blood, Urine 09/10/2018 Positive     pH, UA 09/10/2018 6.5     Protein, UA 09/10/2018 Negative     Nitrite, Urine 09/10/2018 Positive     Leukocytes, UA 09/10/2018 small     Urobilinogen, Urine 09/10/2018 Normal     rbc urine, poc 09/10/2018 0-1     wbc urine, poc 09/10/2018 2+      Copies of these are in the chart. Current Outpatient Medications   Medication Sig Dispense Refill    midodrine (PROAMATINE) 10 MG tablet Patient takes half tablet in the am and half tablet in the afternoon 90 tablet 3    zoster recombinant adjuvanted vaccine (SHINGRIX) 50 MCG/0.5ML SUSR injection Inject 0.5 mLs into the muscle See Admin Instructions 1 dose now and repeat in 2-6 months 1 each 1    levothyroxine (SYNTHROID) 75 MCG tablet TAKE 1 TABLET BY MOUTH DAILY 30 tablet 5    propafenone (RYTHMOL) 225 MG tablet TAKE ONE TABLET BY MOUTH EVERY EIGHT HOURS 270 tablet 3    omeprazole (PRILOSEC) 10 MG delayed release capsule Take 1 capsule by mouth daily 30 capsule 11    rivaroxaban (XARELTO) 20 MG TABS tablet TAKE 1 TABLET DAILY WITH BREAKFAST 90 tablet 3    potassium chloride (KLOR-CON M) 20 MEQ extended release tablet Take 1 tablet by mouth daily 30 tablet 5    furosemide (LASIX) 20 MG tablet Take 1 tablet by mouth daily (Patient taking differently: Take 20 mg by mouth daily as needed ) 60 tablet 3    ondansetron (ZOFRAN) 4 MG tablet Take 1 tablet by mouth every 8 hours as needed for Nausea or Vomiting 30 tablet 0    meclizine (ANTIVERT) 12.5 MG tablet Take 25 mg by mouth as needed Take 2 tablets (25 mg) as needed      Calcium Carbonate-Vitamin D (CALTRATE 600+D PO) Take 600 mg by mouth 2 times daily. No current facility-administered medications for this visit.         Allergies: Nitrofuran derivatives; Sulfa antibiotics; and Pcn [penicillins]     Past Medical History:   Diagnosis Date    A-fib ([address]     Abnormal weight loss     Anemia     Arthritis     Bullae     CAD (coronary artery disease)     Conjunctivitis     COPD (chronic obstructive pulmonary disease) (HCC)     Depression     Fatigue     History of blood transfusion 2017    post op broken hip    Hypertension     Hypothyroidism     Kidney stone     Leukopenia     Leukopenia     Low back pain     Mild CAD 5/15/2017    Osteoporosis     Paroxysmal A-fib ([address]     Prolonged emergence from general anesthesia     Sinus pause     10/2014    Thyroid disease     Urinary tract infection     Weakness        Family History   Problem Relation Age of Onset    Cancer Father         lung    Stroke Mother         mini    Hypotension Mother        Past Surgical History:   Procedure Laterality Date    BACK SURGERY      BACK SURGERY      CARDIAC CATHETERIZATION  8/14/14  [id number]    Mild, non-occlusive CAD, EF 60%    CYSTOSCOPY Left 8/31/2017    CYSTOSCOPY; LEFT URETEROSCOPY; LEFT URETERAL LASER LITHOTRIPSY AND STONE EXTRACTION; INSERTION LEFT URETERAL DOUBLE J STENT performed by Fransisco Iverson MD at [facility] Bilateral 12/14/2017    CYSTOSCOPY STENT INSERTION performed by Fransisco Iverson MD at [place] [address] Right 10/11/2017    FEMUR IM NAIL MICHELLE INSERTION performed by Luba Araujo DO at [address] [place] [address]      HIP SURGERY      HYSTERECTOMY      KNEE ARTHROPLASTY      x 2    LAMINECTOMY      2 lumbar segments    NV CYSTO/URETERO/PYELOSCOPY W/LITHOTRIPSY Left 1/24/2018    LITHOTRIPSY LASER performed by Fransisco Iverson MD at [facility] CYSTO/URETERO/PYELOSCOPY, DX Bilateral 1/24/2018    CYSTOSCOPY BILATERAL URETEROSCOPIES WITH STENT REMOVAL AND RETROGRADE PYELOGRAMS WITH BILATERAL STONE MANIPULATION AND RETRIEVAL  PLACEMENT OF DOUBLE J URETERAL STENTS BILATERAL performed by Eduin Brooke MD at [place] [address]         Social History     Tobacco Use    Smoking status: Never Smoker    Smokeless tobacco: Never Used   Substance Use Topics    Alcohol use: No        Review of Systems   Constitutional: Negative for appetite change, chills, fatigue and fever. HENT: Negative for congestion, ear discharge, ear pain, postnasal drip, sinus pressure and sore throat. Eyes: Negative for discharge and visual disturbance. Respiratory: Negative for cough, chest tightness, shortness of breath and wheezing. Cardiovascular: Negative for chest pain, palpitations and leg swelling. Gastrointestinal: Negative for abdominal pain, constipation, diarrhea, nausea and vomiting. Genitourinary: Negative for difficulty urinating, dysuria and menstrual problem. Musculoskeletal: Negative for arthralgias, back pain, joint swelling and myalgias. Skin: Negative for rash. Neurological: Negative for dizziness, weakness and headaches. Hematological: Negative for adenopathy. Does not bruise/bleed easily. Psychiatric/Behavioral: Negative for sleep disturbance and suicidal ideas. The patient is not nervous/anxious. Physical Exam   Constitutional: She is oriented to person, place, and time. She appears well-developed and well-nourished. She is cooperative. Non-toxic appearance. No distress. Body habitus is normal   HENT:   Head: Normocephalic and atraumatic. Right Ear: External ear and ear canal normal. A middle ear effusion is present. Decreased hearing is noted. Left Ear: Hearing, tympanic membrane, external ear and ear canal normal.   Nose: Nose normal.   Mouth/Throat: Oropharynx is clear and moist and mucous membranes are normal.   Eyes: Pupils are equal, round, and reactive to light.  Conjunctivae, EOM and lids are normal.   Neck: Phonation normal. Neck supple. No JVD present. Carotid bruit is not present. No thyromegaly present. Cardiovascular: Normal rate, regular rhythm and normal heart sounds. No extrasystoles are present. PMI is not displaced. Exam reveals no gallop and no friction rub. No murmur heard. Pulmonary/Chest: Effort normal and breath sounds normal. No respiratory distress. She has no wheezes. She has no rhonchi. She has no rales. Abdominal: Soft. Bowel sounds are normal. She exhibits no distension and no mass. There is no hepatosplenomegaly. There is no tenderness. There is no CVA tenderness. Genitourinary:   Genitourinary Comments: Examination deferred   Musculoskeletal: Normal range of motion. She exhibits edema (trace bilaterally). Joint examination reveals no acute arthritis or synovitis. Lymphadenopathy:     She has no cervical adenopathy. Neurological: She is alert and oriented to person, place, and time. She has normal strength. She displays no atrophy and no tremor. No cranial nerve deficit (by gross examination) or sensory deficit. Gait normal.   No focal deficits appreciated   Skin: Skin is warm and dry. No rash noted. Psychiatric: She has a normal mood and affect. Her speech is normal and behavior is normal.   Vitals reviewed. ASSESSMENT      ICD-10-CM    1. Orthostatic hypotension I95.1 CBC Auto Differential     Comprehensive Metabolic Panel     midodrine (PROAMATINE) 10 MG tablet   2. Paroxysmal A-fib (HCC) I48.0    3. Acquired hypothyroidism E03.9 Microalbumin / Creatinine Urine Ratio     T4, Free     TSH without Reflex   4. Chronic obstructive pulmonary disease, unspecified COPD type ([address] J44.9    5. Coronary artery disease involving native heart without angina pectoris, unspecified vessel or lesion type I25.10 Lipid Panel   6. Gastroesophageal reflux disease without esophagitis K21.9    7. Right chronic serous otitis media H65.21 Lissett Curran MD, Otolaryngology, [place]   8.  Decreased hearing of right ear H91.91 Henri Arias MD, Otolaryngology, [address]    1. Orthostatic hypotension I95.1 CBC Auto Differential     Comprehensive Metabolic Panel     midodrine (PROAMATINE) 10 MG tablet  This is well controlled on medication. 2. Paroxysmal A-fib (HCC) I48.0 Controlled in regular rhythm today   3. Acquired hypothyroidism E03.9 Microalbumin / Creatinine Urine Ratio     T4, Free     TSH without Reflex   4. Chronic obstructive pulmonary disease, unspecified COPD type ([facility] J44.9 Stable    5. Coronary artery disease involving native heart without angina pectoris, unspecified vessel or lesion type I25.10 Lipid Panel  No anginal symptoms   6. Gastroesophageal reflux disease without esophagitis K21.9 The current medical regimen is effective;  continue present plan and medications. Orders Placed This Encounter   Procedures    CBC Auto Differential    Comprehensive Metabolic Panel    Microalbumin / Creatinine Urine Ratio    T4, Free    TSH without Reflex    Lipid Panel   Henri Arias MD, Otolaryngology, [place]        Return in about 6 months (around 10/4/2019).

## 2019-04-09 ENCOUNTER — OFFICE VISIT (OUTPATIENT)
Dept: OTOLARYNGOLOGY | Age: 84
End: 2019-04-09
Payer: MEDICARE

## 2019-04-09 VITALS
SYSTOLIC BLOOD PRESSURE: 106 MMHG | HEIGHT: 64 IN | RESPIRATION RATE: 18 BRPM | DIASTOLIC BLOOD PRESSURE: 62 MMHG | HEART RATE: 60 BPM | OXYGEN SATURATION: 96 % | WEIGHT: 141 LBS | BODY MASS INDEX: 24.07 KG/M2 | TEMPERATURE: 97.8 F

## 2019-04-09 DIAGNOSIS — H61.22 IMPACTED CERUMEN OF LEFT EAR: ICD-10-CM

## 2019-04-09 DIAGNOSIS — H74.8X1 HEMOTYMPANUM, RIGHT: Primary | ICD-10-CM

## 2019-04-09 DIAGNOSIS — H91.90 HEARING LOSS, UNSPECIFIED HEARING LOSS TYPE, UNSPECIFIED LATERALITY: ICD-10-CM

## 2019-04-09 PROCEDURE — 99203 OFFICE O/P NEW LOW 30 MIN: CPT | Performed by: OTOLARYNGOLOGY

## 2019-04-09 PROCEDURE — 4040F PNEUMOC VAC/ADMIN/RCVD: CPT | Performed by: OTOLARYNGOLOGY

## 2019-04-09 PROCEDURE — 1090F PRES/ABSN URINE INCON ASSESS: CPT | Performed by: OTOLARYNGOLOGY

## 2019-04-09 PROCEDURE — G8427 DOCREV CUR MEDS BY ELIG CLIN: HCPCS | Performed by: OTOLARYNGOLOGY

## 2019-04-09 PROCEDURE — G8420 CALC BMI NORM PARAMETERS: HCPCS | Performed by: OTOLARYNGOLOGY

## 2019-04-09 PROCEDURE — G8598 ASA/ANTIPLAT THER USED: HCPCS | Performed by: OTOLARYNGOLOGY

## 2019-04-09 PROCEDURE — 1123F ACP DISCUSS/DSCN MKR DOCD: CPT | Performed by: OTOLARYNGOLOGY

## 2019-04-09 PROCEDURE — 1036F TOBACCO NON-USER: CPT | Performed by: OTOLARYNGOLOGY

## 2019-04-09 PROCEDURE — 69210 REMOVE IMPACTED EAR WAX UNI: CPT | Performed by: OTOLARYNGOLOGY

## 2019-04-09 NOTE — PROGRESS NOTES
[facility]  [address] [facility]  [address] [address]  Dept: [phone]  Dept Fax: [phone]  Loc: [phone]     Iam Morrow is a 80 y.o. female who presents today for her medical conditions/complaintsas noted below.   Iam Morrow is c/o of New Patient (Referred by Dr. Viktoriya Nolan for chronic serous otitis media of right ear and decreased hearing of right ear )      Past Medical History:   Diagnosis Date    A-fib ([address]     Abnormal weight loss     Anemia     Arthritis     Bullae     CAD (coronary artery disease)     Conjunctivitis     COPD (chronic obstructive pulmonary disease) (HCC)     Depression     Fatigue     History of blood transfusion 2017    post op broken hip    Hypertension     Hypothyroidism     Kidney stone     Leukopenia     Leukopenia     Low back pain     Mild CAD 5/15/2017    Osteoporosis     Paroxysmal A-fib ([address]     Prolonged emergence from general anesthesia     Sinus pause     10/2014    Thyroid disease     Urinary tract infection     Weakness       Past Surgical History:   Procedure Laterality Date    BACK SURGERY      BACK SURGERY      CARDIAC CATHETERIZATION  8/14/14  [address]    Mild, non-occlusive CAD, EF 60%    CYSTOSCOPY Left 8/31/2017    CYSTOSCOPY; LEFT URETEROSCOPY; LEFT URETERAL LASER LITHOTRIPSY AND STONE EXTRACTION; INSERTION LEFT URETERAL DOUBLE J STENT performed by Aris Connell MD at [facility] Bilateral 12/14/2017    CYSTOSCOPY STENT INSERTION performed by Aris Connell MD at [place] [address] Right 10/11/2017    FEMUR IM NAIL MICHELLE INSERTION performed by Jose C Hernandez DO at [facility]      HIP SURGERY      HYSTERECTOMY      KNEE ARTHROPLASTY      x 2    LAMINECTOMY      2 lumbar segments    MO CYSTO/URETERO/PYELOSCOPY W/LITHOTRIPSY Left 1/24/2018    LITHOTRIPSY LASER performed by Aris Connell MD at [place] [address] CYSTO/URETERO/PYELOSCOPY, DX Bilateral 1/24/2018    CYSTOSCOPY BILATERAL URETEROSCOPIES WITH STENT REMOVAL AND RETROGRADE PYELOGRAMS WITH BILATERAL STONE MANIPULATION AND RETRIEVAL  PLACEMENT OF DOUBLE J URETERAL STENTS BILATERAL performed by Eduin Brooke MD at [facility]         Family History   Problem Relation Age of Onset    Cancer Father         lung    Stroke Mother         mini    Hypotension Mother           Social History     Tobacco Use    Smoking status: Never Smoker    Smokeless tobacco: Never Used   Substance Use Topics    Alcohol use: No         Current Outpatient Medications   Medication Sig Dispense Refill    midodrine (PROAMATINE) 10 MG tablet Patient takes half tablet in the am and half tablet in the afternoon 90 tablet 3    levothyroxine (SYNTHROID) 75 MCG tablet TAKE 1 TABLET BY MOUTH DAILY 30 tablet 5    propafenone (RYTHMOL) 225 MG tablet TAKE ONE TABLET BY MOUTH EVERY EIGHT HOURS 270 tablet 3    omeprazole (PRILOSEC) 10 MG delayed release capsule Take 1 capsule by mouth daily 30 capsule 11    rivaroxaban (XARELTO) 20 MG TABS tablet TAKE 1 TABLET DAILY WITH BREAKFAST 90 tablet 3    potassium chloride (KLOR-CON M) 20 MEQ extended release tablet Take 1 tablet by mouth daily 30 tablet 5    furosemide (LASIX) 20 MG tablet Take 1 tablet by mouth daily (Patient taking differently: Take 20 mg by mouth daily as needed ) 60 tablet 3    ondansetron (ZOFRAN) 4 MG tablet Take 1 tablet by mouth every 8 hours as needed for Nausea or Vomiting 30 tablet 0    meclizine (ANTIVERT) 12.5 MG tablet Take 25 mg by mouth as needed Take 2 tablets (25 mg) as needed      Calcium Carbonate-Vitamin D (CALTRATE 600+D PO) Take 600 mg by mouth 2 times daily.       zoster recombinant adjuvanted vaccine [facility]) 50 MCG/0.5ML SUSR injection Inject 0.5 mLs into the muscle See Admin Instructions 1 dose now and repeat in 2-6 months 1 each 1     No current facility-administered medications for this visit. Allergies   Allergen Reactions    Nitrofuran Derivatives Swelling    Sulfa Antibiotics     Pcn [Penicillins] Rash       Subjective:   2 weeks ear complaint. Not best historian. Review of Systems  A 12 point review of systems was completed, reviewed, and scanned to chart per staff. Patient medical history reviewed. Objective:      Physical Exam  /62   Pulse 60   Temp 97.8 °F (36.6 °C)   Resp 18   Ht 5' 4\" (1.626 m)   Wt 141 lb (64 kg)   SpO2 96%   BMI 24.20 kg/m²   Physical Exam:     General appearance: Normally developed structures of the head and neck with no deformities. Ability to communicate: Normal voice with ability to convey appropriately the nature of their complaints. Head and Face: Normal appearance with no visible lesions of the skin. Sinus tenderness: None appreciated on exam. No areas of facial swelling. Facial strength: No weakness of the facial muscles appreciated. Microscope: Normal external ear canals and tympanic membranes. The patient has impacted cerumen. Impacted cerumen was safely removed from the left ear with appropriate instrumentation viewing through an operating microscope. It was necessary to use the microscope. Cerumen was removed as well as other debris uneventfully with special instruments including a day hook, curette, and ear suction. Hemotympanum right MES    Hearing assessment:Will order in the future. External ears and nose: normal.   Nasal exam: Anterior speculum exam normal with no polyps purulence or lesions. Oral:  Mucosa of buccal, floor of mouth, and palatal areas appear normal and free of any lesions. Lips, teeth, gingiva: Normal with no lesions. Oropharynx: Tongue reveals normal appearance and mobility. Oral mucosa of buccal, floor of mouth, and palatal areas appear normal and free of any lesions or ulcerations.    Salivary:  Examination of the parotid and submandibular glands

## 2019-05-13 ENCOUNTER — OFFICE VISIT (OUTPATIENT)
Dept: OTOLARYNGOLOGY | Age: 84
End: 2019-05-13
Payer: MEDICARE

## 2019-05-13 VITALS
WEIGHT: 141 LBS | RESPIRATION RATE: 16 BRPM | HEART RATE: 71 BPM | DIASTOLIC BLOOD PRESSURE: 70 MMHG | OXYGEN SATURATION: 98 % | SYSTOLIC BLOOD PRESSURE: 112 MMHG | TEMPERATURE: 97.7 F | HEIGHT: 64 IN | BODY MASS INDEX: 24.07 KG/M2

## 2019-05-13 DIAGNOSIS — H74.8X1 HEMOTYMPANUM, RIGHT: Primary | ICD-10-CM

## 2019-05-13 PROCEDURE — 1036F TOBACCO NON-USER: CPT | Performed by: OTOLARYNGOLOGY

## 2019-05-13 PROCEDURE — G8420 CALC BMI NORM PARAMETERS: HCPCS | Performed by: OTOLARYNGOLOGY

## 2019-05-13 PROCEDURE — 1123F ACP DISCUSS/DSCN MKR DOCD: CPT | Performed by: OTOLARYNGOLOGY

## 2019-05-13 PROCEDURE — G8598 ASA/ANTIPLAT THER USED: HCPCS | Performed by: OTOLARYNGOLOGY

## 2019-05-13 PROCEDURE — 99212 OFFICE O/P EST SF 10 MIN: CPT | Performed by: OTOLARYNGOLOGY

## 2019-05-13 PROCEDURE — 4040F PNEUMOC VAC/ADMIN/RCVD: CPT | Performed by: OTOLARYNGOLOGY

## 2019-05-13 PROCEDURE — 69210 REMOVE IMPACTED EAR WAX UNI: CPT | Performed by: OTOLARYNGOLOGY

## 2019-05-13 PROCEDURE — G8427 DOCREV CUR MEDS BY ELIG CLIN: HCPCS | Performed by: OTOLARYNGOLOGY

## 2019-05-13 PROCEDURE — 1090F PRES/ABSN URINE INCON ASSESS: CPT | Performed by: OTOLARYNGOLOGY

## 2019-05-13 NOTE — PROGRESS NOTES
[facility]  [address] [facility]  [address] [place], [place] [address]  Dept: [phone]  Dept Fax: [phone]  Loc: [phone]     Leti Henderson is a 80 y.o. female who presents today for her medical conditions/complaintsas noted below. Leti Henderson is c/o of Ear Problem (1 month follow up for Hemotympanum, right/ patient reports ear is some better)      Current Outpatient Medications   Medication Sig Dispense Refill    midodrine (PROAMATINE) 10 MG tablet Patient takes half tablet in the am and half tablet in the afternoon 90 tablet 3    zoster recombinant adjuvanted vaccine (SHINGRIX) 50 MCG/0.5ML SUSR injection Inject 0.5 mLs into the muscle See Admin Instructions 1 dose now and repeat in 2-6 months 1 each 1    levothyroxine (SYNTHROID) 75 MCG tablet TAKE 1 TABLET BY MOUTH DAILY 30 tablet 5    propafenone (RYTHMOL) 225 MG tablet TAKE ONE TABLET BY MOUTH EVERY EIGHT HOURS 270 tablet 3    omeprazole (PRILOSEC) 10 MG delayed release capsule Take 1 capsule by mouth daily 30 capsule 11    rivaroxaban (XARELTO) 20 MG TABS tablet TAKE 1 TABLET DAILY WITH BREAKFAST 90 tablet 3    potassium chloride (KLOR-CON M) 20 MEQ extended release tablet Take 1 tablet by mouth daily 30 tablet 5    furosemide (LASIX) 20 MG tablet Take 1 tablet by mouth daily (Patient taking differently: Take 20 mg by mouth daily as needed ) 60 tablet 3    ondansetron (ZOFRAN) 4 MG tablet Take 1 tablet by mouth every 8 hours as needed for Nausea or Vomiting 30 tablet 0    meclizine (ANTIVERT) 12.5 MG tablet Take 25 mg by mouth as needed Take 2 tablets (25 mg) as needed      Calcium Carbonate-Vitamin D (CALTRATE 600+D PO) Take 600 mg by mouth 2 times daily. No current facility-administered medications for this visit. Allergies   Allergen Reactions    Nitrofuran Derivatives Swelling    Sulfa Antibiotics     Pcn [Penicillins] Rash       Subjective:    Hemotympanum right ear. Impacted cerumen right ear. Subjectively hearing better right ear. 80 yrs old on anticoags. Patient medical history reviewed. Objective:     Physical Exam  /70   Pulse 71   Temp 97.7 °F (36.5 °C)   Resp 16   Ht 5' 4\" (1.626 m)   Wt 141 lb (64 kg)   SpO2 98%   BMI 24.20 kg/m²   The patient has impacted cerumen. Impacted cerumen was safely removed from the right and left ear(s) with appropriate instrumentation viewing through an operating microscope. It was necessary to use the microscope. Cerumen was removed as well as other debris uneventfully with special instruments including a day hook, curette, and ear suction. Resolving hemotympanum right and wnl left  Assessment:       ICD-10-CM    1. Hemotympanum, right H74.8X1            Plan:     RTO 1 month. Time to resolve. Audio upon return. Stanley Fair am scribing for and in the presence of Dr. Altagracia Smith May 13, 2019/8:22 AM/Tierra Smith. Reanna Bennett MD,  I personally performed the services described in this documentation as scribed by Rafael Hobson in my presence and it appears accurate and complete.

## 2019-06-10 DIAGNOSIS — I48.0 PAROXYSMAL ATRIAL FIBRILLATION (HCC): ICD-10-CM

## 2019-06-17 ENCOUNTER — OFFICE VISIT (OUTPATIENT)
Dept: CARDIOLOGY | Age: 84
End: 2019-06-17
Payer: MEDICARE

## 2019-06-17 VITALS
SYSTOLIC BLOOD PRESSURE: 128 MMHG | WEIGHT: 142 LBS | HEIGHT: 64 IN | DIASTOLIC BLOOD PRESSURE: 68 MMHG | HEART RATE: 56 BPM | BODY MASS INDEX: 24.24 KG/M2

## 2019-06-17 DIAGNOSIS — I25.10 MILD CAD: ICD-10-CM

## 2019-06-17 DIAGNOSIS — I48.0 PAROXYSMAL ATRIAL FIBRILLATION (HCC): ICD-10-CM

## 2019-06-17 DIAGNOSIS — Z79.01 CHRONIC ANTICOAGULATION: Primary | ICD-10-CM

## 2019-06-17 PROCEDURE — G8427 DOCREV CUR MEDS BY ELIG CLIN: HCPCS | Performed by: NURSE PRACTITIONER

## 2019-06-17 PROCEDURE — G8598 ASA/ANTIPLAT THER USED: HCPCS | Performed by: NURSE PRACTITIONER

## 2019-06-17 PROCEDURE — 4040F PNEUMOC VAC/ADMIN/RCVD: CPT | Performed by: NURSE PRACTITIONER

## 2019-06-17 PROCEDURE — 1090F PRES/ABSN URINE INCON ASSESS: CPT | Performed by: NURSE PRACTITIONER

## 2019-06-17 PROCEDURE — 1036F TOBACCO NON-USER: CPT | Performed by: NURSE PRACTITIONER

## 2019-06-17 PROCEDURE — G8420 CALC BMI NORM PARAMETERS: HCPCS | Performed by: NURSE PRACTITIONER

## 2019-06-17 PROCEDURE — 1123F ACP DISCUSS/DSCN MKR DOCD: CPT | Performed by: NURSE PRACTITIONER

## 2019-06-17 PROCEDURE — 99213 OFFICE O/P EST LOW 20 MIN: CPT | Performed by: NURSE PRACTITIONER

## 2019-06-17 RX ORDER — PROPAFENONE HYDROCHLORIDE 225 MG/1
TABLET, FILM COATED ORAL
Qty: 270 TABLET | Refills: 3 | Status: SHIPPED | OUTPATIENT
Start: 2019-06-17 | End: 2020-06-02 | Stop reason: SDUPTHER

## 2019-06-17 NOTE — PATIENT INSTRUCTIONS
Angina usually lasts for about 2-10 minutes. It is often relieved with rest. Angina can be triggered by:   Exercise or exertion   Emotional stress   Cold weather   A large meal   Chest pain may indicate more serious unstable angina or a heart attack if: It is unrelieved by rest or nitroglycerin   Severe angina   Angina that begins at rest (with no activity)   Angina that lasts more than 15 minutes   Accompanying symptoms may include:   Shortness of breath   Sweating   Nausea   Weakness   Immediate medical attention is needed for unstable angina. CAD in women may cause less classic chest pain. It is likely to start with shortness of breath and fatigue. Diagnosis   If you go to the emergency room with chest pain, some tests will be done right away. The tests will attempt to see if you are having angina or a heart attack. If you have a stable pattern of angina, other tests may be done to determine the severity of your disease. The doctor will ask about your symptoms and medical history. A physical exam will be done.    Tests may include:   Blood tests to look for certain substances in the blood called troponins which help the doctor determine if you are having a heart attack   Electrocardiogram (ECG, EKG) records the heart's activity by measuring electrical currents through the heart muscle, and can reveal evidence of past heart attacks, acute heart attacks, and heart rhythm problems   Echocardiogram uses high-frequency sound waves (ultrasound) to examine the size, shape, and motion of the heart, giving information about the structure and function of the heart   Exercise stress test records the heart's electrical activity during increased physical activity   Nuclear stress test the heart is observed while exercising and radioactive material highlights impaired blood flow to help locate problem areas   Coronary calcium scoring a type of x-ray called a CAT scan that uses a computer to look for the presence of calcium in the heart arteries   Coronary angiography x-rays taken after a dye is injected into the arteries to allows the doctor to look for abnormalities in the arteries   Treatment   Treatment may include:   Nitroglycerin   This medicine is usually given during an attack of angina. It can be given as a tablet that dissolves under the tongue or as a spray. Longer-lasting types can be used to prevent angina before an activity known to cause it. These may be given as pills or applied as patches or ointments. Blood-Thinning Medications   A small, daily dose of aspirin has been shown to decrease the risk of heart attack. Ask your doctor before taking aspirin daily. Warfarin (Coumadin)   Ticlopidine (Ticlid)   Clopidogrel (Plavix)   Beta-Blockers, Calcium-Channel Blockers, and ACE-Inhibitors   These may help prevent angina. In some cases, they may lower the risk of heart attack. Medications to Lower Cholesterol   Medicines, like statins, are often prescribed to people who have CAD. Statins (eg, atorvastatin [Lipitor]) lower cholesterol levels, which can help to prevent CAD events. Revascularization   Patients with severe blockages in their coronary arteries may benefit from procedures to immediately improve blood flow to the heart muscle:   Percutaneous coronary interventions (PCI)such as balloon angioplasty , in some cases, a wire mesh stent is placed to hold the artery open   Coronary artery bypass grafting (CABG) segments of vessels are taken from other areas of the body and are sewn into the heart arteries to reroute blood flow around blockages   Some studies have shown that CABG may be more effective than PCI. Lifestyle changes and intensive medicine may also be just as effective as PCI.    Options for Refractory Angina   For patients who are not candidates for revascularization procedures but have continued angina despite medicine, options include:   Enhanced external counterpulsation (EECP) large air bags fibrillation can occur in people with no structural heart problems. A thyroid disorder or other condition may cause the abnormal rhythm. In some cases, the cause is unknown. Risk Factors   Risk factors include:   · Cardiovascular diseases:   ¨ High blood pressure   ¨ Coronary artery disease   ¨ Congestive heart failure   ¨ Heart attack   ¨ Heart valve disease   ¨ Endocarditis (infection of a heart valve)   ¨ Cardiomyopathy (disease of the heart muscle)   ¨ Congenital heart disease   ¨ Prior episode of atrial fibrillation   · Lung diseases:   ¨ Emphysema   ¨ Asthma   ¨ Blood clots in the lungs   · Age: 54 or older   · Smoking   · Chronic conditions:   ¨ Overactive thyroid   ¨ Diabetes   · Excessive alcohol intake   · Use of stimulant drugs, including caffeine   · Sex: male   · Undergoing general anesthesia   · Stress, either emotional or physical   · Family history of atrial fibrillation     Symptoms   Symptoms can vary from mild to severe, depending on your heart function and overall health. Some people may not notice any symptoms. Symptoms include:   · Irregular or rapid pulse or heart beat   · Racing feeling in the chest   · Palpitations, or a pounding feeling in the chest   · Dizziness, lightheadedness, or fainting   · Sweating   · Pain or pressure in the chest   · Shortness of breath   · Fatigue or weakness   · Exercise intolerance     Treatment   The goals of treatment are to:   · Restore a regular rhythm, if possible   · Keep heart rate as close to normal   ¨ Your doctor will tell you what your target heart rate is. In general, the your resting rate should be between 60-80 beats per minute, and  beats per minute during moderate exercise. · Prevent blood clots from forming   If an underlying cause of atrial fibrillation is found, it may be treated. Some patients return to a normal rhythm without treatment.      Treatments include:   Medication   · Drugs to slow the heart rate, such as:   ¨ Digitalis ¨ Verapamil   ¨ Diltiazem   ¨ Metoprolol   ¨ Atenolol   · Drugs to keep the heart in a regular rhythm, such as:   ¨ Sotalol (Betapace)  ¨ Propafenone (Rythmol)  ¨ Amiodarone   ¨ Multaq  · Drugs to prevent clot formation, such as warfarin (Coumadin) or Pradaxa    If you are started on a blood thinners, you will need at least 6 weeks prior to trying to get your heart back into a normal rhythm. If you are on Coumadin, you will need weekly blood checks to monitor your INR. You will need to keep your INR between 2.0-2.5 or 2.0 and 3.0 as your doctor's directions. Cardioversion   Cardioversion is a procedure that uses an electrical current or drugs to help normalize the heart rhythm. You will be in the hospital approximately 2-3 days to start antiarrhythmics (to keep your heart in rhythm) and shock to get you back in a normal rhythm. Patient Education        rivaroxaban  Pronunciation:  SAURAV a DIMITRI a ban  Brand:  Xarelto  What is the most important information I should know about rivaroxaban? Do not stop taking rivaroxaban without first talking to your doctor. Stopping suddenly can increase your risk of blood clot or stroke. Rivaroxaban can cause you to bleed more easily. Call your doctor at once if you have signs of bleeding such as: headaches, feeling very weak or dizzy, bleeding gums, nosebleeds, heavy menstrual periods or abnormal vaginal bleeding, blood in your urine, bloody or tarry stools, coughing up blood or vomit that looks like coffee grounds. Many other drugs can increase your risk of bleeding when used with rivaroxaban. Tell your doctor about all medicines you have recently used. Rivaroxaban can cause a very serious blood clot around your spinal cord if you undergo a spinal tap or receive spinal anesthesia (epidural). Tell any doctor who treats you that you are taking rivaroxaban. What is rivaroxaban? Rivaroxaban blocks the activity of certain clotting substances in the blood.   Rivaroxaban is used to prevent or treat a type of blood clot called deep vein thrombosis (DVT), which can lead to blood clots in the lungs (pulmonary embolism). A DVT can occur after certain types of surgery. Rivaroxaban is sometimes used to lower your risk of a DVT or PE coming back after you have received treatment for blood clots for at least 6 months. Rivaroxaban is also used in people with atrial fibrillation (a heart rhythm disorder) to lower the risk of stroke caused by a blood clot. Rivaroxaban may also be used for purposes not listed in this medication guide. What should I discuss with my healthcare provider before taking rivaroxaban? You should not use rivaroxaban if you are allergic to it, or if you have active or uncontrolled bleeding. Rivaroxaban can cause a very serious blood clot around your spinal cord if you undergo a spinal tap or receive spinal anesthesia (epidural). This type of blood clot could cause long-term paralysis, and may be more likely to occur if:   · you have a genetic spinal defect;  · you have a spinal catheter in place;  · you have a history of spinal surgery or repeated spinal taps;  · you have recently had a spinal tap or epidural anesthesia;  · you are taking an NSAID--Advil, Aleve, Motrin, and others; or  · you are using other medicines to treat or prevent blood clots. Rivaroxaban may cause you to bleed more easily, especially if you have:  · a bleeding disorder that is inherited or caused by disease;  · hemorrhagic stroke;  · uncontrolled high blood pressure;  · stomach or intestinal bleeding or ulcer; or  · if you take certain medicines such as aspirin, enoxaparin, heparin, warfarin (Coumadin, Nuha Kiang), clopidogrel (Plavix), or certain antidepressants. Tell your doctor if you have ever had:  · an artificial heart valve; or  · liver or kidney disease. Taking rivaroxaban during pregnancy may cause bleeding in the mother or the unborn baby.  Tell your doctor if you are pregnant or plan to become pregnant. It may not be safe to breast-feed a baby while you are using this medicine. Ask your doctor about any risks. How should I take rivaroxaban? Follow all directions on your prescription label and read all medication guides or instruction sheets. Your doctor may occasionally change your dose. Use the medicine exactly as directed. The number of times you take rivaroxaban each day will depend on the reason you are using this medication. For some conditions, rivaroxaban should be taken with food. Whether you take the medicine with or without food may also depend on the tablet strength you take. Follow your doctor's dosing instructions very carefully. Tell your doctor if you have trouble swallowing a rivaroxaban tablet. Tell any doctor who treats you that you are using rivaroxaban. If you need surgery or dental work, tell the surgeon or dentist ahead of time that you are using this medication. If you need anesthesia for a medical procedure or surgery, you may need to stop using rivaroxaban for a short time. Do not change your dose or stop taking this medication without first talking to your doctor. Stopping suddenly can increase your risk of blood clot or stroke. Store at room temperature away from moisture and heat. What happens if I miss a dose? If you take rivaroxaban 1 time each day: Take the medicine as soon as you remember, and then go back to your regular schedule. Do not take two doses at one time. If you take rivaroxaban 2 times each day: Take the missed dose as soon as you remember. You may take 2 doses at the same time to make up a missed dose. Get your prescription refilled before you run out of medicine completely. What happens if I overdose? Seek emergency medical attention or call the Poison Help line at 1-135.658.8666. Overdose may cause excessive bleeding. What should I avoid while taking rivaroxaban? Avoid activities that may increase your risk of bleeding or injury. Use extra care to prevent bleeding while shaving or brushing your teeth. What are the possible side effects of rivaroxaban? Get emergency medical help if you have signs of an allergic reaction: hives; difficult breathing; swelling of your face, lips, tongue, or throat. Also seek emergency medical attention if you have symptoms of a spinal blood clot: back pain, numbness or muscle weakness in your lower body, or loss of bladder or bowel control. Rivaroxaban can cause you to bleed more easily. Call your doctor at once if you have signs of bleeding such as:  · easy bruising or bleeding (nosebleeds, bleeding gums, heavy menstrual bleeding);  · pain, swelling, or drainage from a wound or where a needle was injected in your skin;  · bleeding from wounds or needle injections, any bleeding that will not stop;  · headaches, dizziness, weakness, feeling like you might pass out;  · urine that looks red, pink, or brown; or  · bloody or tarry stools, coughing up blood or vomit that looks like coffee grounds. Bleeding is the most common side effect of rivaroxaban. This is not a complete list of side effects and others may occur. Call your doctor for medical advice about side effects. You may report side effects to FDA at 8-737-FDA-9693. What other drugs will affect rivaroxaban? Sometimes it is not safe to use certain medications at the same time. Some drugs can affect your blood levels of other drugs you take, which may increase side effects or make the medications less effective. Other drugs may affect rivaroxaban, including prescription and over-the-counter medicines, vitamins, and herbal products. Tell your doctor about all your current medicines and any medicine you start or stop using. Where can I get more information? Your pharmacist can provide more information about rivaroxaban.   Remember, keep this and all other medicines out of the reach of children, never share your medicines with others, and use this medication only for the indication prescribed. Every effort has been made to ensure that the information provided by [address] is accurate, up-to-date, and complete, but no guarantee is made to that effect. Drug information contained herein may be time sensitive. [facility] information has been compiled for use by healthcare practitioners and consumers in the [place] and therefore [facility] does not warrant that uses outside of the [place] are appropriate, unless specifically indicated otherwise. [facility]'s drug information does not endorse drugs, diagnose patients or recommend therapy. [facility] [employer] drug information is an informational resource designed to assist licensed healthcare practitioners in caring for their patients and/or to serve consumers viewing this service as a supplement to, and not a substitute for, the expertise, skill, knowledge and judgment of healthcare practitioners. The absence of a warning for a given drug or drug combination in no way should be construed to indicate that the drug or drug combination is safe, effective or appropriate for any given patient. [facility] does not assume any responsibility for any aspect of healthcare administered with the aid of information [facility] provides. The information contained herein is not intended to cover all possible uses, directions, precautions, warnings, drug interactions, allergic reactions, or adverse effects. If you have questions about the drugs you are taking, check with your doctor, nurse or pharmacist.  Copyright 3909-9714 [address]. Version: 5.01. Revision date: 2/26/2018. Care instructions adapted under license by [facility] ([facility]). If you have questions about a medical condition or this instruction, always ask your healthcare professional. [place] [address] any warranty or liability for your use of this information.

## 2019-06-17 NOTE — PROGRESS NOTES
dizziness, tremors, speech difficulty, weakness and numbness. Hematological: Does not bruise/bleed easily. Psychiatric/Behavioral: Negative.         Past Medical History:   Diagnosis Date    A-fib ([address]     Abnormal weight loss     Anemia     Arthritis     Bullae     CAD (coronary artery disease)     Conjunctivitis     COPD (chronic obstructive pulmonary disease) (HCC)     Depression     Fatigue     History of blood transfusion 2017    post op broken hip    Hypertension     Hypothyroidism     Kidney stone     Leukopenia     Leukopenia     Low back pain     Mild CAD 5/15/2017    Osteoporosis     Paroxysmal A-fib ([address]     Prolonged emergence from general anesthesia     Sinus pause     10/2014    Thyroid disease     Urinary tract infection     Weakness        Past Surgical History:   Procedure Laterality Date    BACK SURGERY      BACK SURGERY      CARDIAC [facility]HE[facility]IZ[facility]  [facility]14  [facility]    Mild, non-occlusive CAD, EF 60%    CYSTOSCOPY Left 8/31/2017    CYSTOSCOPY; LEFT URETEROSCOPY; LEFT URETERAL LASER LITHOTRIPSY AND STONE EXTRACTION; INSERTION LEFT URETERAL DOUBLE J STENT performed by Herbert Cantu MD at [facility] Bilateral 12/14/2017    CYSTOSCOPY STENT INSERTION performed by Herbert Cantu MD at [place] [address] Right 10/11/2017    FEMUR IM NAIL MICHELLE INSERTION performed by Corinne España DO at [facility]      x 2    LAMINECTOMY      2 lumbar segments    [place] CYSTO/URETERO/PYELOSCOPY W/LITHOTRIPSY Left 1/24/2018    LITHOTRIPSY LASER performed by Herbert Cantu MD at \[facility]"" 43 CYSTO/URETERO/PYELOSCOPY, DX Bilateral 1/24/2018    CYSTOSCOPY BILATERAL URETEROSCOPIES WITH STENT REMOVAL AND RETROGRADE PYELOGRAMS WITH BILATERAL STONE MANIPULATION AND RETRIEVAL  PLACEMENT OF DOUBLE J URETERAL STENTS BILATERAL performed by Herbert Cantu MD at [place] [address] ARTHROPLASTY      URETER STENT PLACEMENT         Family History   Problem Relation Age of Onset    Cancer Father         lung    Stroke Mother         mini    Hypotension Mother        Social History     Socioeconomic History    Marital status:       Spouse name: Not on file    Number of children: Not on file    Years of education: Not on file    Highest education level: Not on file   Occupational History    Not on file   Social Needs    Financial resource strain: Not on file    Food insecurity:     Worry: Not on file     Inability: Not on file    Transportation needs:     Medical: Not on file     Non-medical: Not on file   Tobacco Use    Smoking status: Never Smoker    Smokeless tobacco: Never Used   Substance and Sexual Activity    Alcohol use: No    Drug use: No    Sexual activity: Yes   Lifestyle    Physical activity:     Days per week: Not on file     Minutes per session: Not on file    Stress: Not on file   Relationships    Social connections:     Talks on phone: Not on file     Gets together: Not on file     Attends Yarsani service: Not on file     Active member of club or organization: Not on file     Attends meetings of clubs or organizations: Not on file     Relationship status: Not on file    Intimate partner violence:     Fear of current or ex partner: Not on file     Emotionally abused: Not on file     Physically abused: Not on file     Forced sexual activity: Not on file   Other Topics Concern    Not on file   Social History Narrative    Not on file       Allergies   Allergen Reactions    Nitrofuran Derivatives Swelling    Sulfa Antibiotics     Pcn [Penicillins] Rash         Current Outpatient Medications:     rivaroxaban (XARELTO) 20 MG TABS tablet, TAKE 1 TABLET DAILY WITH BREAKFAST, Disp: 90 tablet, Rfl: 3    midodrine (PROAMATINE) 10 MG tablet, Patient takes half tablet in the am and half tablet in the afternoon, Disp: 90 tablet, Rfl: 3    zoster recombinant adjuvanted vaccine [facility]) 50 MCG/0.5ML SUSR injection, Inject 0.5 mLs into the muscle See Admin Instructions 1 dose now and repeat in 2-6 months, Disp: 1 each, Rfl: 1    levothyroxine (SYNTHROID) 75 MCG tablet, TAKE 1 TABLET BY MOUTH DAILY, Disp: 30 tablet, Rfl: 5    propafenone (RYTHMOL) 225 MG tablet, TAKE ONE TABLET BY MOUTH EVERY EIGHT HOURS, Disp: 270 tablet, Rfl: 3    omeprazole (PRILOSEC) 10 MG delayed release capsule, Take 1 capsule by mouth daily, Disp: 30 capsule, Rfl: 11    potassium chloride (KLOR-CON M) 20 MEQ extended release tablet, Take 1 tablet by mouth daily, Disp: 30 tablet, Rfl: 5    furosemide (LASIX) 20 MG tablet, Take 1 tablet by mouth daily (Patient taking differently: Take 20 mg by mouth daily as needed ), Disp: 60 tablet, Rfl: 3    meclizine (ANTIVERT) 12.5 MG tablet, Take 25 mg by mouth as needed Take 2 tablets (25 mg) as needed, Disp: , Rfl:     Calcium Carbonate-Vitamin D (CALTRATE 600+D PO), Take 600 mg by mouth 2 times daily. , Disp: , Rfl:     ondansetron (ZOFRAN) 4 MG tablet, Take 1 tablet by mouth every 8 hours as needed for Nausea or Vomiting, Disp: 30 tablet, Rfl: 0    PE:  Vitals:    06/17/19 1048   BP: 128/68   Pulse: 56       Estimated body mass index is 24.37 kg/m² as calculated from the following:    Height as of this encounter: 5' 4\" (1.626 m). Weight as of this encounter: 142 lb (64.4 kg). Constitutional: She is oriented to person, place, and time. She appears well-developed and well-nourished in no acute distress. Head: Normocephalic and atraumatic. Neck:  Neck supple without JVD present. Cardiovascular: Normal rate, Normal rhythm, normal heart sounds. No murmur ascultated. No gallop and no friction rub. No carotid bruits. No peripheral edema. Pulmonary/Chest:  Lungs clear to auscultation bilaterally without evidence of respiratory distress. She without wheezes. She without rales or ronchi. Musculoskeletal: Normal range of motion.   She uses a cane as an assitive device. Neurological: She is alert and oriented to person, place, and time. PERRLA.  are equal. No weakness noted in any extremity. Skin: Skin is warm and dry without rash or pallor. Psychiatric: She has a normal mood and affect. Her behavior is normal. Thought content normal.     Lab Results   Component Value Date    CREATININE 0.6 02/12/2018    CREATININE 0.8 01/18/2018    CREATININE <0.5 12/22/2017    HGB 10.4 02/12/2018    HGB 11.3 01/18/2018    HGB 9.5 12/22/2017        Assessment, Recommendations, & Plan:  80 y.o. female with PAF, chronic anticoagulation & Mild CAD    PAF/chronic anticoagulation-Controlled on Rythmol, She is also anticoagulated on Xarelto. I discussed again with her daughter and herself about her risk of falling and bleeding. They are both aware. Mild CAD- Continue to monitor    Disposition - RTC in 6 months  or sooner if needed      Please do not hesitate to contact me for any questions or concerns.     Sincerely yours,    ROCAEL Solitario

## 2019-06-19 ENCOUNTER — PROCEDURE VISIT (OUTPATIENT)
Dept: OTOLARYNGOLOGY | Age: 84
End: 2019-06-19
Payer: MEDICARE

## 2019-06-19 ENCOUNTER — OFFICE VISIT (OUTPATIENT)
Dept: OTOLARYNGOLOGY | Age: 84
End: 2019-06-19
Payer: MEDICARE

## 2019-06-19 VITALS
BODY MASS INDEX: 24.07 KG/M2 | HEIGHT: 64 IN | OXYGEN SATURATION: 98 % | SYSTOLIC BLOOD PRESSURE: 130 MMHG | WEIGHT: 141 LBS | RESPIRATION RATE: 18 BRPM | TEMPERATURE: 97.2 F | HEART RATE: 55 BPM | DIASTOLIC BLOOD PRESSURE: 64 MMHG

## 2019-06-19 DIAGNOSIS — H90.3 HEARING LOSS SENSORY, BILATERAL: ICD-10-CM

## 2019-06-19 DIAGNOSIS — H74.8X1 HEMOTYMPANUM, RIGHT: Primary | ICD-10-CM

## 2019-06-19 DIAGNOSIS — H90.11 CONDUCTIVE HEARING LOSS OF RIGHT EAR, UNSPECIFIED HEARING STATUS ON CONTRALATERAL SIDE: ICD-10-CM

## 2019-06-19 DIAGNOSIS — H90.A31 MIXED CONDUCTIVE AND SENSORINEURAL HEARING LOSS OF RIGHT EAR WITH RESTRICTED HEARING OF LEFT EAR: ICD-10-CM

## 2019-06-19 PROCEDURE — 1123F ACP DISCUSS/DSCN MKR DOCD: CPT | Performed by: OTOLARYNGOLOGY

## 2019-06-19 PROCEDURE — 92550 TYMPANOMETRY & REFLEX THRESH: CPT | Performed by: AUDIOLOGIST

## 2019-06-19 PROCEDURE — 1090F PRES/ABSN URINE INCON ASSESS: CPT | Performed by: OTOLARYNGOLOGY

## 2019-06-19 PROCEDURE — 1036F TOBACCO NON-USER: CPT | Performed by: OTOLARYNGOLOGY

## 2019-06-19 PROCEDURE — G8598 ASA/ANTIPLAT THER USED: HCPCS | Performed by: OTOLARYNGOLOGY

## 2019-06-19 PROCEDURE — G8420 CALC BMI NORM PARAMETERS: HCPCS | Performed by: OTOLARYNGOLOGY

## 2019-06-19 PROCEDURE — 99214 OFFICE O/P EST MOD 30 MIN: CPT | Performed by: OTOLARYNGOLOGY

## 2019-06-19 PROCEDURE — 4040F PNEUMOC VAC/ADMIN/RCVD: CPT | Performed by: OTOLARYNGOLOGY

## 2019-06-19 PROCEDURE — G8427 DOCREV CUR MEDS BY ELIG CLIN: HCPCS | Performed by: OTOLARYNGOLOGY

## 2019-06-19 PROCEDURE — 92553 AUDIOMETRY AIR & BONE: CPT | Performed by: AUDIOLOGIST

## 2019-06-19 NOTE — PROGRESS NOTES
History   Sabine Maki is a 80 y.o. female who presented to the clinic this date for a hearing evaluation due right hemotympanum. She reported decreased hearing and aural fullness in the right ear. She initially had a roaring sound in her right ear but noted that improved following cerumen removal at last ENT visit in May. Summary   Tympanometry consistent with limited TM mobility right and negative middle ear pressure right. 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 Results   Otoscopy:    Right: hemotympanum   Left: Clear EAC/Normal TM    Audiometry:    Right: Moderately severe to severe sloping mixed hearing loss   Left: Moderate sloping SNHL         Tympanometry:     Right: Type B   Left: Type C    Acoustic Reflex Thresholds:     Right Left   Ipsilateral Absent  Present   Contralateral Absent  Absent        Plan   Results of today's testing were discussed with Ms. Bach and the following recommendations were made:    1. Follow up with ENT as scheduled. 2. Recheck hearing following medical management.         Audiogram and Acoustic Immittance

## 2019-06-19 NOTE — PROGRESS NOTES
[facility]  [address]  [address] [facility] [address]  Dept: [phone]  Dept Fax: [phone]  Loc: [phone]     Daron Paul is a 80 y.o. female who presents today for her medical conditions/complaintsas noted below. Daron Fillers is c/o of Ear Problem (Hemotympanum )      Current Outpatient Medications   Medication Sig Dispense Refill    propafenone (RYTHMOL) 225 MG tablet TAKE ONE TABLET BY MOUTH EVERY EIGHT HOURS 270 tablet 3    rivaroxaban (XARELTO) 20 MG TABS tablet TAKE 1 TABLET DAILY WITH BREAKFAST 90 tablet 3    midodrine (PROAMATINE) 10 MG tablet Patient takes half tablet in the am and half tablet in the afternoon 90 tablet 3    zoster recombinant adjuvanted vaccine (SHINGRIX) 50 MCG/0.5ML SUSR injection Inject 0.5 mLs into the muscle See Admin Instructions 1 dose now and repeat in 2-6 months 1 each 1    levothyroxine (SYNTHROID) 75 MCG tablet TAKE 1 TABLET BY MOUTH DAILY 30 tablet 5    omeprazole (PRILOSEC) 10 MG delayed release capsule Take 1 capsule by mouth daily 30 capsule 11    potassium chloride (KLOR-CON M) 20 MEQ extended release tablet Take 1 tablet by mouth daily 30 tablet 5    furosemide (LASIX) 20 MG tablet Take 1 tablet by mouth daily (Patient taking differently: Take 20 mg by mouth daily as needed ) 60 tablet 3    ondansetron (ZOFRAN) 4 MG tablet Take 1 tablet by mouth every 8 hours as needed for Nausea or Vomiting 30 tablet 0    meclizine (ANTIVERT) 12.5 MG tablet Take 25 mg by mouth as needed Take 2 tablets (25 mg) as needed      Calcium Carbonate-Vitamin D (CALTRATE 600+D PO) Take 600 mg by mouth 2 times daily. No current facility-administered medications for this visit. Allergies   Allergen Reactions    Nitrofuran Derivatives Swelling    Sulfa Antibiotics     Pcn [Penicillins] Rash       Subjective:    Hx hemotympanum noted and still present. Audio obtained. Diagnosis Orders   1.  Hemotympanum, right           Objective:     Physical Exam  /64   Pulse 55   Temp 97.2 °F (36.2 °C)   Resp 18   Ht 5' 4\" (1.626 m)   Wt 141 lb (64 kg)   SpO2 98%   BMI 24.20 kg/m²     Assessment:       ICD-10-CM    1. Hemotympanum, right H74.8X1            Plan:   RTO 2 months repeat micro exam.  Clearance for hearing aid   The patient has been evaluated and is deemed a suitable candidate for use of a hearing aid. The patient is medically cleared to proceed with hearing aid evaluation. Note:  A total of > 50%  (of the total minutes) of this 25 minute visit was spent discussing the pathophysiology and treatment options and/or coordination of care of the above dx. Yann Sandoval am scribing for and in the presence of Dr. Carol Franks June 19, 2019/8:13 AM/Tierra Franks. Mina Teran MD,  I personally performed the services described in this documentation as scribed by Jack Solis in my presence and it appears accurate and complete.

## 2019-08-30 RX ORDER — ZOSTER VACCINE RECOMBINANT, ADJUVANTED 50 MCG/0.5
KIT INTRAMUSCULAR
Qty: 1 EACH | Refills: 1 | Status: SHIPPED | OUTPATIENT
Start: 2019-08-30 | End: 2019-10-04

## 2019-09-18 DIAGNOSIS — E03.9 HYPOTHYROIDISM, UNSPECIFIED TYPE: ICD-10-CM

## 2019-09-18 RX ORDER — LEVOTHYROXINE SODIUM 0.07 MG/1
75 TABLET ORAL DAILY
Qty: 30 TABLET | Refills: 0 | Status: SHIPPED | OUTPATIENT
Start: 2019-09-18 | End: 2019-10-04 | Stop reason: DRUGHIGH

## 2019-09-25 ENCOUNTER — TELEPHONE (OUTPATIENT)
Dept: PRIMARY CARE CLINIC | Age: 84
End: 2019-09-25

## 2019-09-25 DIAGNOSIS — I25.10 CORONARY ARTERY DISEASE INVOLVING NATIVE HEART WITHOUT ANGINA PECTORIS, UNSPECIFIED VESSEL OR LESION TYPE: ICD-10-CM

## 2019-09-25 DIAGNOSIS — E03.9 ACQUIRED HYPOTHYROIDISM: ICD-10-CM

## 2019-09-25 DIAGNOSIS — I95.1 ORTHOSTATIC HYPOTENSION: ICD-10-CM

## 2019-09-25 LAB
ALBUMIN SERPL-MCNC: 4.2 G/DL (ref 3.5–5.2)
ALP BLD-CCNC: 44 U/L (ref 35–104)
ALT SERPL-CCNC: 9 U/L (ref 5–33)
ANION GAP SERPL CALCULATED.3IONS-SCNC: 17 MMOL/L (ref 7–19)
AST SERPL-CCNC: 15 U/L (ref 5–32)
BASOPHILS ABSOLUTE: 0 K/UL (ref 0–0.2)
BASOPHILS RELATIVE PERCENT: 0.3 % (ref 0–1)
BILIRUB SERPL-MCNC: 0.5 MG/DL (ref 0.2–1.2)
BUN BLDV-MCNC: 17 MG/DL (ref 8–23)
CALCIUM SERPL-MCNC: 10.1 MG/DL (ref 8.8–10.2)
CHLORIDE BLD-SCNC: 101 MMOL/L (ref 98–111)
CHOLESTEROL, TOTAL: 182 MG/DL (ref 160–199)
CO2: 21 MMOL/L (ref 22–29)
CREAT SERPL-MCNC: 0.9 MG/DL (ref 0.5–0.9)
CREATININE URINE: 63.2 MG/DL (ref 4.2–622)
EOSINOPHILS ABSOLUTE: 0.1 K/UL (ref 0–0.6)
EOSINOPHILS RELATIVE PERCENT: 2.3 % (ref 0–5)
GFR NON-AFRICAN AMERICAN: 59
GLUCOSE BLD-MCNC: 84 MG/DL (ref 74–109)
HCT VFR BLD CALC: 32.7 % (ref 37–47)
HDLC SERPL-MCNC: 90 MG/DL (ref 65–121)
HEMOGLOBIN: 12.1 G/DL (ref 12–16)
IMMATURE GRANULOCYTES #: 0 K/UL
LDL CHOLESTEROL CALCULATED: 76 MG/DL
LYMPHOCYTES ABSOLUTE: 1.2 K/UL (ref 1.1–4.5)
LYMPHOCYTES RELATIVE PERCENT: 34.6 % (ref 20–40)
MCH RBC QN AUTO: 38.3 PG (ref 27–31)
MCHC RBC AUTO-ENTMCNC: 37 G/DL (ref 33–37)
MCV RBC AUTO: 103.5 FL (ref 81–99)
MICROALBUMIN UR-MCNC: <1.2 MG/DL (ref 0–19)
MICROALBUMIN/CREAT UR-RTO: NORMAL MG/G
MONOCYTES ABSOLUTE: 0.4 K/UL (ref 0–0.9)
MONOCYTES RELATIVE PERCENT: 11.8 % (ref 0–10)
NEUTROPHILS ABSOLUTE: 1.8 K/UL (ref 1.5–7.5)
NEUTROPHILS RELATIVE PERCENT: 50.7 % (ref 50–65)
PDW BLD-RTO: 13.5 % (ref 11.5–14.5)
PLATELET # BLD: 262 K/UL (ref 130–400)
PMV BLD AUTO: 10.8 FL (ref 9.4–12.3)
POTASSIUM SERPL-SCNC: 4.3 MMOL/L (ref 3.5–5)
RBC # BLD: 3.16 M/UL (ref 4.2–5.4)
SODIUM BLD-SCNC: 139 MMOL/L (ref 136–145)
T4 FREE: 1.4 NG/DL (ref 0.9–1.7)
TOTAL PROTEIN: 6.9 G/DL (ref 6.6–8.7)
TRIGL SERPL-MCNC: 81 MG/DL (ref 0–149)
TSH SERPL DL<=0.05 MIU/L-ACNC: 5.1 UIU/ML (ref 0.27–4.2)
WBC # BLD: 3.6 K/UL (ref 4.8–10.8)

## 2019-09-26 DIAGNOSIS — K21.9 GASTROESOPHAGEAL REFLUX DISEASE WITHOUT ESOPHAGITIS: Chronic | ICD-10-CM

## 2019-09-27 RX ORDER — OMEPRAZOLE 10 MG/1
10 CAPSULE, DELAYED RELEASE ORAL DAILY
Qty: 90 CAPSULE | Refills: 3 | Status: SHIPPED | OUTPATIENT
Start: 2019-09-27 | End: 2020-06-30

## 2019-10-04 ENCOUNTER — OFFICE VISIT (OUTPATIENT)
Dept: PRIMARY CARE CLINIC | Age: 84
End: 2019-10-04
Payer: MEDICARE

## 2019-10-04 VITALS
SYSTOLIC BLOOD PRESSURE: 110 MMHG | BODY MASS INDEX: 24.07 KG/M2 | DIASTOLIC BLOOD PRESSURE: 80 MMHG | HEIGHT: 64 IN | WEIGHT: 141 LBS | HEART RATE: 52 BPM | TEMPERATURE: 98 F | OXYGEN SATURATION: 98 %

## 2019-10-04 DIAGNOSIS — E03.9 ACQUIRED HYPOTHYROIDISM: ICD-10-CM

## 2019-10-04 DIAGNOSIS — J44.9 CHRONIC OBSTRUCTIVE PULMONARY DISEASE, UNSPECIFIED COPD TYPE (HCC): Primary | ICD-10-CM

## 2019-10-04 DIAGNOSIS — D56.3 THALASSEMIA MINOR: ICD-10-CM

## 2019-10-04 DIAGNOSIS — I25.10 CORONARY ARTERY DISEASE INVOLVING NATIVE HEART WITHOUT ANGINA PECTORIS, UNSPECIFIED VESSEL OR LESION TYPE: ICD-10-CM

## 2019-10-04 DIAGNOSIS — D75.89 MACROCYTOSIS WITHOUT ANEMIA: ICD-10-CM

## 2019-10-04 DIAGNOSIS — K21.9 GASTROESOPHAGEAL REFLUX DISEASE WITHOUT ESOPHAGITIS: Chronic | ICD-10-CM

## 2019-10-04 DIAGNOSIS — I48.0 PAROXYSMAL A-FIB (HCC): ICD-10-CM

## 2019-10-04 PROCEDURE — G8926 SPIRO NO PERF OR DOC: HCPCS | Performed by: PEDIATRICS

## 2019-10-04 PROCEDURE — G8427 DOCREV CUR MEDS BY ELIG CLIN: HCPCS | Performed by: PEDIATRICS

## 2019-10-04 PROCEDURE — 4040F PNEUMOC VAC/ADMIN/RCVD: CPT | Performed by: PEDIATRICS

## 2019-10-04 PROCEDURE — 3023F SPIROM DOC REV: CPT | Performed by: PEDIATRICS

## 2019-10-04 PROCEDURE — 1090F PRES/ABSN URINE INCON ASSESS: CPT | Performed by: PEDIATRICS

## 2019-10-04 PROCEDURE — G0008 ADMIN INFLUENZA VIRUS VAC: HCPCS | Performed by: PEDIATRICS

## 2019-10-04 PROCEDURE — G8484 FLU IMMUNIZE NO ADMIN: HCPCS | Performed by: PEDIATRICS

## 2019-10-04 PROCEDURE — 99214 OFFICE O/P EST MOD 30 MIN: CPT | Performed by: PEDIATRICS

## 2019-10-04 PROCEDURE — G8598 ASA/ANTIPLAT THER USED: HCPCS | Performed by: PEDIATRICS

## 2019-10-04 PROCEDURE — 1123F ACP DISCUSS/DSCN MKR DOCD: CPT | Performed by: PEDIATRICS

## 2019-10-04 PROCEDURE — 90653 IIV ADJUVANT VACCINE IM: CPT | Performed by: PEDIATRICS

## 2019-10-04 PROCEDURE — 90670 PCV13 VACCINE IM: CPT | Performed by: PEDIATRICS

## 2019-10-04 PROCEDURE — G0009 ADMIN PNEUMOCOCCAL VACCINE: HCPCS | Performed by: PEDIATRICS

## 2019-10-04 PROCEDURE — 1036F TOBACCO NON-USER: CPT | Performed by: PEDIATRICS

## 2019-10-04 PROCEDURE — G8420 CALC BMI NORM PARAMETERS: HCPCS | Performed by: PEDIATRICS

## 2019-10-04 RX ORDER — LEVOTHYROXINE SODIUM 88 UG/1
88 TABLET ORAL DAILY
Qty: 30 TABLET | Refills: 5 | Status: SHIPPED | OUTPATIENT
Start: 2019-10-04 | End: 2020-03-04

## 2019-10-04 ASSESSMENT — ENCOUNTER SYMPTOMS
BACK PAIN: 0
SHORTNESS OF BREATH: 0
DIARRHEA: 0
EYE DISCHARGE: 0
WHEEZING: 0
SORE THROAT: 0
COUGH: 0
ABDOMINAL PAIN: 0
VOMITING: 0
CONSTIPATION: 0
SINUS PRESSURE: 0
NAUSEA: 0
CHEST TIGHTNESS: 0

## 2019-10-24 ENCOUNTER — TELEPHONE (OUTPATIENT)
Dept: CARDIOLOGY | Age: 84
End: 2019-10-24

## 2019-11-20 DIAGNOSIS — E03.9 ACQUIRED HYPOTHYROIDISM: ICD-10-CM

## 2019-11-20 DIAGNOSIS — D75.89 MACROCYTOSIS WITHOUT ANEMIA: ICD-10-CM

## 2019-11-20 LAB
T4 FREE: 1.42 NG/DL (ref 0.93–1.7)
TSH SERPL DL<=0.05 MIU/L-ACNC: 2.25 UIU/ML (ref 0.27–4.2)
VITAMIN B-12: 368 PG/ML (ref 211–946)

## 2019-11-21 ENCOUNTER — TELEPHONE (OUTPATIENT)
Dept: PRIMARY CARE CLINIC | Age: 84
End: 2019-11-21

## 2020-01-07 ENCOUNTER — OFFICE VISIT (OUTPATIENT)
Dept: CARDIOLOGY | Age: 85
End: 2020-01-07
Payer: MEDICARE

## 2020-01-07 VITALS
HEIGHT: 64 IN | WEIGHT: 144 LBS | BODY MASS INDEX: 24.59 KG/M2 | SYSTOLIC BLOOD PRESSURE: 142 MMHG | DIASTOLIC BLOOD PRESSURE: 90 MMHG | HEART RATE: 52 BPM

## 2020-01-07 PROCEDURE — G8482 FLU IMMUNIZE ORDER/ADMIN: HCPCS | Performed by: NURSE PRACTITIONER

## 2020-01-07 PROCEDURE — 1090F PRES/ABSN URINE INCON ASSESS: CPT | Performed by: NURSE PRACTITIONER

## 2020-01-07 PROCEDURE — 4040F PNEUMOC VAC/ADMIN/RCVD: CPT | Performed by: NURSE PRACTITIONER

## 2020-01-07 PROCEDURE — 1123F ACP DISCUSS/DSCN MKR DOCD: CPT | Performed by: NURSE PRACTITIONER

## 2020-01-07 PROCEDURE — 93000 ELECTROCARDIOGRAM COMPLETE: CPT | Performed by: NURSE PRACTITIONER

## 2020-01-07 PROCEDURE — 99213 OFFICE O/P EST LOW 20 MIN: CPT | Performed by: NURSE PRACTITIONER

## 2020-01-07 PROCEDURE — G8420 CALC BMI NORM PARAMETERS: HCPCS | Performed by: NURSE PRACTITIONER

## 2020-01-07 PROCEDURE — G8427 DOCREV CUR MEDS BY ELIG CLIN: HCPCS | Performed by: NURSE PRACTITIONER

## 2020-01-07 PROCEDURE — 1036F TOBACCO NON-USER: CPT | Performed by: NURSE PRACTITIONER

## 2020-01-07 NOTE — PROGRESS NOTES
Dear Dr. Yemi Castle DO,    Thank you for allowing me to participate in the care of Ms. Temi Bach. She presents today at the [address] [place] [address]. As you know, Ms. Bach is a 80 y.o. female with history of PAF, chronic anticoagulation, and Mild CAD who presents with the chief complaint of follow-up for chronic cardiac conditions. She is a former patient of Dr. Naima Nguyen. Since last seen, she has been stable from a cardiac standpoint. She continues to live with her daughter at home. She has had no falls. She has had no bleeding issues on the Xarelto. She had one episode of A. fib back about 3 months ago that was very short in duration. She ha no further episodes. She denies any chest pain. She denies any shortness of breath. Her BP runs low at home and she has to take midrodrine twice a day. she is sleeping on 1 pillow at night. she is not waking gasping for air. she denies any swelling. she has been compliant with her medications. her BP has been controlled. PCP follows labs and lipids. She otherwise denies chest pain, SOA, SALCIDO, PND, orthopnea, syncope or near syncope. She has no other complaints. Review of Systems   Constitutional: Negative for fever, chills, diaphoresis, activity change, appetite change, fatigue and unexpected weight change. Eyes: Negative for photophobia, pain, redness and visual disturbance. Respiratory: Negative for apnea, cough, chest tightness, shortness of breath, wheezing and stridor. Cardiovascular: Negative for chest pain, palpitations and leg swelling. Gastrointestinal: Negative for abdominal distention. Genitourinary: Negative for dysuria, urgency and frequency. Musculoskeletal: Negative for myalgias, arthralgias and gait problem. Skin: Negative for color change, pallor, rash and wound. Neurological: Negative for dizziness, tremors, speech difficulty, weakness and numbness.    Hematological: Does not bruise/bleed easily. Psychiatric/Behavioral: Negative.         Past Medical History:   Diagnosis Date    A-fib ([address]     Abnormal weight loss     Anemia     Arthritis     Bullae     CAD (coronary artery disease)     Conjunctivitis     COPD (chronic obstructive pulmonary disease) (HCC)     Depression     Fatigue     History of blood transfusion 2017    post op broken hip    Hypertension     Hypothyroidism     Kidney stone     Leukopenia     Leukopenia     Low back pain     Mild CAD 5/15/2017    Osteoporosis     Paroxysmal A-fib ([address]     Prolonged emergence from general anesthesia     Sinus pause     10/2014    Thyroid disease     Urinary tract infection     Weakness        Past Surgical History:   Procedure Laterality Date    BACK SURGERY      BACK SURGERY      CARDIAC CATHETERIZATION  8/14/[facility]  [facility]    Mild, non-occlusive CAD, EF 60%    CYSTOSCOPY Left 8/31/2017    CYSTOSCOPY; LEFT URETEROSCOPY; LEFT URETERAL LASER LITHOTRIPSY AND STONE EXTRACTION; INSERTION LEFT URETERAL DOUBLE J STENT performed by Valentine Baca MD at [facility] Bilateral 12/14/2017    CYSTOSCOPY STENT INSERTION performed by Valentine Baca MD at [place] [address] Right 10/11/2017    FEMUR IM NAIL MICHELLE INSERTION performed by Governor Donald DO at [address] [place] [address] Rd      HYSTERECTOMY      KNEE ARTHROPLASTY      x 2    LAMINECTOMY      2 lumbar segments    [place] CYSTO/URETERO/PYELOSCOPY W/LITHOTRIPSY Left 1/24/2018    LITHOTRIPSY LASER performed by Valentine Baca MD at [facility] CYSTO/URETERO/PYELOSCOPY, DX Bilateral 1/24/2018    CYSTOSCOPY BILATERAL URETEROSCOPIES WITH STENT REMOVAL AND RETROGRADE PYELOGRAMS WITH BILATERAL STONE MANIPULATION AND RETRIEVAL  PLACEMENT OF DOUBLE J URETERAL STENTS BILATERAL performed by Valentine [facility]aca MD at [facility] OR    SHOULDER ARTHROPLASTY      URETER STENT PLACEMENT         Family History   Problem Relation Age of Onset    Cancer normal.     Lab Results   Component Value Date    CREATININE 0.9 09/25/2019    CREATININE 0.6 02/12/2018    CREATININE 0.8 01/18/2018    HGB 12.1 09/25/2019    HGB 10.4 02/12/2018    HGB 11.3 01/18/2018      EKG- 1/7/20  Sinus Bradycardia, QTc 456 msec, HR 52 bpm    Assessment, Recommendations, & Plan:  80 y.o. female with PAF, chronic anticoagulation & Mild CAD    PAF/chronic anticoagulation-Controlled on Rythmol, She is also anticoagulated on Xarelto. I discussed again with her daughter and herself about her risk of falling and bleeding. They are both aware. She has had no recent falls. No changes    Mild CAD- Continue to monitor    Disposition - RTC in 6 months  or sooner if needed      Please do not hesitate to contact me for any questions or concerns.     Sincerely yours,    ROCAEL Berg

## 2020-01-07 NOTE — PATIENT INSTRUCTIONS
Atrial Fibrillation     Definition   Atrial fibrillation is an abnormal heart rhythm. The heart's electrical system normally sends regularly spaced signals telling the heart muscle to beat. The heart has two upper chambers, called atria, and two lower chambers, called ventricles. Each signal starts in the atria and travels to the rest of the heart. In atrial fibrillation, the electrical signals from the atria are fast and irregular. The atria quiver, rather than contract. Some signals do not reach the ventricles and the ventricles continue pumping, usually irregularly and sometimes rapidly. This uncoordinated rhythm can reduce the heart's efficiency at pumping blood out to the body. Blood left in the heart chambers can form clots. These clots may sometimes break away, travel to the brain, and cause a stroke . Atrial Fibrillation        [address] [place] [address].   Causes   In most cases, atrial fibrillation is due to an existing heart condition. But atrial fibrillation can occur in people with no structural heart problems. A thyroid disorder or other condition may cause the abnormal rhythm. In some cases, the cause is unknown.    Risk Factors   Risk factors include:   · Cardiovascular diseases:   ¨ High blood pressure   ¨ Coronary artery disease   ¨ Congestive heart failure   ¨ Heart attack   ¨ Heart valve disease   ¨ Endocarditis (infection of a heart valve)   ¨ Cardiomyopathy (disease of the heart muscle)   ¨ Congenital heart disease   ¨ Prior episode of atrial fibrillation   · Lung diseases:   ¨ Emphysema   ¨ Asthma   ¨ Blood clots in the lungs   · Age: 54 or older   · Smoking   · Chronic conditions:   ¨ Overactive thyroid   ¨ Diabetes   · Excessive alcohol intake   · Use of stimulant drugs, including caffeine   · Sex: male   · Undergoing general anesthesia   · Stress, either emotional or physical   · Family history of atrial fibrillation     Symptoms   Symptoms can vary from mild to severe, depending on your heart function and overall health. Some people may not notice any symptoms. Symptoms include:   · Irregular or rapid pulse or heart beat   · Racing feeling in the chest   · Palpitations, or a pounding feeling in the chest   · Dizziness, lightheadedness, or fainting   · Sweating   · Pain or pressure in the chest   · Shortness of breath   · Fatigue or weakness   · Exercise intolerance     Treatment   The goals of treatment are to:   · Restore a regular rhythm, if possible   · Keep heart rate as close to normal   ¨ Your doctor will tell you what your target heart rate is. In general, the your resting rate should be between 60-80 beats per minute, and  beats per minute during moderate exercise. · Prevent blood clots from forming   If an underlying cause of atrial fibrillation is found, it may be treated. Some patients return to a normal rhythm without treatment. Treatments include:   Medication   · Drugs to slow the heart rate, such as:   ¨ Digitalis   ¨ Verapamil   ¨ Diltiazem   ¨ Metoprolol   ¨ Atenolol   · Drugs to keep the heart in a regular rhythm, such as:   ¨ Sotalol (Betapace)  ¨ Propafenone (Rythmol)  ¨ Amiodarone   ¨ Multaq  · Drugs to prevent clot formation, such as warfarin (Coumadin) or Pradaxa    If you are started on a blood thinners, you will need at least 6 weeks prior to trying to get your heart back into a normal rhythm. If you are on Coumadin, you will need weekly blood checks to monitor your INR. You will need to keep your INR between 2.0-2.5 or 2.0 and 3.0 as your doctor's directions. Cardioversion   Cardioversion is a procedure that uses an electrical current or drugs to help normalize the heart rhythm. You will be in the hospital approximately 2-3 days to start antiarrhythmics (to keep your heart in rhythm) and shock to get you back in a normal rhythm.

## 2020-02-12 RX ORDER — MIDODRINE HYDROCHLORIDE 10 MG/1
TABLET ORAL
Qty: 90 TABLET | Refills: 3 | Status: SHIPPED | OUTPATIENT
Start: 2020-02-12 | End: 2020-07-06 | Stop reason: SDUPTHER

## 2020-02-12 NOTE — TELEPHONE ENCOUNTER
Pt seen 10/4/19. Shu Chou   Requested Prescriptions     Pending Prescriptions Disp Refills    midodrine (PROAMATINE) 10 MG tablet [Pharmacy Med Name: MIDODRINE HCL 10 MG TAB 10 TAB] 90 tablet 3     Sig: TAKE 1/2 (ONE-HALF) TABLET BY MOUTH EVERY MORNING AND TAKE 1/2 (ONE-HALF) TABLET IN THE AFTERNOON

## 2020-02-24 ENCOUNTER — TELEPHONE (OUTPATIENT)
Dept: PRIMARY CARE CLINIC | Age: 85
End: 2020-02-24

## 2020-02-24 NOTE — TELEPHONE ENCOUNTER
With her numerous medications  It is best we get a urine for culture  As many wont work   We really need to see her  I will late this evening if that is best for her or anytime

## 2020-02-28 ENCOUNTER — TELEPHONE (OUTPATIENT)
Dept: PRIMARY CARE CLINIC | Age: 85
End: 2020-02-28

## 2020-02-28 ENCOUNTER — HOSPITAL ENCOUNTER (OUTPATIENT)
Dept: GENERAL RADIOLOGY | Age: 85
Discharge: HOME | End: 2020-02-28
Payer: MEDICARE

## 2020-02-28 ENCOUNTER — OFFICE VISIT (OUTPATIENT)
Dept: PRIMARY CARE CLINIC | Age: 85
End: 2020-02-28
Payer: MEDICARE

## 2020-02-28 VITALS
HEART RATE: 65 BPM | OXYGEN SATURATION: 95 % | HEIGHT: 64 IN | BODY MASS INDEX: 24.24 KG/M2 | TEMPERATURE: 98.4 F | WEIGHT: 142 LBS | SYSTOLIC BLOOD PRESSURE: 110 MMHG | DIASTOLIC BLOOD PRESSURE: 70 MMHG

## 2020-02-28 LAB
APPEARANCE FLUID: ABNORMAL
BILIRUBIN, POC: ABNORMAL
BLOOD URINE, POC: ABNORMAL
CLARITY, POC: ABNORMAL
COLOR, POC: YELLOW
GLUCOSE URINE, POC: ABNORMAL
KETONES, POC: ABNORMAL
LEUKOCYTE EST, POC: ABNORMAL
NITRITE, POC: POSITIVE
PH, POC: 6.5
PROTEIN, POC: ABNORMAL
SPECIFIC GRAVITY, POC: 1.02
UROBILINOGEN, POC: 0.2

## 2020-02-28 PROCEDURE — 99213 OFFICE O/P EST LOW 20 MIN: CPT | Performed by: NURSE PRACTITIONER

## 2020-02-28 PROCEDURE — G8482 FLU IMMUNIZE ORDER/ADMIN: HCPCS | Performed by: NURSE PRACTITIONER

## 2020-02-28 PROCEDURE — 1123F ACP DISCUSS/DSCN MKR DOCD: CPT | Performed by: NURSE PRACTITIONER

## 2020-02-28 PROCEDURE — 4040F PNEUMOC VAC/ADMIN/RCVD: CPT | Performed by: NURSE PRACTITIONER

## 2020-02-28 PROCEDURE — 1090F PRES/ABSN URINE INCON ASSESS: CPT | Performed by: NURSE PRACTITIONER

## 2020-02-28 PROCEDURE — G8427 DOCREV CUR MEDS BY ELIG CLIN: HCPCS | Performed by: NURSE PRACTITIONER

## 2020-02-28 PROCEDURE — G8420 CALC BMI NORM PARAMETERS: HCPCS | Performed by: NURSE PRACTITIONER

## 2020-02-28 PROCEDURE — 76770 US EXAM ABDO BACK WALL COMP: CPT

## 2020-02-28 PROCEDURE — 81002 URINALYSIS NONAUTO W/O SCOPE: CPT | Performed by: NURSE PRACTITIONER

## 2020-02-28 PROCEDURE — 1036F TOBACCO NON-USER: CPT | Performed by: NURSE PRACTITIONER

## 2020-02-28 RX ORDER — CIPROFLOXACIN 500 MG/1
500 TABLET, FILM COATED ORAL 2 TIMES DAILY
Qty: 14 TABLET | Refills: 0 | Status: SHIPPED | OUTPATIENT
Start: 2020-02-28 | End: 2020-02-28

## 2020-02-28 RX ORDER — CEFDINIR 300 MG/1
300 CAPSULE ORAL 2 TIMES DAILY
Qty: 20 CAPSULE | Refills: 0 | Status: SHIPPED | OUTPATIENT
Start: 2020-02-28 | End: 2020-03-09

## 2020-02-28 ASSESSMENT — ENCOUNTER SYMPTOMS
SHORTNESS OF BREATH: 0
COUGH: 0
SORE THROAT: 0
DIARRHEA: 0
VOMITING: 0
EYE REDNESS: 0
CONSTIPATION: 0
RHINORRHEA: 0

## 2020-02-28 NOTE — PATIENT INSTRUCTIONS
Patient Education        Urinary Tract Infection in Women: Care Instructions  Your Care Instructions    A urinary tract infection, or UTI, is a general term for an infection anywhere between the kidneys and the urethra (where urine comes out). Most UTIs are bladder infections. They often cause pain or burning when you urinate. UTIs are caused by bacteria and can be cured with antibiotics. Be sure to complete your treatment so that the infection goes away. Follow-up care is a key part of your treatment and safety. Be sure to make and go to all appointments, and call your doctor if you are having problems. It's also a good idea to know your test results and keep a list of the medicines you take. How can you care for yourself at home? · Take your antibiotics as directed. Do not stop taking them just because you feel better. You need to take the full course of antibiotics. · Drink extra water and other fluids for the next day or two. This may help wash out the bacteria that are causing the infection. (If you have kidney, heart, or liver disease and have to limit fluids, talk with your doctor before you increase your fluid intake.)  · Avoid drinks that are carbonated or have caffeine. They can irritate the bladder. · Urinate often. Try to empty your bladder each time. · To relieve pain, take a hot bath or lay a heating pad set on low over your lower belly or genital area. Never go to sleep with a heating pad in place. To prevent UTIs  · Drink plenty of water each day. This helps you urinate often, which clears bacteria from your system. (If you have kidney, heart, or liver disease and have to limit fluids, talk with your doctor before you increase your fluid intake.)  · Urinate when you need to. · Urinate right after you have sex. · Change sanitary pads often. · Avoid douches, bubble baths, feminine hygiene sprays, and other feminine hygiene products that have deodorants.   · After going to the bathroom, wipe from front to back. When should you call for help? Call your doctor now or seek immediate medical care if:    · Symptoms such as fever, chills, nausea, or vomiting get worse or appear for the first time.     · You have new pain in your back just below your rib cage. This is called flank pain.     · There is new blood or pus in your urine.     · You have any problems with your antibiotic medicine.    Watch closely for changes in your health, and be sure to contact your doctor if:    · You are not getting better after taking an antibiotic for 2 days.     · Your symptoms go away but then come back. Where can you learn more? Go to https://[employer]. org and sign in to your [employer] account. Enter D779 in the [employer] box to learn more about \"Urinary Tract Infection in Women: Care Instructions. \"     If you do not have an account, please click on the \"Sign Up Now\" link. Current as of: August 21, 2019  Content Version: 12.3  © 3656-0920 [employer]. Care instructions adapted under license by [facility] ([facility]). If you have questions about a medical condition or this instruction, always ask your healthcare professional. [place] [address] any warranty or liability for your use of this information.

## 2020-02-28 NOTE — TELEPHONE ENCOUNTER
----- Message from ROCAEL Hsieh sent at 2/28/2020  4:33 PM CST -----  Renal ultrasound shows nonobstructing left renal calculi. Typically this does not cause any type of pain since it is not obstructing. Recommend treatment as prescribed in office today.   If symptoms/flank pain worsens patient needs to notify the office

## 2020-02-28 NOTE — PROGRESS NOTES
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 female who is here for Hematuria and Fever (at home a couple days ago)     HPI  The patient presents today for evaluation of hematuria. She noticed this 2 days ago. Reports \"fresh\" blood in her urine  She had a fever a couple of days. She has a history of kidney stones. Reports left flank pain. \"It has improved. \"  Urinary frequency     height is 5' 4\" (1.626 m) and weight is 142 lb (64.4 kg). Her temporal temperature is 98.4 °F (36.9 °C). Her blood pressure is 110/70 and her pulse is 65. Her oxygen saturation is 95%. Body mass index is 24.37 kg/m². Results for orders placed or performed in visit on 19   T4, Free   Result Value Ref Range    T4 Free 1.42 0.93 - 1.70 ng/dL   TSH without Reflex   Result Value Ref Range    TSH 2.250 0.270 - 4.200 uIU/mL   Vitamin B12   Result Value Ref Range    Vitamin B-12 368 211 - 946 pg/mL     have reviewed the following with the Ms. Bach   Lab Review   Orders Only on 2019   Component Date Value    T4 Free 2019 1.42     TSH 2019 2.250     Vitamin B-12 2019 368    Orders Only on 2019   Component Date Value    Cholesterol, Total 2019 182     Triglycerides 2019 81     HDL 2019 90     LDL Calculated 2019 76     TSH 2019 5.100*    WBC 2019 3.6*    RBC 2019 3.16*    Hemoglobin 2019 12.1     Hematocrit 2019 32.7*    MCV 2019 103.5*    MCH 2019 38.3*    MCHC 2019 37.0     RDW 2019 13.5     Platelets  262     MPV 2019 10.8     Neutrophils % 2019 50.7     Lymphocytes % 2019 34.6     Monocytes % 2019 11.8*    Eosinophils % 2019 2.3     Basophils % 2019 0.3     Neutrophils Absolute 2019 1.8     Immature needed  Yes Louis García MD   meclizine (ANTIVERT) 12.5 MG tablet Take 25 mg by mouth as needed Take 2 tablets (25 mg) as needed Yes Historical Provider, MD   Calcium Carbonate-Vitamin D (CALTRATE 600+D PO) Take 600 mg by mouth 2 times daily.  Yes Historical Provider, MD       Allergies: Nitrofuran derivatives; Sulfa antibiotics; and Pcn [penicillins]    Past Medical History:   Diagnosis Date    [facility] [address]     Abnormal weight loss     Anemia     Arthritis     Bullae     CAD (coronary artery disease)     Conjunctivitis     COPD (chronic obstructive pulmonary disease) ([facility] [address]     Depression     Fatigue     History of blood transfusion 2017    post op broken hip    Hypertension     Hypothyroidism     Kidney stone     Leukopenia     Leukopenia     Low back pain     Mild CAD 5/15/2017    Osteoporosis     Paroxysmal A-fib ([facility] [address]     Prolonged emergence from general anesthesia     Sinus pause     10/2014    Thyroid disease     Urinary tract infection     Weakness        Past Surgical History:   Procedure Laterality Date    BACK SURGERY      BACK SURGERY      CARDIAC CATHETERIZATION  8/14/14  [facility]    Mild, non-occlusive CAD, EF 60%    CYSTOSCOPY Left 8/31/2017    CYSTOSCOPY; LEFT URETEROSCOPY; LEFT URETERAL LASER LITHOTRIPSY AND STONE EXTRACTION; INSERTION LEFT URETERAL DOUBLE J STENT performed by Louis García MD at [facility] Bilateral 12/14/2017    CYSTOSCOPY STENT INSERTION performed by Louis García MD at [place] [address] Right 10/11/2017    FEMUR IM NAIL MICHELLE INSERTION performed by Medhat Messer DO at [address] [place] [address]      HIP SURGERY      HYSTERECTOMY      KNEE ARTHROPLASTY      x 2    LAMINECTOMY      2 lumbar segments    IA CYSTO/URETERO/PYELOSCOPY W/LITHOTRIPSY Left 1/24/2018    LITHOTRIPSY LASER performed by Louis García MD at \[facility]\"" 43 CYSTO/URETERO/PYELOSCOPY, DX Bilateral 1/24/2018    CYSTOSCOPY BILATERAL Judgment: Judgment normal.       ASSESSMENT      ICD-10-CM    1. Acute cystitis with hematuria N30.01 POCT Urinalysis no Micro     US RENAL COMPLETE     cefdinir (OMNICEF) 300 MG capsule     Urine culture   2. Left flank pain R10.9 US RENAL COMPLETE   3. Urinary frequency R35.0 POCT Urinalysis no Micro     US RENAL COMPLETE     cefdinir (OMNICEF) 300 MG capsule   4. History of kidney stones Z87.442 US RENAL COMPLETE         PLAN    Orders Placed This Encounter   Procedures    US RENAL COMPLETE    POCT Urinalysis no Micro        Return if symptoms worsen or fail to improve. Patient Instructions       Patient Education        Urinary Tract Infection in Women: Care Instructions  Your Care Instructions    A urinary tract infection, or UTI, is a general term for an infection anywhere between the kidneys and the urethra (where urine comes out). Most UTIs are bladder infections. They often cause pain or burning when you urinate. UTIs are caused by bacteria and can be cured with antibiotics. Be sure to complete your treatment so that the infection goes away. Follow-up care is a key part of your treatment and safety. Be sure to make and go to all appointments, and call your doctor if you are having problems. It's also a good idea to know your test results and keep a list of the medicines you take. How can you care for yourself at home? · Take your antibiotics as directed. Do not stop taking them just because you feel better. You need to take the full course of antibiotics. · Drink extra water and other fluids for the next day or two. This may help wash out the bacteria that are causing the infection. (If you have kidney, heart, or liver disease and have to limit fluids, talk with your doctor before you increase your fluid intake.)  · Avoid drinks that are carbonated or have caffeine. They can irritate the bladder. · Urinate often. Try to empty your bladder each time.   · To relieve pain, take a hot bath or lay a

## 2020-03-02 ENCOUNTER — TELEPHONE (OUTPATIENT)
Dept: PRIMARY CARE CLINIC | Age: 85
End: 2020-03-02

## 2020-03-02 LAB
ORGANISM: ABNORMAL
ORGANISM: ABNORMAL
URINE CULTURE, ROUTINE: ABNORMAL

## 2020-03-02 NOTE — TELEPHONE ENCOUNTER
----- Message from ROCAEL Skaggs sent at 3/2/2020  7:23 AM CST -----  Please call patient and let them know results. Urine culture shows E. coli infection. Patient is on ALBERT MEGAN.   It appears that is sensitive to this

## 2020-03-04 RX ORDER — LEVOTHYROXINE SODIUM 88 UG/1
88 TABLET ORAL DAILY
Qty: 30 TABLET | Refills: 5 | Status: SHIPPED | OUTPATIENT
Start: 2020-03-04 | End: 2020-04-06 | Stop reason: SDUPTHER

## 2020-04-06 ENCOUNTER — TELEMEDICINE (OUTPATIENT)
Dept: PRIMARY CARE CLINIC | Age: 85
End: 2020-04-06
Payer: MEDICARE

## 2020-04-06 PROCEDURE — G8428 CUR MEDS NOT DOCUMENT: HCPCS | Performed by: PEDIATRICS

## 2020-04-06 PROCEDURE — 1090F PRES/ABSN URINE INCON ASSESS: CPT | Performed by: PEDIATRICS

## 2020-04-06 PROCEDURE — 99214 OFFICE O/P EST MOD 30 MIN: CPT | Performed by: PEDIATRICS

## 2020-04-06 PROCEDURE — 4040F PNEUMOC VAC/ADMIN/RCVD: CPT | Performed by: PEDIATRICS

## 2020-04-06 PROCEDURE — 1036F TOBACCO NON-USER: CPT | Performed by: PEDIATRICS

## 2020-04-06 PROCEDURE — G8926 SPIRO NO PERF OR DOC: HCPCS | Performed by: PEDIATRICS

## 2020-04-06 PROCEDURE — 3023F SPIROM DOC REV: CPT | Performed by: PEDIATRICS

## 2020-04-06 PROCEDURE — G8420 CALC BMI NORM PARAMETERS: HCPCS | Performed by: PEDIATRICS

## 2020-04-06 PROCEDURE — 1123F ACP DISCUSS/DSCN MKR DOCD: CPT | Performed by: PEDIATRICS

## 2020-04-06 RX ORDER — LEVOTHYROXINE SODIUM 88 UG/1
88 TABLET ORAL DAILY
Qty: 90 TABLET | Refills: 3 | Status: SHIPPED | OUTPATIENT
Start: 2020-04-06 | End: 2020-08-28

## 2020-04-06 ASSESSMENT — ENCOUNTER SYMPTOMS
VOMITING: 0
ABDOMINAL PAIN: 0
CHEST TIGHTNESS: 0
DIARRHEA: 0
NAUSEA: 0
EYE DISCHARGE: 0
CONSTIPATION: 0
BACK PAIN: 0
SHORTNESS OF BREATH: 0
WHEEZING: 0
COUGH: 0
SORE THROAT: 0
SINUS PRESSURE: 0

## 2020-04-06 NOTE — PROGRESS NOTES
02/28/2020 small*    Nitrite, UA 02/28/2020 positive*    Appearance, Fluid 02/28/2020 Cloudy*    Urine Culture, Routine 02/28/2020 *                    Value:>100,000 CFU/ml  Mixed skin cherelle present      Organism 02/28/2020 Escherichia coli*    Urine Culture, Routine 02/28/2020 Moderate growth     Organism 02/28/2020 Escherichia coli*    Urine Culture, Routine 02/28/2020 Moderate growth 2nd colony type    Orders Only on 11/20/2019   Component Date Value    T4 Free 11/20/2019 1.42     TSH 11/20/2019 2.250     Vitamin B-12 11/20/2019 368      Copies of these are in the chart. Current Outpatient Medications   Medication Sig Dispense Refill    levothyroxine (SYNTHROID) 88 MCG tablet Take 1 tablet by mouth daily 90 tablet 3    midodrine (PROAMATINE) 10 MG tablet TAKE 1/2 (ONE-HALF) TABLET BY MOUTH EVERY MORNING AND TAKE 1/2 (ONE-HALF) TABLET IN THE AFTERNOON 90 tablet 3    omeprazole (PRILOSEC) 10 MG delayed release capsule Take 1 capsule by mouth daily 90 capsule 3    propafenone (RYTHMOL) 225 MG tablet TAKE ONE TABLET BY MOUTH EVERY EIGHT HOURS 270 tablet 3    rivaroxaban (XARELTO) 20 MG TABS tablet TAKE 1 TABLET DAILY WITH BREAKFAST 90 tablet 3    potassium chloride (KLOR-CON M) 20 MEQ extended release tablet Take 1 tablet by mouth daily (Patient taking differently: Take 20 mEq by mouth daily Take with lasix) 30 tablet 5    furosemide (LASIX) 20 MG tablet Take 1 tablet by mouth daily (Patient taking differently: Take 20 mg by mouth daily as needed ) 60 tablet 3    meclizine (ANTIVERT) 12.5 MG tablet Take 25 mg by mouth as needed Take 2 tablets (25 mg) as needed      Calcium Carbonate-Vitamin D (CALTRATE 600+D PO) Take 600 mg by mouth 2 times daily. No current facility-administered medications for this visit.         Allergies: Nitrofuran derivatives; Sulfa antibiotics; and Pcn [penicillins]     Past Medical History:   Diagnosis Date    [facility] [address]     Abnormal weight loss     Anemia    

## 2020-04-06 NOTE — PATIENT INSTRUCTIONS
surface, use grippers that can be worn over your shoes in bad weather. · Be extra careful if weather is bad. Walk on the grass when the sidewalks are slick. If you live in a place that gets snow and ice in the winter, sprinkle salt on slippery stairs and sidewalks. · Be careful getting on or off buses and trains or getting in and out of cars. If handrails are available, use them. · Be careful when you cross the street. Look for crosswalks or places where curb cuts or ramps are present. · Try not to hurry, especially if you are carrying something. · Be cautious in parking lots or garages. There may be curbs or changes in pavement, or the height of the pavement may vary. · Make sure to wear the correct eyeglasses, if you need them. Reading glasses or bifocals can make it harder to see hazards that might be in your way. · If you are walking outdoors for exercise, try to:  ? Walk in well-lighted, well-maintained areas. These include high school or college tracks, shopping malls, and public spaces. ? Walk with a partner. ? Watch out for cracked sidewalks, curbs, changes in the height of the pavement, exposed tree roots, and debris such as fallen leaves or branches. Where can you learn more? Go to https://chsajaneb.[employer]. org and sign in to your [employer] account. Enter A824 in the [facility] box to learn more about \"Preventing Outdoor Falls: Care Instructions. \"     If you do not have an account, please click on the \"Sign Up Now\" link. Current as of: August 6, 2019Content Version: 12.4  © 2943-3589 Healthwise, Incorporated. Care instructions adapted under license by [facility] ([facility]). If you have questions about a medical condition or this instruction, always ask your healthcare professional. [address] any warranty or liability for your use of this information.          Patient Education        Preventing Outdoor Falls: Care Instructions  Your Care changes in the height of the pavement, exposed tree roots, and debris such as fallen leaves or branches. Where can you learn more? Go to https://chpepiceweb.[email or web address] org and sign in to your [employer] account. Enter W427 in the [employer] box to learn more about \"Preventing Outdoor Falls: Care Instructions. \"     If you do not have an account, please click on the \"Sign Up Now\" link. Current as of: August 6, 2019Content Version: 12.4  © 8890-6555 Healthwise, Incorporated. Care instructions adapted under license by [facility] ([facility]). If you have questions about a medical condition or this instruction, always ask your healthcare professional. [address] any warranty or liability for your use of this information.

## 2020-06-02 RX ORDER — PROPAFENONE HYDROCHLORIDE 225 MG/1
TABLET, FILM COATED ORAL
Qty: 270 TABLET | Refills: 3 | Status: SHIPPED | OUTPATIENT
Start: 2020-06-02 | End: 2020-07-08 | Stop reason: SDUPTHER

## 2020-06-30 RX ORDER — OMEPRAZOLE 10 MG/1
10 CAPSULE, DELAYED RELEASE ORAL DAILY
Qty: 90 CAPSULE | Refills: 3 | Status: SHIPPED | OUTPATIENT
Start: 2020-06-30 | End: 2021-06-15

## 2020-06-30 NOTE — TELEPHONE ENCOUNTER
Received fax from pharmacy requesting refill on pts medication(s). Pt was last seen in office on 2/28/2020  and has a follow up scheduled for 7/6/2020. Will send request to  [facility]  for authorization.      Requested Prescriptions     Pending Prescriptions Disp Refills    omeprazole (PRILOSEC) 10 MG delayed release capsule [Pharmacy Med Name: OMEPRAZOLE 10 MG CAP 10 CAP] 90 capsule 3     Sig: Take 1 capsule by mouth daily

## 2020-07-06 ENCOUNTER — TELEMEDICINE (OUTPATIENT)
Dept: PRIMARY CARE CLINIC | Age: 85
End: 2020-07-06
Payer: MEDICARE

## 2020-07-06 PROBLEM — N20.1 RIGHT URETERAL CALCULUS: Status: RESOLVED | Noted: 2017-12-14 | Resolved: 2020-07-06

## 2020-07-06 PROBLEM — Z87.81 HX OF FRACTURE OF RIGHT HIP: Status: ACTIVE | Noted: 2017-10-09

## 2020-07-06 PROBLEM — N11.1 OBSTRUCTIVE PYELONEPHRITIS: Status: RESOLVED | Noted: 2017-12-14 | Resolved: 2020-07-06

## 2020-07-06 PROBLEM — N10 ACUTE PYELONEPHRITIS: Status: RESOLVED | Noted: 2017-12-14 | Resolved: 2020-07-06

## 2020-07-06 PROBLEM — N30.01 ACUTE CYSTITIS WITH HEMATURIA: Status: RESOLVED | Noted: 2017-10-09 | Resolved: 2020-07-06

## 2020-07-06 PROBLEM — N20.0 RENAL CALCULUS, LEFT: Status: RESOLVED | Noted: 2017-09-22 | Resolved: 2020-07-06

## 2020-07-06 PROBLEM — N20.0 RENAL CALCULUS, RIGHT: Status: RESOLVED | Noted: 2017-12-14 | Resolved: 2020-07-06

## 2020-07-06 PROBLEM — E87.6 HYPOKALEMIA: Status: RESOLVED | Noted: 2018-03-20 | Resolved: 2020-07-06

## 2020-07-06 PROBLEM — E87.0 HYPERNATREMIA: Status: RESOLVED | Noted: 2017-12-19 | Resolved: 2020-07-06

## 2020-07-06 PROBLEM — R57.9 SHOCK (HCC): Status: RESOLVED | Noted: 2017-12-14 | Resolved: 2020-07-06

## 2020-07-06 PROCEDURE — 4040F PNEUMOC VAC/ADMIN/RCVD: CPT | Performed by: PEDIATRICS

## 2020-07-06 PROCEDURE — 1090F PRES/ABSN URINE INCON ASSESS: CPT | Performed by: PEDIATRICS

## 2020-07-06 PROCEDURE — G8428 CUR MEDS NOT DOCUMENT: HCPCS | Performed by: PEDIATRICS

## 2020-07-06 PROCEDURE — 99214 OFFICE O/P EST MOD 30 MIN: CPT | Performed by: PEDIATRICS

## 2020-07-06 PROCEDURE — 1123F ACP DISCUSS/DSCN MKR DOCD: CPT | Performed by: PEDIATRICS

## 2020-07-06 RX ORDER — MIDODRINE HYDROCHLORIDE 10 MG/1
10 TABLET ORAL 3 TIMES DAILY
Qty: 270 TABLET | Refills: 3 | Status: SHIPPED | OUTPATIENT
Start: 2020-07-06 | End: 2021-04-06 | Stop reason: SDUPTHER

## 2020-07-06 ASSESSMENT — ENCOUNTER SYMPTOMS
BACK PAIN: 0
SORE THROAT: 0
SINUS PRESSURE: 0
EYE DISCHARGE: 0
SHORTNESS OF BREATH: 0
CONSTIPATION: 0
DIARRHEA: 0
CHEST TIGHTNESS: 0
NAUSEA: 0
WHEEZING: 0
ABDOMINAL PAIN: 0
VOMITING: 0
COUGH: 0

## 2020-07-06 NOTE — PATIENT INSTRUCTIONS
call for help? [id number] anytime you think you may need emergency care. For example, call if:  · You passed out (lost consciousness). · You have severe trouble breathing. · You have a very slow heartbeat (less than 60 beats a minute). · You have a low body temperature (95°F or below). Call your doctor now or seek immediate medical care if:  · You feel tired, sluggish, or weak. · You have trouble remembering things or concentrating. · You do not begin to feel better 2 weeks after starting your medicine. Watch closely for changes in your health, and be sure to contact your doctor if you have any problems. Where can you learn more? Go to https://Wysada.com.[employer]. org and sign in to your [employer] account. Enter N113 in the [employer] box to learn more about \"Hypothyroidism: Care Instructions. \"     If you do not have an account, please click on the \"Sign Up Now\" link. Current as of: July 29, 2019               Content Version: 12.5  © 6112-2731 Healthwise, Incorporated. Care instructions adapted under license by [facility] ([facility]). If you have questions about a medical condition or this instruction, always ask your healthcare professional. [address] any warranty or liability for your use of this information.

## 2020-07-06 NOTE — PROGRESS NOTES
[address] [facility],  [address]  Phone [phone]   Fax [phone]      OFFICE VISIT: 2020    Tanja Ricemichaelmeche-: 1932      HPI  Reason For Visit:  Sarkis Jennings is a 80 y.o. Hypertension; Hyperlipidemia; and Hypothyroidism    Patient presents via video conference using [employer] on follow-up for multiple health issues. She was last evaluated in 2000 and was doing well at that time. She has no other significant concerns today other than her blood pressure being somewhat low if she did have to increase her Midrin dosing      Hypotension:  Medications:              Midodrine 10 mg 3 times daily  She does monitor her blood pressure at home.   100/74  this morning. She has adjusted her medication.          Atrial fibrillation:  Medication:              Propafenone 225 mg 3 times daily              Xarelto 20 mg daily  Symptoms: she does not sense rhythm changes/ palpitations  Maximum HR has been low 80's.        COPD  Medications              None  Symptoms:doing well.        Hypothyroidism:  Medication:   Synthroid 88 mcg daily  Medication compliance:  compliant most of the time  Patient is  taking her medication consistently on an empty stomach. Symptoms: none. Laboratory:  Lab Results   Component Value Date    TSH 2.250 2019    TSH 5.100 (H) 2019    TSH 2.220 2018     Lab Results   Component Value Date    T4FREE 1.42 2019    T4FREE 1.4 2019    T4FREE 1.3 2017       GERD:  Medication:              Omeprazole 10 mg daily  Symptoms: controlled.        Peripheral edema:  Medication:              Lasix 20 mg as needed edema  Symptoms: she rarely takes this. No significant edema in some time. She is watching her sodium intake.        vitals were not taken for this visit. There is no height or weight on file to calculate BMI. I have reviewed the following with the Ms. Bach   Lab Review  Office Visit on 2020 Component Date Value    Color, UA 02/28/2020 yellow     Clarity, UA 02/28/2020 cloudy     Glucose, UA POC 02/28/2020 neg     Bilirubin, UA 02/28/2020 neg     Ketones, UA 02/28/2020 neg     Spec Grav, UA 02/28/2020 1.025     Blood, UA POC 02/28/2020 moderate*    pH, UA 02/28/2020 6.5     Protein, UA POC 02/28/2020 trace*    Urobilinogen, UA 02/28/2020 0.2     Leukocytes, UA 02/28/2020 small*    Nitrite, UA 02/28/2020 positive*    Appearance, Fluid 02/28/2020 Cloudy*    Urine Culture, Routine 02/28/2020 *                    Value:>100,000 CFU/ml  Mixed skin cherelle present      Organism 02/28/2020 Escherichia coli*    Urine Culture, Routine 02/28/2020 Moderate growth     Organism 02/28/2020 Escherichia coli*    Urine Culture, Routine 02/28/2020 Moderate growth 2nd colony type      Copies of these are in the chart. Current Outpatient Medications   Medication Sig Dispense Refill    midodrine (PROAMATINE) 10 MG tablet Take 1 tablet by mouth 3 times daily 270 tablet 3    omeprazole (PRILOSEC) 10 MG delayed release capsule Take 1 capsule by mouth daily 90 capsule 3    rivaroxaban (XARELTO) 20 MG TABS tablet TAKE 1 TABLET DAILY WITH BREAKFAST 90 tablet 3    propafenone (RYTHMOL) 225 MG tablet TAKE ONE TABLET BY MOUTH EVERY EIGHT HOURS 270 tablet 3    levothyroxine (SYNTHROID) 88 MCG tablet Take 1 tablet by mouth daily 90 tablet 3    potassium chloride (KLOR-CON M) 20 MEQ extended release tablet Take 1 tablet by mouth daily (Patient taking differently: Take 20 mEq by mouth daily Take with lasix) 30 tablet 5    furosemide (LASIX) 20 MG tablet Take 1 tablet by mouth daily (Patient taking differently: Take 20 mg by mouth daily as needed ) 60 tablet 3    meclizine (ANTIVERT) 12.5 MG tablet Take 25 mg by mouth as needed Take 2 tablets (25 mg) as needed      Calcium Carbonate-Vitamin D (CALTRATE 600+D PO) Take 600 mg by mouth 2 times daily.        No current facility-administered medications for this visit.         Allergies: Nitrofuran derivatives; Sulfa antibiotics; and Pcn [penicillins]     Past Medical History:   Diagnosis Date    A-fib ([address]     Abnormal weight loss     Anemia     Arthritis     Bullae     CAD (coronary artery disease)     Conjunctivitis     COPD (chronic obstructive pulmonary disease) (HCC)     Depression     Fatigue     History of blood transfusion 2017    post op broken hip    Hypertension     Hypothyroidism     Kidney stone     Leukopenia     Leukopenia     Low back pain     Mild CAD 5/15/2017    Osteoporosis     Paroxysmal A-fib ([address]     Prolonged emergence from general anesthesia     Sinus pause     10/2014    Thyroid disease     Urinary tract infection     Weakness        Family History   Problem Relation Age of Onset    Cancer Father         lung    Stroke Mother         mini    Hypotension Mother        Past Surgical History:   Procedure Laterality Date    BACK SURGERY      BACK SURGERY      CARDIAC CATHETERIZATION  8/14/14  [address] [facility]    Mild, non-occlusive CAD, EF 60%    CYSTOSCOPY Left 8/31/2017    CYSTOSCOPY; LEFT URETEROSCOPY; LEFT URETERAL LASER LITHOTRIPSY AND STONE EXTRACTION; INSERTION LEFT URETERAL DOUBLE J STENT performed by Maximus Lyn MD at [facility] Bilateral 12/14/2017    CYSTOSCOPY STENT INSERTION performed by Maximus Lyn MD at [place] [address] Right 10/11/2017    FEMUR IM NAIL MICHELLE INSERTION performed by Anabell Mckenzie DO at [facility]      HIP SURGERY      HYSTERECTOMY      KNEE ARTHROPLASTY      x 2    LAMINECTOMY      2 lumbar segments    MO CYSTO/URETERO/PYELOSCOPY W/LITHOTRIPSY Left 1/24/2018    LITHOTRIPSY LASER performed by Maximus Lyn MD at [facility] CYSTO/URETERO/PYELOSCOPY, DX Bilateral 1/24/2018    CYSTOSCOPY BILATERAL URETEROSCOPIES WITH STENT REMOVAL AND RETROGRADE PYELOGRAMS WITH BILATERAL STONE MANIPULATION AND RETRIEVAL  PLACEMENT OF DOUBLE J URETERAL STENTS BILATERAL performed by Gaston Paul MD at [place] [address]         Social History     Tobacco Use    Smoking status: Never Smoker    Smokeless tobacco: Never Used   Substance Use Topics    Alcohol use: No        Review of Systems   Constitutional: Negative for appetite change, chills, fatigue and fever. HENT: Negative for congestion, ear discharge, ear pain, postnasal drip, sinus pressure and sore throat. Eyes: Negative for discharge and visual disturbance. Respiratory: Negative for cough, chest tightness, shortness of breath and wheezing. Cardiovascular: Negative for chest pain, palpitations and leg swelling. Gastrointestinal: Negative for abdominal pain, constipation, diarrhea, nausea and vomiting. Genitourinary: Negative for difficulty urinating, dysuria and menstrual problem. Musculoskeletal: Positive for arthralgias (mild diffuse). Negative for back pain, joint swelling and myalgias. Skin: Negative for rash. Neurological: Negative for dizziness, weakness and headaches. Hematological: Negative for adenopathy. Does not bruise/bleed easily. Psychiatric/Behavioral: Negative for sleep disturbance and suicidal ideas. The patient is not nervous/anxious. Physical Exam  Physical exam was not performed today as this was a video teleconference visit using [address] [facility] [address] [address]    1. Acquired hypothyroidism E03.9 CBC Auto Differential     Comprehensive Metabolic Panel     T4, Free     TSH without Reflex   2. Orthostatic hypotension I95.1 midodrine (PROAMATINE) 10 MG tablet     CBC Auto Differential     Comprehensive Metabolic Panel     Microalbumin / Creatinine Urine Ratio   3. Paroxysmal A-fib ([facility]) I48.0 CBC Auto Differential     Comprehensive Metabolic Panel   4. Chronic obstructive pulmonary disease, unspecified COPD type ([facility]) J44.9 CBC Auto Differential     Comprehensive Metabolic Panel   5.  Coronary artery disease involving native heart without angina pectoris, unspecified vessel or lesion type I25.10 CBC Auto Differential     Comprehensive Metabolic Panel     Lipid Panel     Microalbumin / Creatinine Urine Ratio   6. Gastroesophageal reflux disease, esophagitis presence not specified K21.9          PLAN    1. Orthostatic hypotension  She has increased her Midrin to 1 tablet 3 times daily. Her blood pressure was running down under 013 systolic. She was starting to have some symptoms of hypotension and orthostasis. Since the increase, her symptoms have resolved blood pressure today was 100/74  - midodrine (PROAMATINE) 10 MG tablet; Take 1 tablet by mouth 3 times daily  Dispense: 270 tablet; Refill: 3  - CBC Auto Differential; Future  - Comprehensive Metabolic Panel; Future  - Microalbumin / Creatinine Urine Ratio; Future    2. Acquired hypothyroidism  We will need to recheck thyroid levels. This has been controlled  - CBC Auto Differential; Future  - Comprehensive Metabolic Panel; Future  - T4, Free; Future  - TSH without Reflex; Future    3. Paroxysmal A-fib (HCC)  No symptoms of palpitations. Heart rate has been up to the low 80s at a maximum  - CBC Auto Differential; Future  - Comprehensive Metabolic Panel; Future    4. Chronic obstructive pulmonary disease, unspecified COPD type ([facility]  She is doing very well from a breathing standpoint. She is not smoking. She is not around secondhand smoke. She is not using any inhalers. She has been in the house due to coronavirus and has not had any issues with the heat and humidity.    - CBC Auto Differential; Future  - Comprehensive Metabolic Panel; Future    5. Coronary artery disease involving native heart without angina pectoris, unspecified vessel or lesion type  No symptoms of coronary angina. Doing well with risk factors controlled.   We will need to reassess that level of control with labs  - CBC Auto Differential; Future  - Comprehensive Metabolic Panel; Future  - Lipid Panel; Future  - Microalbumin / Creatinine Urine Ratio; Future      Orders Placed This Encounter   Procedures    CBC Auto Differential    Comprehensive Metabolic Panel    Lipid Panel    Microalbumin / Creatinine Urine Ratio    T4, Free    TSH without Reflex        Return in about 3 months (around 10/6/2020) for 30Maureen Espinoza is a 80 y.o. female being evaluated by a Virtual Visit (video visit) encounter to address concerns as mentioned above. A caregiver was present when appropriate. Due to this being a TeleHealth encounter (During [facility] public health emergency), evaluation of the following organ systems was limited: Vitals/Constitutional/EENT/Resp/CV/GI//MS/Neuro/Skin/Heme-Lymph-Imm. Pursuant to the emergency declaration under the [address] [place] [address] authority and the [employer] and [employer] Act, this Virtual Visit was conducted with patient's (and/or legal guardian's) consent, to reduce the patient's risk of exposure to COVID-19 and provide necessary medical care. The patient (and/or legal guardian) has also been advised to contact this office for worsening conditions or problems, and seek emergency medical treatment and/or call 911 if deemed necessary. Patient identification was verified at the start of the visit: Yes    Total time spent for this encounter: 25m    Services were provided through a video synchronous discussion virtually to substitute for in-person clinic visit. Patient and provider were located at their individual homes. --AMRIT Ireland DO on 7/6/2020 at 7:23 AM    An electronic signature was used to authenticate this note.

## 2020-07-08 RX ORDER — PROPAFENONE HYDROCHLORIDE 225 MG/1
225 TABLET, FILM COATED ORAL EVERY 8 HOURS
Qty: 270 TABLET | Refills: 3 | Status: SHIPPED | OUTPATIENT
Start: 2020-07-08 | End: 2021-06-10

## 2020-08-28 RX ORDER — LEVOTHYROXINE SODIUM 88 UG/1
88 TABLET ORAL DAILY
Qty: 30 TABLET | Refills: 5 | Status: SHIPPED | OUTPATIENT
Start: 2020-08-28 | End: 2020-09-29

## 2020-08-28 NOTE — TELEPHONE ENCOUNTER
Received fax from pharmacy requesting refill on pts medication(s). Pt was last seen in office on 7/6/2020  and has a follow up scheduled for 10/6/2020. Will send request to  Dr. Cox Files  for patient.      Requested Prescriptions     Pending Prescriptions Disp Refills    levothyroxine (SYNTHROID) 88 MCG tablet [Pharmacy Med Name: LEVOTHYROXINE SODIUM 88 MCG 88 TAB] 30 tablet 5     Sig: TAKE ONE TABLET BY MOUTH DAILY

## 2020-09-29 RX ORDER — LEVOTHYROXINE SODIUM 88 UG/1
88 TABLET ORAL DAILY
Qty: 30 TABLET | Refills: 5 | Status: SHIPPED | OUTPATIENT
Start: 2020-09-29 | End: 2021-02-23

## 2020-09-29 NOTE — TELEPHONE ENCOUNTER
Received fax from pharmacy requesting refill on pts medication(s). Pt was last seen in office on 7/6/2020  and has a follow up scheduled for 10/6/2020. Will send request to  Dr. Samy Mitchell  for authorization.      Requested Prescriptions     Pending Prescriptions Disp Refills    levothyroxine (SYNTHROID) 88 MCG tablet [Pharmacy Med Name: LEVOTHYROXINE 88 MCG 88 TAB] 30 tablet 5     Sig: Take 1 tablet by mouth Daily

## 2020-10-06 ENCOUNTER — TELEMEDICINE (OUTPATIENT)
Dept: PRIMARY CARE CLINIC | Age: 85
End: 2020-10-06
Payer: MEDICARE

## 2020-10-06 PROCEDURE — 99443 PR PHYS/QHP TELEPHONE EVALUATION 21-30 MIN: CPT | Performed by: PEDIATRICS

## 2020-10-06 ASSESSMENT — ENCOUNTER SYMPTOMS
COUGH: 0
SORE THROAT: 0
VOMITING: 0
WHEEZING: 0
BACK PAIN: 0
CONSTIPATION: 0
DIARRHEA: 0
NAUSEA: 0
CHEST TIGHTNESS: 0
SINUS PRESSURE: 0
EYE DISCHARGE: 0
SHORTNESS OF BREATH: 0
ABDOMINAL PAIN: 0

## 2020-10-06 NOTE — PROGRESS NOTES
[address] [facility], [address]  Phone [phone]   Fax [phone]      OFFICE VISIT: 10/6/2020    Yary Bach-: 1932      HPI  Reason For Visit:  Reva Jara is a 80 y.o. COPD    Patient presents on follow-up for multiple health issues. She presents via [employer] video conferencing    She does have a history of hypotension. She a takes Midodrin for this 10 mg 3 times daily. Blood pressure is well controlled without any symptoms of orthostasis. COPD:  She is not on any medications for this. Her breathing is doing very well. She does not feel she needs any additional medications. She is going to come in and get her flu shot this week. Atrial Fibrillation:  Medications:   Rate control:   None other than the propafenone   Anticoagulation:  Xarelto 20 mg daily        Most recent creatinine clearance as of 2019 was 59   Rhythm management: Propafenone 225 mg 3 times daily  Symptoms: No recent symptoms  She does have symptoms when she is out of rhythm and she can tell. Hypothyroidism:  Medication:   Synthroid 88 mcg daily  Medication compliance:  compliant most of the time  Patient is  taking her medication consistently on an empty stomach. Symptoms: none. Laboratory:  Lab Results   Component Value Date    TSH 2.250 2019    TSH 5.100 (H) 2019    TSH 2.220 2018     Lab Results   Component Value Date    T4FREE 1.42 2019    T4FREE 1.4 2019    T4FREE 1.3 2017        vitals were not taken for this visit. There is no height or weight on file to calculate BMI. I have reviewed the following with the MsMaureen Dwaynemichaelmeche   Lab Review  No visits with results within 6 Month(s) from this visit.    Latest known visit with results is:   Office Visit on 2020   Component Date Value    Color, UA 2020 yellow     Clarity, UA 2020 cloudy     Glucose, UA POC 2020 neg     Bilirubin, UA 2020 neg  Ketones, UA 02/28/2020 neg     Spec Grav, UA 02/28/2020 1.025     Blood, UA POC 02/28/2020 moderate*    pH, UA 02/28/2020 6.5     Protein, UA POC 02/28/2020 trace*    Urobilinogen, UA 02/28/2020 0.2     Leukocytes, UA 02/28/2020 small*    Nitrite, UA 02/28/2020 positive*    Appearance, Fluid 02/28/2020 Cloudy*    Urine Culture, Routine 02/28/2020 *                    Value:>100,000 CFU/ml  Mixed skin cherelle present      Organism 02/28/2020 Escherichia coli*    Urine Culture, Routine 02/28/2020 Moderate growth     Organism 02/28/2020 Escherichia coli*    Urine Culture, Routine 02/28/2020 Moderate growth 2nd colony type      Copies of these are in the chart. Current Outpatient Medications   Medication Sig Dispense Refill    levothyroxine (SYNTHROID) 88 MCG tablet Take 1 tablet by mouth Daily 30 tablet 5    propafenone (RYTHMOL) 225 MG tablet Take 1 tablet by mouth every 8 hours 270 tablet 3    midodrine (PROAMATINE) 10 MG tablet Take 1 tablet by mouth 3 times daily 270 tablet 3    omeprazole (PRILOSEC) 10 MG delayed release capsule Take 1 capsule by mouth daily 90 capsule 3    rivaroxaban (XARELTO) 20 MG TABS tablet TAKE 1 TABLET DAILY WITH BREAKFAST 90 tablet 3    potassium chloride (KLOR-CON M) 20 MEQ extended release tablet Take 1 tablet by mouth daily (Patient taking differently: Take 20 mEq by mouth daily Take with lasix) 30 tablet 5    furosemide (LASIX) 20 MG tablet Take 1 tablet by mouth daily (Patient taking differently: Take 20 mg by mouth daily as needed ) 60 tablet 3    meclizine (ANTIVERT) 12.5 MG tablet Take 25 mg by mouth as needed Take 2 tablets (25 mg) as needed      Calcium Carbonate-Vitamin D (CALTRATE 600+D PO) Take 600 mg by mouth 2 times daily. No current facility-administered medications for this visit.         Allergies: Nitrofuran derivatives; Sulfa antibiotics; and Pcn [penicillins]     Past Medical History:   Diagnosis Date    A[facility]fib ([facility]     Abnormal weight loss     Anemia     Arthritis     Bullae     CAD (coronary artery disease)     Conjunctivitis     COPD (chronic obstructive pulmonary disease) (HCC)     Depression     Fatigue     History of blood transfusion 2017    post op broken hip    Hypertension     Hypothyroidism     Kidney stone     Leukopenia     Leukopenia     Low back pain     Mild CAD 5/15/2017    Osteoporosis     Paroxysmal A-fib (HCC)     Prolonged emergence from general anesthesia     Sinus pause     10/2014    Thyroid disease     Urinary tract infection     Weakness        Family History   Problem Relation Age of Onset    Cancer Father         lung    Stroke Mother         mini    Hypotension Mother        Past Surgical History:   Procedure Laterality Date    BACK SURGERY      BACK SURGERY      CARDIAC CATHETERIZATION  8/14/14  [facility]    Mild, non-occlusive CAD, EF 60%    CYSTOSCOPY Left 8/31/2017    CYSTOSCOPY; LEFT URETEROSCOPY; LEFT URETERAL LASER LITHOTRIPSY AND STONE EXTRACTION; INSERTION LEFT URETERAL DOUBLE J STENT performed by Ollie Bates MD at [address] [place] [address] Bilateral 12/14/2017    CYSTOSCOPY STENT INSERTION performed by Ollie Bates MD at [place] [address] Right 10/11/2017    FEMUR IM NAIL MICHELLE INSERTION performed by Carline Ayers DO at [address] [place] [address] Rd      HYSTERECTOMY      KNEE ARTHROPLASTY      x 2    LAMINECTOMY      2 lumbar segments    SC CYSTO/URETERO/PYELOSCOPY W/LITHOTRIPSY Left 1/24/2018    LITHOTRIPSY LASER performed by Ollie Bates MD at [facility] CYSTO/URETERO/PYELOSCOPY, DX Bilateral 1/24/2018    CYSTOSCOPY BILATERAL URETEROSCOPIES WITH STENT REMOVAL AND RETROGRADE PYELOGRAMS WITH BILATERAL STONE MANIPULATION AND RETRIEVAL  PLACEMENT OF DOUBLE J URETERAL STENTS BILATERAL performed by Ollie Bates MD at [place] [address]         Social History     Tobacco Use    Smoking status: Never Smoker    Smokeless tobacco: Never Used   Substance Use Topics    Alcohol use: No        Review of Systems   Constitutional: Negative for appetite change, chills, fatigue and fever. HENT: Negative for congestion, ear discharge, ear pain, postnasal drip, sinus pressure and sore throat. Eyes: Negative for discharge and visual disturbance. Respiratory: Negative for cough, chest tightness, shortness of breath and wheezing. Cardiovascular: Negative for chest pain, palpitations and leg swelling. Gastrointestinal: Negative for abdominal pain, constipation, diarrhea, nausea and vomiting. Genitourinary: Negative for difficulty urinating, dysuria and menstrual problem. Musculoskeletal: Positive for arthralgias (mild diffuse). Negative for back pain, joint swelling and myalgias. Skin: Negative for rash. Neurological: Negative for dizziness, weakness and headaches. Hematological: Negative for adenopathy. Does not bruise/bleed easily. Psychiatric/Behavioral: Negative for sleep disturbance and suicidal ideas. The patient is not nervous/anxious. Physical Exam  Physical Exam was not performed as this was a video teleconference visit via [address] [place] [address] Road    1. Chronic obstructive pulmonary disease, unspecified COPD type ([facility]  J44.9    2. Paroxysmal A-fib (HCC)  I48.0    3. Coronary artery disease involving native heart without angina pectoris, unspecified vessel or lesion type  I25.10    4. Acquired hypothyroidism  E03.9          PLAN    1. Chronic obstructive pulmonary disease, unspecified COPD type ([facility]  Doing very well without any additional inhalers or pulmonary medicines. She is going to get her flu shot this week    2. Paroxysmal A-fib (HCC)  Rate controlled and symptom controlled without any recent episodes of atrial fibrillation    3.  Coronary artery disease involving native heart without angina pectoris, unspecified vessel or lesion type  He denies any anginal symptoms    4. Acquired hypothyroidism  We do need to recheck thyroid as it has been now approaching a year      No orders of the defined types were placed in this encounter. Return in about 6 months (around 4/6/2021) for 30. Erna Pendleton is a 80 y.o. female being evaluated by a Virtual Visit (video visit) encounter to address concerns as mentioned above. A caregiver was present when appropriate. Due to this being a TeleHealth encounter (During [facility] public health emergency), evaluation of the following organ systems was limited: Vitals/Constitutional/EENT/Resp/CV/GI//MS/Neuro/Skin/Heme-Lymph-Imm. Pursuant to the emergency declaration under the [address] [place] [address] authority and the [employer] and [employer] Act, this Virtual Visit was conducted with patient's (and/or legal guardian's) consent, to reduce the patient's risk of exposure to COVID-19 and provide necessary medical care. The patient (and/or legal guardian) has also been advised to contact this office for worsening conditions or problems, and seek emergency medical treatment and/or call 911 if deemed necessary. Patient identification was verified at the start of the visit: Yes    Total time spent for this encounter: 25m    Services were provided through a video synchronous discussion virtually to substitute for in-person clinic visit. Patient and provider were located at their individual homes. --AMRIT Garza DO on 10/6/2020 at 6:01 PM    An electronic signature was used to authenticate this note.

## 2021-01-28 ENCOUNTER — IMMUNIZATION (OUTPATIENT)
Age: 86
End: 2021-01-28
Payer: MEDICARE

## 2021-01-28 PROCEDURE — 0001A PR IMM ADMN SARSCOV2 30MCG/0.3ML DIL RECON 1ST DOSE: CPT | Performed by: FAMILY MEDICINE

## 2021-01-28 PROCEDURE — 91300 COVID-19, PFIZER VACCINE 30MCG/0.3ML DOSE: CPT | Performed by: FAMILY MEDICINE

## 2021-02-20 ENCOUNTER — IMMUNIZATION (OUTPATIENT)
Age: 86
End: 2021-02-20
Payer: MEDICARE

## 2021-02-20 PROCEDURE — 91300 COVID-19, PFIZER VACCINE 30MCG/0.3ML DOSE: CPT | Performed by: FAMILY MEDICINE

## 2021-02-20 PROCEDURE — 0002A COVID-19, PFIZER VACCINE 30MCG/0.3ML DOSE: CPT | Performed by: FAMILY MEDICINE

## 2021-02-23 RX ORDER — LEVOTHYROXINE SODIUM 88 UG/1
88 TABLET ORAL DAILY
Qty: 90 TABLET | Refills: 3 | Status: SHIPPED | OUTPATIENT
Start: 2021-02-23 | End: 2022-02-23

## 2021-02-23 NOTE — TELEPHONE ENCOUNTER
Received fax from pharmacy requesting refill on pts medication(s). Pt was last seen in office on 10/6/2020  and has a follow up scheduled for 4/6/2021. Will send request to  Dr. Ana Ramesh  for patient.      Requested Prescriptions     Pending Prescriptions Disp Refills    levothyroxine (SYNTHROID) 88 MCG tablet [Pharmacy Med Name: LEVOTHYROXINE 88 MCG 88 Tablet] 90 tablet 7     Sig: TAKE ONE TABLET BY MOUTH DAILY

## 2021-04-06 ENCOUNTER — OFFICE VISIT (OUTPATIENT)
Dept: PRIMARY CARE CLINIC | Age: 86
End: 2021-04-06
Payer: MEDICARE

## 2021-04-06 VITALS
HEART RATE: 71 BPM | OXYGEN SATURATION: 97 % | BODY MASS INDEX: 23.09 KG/M2 | DIASTOLIC BLOOD PRESSURE: 84 MMHG | SYSTOLIC BLOOD PRESSURE: 126 MMHG | WEIGHT: 135.25 LBS | TEMPERATURE: 96.6 F | HEIGHT: 64 IN

## 2021-04-06 DIAGNOSIS — E03.9 ACQUIRED HYPOTHYROIDISM: ICD-10-CM

## 2021-04-06 DIAGNOSIS — I25.10 MILD CAD: Primary | ICD-10-CM

## 2021-04-06 DIAGNOSIS — Z00.00 ROUTINE GENERAL MEDICAL EXAMINATION AT A HEALTH CARE FACILITY: ICD-10-CM

## 2021-04-06 DIAGNOSIS — K21.9 GASTROESOPHAGEAL REFLUX DISEASE WITHOUT ESOPHAGITIS: Chronic | ICD-10-CM

## 2021-04-06 DIAGNOSIS — Z23 NEED FOR 23-POLYVALENT PNEUMOCOCCAL POLYSACCHARIDE VACCINE: ICD-10-CM

## 2021-04-06 DIAGNOSIS — I48.0 PAROXYSMAL A-FIB (HCC): ICD-10-CM

## 2021-04-06 DIAGNOSIS — D56.3 THALASSEMIA MINOR: ICD-10-CM

## 2021-04-06 DIAGNOSIS — R60.9 PERIPHERAL EDEMA: ICD-10-CM

## 2021-04-06 DIAGNOSIS — I95.1 ORTHOSTATIC HYPOTENSION: ICD-10-CM

## 2021-04-06 PROCEDURE — 1123F ACP DISCUSS/DSCN MKR DOCD: CPT | Performed by: PEDIATRICS

## 2021-04-06 PROCEDURE — G8420 CALC BMI NORM PARAMETERS: HCPCS | Performed by: PEDIATRICS

## 2021-04-06 PROCEDURE — G0439 PPPS, SUBSEQ VISIT: HCPCS | Performed by: PEDIATRICS

## 2021-04-06 PROCEDURE — G8427 DOCREV CUR MEDS BY ELIG CLIN: HCPCS | Performed by: PEDIATRICS

## 2021-04-06 PROCEDURE — 1090F PRES/ABSN URINE INCON ASSESS: CPT | Performed by: PEDIATRICS

## 2021-04-06 PROCEDURE — 1036F TOBACCO NON-USER: CPT | Performed by: PEDIATRICS

## 2021-04-06 PROCEDURE — 4040F PNEUMOC VAC/ADMIN/RCVD: CPT | Performed by: PEDIATRICS

## 2021-04-06 PROCEDURE — 99214 OFFICE O/P EST MOD 30 MIN: CPT | Performed by: PEDIATRICS

## 2021-04-06 RX ORDER — MIDODRINE HYDROCHLORIDE 10 MG/1
10 TABLET ORAL 3 TIMES DAILY
Qty: 270 TABLET | Refills: 3 | Status: SHIPPED | OUTPATIENT
Start: 2021-04-06 | End: 2022-08-09 | Stop reason: SDUPTHER

## 2021-04-06 SDOH — ECONOMIC STABILITY: TRANSPORTATION INSECURITY
IN THE PAST 12 MONTHS, HAS LACK OF TRANSPORTATION KEPT YOU FROM MEETINGS, WORK, OR FROM GETTING THINGS NEEDED FOR DAILY LIVING?: NO

## 2021-04-06 SDOH — ECONOMIC STABILITY: FOOD INSECURITY: WITHIN THE PAST 12 MONTHS, YOU WORRIED THAT YOUR FOOD WOULD RUN OUT BEFORE YOU GOT MONEY TO BUY MORE.: NEVER TRUE

## 2021-04-06 SDOH — ECONOMIC STABILITY: INCOME INSECURITY: HOW HARD IS IT FOR YOU TO PAY FOR THE VERY BASICS LIKE FOOD, HOUSING, MEDICAL CARE, AND HEATING?: NOT HARD AT ALL

## 2021-04-06 SDOH — ECONOMIC STABILITY: TRANSPORTATION INSECURITY
IN THE PAST 12 MONTHS, HAS THE LACK OF TRANSPORTATION KEPT YOU FROM MEDICAL APPOINTMENTS OR FROM GETTING MEDICATIONS?: NO

## 2021-04-06 SDOH — ECONOMIC STABILITY: FOOD INSECURITY: WITHIN THE PAST 12 MONTHS, THE FOOD YOU BOUGHT JUST DIDN'T LAST AND YOU DIDN'T HAVE MONEY TO GET MORE.: NEVER TRUE

## 2021-04-06 ASSESSMENT — ENCOUNTER SYMPTOMS
SINUS PRESSURE: 0
EYE DISCHARGE: 0
NAUSEA: 0
ABDOMINAL PAIN: 0
VOMITING: 0
CHEST TIGHTNESS: 0
CONSTIPATION: 0
WHEEZING: 0
COUGH: 0
SHORTNESS OF BREATH: 0
BACK PAIN: 0
DIARRHEA: 0
SORE THROAT: 0

## 2021-04-06 ASSESSMENT — PATIENT HEALTH QUESTIONNAIRE - PHQ9
SUM OF ALL RESPONSES TO PHQ QUESTIONS 1-9: 0
SUM OF ALL RESPONSES TO PHQ QUESTIONS 1-9: 0
2. FEELING DOWN, DEPRESSED OR HOPELESS: 0
SUM OF ALL RESPONSES TO PHQ QUESTIONS 1-9: 0

## 2021-04-06 NOTE — PROGRESS NOTES
[address] [facility], [address]  Phone [phone]   Fax [phone]      OFFICE VISIT: 2021    Ling Barkerff-: 1932      HPI  Reason For Visit:  Oskar Mobley is a 80 y.o. Medicare AWV (no concerns, routine check up) and Medication Refill (midodrine)    Patient presents for routine annual wellness evaluation. Present concerns:  No new concerns. She does need a refill of her medications. Orthostatic hypotension:  Medication:   Midodrine 10 mg 3 times daily  Symptoms:doing well on this       Paroxysmal Atrial Fibrillation:  Medications:   Rate control:   Propafenone 25 mg 3 times daily   Anticoagulation:  Xarelto 20 mg daily   Rhythm management: Propafenone 25 mg 3 times daily  Symptoms:no appreciated syptoms      Mild Coronary Artery Disease:  Medications:   Beta-blockade: None, she does not tolerate secondary to low blood pressure   RAAS Therapy: None   Lipid Management: None   Platelet Inhibition: None, however she is on Xarelto   Anti-anginal:  None  Symptoms: no anginal symptoms. Nitroglycerin use: none  Cardiology follow-up: She follows with [facility] cardiology  Additional studies: No recent studies      Hypothyroidism:  Medication:   Synthroid 88 mcg daily  Medication compliance:  compliant most of the time  Patient is  taking her medication consistently on an empty stomach. Symptoms: none. Laboratory:  Lab Results   Component Value Date    TSH 2.250 2019    TSH 5.100 (H) 2019    TSH 2.220 2018     Lab Results   Component Value Date    T4FREE 1.42 2019    T4FREE 1.4 2019    T4FREE 1.3 2017       Peripheral edema:  Medication:   Lasix 20 mg as needed   Potassium chloride 20 mEq daily as needed   Symptoms: some edema, but rarely takes. GERD:  Medication:   Omeprazole 10 mg daily  Symptoms:no recent symptoms       height is 5' 4\" (1.626 m) and weight is 135 lb 4 oz (61.3 kg).  Her temporal temperature is 96.6 °F (35.9 °C). Her blood pressure is 126/84 and her pulse is 71. Her oxygen saturation is 97%. Body mass index is 23.22 kg/m². I have reviewed the following with the Ms. Bach   Lab Review  No visits with results within 6 Month(s) from this visit. Latest known visit with results is:   Office Visit on 02/28/2020   Component Date Value    Color, UA 02/28/2020 yellow     Clarity, UA 02/28/2020 cloudy     Glucose, UA POC 02/28/2020 neg     Bilirubin, UA 02/28/2020 neg     Ketones, UA 02/28/2020 neg     Spec Grav, UA 02/28/2020 1.025     Blood, UA POC 02/28/2020 moderate*    pH, UA 02/28/2020 6.5     Protein, UA POC 02/28/2020 trace*    Urobilinogen, UA 02/28/2020 0.2     Leukocytes, UA 02/28/2020 small*    Nitrite, UA 02/28/2020 positive*    Appearance, Fluid 02/28/2020 Cloudy*    Urine Culture, Routine 02/28/2020 *                    Value:>100,000 CFU/ml  Mixed skin cherelle present      Organism 02/28/2020 Escherichia coli*    Urine Culture, Routine 02/28/2020 Moderate growth     Organism 02/28/2020 Escherichia coli*    Urine Culture, Routine 02/28/2020 Moderate growth 2nd colony type      Copies of these are in the chart.     Current Outpatient Medications   Medication Sig Dispense Refill    midodrine (PROAMATINE) 10 MG tablet Take 1 tablet by mouth 3 times daily 270 tablet 3    levothyroxine (SYNTHROID) 88 MCG tablet Take 1 tablet by mouth Daily 90 tablet 3    propafenone (RYTHMOL) 225 MG tablet Take 1 tablet by mouth every 8 hours 270 tablet 3    omeprazole (PRILOSEC) 10 MG delayed release capsule Take 1 capsule by mouth daily 90 capsule 3    rivaroxaban (XARELTO) 20 MG TABS tablet TAKE 1 TABLET DAILY WITH BREAKFAST 90 tablet 3    potassium chloride (KLOR-CON M) 20 MEQ extended release tablet Take 1 tablet by mouth daily (Patient taking differently: Take 20 mEq by mouth daily Take with lasix) 30 tablet 5    furosemide (LASIX) 20 MG tablet Take 1 tablet by mouth daily (Patient taking differently: Take 20 mg by mouth daily as needed ) 60 tablet 3    meclizine (ANTIVERT) 12.5 MG tablet Take 25 mg by mouth as needed Take 2 tablets (25 mg) as needed      Calcium Carbonate-Vitamin D (CALTRATE 600+D PO) Take 600 mg by mouth 2 times daily. No current facility-administered medications for this visit.         Allergies: Nitrofuran derivatives, Sulfa antibiotics, and Pcn [penicillins]     Past Medical History:   Diagnosis Date    A-fib ([address]     Abnormal weight loss     Anemia     Arthritis     Bullae     CAD (coronary artery disease)     Conjunctivitis     COPD (chronic obstructive pulmonary disease) (HCC)     Depression     Fatigue     History of blood transfusion 2017    post op broken hip    Hypertension     Hypothyroidism     Kidney stone     Leukopenia     Leukopenia     Low back pain     Mild CAD 5/15/2017    Osteoporosis     Paroxysmal A-fib ([address]     Prolonged emergence from general anesthesia     Sinus pause     10/2014    Thyroid disease     Urinary tract infection     Weakness        Family History   Problem Relation Age of Onset    Cancer Father         lung    Stroke Mother         mini    Hypotension Mother        Past Surgical History:   Procedure Laterality Date    BACK SURGERY      BACK SURGERY      CARDIAC [facility]HETERIZ[facility]  [facility]/14[facility]    Mild, non-occlusive CAD, EF 60%    CYSTOSCOPY Left 8/31/2017    CYSTOSCOPY; LEFT URETEROSCOPY; LEFT URETERAL LASER LITHOTRIPSY AND STONE EXTRACTION; INSERTION LEFT URETERAL DOUBLE J STENT performed by Lala Johnson MD at [facility] Bilateral 12/14/2017    CYSTOSCOPY STENT INSERTION performed by Lala Johnson MD at [place] [address] Right 10/11/2017    FEMUR IM NAIL MICHELLE INSERTION performed by Kamran Miranda DO at [address]      HIP SURGERY      HYSTERECTOMY      KNEE ARTHROPLASTY      x 2    LAMINECTOMY      2 lumbar segments    [place] CYSTO/URETERO/PYELOSCOPY W/LITHOTRIPSY Left 1/24/2018    LITHOTRIPSY LASER performed by Jada Ortiz MD at [address] [place] [address], DX Bilateral 1/24/2018    CYSTOSCOPY BILATERAL URETEROSCOPIES WITH STENT REMOVAL AND RETROGRADE PYELOGRAMS WITH BILATERAL STONE MANIPULATION AND RETRIEVAL  PLACEMENT OF DOUBLE J URETERAL STENTS BILATERAL performed by Jada Ortiz MD at [place] [address]         Social History     Tobacco Use    Smoking status: Never Smoker    Smokeless tobacco: Never Used   Substance Use Topics    Alcohol use: No        Review of Systems   Constitutional: Negative for appetite change, chills, fatigue and fever. HENT: Negative for congestion, ear discharge, ear pain, postnasal drip, sinus pressure and sore throat. Eyes: Negative for discharge and visual disturbance. Respiratory: Negative for cough, chest tightness, shortness of breath and wheezing. Cardiovascular: Negative for chest pain, palpitations and leg swelling. Gastrointestinal: Negative for abdominal pain, constipation, diarrhea, nausea and vomiting. Genitourinary: Negative for difficulty urinating, dysuria and menstrual problem. Musculoskeletal: Positive for arthralgias (mild diffuse). Negative for back pain, joint swelling and myalgias. Skin: Negative for rash. Neurological: Negative for dizziness, weakness and headaches. Hematological: Negative for adenopathy. Does not bruise/bleed easily. Psychiatric/Behavioral: Negative for sleep disturbance and suicidal ideas. The patient is not nervous/anxious. Physical Exam  Vitals signs reviewed. Constitutional:       General: She is not in acute distress. Appearance: She is well-developed and normal weight. She is not toxic-appearing. Comments: Body habitus is normal   HENT:      Head: Normocephalic and atraumatic. Right Ear: Ear canal and external ear normal. Decreased hearing noted.  A middle ear effusion is present. Left Ear: Hearing, tympanic membrane, ear canal and external ear normal.      Nose: Nose normal.      Mouth/Throat:      Mouth: Mucous membranes are moist.      Pharynx: Oropharynx is clear. Eyes:      General: Lids are normal.      Extraocular Movements: Extraocular movements intact. Conjunctiva/sclera: Conjunctivae normal.      Pupils: Pupils are equal, round, and reactive to light. Neck:      Musculoskeletal: Neck supple. Thyroid: No thyromegaly. Vascular: No carotid bruit or JVD. Trachea: Phonation normal.   Cardiovascular:      Rate and Rhythm: Normal rate and regular rhythm. No extrasystoles are present. Chest Wall: PMI is not displaced. Heart sounds: Normal heart sounds. No murmur. No friction rub. No gallop. Pulmonary:      Effort: Pulmonary effort is normal. No respiratory distress. Breath sounds: Normal breath sounds. No wheezing, rhonchi or rales. Abdominal:      General: Bowel sounds are normal. There is no distension. Palpations: Abdomen is soft. There is no mass. Tenderness: There is no abdominal tenderness. Genitourinary:     Comments: Examination deferred  Musculoskeletal: Normal range of motion. Comments: Joint examination reveals no acute arthritis or synovitis. Lymphadenopathy:      Cervical: No cervical adenopathy. Skin:     General: Skin is warm and dry. Findings: No rash. Neurological:      General: No focal deficit present. Mental Status: She is alert and oriented to person, place, and time. Cranial Nerves: No cranial nerve deficit (by gross examination). Sensory: No sensory deficit. Motor: No tremor or atrophy. Gait: Gait normal.      Comments: No focal deficits appreciated   Psychiatric:         Mood and Affect: Mood normal.         Speech: Speech normal.         Behavior: Behavior normal. Behavior is cooperative.              ASSESSMENT      ICD-10-CM    1. Mild CAD  I25.10 CBC Auto Differential     Comprehensive Metabolic Panel     Lipid Panel     Microalbumin / Creatinine Urine Ratio   2. Orthostatic hypotension  I95.1 CBC Auto Differential     Comprehensive Metabolic Panel     Microalbumin / Creatinine Urine Ratio     midodrine (PROAMATINE) 10 MG tablet   3. Paroxysmal A-fib (HCC)  I48.0 T4, Free     TSH without Reflex   4. Thalassemia minor  D56.3 CBC Auto Differential   5. Acquired hypothyroidism  E03.9 T4, Free     TSH without Reflex   6. Gastroesophageal reflux disease without esophagitis  K21.9    7. Peripheral edema  R60.9 Comprehensive Metabolic Panel   8. Routine general medical examination at a health care facility  Z00.00 CBC Auto Differential     Comprehensive Metabolic Panel     Lipid Panel     Microalbumin / Creatinine Urine Ratio     T4, Free     TSH without Reflex     midodrine (PROAMATINE) 10 MG tablet   9. Need for 23-polyvalent pneumococcal polysaccharide vaccine  Z23          PLAN    1. Orthostatic hypotension  Now normalized on medication  - CBC Auto Differential; Future  - Comprehensive Metabolic Panel; Future  - Microalbumin / Creatinine Urine Ratio; Future  - midodrine (PROAMATINE) 10 MG tablet; Take 1 tablet by mouth 3 times daily  Dispense: 270 tablet; Refill: 3    2. Mild CAD  No anginal symptoms  - CBC Auto Differential; Future  - Comprehensive Metabolic Panel; Future  - Lipid Panel; Future  - Microalbumin / Creatinine Urine Ratio; Future    3. Paroxysmal A-fib (HCC)  In regular rhythm today  - T4, Free; Future  - TSH without Reflex; Future    4. Thalassemia minor  Will recheck cbc  - CBC Auto Differential; Future    5. Acquired hypothyroidism  Will recheck the labs  - T4, Free; Future  - TSH without Reflex; Future    6. Gastroesophageal reflux disease without esophagitis  Doing well on minimal dose omeprazole    7. Peripheral edema  Prn lasix  - Comprehensive Metabolic Panel; Future    8.  Routine general medical examination at a [facility] care facility  Routine 20 mEq by mouth daily Take with lasix Yes ROCAEL Reyes   furosemide (LASIX) 20 MG tablet Take 1 tablet by mouth daily  Patient taking differently: Take 20 mg by mouth daily as needed  Yes Luz Dozier MD   meclizine (ANTIVERT) 12.5 MG tablet Take 25 mg by mouth as needed Take 2 tablets (25 mg) as needed Yes Historical Provider, MD   Calcium Carbonate-Vitamin D (CALTRATE 600+D PO) Take 600 mg by mouth 2 times daily.  Yes Historical Provider, MD         Past Medical History:   Diagnosis Date    A-fib ([address]     Abnormal weight loss     Anemia     Arthritis     Bullae     CAD (coronary artery disease)     Conjunctivitis     COPD (chronic obstructive pulmonary disease) (HCC)     Depression     Fatigue     History of blood transfusion 2017    post op broken hip    Hypertension     Hypothyroidism     Kidney stone     Leukopenia     Leukopenia     Low back pain     Mild CAD 5/15/2017    Osteoporosis     Paroxysmal A-fib ([address]     Prolonged emergence from general anesthesia     Sinus pause     10/2014    Thyroid disease     Urinary tract infection     Weakness        Past Surgical History:   Procedure Laterality Date    BACK SURGERY      BACK SURGERY      CARDIAC CATHETERIZATION  8/14/14  [address]    Mild, non-occlusive CAD, EF 60%    CYSTOSCOPY Left 8/31/2017    CYSTOSCOPY; LEFT URETEROSCOPY; LEFT URETERAL LASER LITHOTRIPSY AND STONE EXTRACTION; INSERTION LEFT URETERAL DOUBLE J STENT performed by Luz Dozier MD [facility] Bilateral 12/14/2017    CYSTOSCOPY STENT INSERTION performed by Luz Dozier MD at [place] [address] Right 10/11/2017    FEMUR IM NAIL MICHELLE INSERTION performed by Shwetha Demarco DO at [address]      HIP SURGERY      HYSTERECTOMY      KNEE ARTHROPLASTY      x 2    LAMINECTOMY      2 lumbar segments    [place] CYSTO/URETERO/PYELOSCOPY W/LITHOTRIPSY Left 1/24/2018    LITHOTRIPSY LASER performed by Bernie Tracy Elle Campuzano MD at [facility] CYSTO/URETERO/PYELOSCOPY, DX Bilateral 1/24/2018    CYSTOSCOPY BILATERAL URETEROSCOPIES WITH STENT REMOVAL AND RETROGRADE PYELOGRAMS WITH BILATERAL STONE MANIPULATION AND RETRIEVAL  PLACEMENT OF DOUBLE J URETERAL STENTS BILATERAL performed by Adilene Davidson MD at [facility]           Family History   Problem Relation Age of Onset    Cancer Father         lung    Stroke Mother         mini    Hypotension Mother        CareTeam (Including outside providers/suppliers regularly involved in providing care):   Patient Care Team:  Elle Vicente DO as PCP - General (Pediatrics)  Elle Vicente DO as PCP - [facility] Empaneled Provider  DIANNA Ibarra MD (Cardiology)  Tenisha José MD as Consulting Physician (Otolaryngology)    Wt Readings from Last 3 Encounters:   04/06/21 135 lb 4 oz (61.3 kg)   02/28/20 142 lb (64.4 kg)   01/07/20 144 lb (65.3 kg)     Vitals:    04/06/21 1043   BP: 126/84   Site: Left Upper Arm   Position: Sitting   Cuff Size: Large Adult   Pulse: 71   Temp: 96.6 °F (35.9 °C)   TempSrc: Temporal   SpO2: 97%   Weight: 135 lb 4 oz (61.3 kg)   Height: 5' 4\" (1.626 m)     Body mass index is 23.22 kg/m². Based upon direct observation of the patient, evaluation of cognition reveals recent and remote memory intact. Physical exam as documented elsewhere in a separate note    Patient's complete Health Risk Assessment and screening values have been reviewed and are found in Flowsheets. The following problems were reviewed today and where indicated follow up appointments were made and/or referrals ordered.     Positive Risk Factor Screenings with Interventions:           Health Habits/Nutrition:  Health Habits/Nutrition  Do you exercise for at least 20 minutes 2-3 times per week?: Yes  Have you lost any weight without trying in the past 3 months?: No  Do you eat only one meal per day?: No  Have you seen the dentist within the past year?: (!) No  Body mass index: 23.21  Health Habits/Nutrition Interventions:  · Dental exam overdue:  patient encouraged to make appointment with his/her dentist    Hearing/Vision:  No exam data present  Hearing/Vision  Do you or your family notice any trouble with your hearing that hasn't been managed with hearing aids?: (!) Yes  Do you have difficulty driving, watching TV, or doing any of your daily activities because of your eyesight?: No  Have you had an eye exam within the past year?: (!) No  Hearing/Vision Interventions:  · Hearing concerns: Additional information was provided  · Vision concerns:  patient encouraged to make appointment with his/her eye specialist     ADL:  ADLs  In the past 7 days, did you need help from others to perform any of the following everyday activities? Eating, dressing, grooming, bathing, toileting, or walking/balance?: (!) Walking/Balance  In the past 7 days, did you need help from others to take care of any of the following?  Laundry, housekeeping, banking/finances, shopping, telephone use, food preparation, transportation, or taking medications?: (!) Food Preparation, Banking/Finances, Shopping, Taking Medications  ADL Interventions:  · Additional information regarding fall prevention was provided    Personalized Preventive Plan   Current Health Maintenance Status  Immunization History   Administered Date(s) Administered    COVID-19, D[place]osta Peter, PF, 30mcg/0.3mL 01/28/2021, 02/20/2021    Influenza, MDCK Quadv, IM, PF (Flucelvax 4 yrs and older) 10/12/2017, 10/10/2018    Influenza, Triv, inactivated, subunit, adjuvanted, IM (Fluad 65 yrs and older) 10/04/2019    Pneumococcal Conjugate 13-valent ([facility]) 10/04/2019    Zoster Recombinant (Shingrix) 04/25/2019, 08/30/2019        Health Maintenance   Topic Date Due    DTaP/Tdap/Td vaccine (1 - Tdap) Never done   [employer] Visit (AWV)  Never done    Potassium monitoring  09/25/2020    Creatinine monitoring  09/25/2020    Pneumococcal 65+ years Vaccine (2 of 2 - PPSV23) 10/04/2020    TSH testing  11/20/2020    Flu vaccine (Season Ended) 09/01/2021    Shingles Vaccine  Completed    COVID-19 Vaccine  Completed    Hepatitis A vaccine  Aged Out    Hepatitis B vaccine  Aged Out    Hib vaccine  Aged Out    Meningococcal (ACWY) vaccine  Aged Out     Recommendations for [place] Due: see orders and patient instructions/AVS.  . Recommended screening schedule for the next 5-10 years is provided to the patient in written form: see Patient Tin Cooley was seen today for medicare awv and medication refill. Diagnoses and all orders for this visit:    Mild CAD  -     CBC Auto Differential; Future  -     Comprehensive Metabolic Panel; Future  -     Lipid Panel; Future  -     Microalbumin / Creatinine Urine Ratio; Future    Orthostatic hypotension  -     CBC Auto Differential; Future  -     Comprehensive Metabolic Panel; Future  -     Microalbumin / Creatinine Urine Ratio; Future  -     midodrine (PROAMATINE) 10 MG tablet; Take 1 tablet by mouth 3 times daily    Paroxysmal A-fib (HCC)  -     T4, Free; Future  -     TSH without Reflex; Future    Thalassemia minor  -     CBC Auto Differential; Future    Acquired hypothyroidism  -     T4, Free; Future  -     TSH without Reflex; Future    Gastroesophageal reflux disease without esophagitis    Peripheral edema  -     Comprehensive Metabolic Panel; Future    Routine general medical examination at a health care facility  -     CBC Auto Differential; Future  -     Comprehensive Metabolic Panel; Future  -     Lipid Panel; Future  -     Microalbumin / Creatinine Urine Ratio; Future  -     T4, Free; Future  -     TSH without Reflex; Future  -     midodrine (PROAMATINE) 10 MG tablet; Take 1 tablet by mouth 3 times daily    Need for 23-polyvalent pneumococcal polysaccharide vaccine             This was an in-house visit.

## 2021-05-17 ENCOUNTER — TELEPHONE (OUTPATIENT)
Dept: PRIMARY CARE CLINIC | Age: 86
End: 2021-05-17

## 2021-05-17 NOTE — TELEPHONE ENCOUNTER
ABHI brand with [facility] called and left message that pt has 2+ protein in her urine today. Pt has an appt with us tomorrow.

## 2021-05-18 ENCOUNTER — OFFICE VISIT (OUTPATIENT)
Dept: PRIMARY CARE CLINIC | Age: 86
End: 2021-05-18
Payer: MEDICARE

## 2021-05-18 VITALS
HEART RATE: 64 BPM | OXYGEN SATURATION: 95 % | DIASTOLIC BLOOD PRESSURE: 80 MMHG | TEMPERATURE: 97.3 F | SYSTOLIC BLOOD PRESSURE: 110 MMHG | HEIGHT: 64 IN | WEIGHT: 129.5 LBS | BODY MASS INDEX: 22.11 KG/M2

## 2021-05-18 DIAGNOSIS — R50.9 FEVER, UNSPECIFIED FEVER CAUSE: ICD-10-CM

## 2021-05-18 DIAGNOSIS — R30.0 DYSURIA: ICD-10-CM

## 2021-05-18 DIAGNOSIS — N12 PYELONEPHRITIS: Primary | ICD-10-CM

## 2021-05-18 DIAGNOSIS — R10.9 FLANK PAIN: ICD-10-CM

## 2021-05-18 PROCEDURE — 1123F ACP DISCUSS/DSCN MKR DOCD: CPT | Performed by: NURSE PRACTITIONER

## 2021-05-18 PROCEDURE — 4040F PNEUMOC VAC/ADMIN/RCVD: CPT | Performed by: NURSE PRACTITIONER

## 2021-05-18 PROCEDURE — G8427 DOCREV CUR MEDS BY ELIG CLIN: HCPCS | Performed by: NURSE PRACTITIONER

## 2021-05-18 PROCEDURE — 99214 OFFICE O/P EST MOD 30 MIN: CPT | Performed by: NURSE PRACTITIONER

## 2021-05-18 PROCEDURE — 81002 URINALYSIS NONAUTO W/O SCOPE: CPT | Performed by: NURSE PRACTITIONER

## 2021-05-18 PROCEDURE — 96372 THER/PROPH/DIAG INJ SC/IM: CPT | Performed by: NURSE PRACTITIONER

## 2021-05-18 PROCEDURE — 1036F TOBACCO NON-USER: CPT | Performed by: NURSE PRACTITIONER

## 2021-05-18 PROCEDURE — G8420 CALC BMI NORM PARAMETERS: HCPCS | Performed by: NURSE PRACTITIONER

## 2021-05-18 PROCEDURE — 1090F PRES/ABSN URINE INCON ASSESS: CPT | Performed by: NURSE PRACTITIONER

## 2021-05-18 RX ORDER — LORATADINE 10 MG/1
10 TABLET ORAL DAILY
COMMUNITY

## 2021-05-18 RX ORDER — LEVOFLOXACIN 500 MG/1
500 TABLET, FILM COATED ORAL DAILY
Qty: 10 TABLET | Refills: 0 | Status: SHIPPED | OUTPATIENT
Start: 2021-05-18 | End: 2021-05-28 | Stop reason: SDUPTHER

## 2021-05-18 ASSESSMENT — ENCOUNTER SYMPTOMS
ABDOMINAL PAIN: 0
DIARRHEA: 0
WHEEZING: 0
BLOOD IN STOOL: 0
SORE THROAT: 0
COUGH: 0
TROUBLE SWALLOWING: 0
EYE REDNESS: 0
EYE DISCHARGE: 0
RHINORRHEA: 0
CONSTIPATION: 0

## 2021-05-18 NOTE — PROGRESS NOTES
Akhil Yun (:  1932) is a 80 y.o. female,Established patient, here for evaluation of the following chief complaint(s):  Dysuria and Lower Back Pain      ASSESSMENT/PLAN:    ICD-10-CM    1. Pyelonephritis  N12 levoFLOXacin (LEVAQUIN) 500 MG tablet     cefTRIAXone (ROCEPHIN) 1,000 mg in lidocaine 1 % 2.86 mL IM Injection   2. Dysuria  R30.0 POCT Urinalysis no Micro     cefTRIAXone (ROCEPHIN) 1,000 mg in lidocaine 1 % 2.86 mL IM Injection   3. Fever, unspecified fever cause  R50.9 cefTRIAXone (ROCEPHIN) 1,000 mg in lidocaine 1 % 2.86 mL IM Injection   4. Flank pain  R10.9        Return if symptoms worsen or fail to improve. SUBJECTIVE/OBJECTIVE:  HPI  Dysuria  She started having burning off and on about 4 days ago. The last 3 nights she has had chills and low-grade fever at night. And sweated. She is having left flank pain. She has a history of pyelonephritis. Review of Systems   Constitutional: Positive for fatigue and fever. Negative for appetite change and unexpected weight change. HENT: Negative for congestion, rhinorrhea, sore throat and trouble swallowing. Eyes: Negative for discharge and redness. Respiratory: Negative for cough and wheezing. Cardiovascular: Negative for chest pain. Gastrointestinal: Negative for abdominal pain, blood in stool, constipation and diarrhea. Genitourinary: Positive for dysuria, flank pain and frequency. Musculoskeletal: Positive for arthralgias. Skin: Negative for rash. Neurological: Negative for weakness. Hematological: Negative for adenopathy. /80   Pulse 64   Temp 97.3 °F (36.3 °C)   Ht 5' 4\" (1.626 m)   Wt 129 lb 8 oz (58.7 kg)   SpO2 95%   BMI 22.23 kg/m²    Physical Exam  Vitals reviewed. Constitutional:       Appearance: She is well-developed. HENT:      Head: Normocephalic. Eyes:      Conjunctiva/sclera: Conjunctivae normal.   Cardiovascular:      Rate and Rhythm: Normal rate.       Heart sounds: Normal heart sounds. Pulmonary:      Effort: Pulmonary effort is normal.   Abdominal:      Palpations: Abdomen is soft. Tenderness: There is no abdominal tenderness. Musculoskeletal:         General: Normal range of motion. Cervical back: Normal range of motion and neck supple. Skin:     General: Skin is warm and dry. Neurological:      Mental Status: She is alert and oriented to person, place, and time. Psychiatric:         Behavior: Behavior normal.                 An electronic signature was used to authenticate this note.     --ROCAEL Brown

## 2021-05-27 ENCOUNTER — TELEPHONE (OUTPATIENT)
Dept: PRIMARY CARE CLINIC | Age: 86
End: 2021-05-27

## 2021-05-27 DIAGNOSIS — R31.9 HEMATURIA, UNSPECIFIED TYPE: Primary | ICD-10-CM

## 2021-05-27 DIAGNOSIS — R31.9 HEMATURIA, UNSPECIFIED TYPE: ICD-10-CM

## 2021-05-27 LAB
AMORPHOUS: ABNORMAL /HPF
BACTERIA: ABNORMAL /HPF
BILIRUBIN URINE: ABNORMAL
BLOOD, URINE: ABNORMAL
CLARITY: ABNORMAL
COLOR: ABNORMAL
CRYSTALS, UA: ABNORMAL /HPF
EPITHELIAL CELLS, UA: ABNORMAL /HPF
GLUCOSE URINE: NEGATIVE MG/DL
KETONES, URINE: ABNORMAL MG/DL
LEUKOCYTE ESTERASE, URINE: ABNORMAL
NITRITE, URINE: NEGATIVE
PH UA: 5.5 (ref 5–8)
PROTEIN UA: 100 MG/DL
RBC UA: ABNORMAL /HPF (ref 0–2)
SPECIFIC GRAVITY UA: 1.03 (ref 1–1.03)
UROBILINOGEN, URINE: 0.2 E.U./DL
WBC UA: ABNORMAL /HPF (ref 0–5)

## 2021-05-27 NOTE — TELEPHONE ENCOUNTER
pts daughter called, her last abx pill is today. Pt is still having blood in her urine. They want to know if you can send in more abx. Pt states she is not coming back in.

## 2021-05-27 NOTE — TELEPHONE ENCOUNTER
Jamila Ray would like patient to bring urine sample to be cultured. Daughter aware and coming in to get specimen cup.

## 2021-05-28 DIAGNOSIS — N12 PYELONEPHRITIS: ICD-10-CM

## 2021-05-28 RX ORDER — LEVOFLOXACIN 500 MG/1
500 TABLET, FILM COATED ORAL DAILY
Qty: 10 TABLET | Refills: 0 | Status: ON HOLD | OUTPATIENT
Start: 2021-05-28 | End: 2021-06-05 | Stop reason: HOSPADM

## 2021-05-29 LAB — URINE CULTURE, ROUTINE: NORMAL

## 2021-05-30 DIAGNOSIS — I48.0 PAROXYSMAL ATRIAL FIBRILLATION (HCC): ICD-10-CM

## 2021-06-01 ENCOUNTER — TELEPHONE (OUTPATIENT)
Dept: PRIMARY CARE CLINIC | Age: 86
End: 2021-06-01

## 2021-06-01 NOTE — TELEPHONE ENCOUNTER
----- Message from ROCAEL Nunes sent at 6/1/2021 10:11 AM CDT -----  Please inform patient results show  Urine culture is negative.

## 2021-06-01 NOTE — TELEPHONE ENCOUNTER
Spoke with patients daughter. Patient is still having quite a bit of blood in urine. Spoke with ROCAEL Quezada. She did say she had crystals and it could be that and may have a kidney stone. Per Tali Spurling if patient is still in quite a bit of pain she suggest to go to ER since we do not have any openings today.

## 2021-06-03 ENCOUNTER — HOSPITAL ENCOUNTER (INPATIENT)
Age: 86
LOS: 2 days | Discharge: HOME HEALTH | DRG: 690 | End: 2021-06-05
Attending: EMERGENCY MEDICINE | Admitting: INTERNAL MEDICINE
Payer: MEDICARE

## 2021-06-03 ENCOUNTER — APPOINTMENT (OUTPATIENT)
Dept: CT IMAGING | Age: 86
DRG: 690 | End: 2021-06-03
Payer: MEDICARE

## 2021-06-03 DIAGNOSIS — Z78.9 FAILURE OF OUTPATIENT TREATMENT: ICD-10-CM

## 2021-06-03 DIAGNOSIS — R10.9 RIGHT FLANK PAIN: Primary | ICD-10-CM

## 2021-06-03 DIAGNOSIS — N12 PYELONEPHRITIS OF RIGHT KIDNEY: ICD-10-CM

## 2021-06-03 DIAGNOSIS — N20.0 STAGHORN CALCULUS: ICD-10-CM

## 2021-06-03 DIAGNOSIS — Z87.442 HISTORY OF KIDNEY STONES: ICD-10-CM

## 2021-06-03 LAB
ALBUMIN SERPL-MCNC: 3.7 G/DL (ref 3.5–5.2)
ALP BLD-CCNC: 41 U/L (ref 35–104)
ALT SERPL-CCNC: 8 U/L (ref 5–33)
ANION GAP SERPL CALCULATED.3IONS-SCNC: 10 MMOL/L (ref 7–19)
AST SERPL-CCNC: 19 U/L (ref 5–32)
BACTERIA: ABNORMAL /HPF
BASOPHILS ABSOLUTE: 0 K/UL (ref 0–0.2)
BASOPHILS RELATIVE PERCENT: 0.3 % (ref 0–1)
BILIRUB SERPL-MCNC: 0.5 MG/DL (ref 0.2–1.2)
BILIRUBIN URINE: ABNORMAL
BLOOD, URINE: ABNORMAL
BUN BLDV-MCNC: 20 MG/DL (ref 8–23)
CALCIUM SERPL-MCNC: 9.7 MG/DL (ref 8.8–10.2)
CHLORIDE BLD-SCNC: 105 MMOL/L (ref 98–111)
CLARITY: ABNORMAL
CO2: 24 MMOL/L (ref 22–29)
COLOR: ABNORMAL
CREAT SERPL-MCNC: 0.8 MG/DL (ref 0.5–0.9)
EOSINOPHILS ABSOLUTE: 0.1 K/UL (ref 0–0.6)
EOSINOPHILS RELATIVE PERCENT: 1.2 % (ref 0–5)
EPITHELIAL CELLS, UA: ABNORMAL /HPF
GFR AFRICAN AMERICAN: >59
GFR NON-AFRICAN AMERICAN: >60
GLUCOSE BLD-MCNC: 86 MG/DL (ref 74–109)
GLUCOSE URINE: NEGATIVE MG/DL
HCT VFR BLD CALC: 32.4 % (ref 37–47)
HEMOGLOBIN: 10.5 G/DL (ref 12–16)
IMMATURE GRANULOCYTES #: 0 K/UL
KETONES, URINE: NEGATIVE MG/DL
LEUKOCYTE ESTERASE, URINE: ABNORMAL
LYMPHOCYTES ABSOLUTE: 1.3 K/UL (ref 1.1–4.5)
LYMPHOCYTES RELATIVE PERCENT: 21.6 % (ref 20–40)
MCH RBC QN AUTO: 32.6 PG (ref 27–31)
MCHC RBC AUTO-ENTMCNC: 32.4 G/DL (ref 33–37)
MCV RBC AUTO: 100.6 FL (ref 81–99)
MONOCYTES ABSOLUTE: 0.6 K/UL (ref 0–0.9)
MONOCYTES RELATIVE PERCENT: 9.7 % (ref 0–10)
NEUTROPHILS ABSOLUTE: 4 K/UL (ref 1.5–7.5)
NEUTROPHILS RELATIVE PERCENT: 67 % (ref 50–65)
NITRITE, URINE: NEGATIVE
PDW BLD-RTO: 14.5 % (ref 11.5–14.5)
PH UA: 5.5 (ref 5–8)
PLATELET # BLD: 267 K/UL (ref 130–400)
PMV BLD AUTO: 9.6 FL (ref 9.4–12.3)
POTASSIUM REFLEX MAGNESIUM: 4.3 MMOL/L (ref 3.5–5)
PROTEIN UA: 100 MG/DL
RBC # BLD: 3.22 M/UL (ref 4.2–5.4)
RBC UA: ABNORMAL /HPF (ref 0–2)
SARS-COV-2, NAAT: NOT DETECTED
SODIUM BLD-SCNC: 139 MMOL/L (ref 136–145)
SPECIFIC GRAVITY UA: 1.02 (ref 1–1.03)
TOTAL PROTEIN: 6.3 G/DL (ref 6.6–8.7)
UROBILINOGEN, URINE: 0.2 E.U./DL
WBC # BLD: 6 K/UL (ref 4.8–10.8)
WBC UA: ABNORMAL /HPF (ref 0–5)
YEAST: PRESENT /HPF

## 2021-06-03 PROCEDURE — 87635 SARS-COV-2 COVID-19 AMP PRB: CPT

## 2021-06-03 PROCEDURE — 85025 COMPLETE CBC W/AUTO DIFF WBC: CPT

## 2021-06-03 PROCEDURE — 1210000000 HC MED SURG R&B

## 2021-06-03 PROCEDURE — 81001 URINALYSIS AUTO W/SCOPE: CPT

## 2021-06-03 PROCEDURE — 2580000003 HC RX 258: Performed by: INTERNAL MEDICINE

## 2021-06-03 PROCEDURE — 2580000003 HC RX 258: Performed by: EMERGENCY MEDICINE

## 2021-06-03 PROCEDURE — 6360000002 HC RX W HCPCS: Performed by: INTERNAL MEDICINE

## 2021-06-03 PROCEDURE — 36415 COLL VENOUS BLD VENIPUNCTURE: CPT

## 2021-06-03 PROCEDURE — 96374 THER/PROPH/DIAG INJ IV PUSH: CPT

## 2021-06-03 PROCEDURE — 87086 URINE CULTURE/COLONY COUNT: CPT

## 2021-06-03 PROCEDURE — 74150 CT ABDOMEN W/O CONTRAST: CPT

## 2021-06-03 PROCEDURE — 6360000002 HC RX W HCPCS: Performed by: EMERGENCY MEDICINE

## 2021-06-03 PROCEDURE — 99283 EMERGENCY DEPT VISIT LOW MDM: CPT

## 2021-06-03 PROCEDURE — 80053 COMPREHEN METABOLIC PANEL: CPT

## 2021-06-03 RX ORDER — ACETAMINOPHEN 325 MG/1
650 TABLET ORAL EVERY 6 HOURS PRN
Status: DISCONTINUED | OUTPATIENT
Start: 2021-06-03 | End: 2021-06-05 | Stop reason: HOSPADM

## 2021-06-03 RX ORDER — LEVOTHYROXINE SODIUM 88 UG/1
88 TABLET ORAL DAILY
Status: DISCONTINUED | OUTPATIENT
Start: 2021-06-04 | End: 2021-06-05 | Stop reason: HOSPADM

## 2021-06-03 RX ORDER — ONDANSETRON 4 MG/1
4 TABLET, ORALLY DISINTEGRATING ORAL EVERY 8 HOURS PRN
Status: DISCONTINUED | OUTPATIENT
Start: 2021-06-03 | End: 2021-06-05 | Stop reason: HOSPADM

## 2021-06-03 RX ORDER — ONDANSETRON 2 MG/ML
4 INJECTION INTRAMUSCULAR; INTRAVENOUS EVERY 6 HOURS PRN
Status: DISCONTINUED | OUTPATIENT
Start: 2021-06-03 | End: 2021-06-05 | Stop reason: HOSPADM

## 2021-06-03 RX ORDER — SODIUM CHLORIDE 9 MG/ML
25 INJECTION, SOLUTION INTRAVENOUS PRN
Status: DISCONTINUED | OUTPATIENT
Start: 2021-06-03 | End: 2021-06-05 | Stop reason: HOSPADM

## 2021-06-03 RX ORDER — ACETAMINOPHEN 650 MG/1
650 SUPPOSITORY RECTAL EVERY 6 HOURS PRN
Status: DISCONTINUED | OUTPATIENT
Start: 2021-06-03 | End: 2021-06-05 | Stop reason: HOSPADM

## 2021-06-03 RX ORDER — POTASSIUM CHLORIDE 7.45 MG/ML
10 INJECTION INTRAVENOUS PRN
Status: DISCONTINUED | OUTPATIENT
Start: 2021-06-03 | End: 2021-06-05 | Stop reason: HOSPADM

## 2021-06-03 RX ORDER — SODIUM CHLORIDE 0.9 % (FLUSH) 0.9 %
10 SYRINGE (ML) INJECTION EVERY 12 HOURS SCHEDULED
Status: DISCONTINUED | OUTPATIENT
Start: 2021-06-03 | End: 2021-06-05 | Stop reason: HOSPADM

## 2021-06-03 RX ORDER — PROPAFENONE HYDROCHLORIDE 225 MG/1
225 TABLET, FILM COATED ORAL EVERY 8 HOURS
Status: DISCONTINUED | OUTPATIENT
Start: 2021-06-03 | End: 2021-06-05 | Stop reason: HOSPADM

## 2021-06-03 RX ORDER — POTASSIUM CHLORIDE 20 MEQ/1
40 TABLET, EXTENDED RELEASE ORAL PRN
Status: DISCONTINUED | OUTPATIENT
Start: 2021-06-03 | End: 2021-06-05 | Stop reason: HOSPADM

## 2021-06-03 RX ORDER — SODIUM CHLORIDE 0.9 % (FLUSH) 0.9 %
10 SYRINGE (ML) INJECTION PRN
Status: DISCONTINUED | OUTPATIENT
Start: 2021-06-03 | End: 2021-06-05 | Stop reason: HOSPADM

## 2021-06-03 RX ADMIN — SODIUM CHLORIDE, PRESERVATIVE FREE 2000 MG: 5 INJECTION INTRAVENOUS at 20:19

## 2021-06-03 RX ADMIN — SODIUM CHLORIDE, PRESERVATIVE FREE 10 ML: 5 INJECTION INTRAVENOUS at 20:20

## 2021-06-03 RX ADMIN — CEFTRIAXONE 1000 MG: 1 INJECTION, POWDER, FOR SOLUTION INTRAMUSCULAR; INTRAVENOUS at 15:08

## 2021-06-03 ASSESSMENT — ENCOUNTER SYMPTOMS
COUGH: 0
EYE PAIN: 0
VOMITING: 0
DIARRHEA: 0
SHORTNESS OF BREATH: 0
ABDOMINAL PAIN: 0
EYE REDNESS: 0
VOICE CHANGE: 0
RHINORRHEA: 0

## 2021-06-03 ASSESSMENT — PAIN DESCRIPTION - ORIENTATION: ORIENTATION: LOWER

## 2021-06-03 ASSESSMENT — PAIN DESCRIPTION - LOCATION: LOCATION: FLANK

## 2021-06-03 ASSESSMENT — PAIN SCALES - GENERAL
PAINLEVEL_OUTOF10: 9
PAINLEVEL_OUTOF10: 0
PAINLEVEL_OUTOF10: 0

## 2021-06-03 NOTE — H&P
[facility]      History & Physical    04/04  PCP: Nehemias Hobson DO    Date of Admission:6/3/2021    Patient:  Cynthia Hernadez  MRN: [id number]    Date of Service: Pt seen/examined on 6/3/2021 and Admitted to Inpatient with expected LOS greater than two midnights due to medical therapy. CHIEF COMPLAINT: Right flank pain and hematuria  Chief Complaint   Patient presents with    Hematuria       History Obtained From:  patient, electronic medical record  Primary Care Physician: Nehemias Hobson DO    HISTORY OF PRESENT ILLNESS:    Ms. Magali Bruner, a 80 y.o. female with a history of COPD, paroxysmal atrial fibrillation (on rivaroxaban), presented to [address] [place] [address] ED (06/03/2021), on account of mild to moderate right flank pain and blood in the urine. Patient reportedly currently on levofloxacin, which is a second round of oral antibiotic treatment for persistent UTI.    UA, with 4+ bacteria, WBC and RBC TNTC,    Given patient has failed outpatient treatment on 2 separate occasions with oral antibiotic, patient being admitted, for further work-up and management, including IV antibiotic pending urine cultures.        PAST MEDICAL & SURGICAL HISTORY    Past Medical History:      Diagnosis Date    A-fib ([address]     Abnormal weight loss     Anemia     Arthritis     Bullae     CAD (coronary artery disease)     Conjunctivitis     COPD (chronic obstructive pulmonary disease) (HCC)     Depression     Fatigue     History of blood transfusion 2017    post op broken hip    Hypertension     Hypothyroidism     Kidney stone     Leukopenia     Leukopenia     Low back pain     Mild CAD 5/15/2017    Osteoporosis     Paroxysmal A-fib ([facility])     Prolonged emergence from general anesthesia     Sinus pause     10/2014    Thyroid disease     Urinary tract infection     Weakness          Past Surgical History:      Procedure Laterality Date    BACK SURGERY      BACK SURGERY      CARDIAC times daily  Patient taking differently: Take 10 mg by mouth 2 times daily  4/6/21   B Mart Nessunda, DO   levothyroxine (SYNTHROID) 88 MCG tablet Take 1 tablet by mouth Daily 2/23/21   ROCAEL Macedo   propafenone (RYTHMOL) 225 MG tablet Take 1 tablet by mouth every 8 hours 7/8/20   Simona Michaels MD   omeprazole (PRILOSEC) 10 MG delayed release capsule Take 1 capsule by mouth daily 6/30/20   B Cleophus Rotunda, DO   potassium chloride (KLOR-CON M) 20 MEQ extended release tablet Take 1 tablet by mouth daily  Patient taking differently: Take 20 mEq by mouth daily Take with lasix 3/20/18   ROCAEL Florentino   furosemide (LASIX) 20 MG tablet Take 1 tablet by mouth daily  Patient taking differently: Take 20 mg by mouth daily as needed  12/22/17   Myke Ferraro MD   meclizine (ANTIVERT) 12.5 MG tablet Take 25 mg by mouth as needed Take 2 tablets (25 mg) as needed    Historical Provider, MD   Calcium Carbonate-Vitamin D (CALTRATE 600+D PO) Take 600 mg by mouth 2 times daily. Historical Provider, MD        ALLERGIES:    Allergies:  Nitrofuran derivatives, Quinine derivatives, Sulfa antibiotics, and Pcn [penicillins]       REVIEW OF SYSTEMS :    Review of Systems  14 point review of systems is negative except as specifically addressed above. PHYSICAL EXAM:    Physical Exam:    Vitals:   BP (!) 148/69   Pulse 75   Temp 97.8 °F (36.6 °C) (Oral)   Resp 20   Ht 5' 4\" (1.626 m)   Wt 123 lb (55.8 kg)   SpO2 97%   BMI 21.11 kg/m²     Physical Exam  Vitals and nursing note reviewed. Constitutional:       General: She is not in acute distress. Appearance: Normal appearance. She is not ill-appearing, toxic-appearing or diaphoretic. HENT:      Head: Normocephalic and atraumatic. Right Ear: External ear normal.      Left Ear: External ear normal.      Nose: Nose normal. No congestion or rhinorrhea.       Mouth/Throat:      Mouth: Mucous membranes are moist.      Pharynx: Oropharynx is clear. No oropharyngeal exudate or posterior oropharyngeal erythema. Eyes:      General: No scleral icterus. Right eye: No discharge. Left eye: No discharge. Extraocular Movements: Extraocular movements intact. Conjunctiva/sclera: Conjunctivae normal.      Pupils: Pupils are equal, round, and reactive to light. Neck:      Vascular: No carotid bruit. Cardiovascular:      Rate and Rhythm: Normal rate and regular rhythm. Pulses: Normal pulses. Heart sounds: Normal heart sounds. No murmur heard. No friction rub. No gallop. Pulmonary:      Effort: Pulmonary effort is normal. No respiratory distress. Breath sounds: Normal breath sounds. No stridor. No wheezing, rhonchi or rales. Chest:      Chest wall: No tenderness. Abdominal:      General: Bowel sounds are normal. There is no distension. Palpations: Abdomen is soft. Tenderness: There is no abdominal tenderness ( CVA tenderness). There is no guarding or rebound. Musculoskeletal:         General: No swelling, tenderness, deformity or signs of injury. Normal range of motion. Cervical back: Normal range of motion and neck supple. No rigidity or tenderness. No muscular tenderness. Right lower leg: Edema present. Left lower leg: Edema present. Skin:     General: Skin is warm and dry. Capillary Refill: Capillary refill takes less than 2 seconds. Coloration: Skin is not jaundiced or pale. Findings: No bruising, erythema, lesion or rash. Neurological:      General: No focal deficit present. Mental Status: She is alert and oriented to person, place, and time. Cranial Nerves: No cranial nerve deficit. Sensory: No sensory deficit. Motor: No weakness. Coordination: Coordination normal.   Psychiatric:         Mood and Affect: Mood normal.         Behavior: Behavior normal.         Thought Content:  Thought content normal.         Judgment: Judgment normal. DIAGNOSTIC STUDIES:    I have reviewedLaboratory and Imaging data report today. Recent Labs     06/03/21  1319   WBC 6.0   HGB 10.5*        Recent Labs     06/03/21  1319      K 4.3      CO2 24   BUN 20   CREATININE 0.8   GLUCOSE 86   AST 19   ALT 8   BILITOT 0.5   ALKPHOS 41     Troponin T: No results for input(s): TROPONINI in the last 72 hours. Pro-BNP:   Lab Results   Component Value Date    .8 10/13/2015     Lactate: No results found for: LACTA      ABGs: No results found for: PHART, PO2ART, BKQ4HXR  INR: No results for input(s): INR in the last 72 hours. TSH:   Lab Results   Component Value Date    TSH 2.250 11/20/2019     URINALYSIS:  Recent Labs     06/03/21  1150   COLORU RED*   PHUR 5.5   WBCUA TNTC*   RBCUA TNTC*   YEAST Present*   BACTERIA 4+*   CLARITYU CLOUDY*   SPECGRAV 1.016   LEUKOCYTESUR LARGE*   UROBILINOGEN 0.2   BILIRUBINUR SMALL*   BLOODU LARGE*   GLUCOSEU Negative        RADIOLOGY / IMAGING REPORTS:     CT KIDNEY WO CONTRAST    Result Date: 6/3/2021  EXAM: CT KIDNEY WO CONTRAST -- 6/3/2021 2:49 PM HISTORY: 89 years, Female, right flank pain, hematuria, recurrent urinary tract infection COMPARISON: 3/7/2018 DLP: 257 mGy cm. Automated exposure control was utilized to minimize patient radiation dose. TECHNIQUE: Unenhanced axial images of the abdomen and pelvis obtained with coronal and sagittal reformats. FINDINGS: Evaluation limited secondary to lack of intravenous contrast. LUNG BASES: No acute findings of the lung bases. No inferior pericardial or pleural effusion. Cardiomegaly. Motion limited exam. LIVER: The liver is normal in size and contour. GALLBLADDER and BILARY: The gallbladder is within normal limits. No radiodense cholelithiasis. PANCREAS: Atrophic. SPLEEN: The spleen is normal in size. ADRENAL: No adrenal mass. GENITOURINARY: Staghorn configuration calculus formation at the left kidney. No right nephrolithiasis.  No ureteral calculus or hydronephrosis. The urinary bladder is normal in appearance. Uterus appears surgically absent. PERITONEUM: Small free fluid in the pelvis. No free air. BOWEL: Stomach and duodenum have normal course and caliber. No abnormally dilated loops of bowel or bowel wall thickening. Large amount of colonic stool. Normal appendix on axial images 110-95. RETROPERITONEUM:   Aorta normal in course and caliber with calcified atherosclerosis. No abdominal or pelvic adenopathy. ABDOMINAL WALL: No acute findings. BONES: Right proximal femoral hardware. Central similar multilevel compression deformities compared to prior. Changes of prior L4-5 laminectomy with anterolisthesis L4 on L5. No acute bony finding. 1. Staghorn type calculus formation in the left kidney. No hydronephrosis or ureteral calculus. 2. Cardiomegaly. 3. Changes of prior hysterectomy. 4. Calcified atherosclerosis. 5. Multilevel compression deformities are similar compared to 3/7/2018. Prior lumbar spine surgery. Right proximal femoral hardware. Signed by Dr Juan Carlos Ferrara on 6/3/2021 4:15 PM                  ASSESSMENT / IMPRESSION & PLAN:          Hospital Problems         Last Modified POA    * (Principal) Pyelonephritis 6/3/2021 Yes    Paroxysmal A-fib ([address] 6/3/2021 Yes    Chronic obstructive pulmonary disease ([address] 6/3/2021 Yes    Acquired hypothyroidism 6/3/2021 Yes          Principal Problem:    Pyelonephritis  Active Problems:    Paroxysmal A-fib ([address]    Chronic obstructive pulmonary disease (HCC)    Acquired hypothyroidism  Resolved Problems:    * No resolved hospital problems. *      Right Flank pain  Pyelonephritis  · Failed outpatient oral antibiotic treatment for persistent UTI  · UA, with 4+ bacteria, WBC and RBC TNTC,  · CT kidney  WO Con (06/03/2021): Impression: Staghorn type calculus formation in the left kidney. No hydronephrosis or ureteral calculus. Cardiomegaly. Changes of prior hysterectomy. Calcified atherosclerosis.  Multilevel compression deformities are similar compared to 3/7/2018. Prior lumbar spine surgery. Right proximal femoral hardware. · Cefepime IV  · Pending urine and blood cultures. · Continue symptomatic and supportive management    History of paroxysmal atrial fibrillation  · Currently rate controlled  · Propafenone  · Rivaroxaban PO daily    History of COPD  · Stable  · Bronchodilators as needed    History of acquired hypothyroidism  · Levothyroxine 88 mcg p.o. daily  · TSH level        Continue management of other chronic medical conditions - Please see orders above         CONSULTS:    IP CONSULT TO SOCIAL WORK        INPATIENT CHECKLIST:      Nutrition: No diet orders on file    Prophylaxis Orders:   VTE -rivaroxaban     CODE STATUS: Full code  ISOLATION:       DISCHARGE PLAN: tbd     Total face-to-face time spent with this patient, time spent reviewing medical records, and in coordination of care with the emergency department physician, nursing staff, in the examination, evaluation/assessment, counseling, review of medications and plan, was  50 mins . Electronically signed by   Paola Pisano MD, MPH, MD  Internal Medicine Hospitalist   6/3/2021 6:17 PM      EMR Dragon/Transcription disclaimer:   Much of this encounter note is an electronic transcription/translation of spoken language to printed text.  The electronic translation of spoken language may permit erroneous, or at times, nonsensical words or phrases to be inadvertently transcribed; although attempts have made to review the note for such errors, some may still exist.

## 2021-06-03 NOTE — ED PROVIDER NOTES
[facility] 3 JANES/VAS/MED  EMERGENCY DEPARTMENT ENCOUNTER      Pt Name: Todd Quintana  MRN: [id number]  [place] 1/11/1932  Date of evaluation: 6/3/2021  Provider: Larose Paget, MD    [address] [place] [address]       Chief Complaint   Patient presents with    Hematuria         HISTORY OF PRESENT ILLNESS   (Location/Symptom, Timing/Onset,Context/Setting, Quality, Duration, Modifying Factors, Severity)  Note limiting factors. Todd Quintana is a 80 y.o. female who presents to the emergency department with complaint of right flank pain and blood in her urine. States blood in her urine has been present for almost a month. She is currently on her second round of antibiotic treatment with Levaquin and only has 4 days left. States she did previously have fevers but those have since resolved. Has a history of kidney stones and had obstructive hydronephrosis several years ago with associated infection and required ureteral stent at that time. Patient reports the blood in her urine seems less over the last couple days that had been but she is still having right flank pain. Patient had urinalysis done within the last week and had a negative culture from that. HPI    NursingNotes were reviewed. REVIEW OF SYSTEMS    (2-9 systems for level 4, 10 or more for level 5)     Review of Systems   Constitutional: Negative for fatigue and fever. HENT: Negative for congestion, rhinorrhea and voice change. Eyes: Negative for pain and redness. Respiratory: Negative for cough and shortness of breath. Cardiovascular: Negative for chest pain. Gastrointestinal: Negative for abdominal pain, diarrhea and vomiting. Endocrine: Negative. Genitourinary: Positive for flank pain and hematuria. Musculoskeletal: Negative for arthralgias and gait problem. Skin: Negative for rash and wound. Neurological: Negative for weakness and headaches. Hematological: Negative. Psychiatric/Behavioral: Negative.     All other systems at [address]       Current Discharge Medication List      CONTINUE these medications which have NOT CHANGED    Details   rivaroxaban (XARELTO) 20 MG TABS tablet TAKE 1 TABLET DAILY WITH BREAKFAST  Qty: 90 tablet, Refills: 0    Associated Diagnoses: Paroxysmal atrial fibrillation (HCC)      levoFLOXacin (LEVAQUIN) 500 MG tablet Take 1 tablet by mouth daily for 10 days  Qty: 10 tablet, Refills: 0    Associated Diagnoses: Pyelonephritis      loratadine (CLARITIN) 10 MG tablet Take 10 mg by mouth daily      midodrine (PROAMATINE) 10 MG tablet Take 1 tablet by mouth 3 times daily  Qty: 270 tablet, Refills: 3    Associated Diagnoses: Orthostatic hypotension; Routine general medical examination at a health care facility      levothyroxine (SYNTHROID) 88 MCG tablet Take 1 tablet by mouth Daily  Qty: 90 tablet, Refills: 3      propafenone (RYTHMOL) 225 MG tablet Take 1 tablet by mouth every 8 hours  Qty: 270 tablet, Refills: 3    Associated Diagnoses: Paroxysmal atrial fibrillation (HCC)      omeprazole (PRILOSEC) 10 MG delayed release capsule Take 1 capsule by mouth daily  Qty: 90 capsule, Refills: 3    Associated Diagnoses: Gastroesophageal reflux disease without esophagitis      potassium chloride (KLOR-CON M) 20 MEQ extended release tablet Take 1 tablet by mouth daily  Qty: 30 tablet, Refills: 5    Associated Diagnoses: Hypokalemia      furosemide (LASIX) 20 MG tablet Take 1 tablet by mouth daily  Qty: 60 tablet, Refills: 3      meclizine (ANTIVERT) 12.5 MG tablet Take 25 mg by mouth as needed Take 2 tablets (25 mg) as needed      Calcium Carbonate-Vitamin D (CALTRATE 600+D PO) Take 600 mg by mouth 2 times daily.              ALLERGIES     Nitrofuran derivatives, Quinine derivatives, Sulfa antibiotics, and Pcn [penicillins]    FAMILY HISTORY       Family History   Problem Relation Age of Onset    Cancer Father         lung    Stroke Mother mini    Hypotension Mother           SOCIAL HISTORY       Social History     Socioeconomic History    Marital status:      Spouse name: None    Number of children: None    Years of education: None    Highest education level: None   Occupational History    None   Tobacco Use    Smoking status: Never Smoker    Smokeless tobacco: Never Used   Vaping Use    Vaping Use: Never used   Substance and Sexual Activity    Alcohol use: No    Drug use: No    Sexual activity: Yes   Other Topics Concern    None   Social History Narrative    None     Social Determinants of Health     Financial Resource Strain: Low Risk     Difficulty of Paying Living Expenses: Not hard at all   Food Insecurity: No Food Insecurity    Worried About Running Out of Food in the Last Year: Never true    Megha of Food in the Last Year: Never true   Transportation Needs: No Transportation Needs    Lack of Transportation (Medical): No    Lack of Transportation (Non-Medical):  No   Physical Activity:     Days of Exercise per Week:     Minutes of Exercise per Session:    Stress:     Feeling of Stress :    Social Connections:     Frequency of Communication with Friends and Family:     Frequency of Social Gatherings with Friends and Family:     Attends Yazdanism Services:     Active Member of Clubs or Organizations:     Attends Club or Organization Meetings:     Marital Status:    Intimate Partner Violence:     Fear of Current or Ex-Partner:     Emotionally Abused:     Physically Abused:     Sexually Abused:        SCREENINGS    Alexsander Coma Scale  Eye Opening: Spontaneous  Best Verbal Response: Oriented  Best Motor Response: Obeys commands  [place] Coma Scale Score: 15        PHYSICAL EXAM    (up to 7 for level 4, 8 or more for level 5)     ED Triage Vitals   BP Temp Temp Source Pulse Resp SpO2 Height Weight   06/03/21 1101 06/03/21 1101 06/03/21 1313 06/03/21 1101 06/03/21 1101 06/03/21 1101 06/03/21 1101 06/03/21 1101   (!) 159/85 98 °F (36.7 °C) Oral 66 20 96 % 5' 4\" (1.626 m) 123 lb (55.8 kg)       Physical Exam  Vitals and nursing note reviewed. Constitutional:       General: She is not in acute distress. Appearance: Normal appearance. She is well-developed. She is not diaphoretic. HENT:      Head: Normocephalic and atraumatic. Mouth/Throat:      Pharynx: No oropharyngeal exudate. Eyes:      General: No scleral icterus. Pupils: Pupils are equal, round, and reactive to light. Neck:      Trachea: No tracheal deviation. Cardiovascular:      Rate and Rhythm: Normal rate. Pulses: Normal pulses. Heart sounds: Normal heart sounds. Pulmonary:      Effort: Pulmonary effort is normal.      Breath sounds: Normal breath sounds. No stridor. No wheezing or rhonchi. Abdominal:      General: There is no distension. Palpations: Abdomen is soft. Abdomen is not rigid. Tenderness: There is no abdominal tenderness. There is right CVA tenderness. There is no left CVA tenderness or guarding. Hernia: No hernia is present. Musculoskeletal:         General: No deformity. Cervical back: Normal range of motion. Skin:     General: Skin is warm and dry. Findings: No rash. Neurological:      Mental Status: She is alert and oriented to person, place, and time. Cranial Nerves: No cranial nerve deficit.       Coordination: Coordination normal.   Psychiatric:         Behavior: Behavior normal.         DIAGNOSTIC RESULTS     EKG: All EKG's are interpreted by the Emergency Department Physician who either signs or Co-signs this chart in the absence of a cardiologist.        RADIOLOGY:   Non-plain film images such as CT, Ultrasound and MRI are read by the radiologist. Plainradiographic images are visualized and preliminarily interpreted by the emergency physician with the below findings:        Interpretation per the Radiologist below, if available at the time of this note:    CT KIDNEY WO CONTRAST   Final Result   1. Staghorn type calculus formation in the left kidney. No   hydronephrosis or ureteral calculus. 2. Cardiomegaly. 3. Changes of prior hysterectomy. 4. Calcified atherosclerosis. 5. Multilevel compression deformities are similar compared to   3/7/2018. Prior lumbar spine surgery. Right proximal femoral hardware. Signed by Dr Noah Dalton on 6/3/2021 4:15 PM            ED BEDSIDE ULTRASOUND:   Performed by ED Physician - none    LABS:  Labs Reviewed   CBC WITH AUTO DIFFERENTIAL - Abnormal; Notable for the following components:       Result Value    RBC 3.22 (*)     Hemoglobin 10.5 (*)     Hematocrit 32.4 (*)     .6 (*)     MCH 32.6 (*)     MCHC 32.4 (*)     Neutrophils % 67.0 (*)     All other components within normal limits   COMPREHENSIVE METABOLIC PANEL W/ REFLEX TO MG FOR LOW K - Abnormal; Notable for the following components: Total Protein 6.3 (*)     All other components within normal limits   URINE RT REFLEX TO CULTURE - Abnormal; Notable for the following components:    Color, UA RED (*)     Clarity, UA CLOUDY (*)     Bilirubin Urine SMALL (*)     Blood, Urine LARGE (*)     Protein,  (*)     Leukocyte Esterase, Urine LARGE (*)     All other components within normal limits   MICROSCOPIC URINALYSIS - Abnormal; Notable for the following components:    WBC, UA TNTC (*)     RBC, UA TNTC (*)     Bacteria, UA 4+ (*)     Yeast, UA Present (*)     All other components within normal limits   COVID-19, RAPID   CULTURE, URINE   TSH WITH REFLEX TO FT4   BASIC METABOLIC PANEL W/ REFLEX TO MG FOR LOW K   CBC WITH AUTO DIFFERENTIAL       All other labs were within normal range or not returned as of this dictation.     Medications   ceFEPIme (MAXIPIME) 2,000 mg in sodium chloride (PF) 20 mL IV syringe ( Intravenous Canceled Entry 6/3/21 2039)   levothyroxine (SYNTHROID) tablet 88 mcg (has no administration in time range)   propafenone (RYTHMOL) tablet 225 mg (has reviewed  Tests in the radiology section of CPT®: ordered and reviewed  Tests in the medicine section of CPT®: ordered and reviewed  Decide to obtain previous medical records or to obtain history from someone other than the patient: yes  Obtain history from someone other than the patient: yes  Review and summarize past medical records: yes  Discuss the patient with other providers: yes  Independent visualization of images, tracings, or specimens: yes    Risk of Complications, Morbidity, and/or Mortality  Presenting problems: high  Diagnostic procedures: moderate  Management options: moderate    Patient Progress  Patient progress: stable      ED Course as of Jun 03 2140   Thu Jun 03, 2021   1440 Bacteria, UA(!): 4+ [ERI]   1443 Labs show evidence of significant continued urinary tract infection with 4+ bacteria as well as yeast present in the urine. There is a large amount of both white and red blood cells present as well. No evidence of contamination with 0-2 epithelial cells. [ERI]   2033 CT shows no right-sided hydronephrosis ureteral stone, or other obstruction. There is a staghorn calculus on the left side the patient has no left-sided. Culture sent here. Patient given Rocephin emergency department. [ERI]   4828 Discussed further management options with the patient. Given failure to antibiotic course at this point she prefers to come in the hospital for IV antibiotics. Given the staghorn calculus on the left side, plan to cover for Proteus until culture results available which should be heavily covered with Rocephin. [ERI]   6744 Advised patient urology is not on-call for the next several days so she would still have to schedule outpatient appointment for further evaluation with them and management of the staghorn calculus.     [ERI]      ED Course User Index  [ERI] Letty Bingham MD     Based on the evaluation and work-up here patient is felt to require further monitoring, work-up, or treatment that is available in the emergency department. Case was discussed with hospitalist who agrees for observation or admission for further management. Treatment and stabilization as necessary were provided in the emergency department prior to transfer of care to the medicine service. CONSULTS:  IP CONSULT TO SOCIAL WORK    PROCEDURES:  Unless otherwise notedbelow, none     Procedures      FINAL IMPRESSION     1. Right flank pain    2. Pyelonephritis of right kidney    3. History of kidney stones    4. Staghorn calculus    5. Failure of outpatient treatment          DISPOSITION/PLAN   DISPOSITION        PATIENT REFERRED TO:  No follow-up provider specified.     DISCHARGE MEDICATIONS:  Current Discharge Medication List             (Please note that portions of this note were completed with a voice recognition program.  Efforts were made to edit the dictations butoccasionally words are mis-transcribed.)    Travis Sexton MD (electronically signed)  AttendingEmergency Physician          Travis Pace MD  06/03/21 1725

## 2021-06-04 ENCOUNTER — TELEPHONE (OUTPATIENT)
Dept: PRIMARY CARE CLINIC | Age: 86
End: 2021-06-04

## 2021-06-04 PROBLEM — Z51.5 PALLIATIVE CARE PATIENT: Status: ACTIVE | Noted: 2021-06-04

## 2021-06-04 LAB
ANION GAP SERPL CALCULATED.3IONS-SCNC: 9 MMOL/L (ref 7–19)
BASOPHILS ABSOLUTE: 0 K/UL (ref 0–0.2)
BASOPHILS RELATIVE PERCENT: 0.2 % (ref 0–1)
BUN BLDV-MCNC: 18 MG/DL (ref 8–23)
CALCIUM SERPL-MCNC: 9.2 MG/DL (ref 8.8–10.2)
CHLORIDE BLD-SCNC: 110 MMOL/L (ref 98–111)
CO2: 25 MMOL/L (ref 22–29)
CREAT SERPL-MCNC: 0.8 MG/DL (ref 0.5–0.9)
EOSINOPHILS ABSOLUTE: 0.1 K/UL (ref 0–0.6)
EOSINOPHILS RELATIVE PERCENT: 1.2 % (ref 0–5)
GFR AFRICAN AMERICAN: >59
GFR NON-AFRICAN AMERICAN: >60
GLUCOSE BLD-MCNC: 110 MG/DL (ref 74–109)
HCT VFR BLD CALC: 30.2 % (ref 37–47)
HEMOGLOBIN: 9.6 G/DL (ref 12–16)
IMMATURE GRANULOCYTES #: 0 K/UL
LYMPHOCYTES ABSOLUTE: 1.3 K/UL (ref 1.1–4.5)
LYMPHOCYTES RELATIVE PERCENT: 31.2 % (ref 20–40)
MCH RBC QN AUTO: 31.9 PG (ref 27–31)
MCHC RBC AUTO-ENTMCNC: 31.8 G/DL (ref 33–37)
MCV RBC AUTO: 100.3 FL (ref 81–99)
MONOCYTES ABSOLUTE: 0.5 K/UL (ref 0–0.9)
MONOCYTES RELATIVE PERCENT: 12.2 % (ref 0–10)
NEUTROPHILS ABSOLUTE: 2.2 K/UL (ref 1.5–7.5)
NEUTROPHILS RELATIVE PERCENT: 54.7 % (ref 50–65)
PDW BLD-RTO: 14.2 % (ref 11.5–14.5)
PLATELET # BLD: 246 K/UL (ref 130–400)
PMV BLD AUTO: 9.5 FL (ref 9.4–12.3)
POTASSIUM REFLEX MAGNESIUM: 4.1 MMOL/L (ref 3.5–5)
RBC # BLD: 3.01 M/UL (ref 4.2–5.4)
SODIUM BLD-SCNC: 144 MMOL/L (ref 136–145)
TSH REFLEX FT4: 2.07 UIU/ML (ref 0.35–5.5)
WBC # BLD: 4 K/UL (ref 4.8–10.8)

## 2021-06-04 PROCEDURE — 84443 ASSAY THYROID STIM HORMONE: CPT

## 2021-06-04 PROCEDURE — 36415 COLL VENOUS BLD VENIPUNCTURE: CPT

## 2021-06-04 PROCEDURE — 85025 COMPLETE CBC W/AUTO DIFF WBC: CPT

## 2021-06-04 PROCEDURE — 2580000003 HC RX 258: Performed by: INTERNAL MEDICINE

## 2021-06-04 PROCEDURE — 6360000002 HC RX W HCPCS: Performed by: INTERNAL MEDICINE

## 2021-06-04 PROCEDURE — 6370000000 HC RX 637 (ALT 250 FOR IP): Performed by: INTERNAL MEDICINE

## 2021-06-04 PROCEDURE — 80048 BASIC METABOLIC PNL TOTAL CA: CPT

## 2021-06-04 PROCEDURE — 1210000000 HC MED SURG R&B

## 2021-06-04 RX ADMIN — PROPAFENONE HYDROCHLORIDE 225 MG: 225 TABLET, FILM COATED ORAL at 13:08

## 2021-06-04 RX ADMIN — SODIUM CHLORIDE, PRESERVATIVE FREE 2000 MG: 5 INJECTION INTRAVENOUS at 08:21

## 2021-06-04 RX ADMIN — RIVAROXABAN 15 MG: 15 TABLET, FILM COATED ORAL at 17:13

## 2021-06-04 RX ADMIN — LEVOTHYROXINE SODIUM 88 MCG: 0.09 TABLET ORAL at 05:45

## 2021-06-04 RX ADMIN — SODIUM CHLORIDE, PRESERVATIVE FREE 10 ML: 5 INJECTION INTRAVENOUS at 08:21

## 2021-06-04 RX ADMIN — PROPAFENONE HYDROCHLORIDE 225 MG: 225 TABLET, FILM COATED ORAL at 05:45

## 2021-06-04 RX ADMIN — ACETAMINOPHEN 650 MG: 325 TABLET ORAL at 02:03

## 2021-06-04 RX ADMIN — SODIUM CHLORIDE, PRESERVATIVE FREE 2000 MG: 5 INJECTION INTRAVENOUS at 20:11

## 2021-06-04 RX ADMIN — ACETAMINOPHEN 650 MG: 325 TABLET ORAL at 13:07

## 2021-06-04 RX ADMIN — PROPAFENONE HYDROCHLORIDE 225 MG: 225 TABLET, FILM COATED ORAL at 20:11

## 2021-06-04 RX ADMIN — ACETAMINOPHEN 650 MG: 325 TABLET ORAL at 22:49

## 2021-06-04 RX ADMIN — SODIUM CHLORIDE, PRESERVATIVE FREE 10 ML: 5 INJECTION INTRAVENOUS at 20:11

## 2021-06-04 ASSESSMENT — PAIN SCALES - GENERAL
PAINLEVEL_OUTOF10: 2
PAINLEVEL_OUTOF10: 3
PAINLEVEL_OUTOF10: 6
PAINLEVEL_OUTOF10: 3

## 2021-06-04 NOTE — CONSULTS
Palliative Care:      Initiated for support. Met with this very pleasant lady who had been treating a UTI at home. She presented with increased flank pain and hematuria. She has received IV abx. She is currently resting in bed on room air. She tells me of life at home. She currently lives with her daughter and GET, since she had some falls. She is hopeful to return to her own home in the future. No current home services. She states, \"I have not been able to exercise much, so I am not eating much at home. \"  She has eaten well here and has voiced her praise for the food and the staff who are caring for her. Encouragement and support provided. Past Medical History:        Past Medical History:   Diagnosis Date    A-fib ([facility] [address]     Abnormal weight loss     Anemia     Arthritis     Bullae     CAD (coronary artery disease)     Conjunctivitis     COPD (chronic obstructive pulmonary disease) (HCC)     Depression     Fatigue     History of blood transfusion 2017    post op broken hip    Hypertension     Hypothyroidism     Kidney stone     Leukopenia     Leukopenia     Low back pain     Mild CAD 05/15/2017    Osteoporosis     Palliative care patient 06/04/2021    Paroxysmal A-fib (HCC)     Prolonged emergence from general anesthesia     Sinus pause     10/2014    Thyroid disease     Urinary tract infection     Weakness        Advance Directives:    Full code  Pt has a living will and POA on file     Pain/Other Symptoms:    Pt denies pain. She states the pain in her side is better. Denies soa. Activity:        Pt is up with a walker at home and independent in her personal care. Patient/family discussion r/t goals: Pt states she will return home with her daughter and is hopefull she is discharged today.     Review of any needed services:  TBD    Electronically signed by Laurie Vitale RN on 6/4/2021 at 10:45 AM

## 2021-06-04 NOTE — PROGRESS NOTES
Comprehensive Nutrition Assessment    Type and Reason for Visit:  Initial, Positive Nutrition Screen    Nutrition Recommendations/Plan: continue current POC. If po decreases start ONS    Nutrition Assessment:  Pt is thin and meets criteria for mild malnutrition. Pt is not at risk for further compromise at this time as po intake remains good at %. Malnutrition Assessment:  Malnutrition Status:  Mild malnutrition    Context:  Acute Illness     Findings of the 6 clinical characteristics of malnutrition:  Energy Intake:  No significant decrease in energy intake  Weight Loss:   (4.4% weight loss in 2 months)     Body Fat Loss:  1 - Mild body fat loss     Muscle Mass Loss:  No significant muscle mass loss    Fluid Accumulation:  No significant fluid accumulation     Strength:  Not Performed    Estimated Daily Nutrient Needs:  Energy (kcal):  1472 - 1767 kcals (25-30 kcals/kg); Weight Used for Energy Requirements:  Current     Protein (g):  59-118g; Weight Used for Protein Requirements:  Current        Fluid (ml/day):  4826-7578 ml; Method Used for Fluid Requirements:  1 ml/kcal      Nutrition Related Findings:  thin appearing      Wounds:  None       Current Nutrition Therapies:    ADULT DIET;  Regular; Low Fat/Low Chol/High Fiber/2 gm Na    Anthropometric Measures:  · Height: 5' 4\" (162.6 cm)  · Current Body Weight: 129 lb 14.4 oz (58.9 kg)   · Admission Body Weight: 123 lb (55.8 kg) (stated)    · Usual Body Weight: 135 lb 4 oz (61.3 kg) (4/2021)     · Ideal Body Weight: 120 lbs; % Ideal Body Weight 108.3 %   · BMI: 22.3  · Adjusted Body Weight:  ; No Adjustment   · BMI Categories: Normal Weight (BMI 22.0 to 24.9) age over 72       Nutrition Diagnosis:   · Unintended weight loss related to acute injury/trauma as evidenced by weight loss (4% in 2 months)      Nutrition Interventions:   Food and/or Nutrient Delivery:  Continue Current Diet  Nutrition Education/Counseling:  No recommendation at this time Coordination of Nutrition Care:  Continue to monitor while inpatient    Goals:  po intake 50% or greater. Weight increase 1-3# per week.        Nutrition Monitoring and Evaluation:   Behavioral-Environmental Outcomes:  None Identified   Food/Nutrient Intake Outcomes:  Food and Nutrient Intake  Physical Signs/Symptoms Outcomes:  Biochemical Data, Weight, Skin, Nutrition Focused Physical Findings     Discharge Planning:    Continue current diet     Electronically signed by Barrington Mejia MS, RD, LD on 6/4/21 at 1:43 PM CDT    Contact: [phone]

## 2021-06-04 NOTE — PROGRESS NOTES
[facility]    Patient:  Cassie Torres  YOB: 1932  Date of Service: 6/4/2021  MRN: [id number]   Acct: [de-identified]   Primary Care Physician: [address],   Advance Directive: Full Code  Admit Date: 6/3/2021       Hospital Day: 1        CHIEF COMPLAINT: Right flank pain  Chief Complaint   Patient presents with    Hematuria       6/4/2021 9:31 AM  Subjective / Interval History:   06/04/2021  Patient seen and examined this AM.  Doing well. No new complaints. Flank pain fairly controlled. No acute changes or acute overnight event reported. Laying comfortably in bed in no acute distress. Denies any acute complaints or distress at this time. Review of Systems:   Review of Systems  ROS: 14 point review of systems is negative except as specifically addressed above. ADULT DIET;  Regular; Low Fat/Low Chol/High Fiber/2 gm Na    Intake/Output Summary (Last 24 hours) at 6/4/2021 0931  Last data filed at 6/4/2021 7983  Gross per 24 hour   Intake --   Output 400 ml   Net -400 ml       Medications:   sodium chloride       Current Facility-Administered Medications   Medication Dose Route Frequency Provider Last Rate Last Admin    ceFEPIme (MAXIPIME) 2,000 mg in sodium chloride (PF) 20 mL IV syringe  2,000 mg Intravenous Q12H Chris Raman MD   2,000 mg at 06/04/21 2021    levothyroxine (SYNTHROID) tablet 88 mcg  88 mcg Oral Daily Chris Raman MD   88 mcg at 06/04/21 0545    propafenone (RYTHMOL) tablet 225 mg  225 mg Oral Q8H Chris Raman MD   225 mg at 06/04/21 0545    rivaroxaban (XARELTO) tablet 15 mg  15 mg Oral Daily Chris Raman MD        sodium chloride flush 0.9 % injection 10 mL  10 mL Intravenous 2 times per day Chris Raman MD   10 mL at 06/04/21 0821    sodium chloride flush 0.9 % injection 10 mL  10 mL Intravenous PRN Chris Raman MD        0.9 % sodium chloride infusion  25 mL Intravenous PRN Coty Hoskins Yulia Huffman MD        potassium chloride (KLOR-CON M) extended release tablet 40 mEq  40 mEq Oral PRN Liz Tanner MD        Or    potassium bicarb-citric acid (EFFER-K) effervescent tablet 40 mEq  40 mEq Oral PRN Liz Tanner MD        Or    potassium chloride 10 mEq/100 mL IVPB (Peripheral Line)  10 mEq Intravenous PRN Liz Tanner MD        ondansetron (ZOFRAN-ODT) disintegrating tablet 4 mg  4 mg Oral Q8H PRN Liz Tanner MD        Or    ondansetron [facility]) injection 4 mg  4 mg Intravenous Q6H PRN Liz Tanner MD        magnesium hydroxide (MILK OF MAGNESIA) 400 MG/5ML suspension 30 mL  30 mL Oral Daily PRN Liz Tanner MD        acetaminophen (TYLENOL) tablet 650 mg  650 mg Oral Q6H PRN Liz Tanner MD   650 mg at 06/04/21 0203    Or    acetaminophen (TYLENOL) suppository 650 mg  650 mg Rectal Q6H PRN Liz Tanner MD             sodium chloride        cefepime  2,000 mg Intravenous Q12H    levothyroxine  88 mcg Oral Daily    propafenone  225 mg Oral Q8H    rivaroxaban  15 mg Oral Daily    sodium chloride flush  10 mL Intravenous 2 times per day     sodium chloride flush, sodium chloride, potassium chloride **OR** potassium alternative oral replacement **OR** potassium chloride, ondansetron **OR** ondansetron, magnesium hydroxide, acetaminophen **OR** acetaminophen  ADULT DIET;  Regular; Low Fat/Low Chol/High Fiber/2 gm Na       Labs:   CBC with DIFF:  Recent Labs     06/03/21  1319 06/04/21  0200   WBC 6.0 4.0*   RBC 3.22* 3.01*   HGB 10.5* 9.6*   HCT 32.4* 30.2*   .6* 100.3*   MCH 32.6* 31.9*   MCHC 32.4* 31.8*   RDW 14.5 14.2    246   MPV 9.6 9.5   NEUTOPHILPCT 67.0* 54.7   LYMPHOPCT 21.6 31.2   MONOPCT 9.7 12.2*   EOSRELPCT 1.2 1.2   BASOPCT 0.3 0.2   NEUTROABS 4.0 2.2   LYMPHSABS 1.3 1.3   MONOSABS 0.60 0.50   EOSABS 0.10 0.10   BASOSABS 0.00 0.00       CMP/BMP:  Recent Labs     06/03/21  1319 06/04/21  0200    144   K 4.3 4.1    110   CO2 24 25   ANIONGAP 10 9   GLUCOSE 86 110*   BUN 20 18   CREATININE 0.8 0.8   LABGLOM >60 >60   CALCIUM 9.7 9.2   PROT 6.3*  --    LABALBU 3.7  --    BILITOT 0.5  --    ALKPHOS 41  --    ALT 8  --    AST 19  --          CRP:  No results for input(s): CRP in the last 72 hours. Sed Rate:  No results for input(s): SEDRATE in the last 72 hours. HgBA1c:  No components found for: HGBA1C  FLP:    Lab Results   Component Value Date    TRIG 81 09/25/2019    HDL 90 09/25/2019    LDLCALC 76 09/25/2019    LDLDIRECT 109 08/13/2014     TSH:    Lab Results   Component Value Date    TSH 2.250 11/20/2019     Troponin T: No results for input(s): TROPONINI in the last 72 hours. Pro-BNP: No results for input(s): BNP in the last 72 hours. INR: No results for input(s): INR in the last 72 hours. ABGs: No results found for: PHART, PO2ART, VCQ7YGF  UA:  Recent Labs     06/03/21  1150   COLORU RED*   PHUR 5.5   WBCUA TNTC*   RBCUA TNTC*   YEAST Present*   BACTERIA 4+*   CLARITYU CLOUDY*   SPECGRAV 1.016   LEUKOCYTESUR LARGE*   UROBILINOGEN 0.2   BILIRUBINUR SMALL*   BLOODU LARGE*   GLUCOSEU Negative         Culture Results:    No results for input(s): CXSURG in the last 720 hours. Blood Culture Recent: No results for input(s): BC in the last 720 hours. Cultures:   No results for input(s): CULTURE in the last 72 hours. No results for input(s): BC, Angle Schwalbe in the last 72 hours. No results for input(s): CXSURG in the last 72 hours. No results for input(s): MG, PHOS in the last 72 hours. Recent Labs     06/03/21  1319   AST 19   ALT 8   BILITOT 0.5   ALKPHOS 41         RAD:   CT KIDNEY WO CONTRAST    Result Date: 6/3/2021  EXAM: CT KIDNEY WO CONTRAST -- 6/3/2021 2:49 PM HISTORY: 89 years, Female, right flank pain, hematuria, recurrent urinary tract infection COMPARISON: 3/7/2018 DLP: 257 mGy cm. Automated exposure control was utilized to minimize patient radiation dose.  TECHNIQUE: Unenhanced axial images of the abdomen and pelvis obtained with coronal and sagittal reformats. FINDINGS: Evaluation limited secondary to lack of intravenous contrast. LUNG BASES: No acute findings of the lung bases. No inferior pericardial or pleural effusion. Cardiomegaly. Motion limited exam. LIVER: The liver is normal in size and contour. GALLBLADDER and BILARY: The gallbladder is within normal limits. No radiodense cholelithiasis. PANCREAS: Atrophic. SPLEEN: The spleen is normal in size. ADRENAL: No adrenal mass. GENITOURINARY: Staghorn configuration calculus formation at the left kidney. No right nephrolithiasis. No ureteral calculus or hydronephrosis. The urinary bladder is normal in appearance. Uterus appears surgically absent. PERITONEUM: Small free fluid in the pelvis. No free air. BOWEL: Stomach and duodenum have normal course and caliber. No abnormally dilated loops of bowel or bowel wall thickening. Large amount of colonic stool. Normal appendix on axial images 110-95. RETROPERITONEUM:   Aorta normal in course and caliber with calcified atherosclerosis. No abdominal or pelvic adenopathy. ABDOMINAL WALL: No acute findings. BONES: Right proximal femoral hardware. Central similar multilevel compression deformities compared to prior. Changes of prior L4-5 laminectomy with anterolisthesis L4 on L5. No acute bony finding. 1. Staghorn type calculus formation in the left kidney. No hydronephrosis or ureteral calculus. 2. Cardiomegaly. 3. Changes of prior hysterectomy. 4. Calcified atherosclerosis. 5. Multilevel compression deformities are similar compared to 3/7/2018. Prior lumbar spine surgery. Right proximal femoral hardware.  Signed by Dr Elizabeth Hutton on 6/3/2021 4:15 PM      Objective:   Vitals:   /63   Pulse 68   Temp 97.5 °F (36.4 °C) (Temporal)   Resp 18   Ht 5' 4\" (1.626 m)   Wt 129 lb 14.4 oz (58.9 kg)   SpO2 98%   BMI 22.30 kg/m²       Patient Vitals for the past 24 hrs:   BP Temp Temp src Pulse Resp SpO2 Height Weight   06/04/21 0629 129/63 97.5 °F (36.4 °C) Temporal 68 18 98 % -- --   06/04/21 0027 (!) 143/79 97.7 °F (36.5 °C) Temporal 68 16 98 % -- 129 lb 14.4 oz (58.9 kg)   06/03/21 1921 (!) 162/88 96.8 °F (36 °C) Temporal 89 18 94 % -- --   06/03/21 1853 -- -- -- -- -- 94 % -- --   06/03/21 1801 (!) 174/106 -- -- -- -- 96 % -- --   06/03/21 1731 (!) 169/79 -- -- -- -- 95 % -- --   06/03/21 1704 (!) 172/78 -- -- -- -- 98 % -- --   06/03/21 1332 (!) 148/69 -- -- -- -- 97 % -- --   06/03/21 1313 -- 97.8 °F (36.6 °C) Oral 75 20 95 % -- --   06/03/21 1101 (!) 159/85 98 °F (36.7 °C) -- 66 20 96 % 5' 4\" (1.626 m) 123 lb (55.8 kg)       24HR INTAKE/OUTPUT:      Intake/Output Summary (Last 24 hours) at 6/4/2021 0931  Last data filed at 6/4/2021 1535  Gross per 24 hour   Intake --   Output 400 ml   Net -400 ml       Physical Exam  Vitals and nursing note reviewed. Constitutional:       General: She is not in acute distress. Appearance: Normal appearance. She is not ill-appearing, toxic-appearing or diaphoretic. HENT:      Head: Normocephalic and atraumatic. Right Ear: External ear normal.      Left Ear: External ear normal.      Nose: Nose normal. No congestion or rhinorrhea. Mouth/Throat:      Mouth: Mucous membranes are moist.      Pharynx: Oropharynx is clear. No oropharyngeal exudate or posterior oropharyngeal erythema. Eyes:      General: No scleral icterus. Right eye: No discharge. Left eye: No discharge. Extraocular Movements: Extraocular movements intact. Conjunctiva/sclera: Conjunctivae normal.      Pupils: Pupils are equal, round, and reactive to light. Neck:      Vascular: No carotid bruit. Cardiovascular:      Rate and Rhythm: Normal rate and regular rhythm. Pulses: Normal pulses. Heart sounds: Normal heart sounds. No murmur heard. No friction rub. No gallop.     Pulmonary:      Effort: Pulmonary effort is normal. No respiratory distress. Breath sounds: Normal breath sounds. No stridor. No wheezing, rhonchi or rales. Chest:      Chest wall: No tenderness. Abdominal:      General: Bowel sounds are normal. There is no distension. Palpations: Abdomen is soft. Tenderness: There is no abdominal tenderness. There is no guarding or rebound. Musculoskeletal:         General: No swelling, tenderness, deformity or signs of injury. Normal range of motion. Cervical back: Normal range of motion and neck supple. No rigidity. No muscular tenderness. Right lower leg: Edema present. Left lower leg: Edema present. Skin:     General: Skin is warm and dry. Capillary Refill: Capillary refill takes less than 2 seconds. Coloration: Skin is not jaundiced or pale. Findings: No bruising, erythema, lesion or rash. Neurological:      General: No focal deficit present. Mental Status: She is alert and oriented to person, place, and time. Cranial Nerves: No cranial nerve deficit. Sensory: No sensory deficit. Motor: No weakness. Coordination: Coordination normal.   Psychiatric:         Mood and Affect: Mood normal.         Behavior: Behavior normal.         Thought Content: Thought content normal.         Judgment: Judgment normal.         Assessment/plan:       Hospital Problems         Last Modified POA    * (Principal) Pyelonephritis 6/3/2021 Yes    Paroxysmal A-fib ([address] 6/3/2021 Yes    Chronic obstructive pulmonary disease ([address] 6/3/2021 Yes    Acquired hypothyroidism 6/3/2021 Yes    Palliative care patient 6/4/2021 Yes          Principal Problem:    Pyelonephritis  Active Problems:    Paroxysmal A-fib (HCC)    Chronic obstructive pulmonary disease (HCC)    Acquired hypothyroidism    Palliative care patient  Resolved Problems:    * No resolved hospital problems. *      Brief Summary  Ms. Bach, a 80 y.o. female with a history of COPD, paroxysmal atrial fibrillation (on rivaroxaban), presented to [address] [place] [address] ED (06/03/2021), on account of mild to moderate right flank pain and blood in the urine.      Patient reportedly currently on levofloxacin, which is a second round of oral antibiotic treatment for persistent UTI.     UA, with 4+ bacteria, WBC and RBC TNTC,     Given patient has failed outpatient treatment on 2 separate occasions with oral antibiotic, patient being admitted, for further work-up and management, including IV antibiotic pending urine cultures. Right Flank pain  Pyelonephritis  · Failed outpatient oral antibiotic treatment for persistent UTI  · UA, with 4+ bacteria, WBC and RBC TNTC,  · CT kidney  WO Con (06/03/2021): Impression: Staghorn type calculus formation in the left kidney. No hydronephrosis or ureteral calculus. Cardiomegaly. Changes of prior hysterectomy. Calcified atherosclerosis. Multilevel compression deformities are similar compared to 3/7/2018. Prior lumbar spine surgery. Right proximal femoral hardware. · Cefepime IV  · Pending urine   · Continue symptomatic and supportive management     History of paroxysmal atrial fibrillation  · Currently rate controlled  · Propafenone  · Rivaroxaban PO daily     History of COPD  · Stable  · Bronchodilators as needed     History of acquired hypothyroidism  · Levothyroxine 88 mcg p.o. daily  · TSH level: 2.07 (06/04/2021)            Continue management of other chronic medical conditions - see above and orders. Advance Directive: Full Code    ADULT DIET; Regular; Low Fat/Low Chol/High Fiber/2 gm Na         Consults Made:   IP CONSULT TO SOCIAL WORK  PALLIATIVE CARE EVAL    DVT prophylaxis: Rivaroxaban      Discharge planning:   Continue IV antibiotics, pending urine cultures      Time Spent is 25 mins in the examination, evaluation, counseling and review of medications, assessment and plan.      Electronically signed by   Ashok Ibanez MD, MPH, MD,   Internal Medicine Hospitalist   6/4/2021 9:31 AM

## 2021-06-04 NOTE — PLAN OF CARE
Problem: Falls - Risk of:  Goal: Will remain free from falls  Description: Will remain free from falls  6/3/2021 2333 by Emily Saul RN  Outcome: Ongoing  6/3/2021 1933 by Emily Saul RN  Outcome: Ongoing  Goal: Absence of physical injury  Description: Absence of physical injury  6/3/2021 2333 by Emily Salu RN  Outcome: Ongoing  6/3/2021 1933 by Emily Saul RN  Outcome: Ongoing

## 2021-06-05 VITALS
OXYGEN SATURATION: 98 % | WEIGHT: 130.7 LBS | BODY MASS INDEX: 22.31 KG/M2 | HEIGHT: 64 IN | SYSTOLIC BLOOD PRESSURE: 121 MMHG | TEMPERATURE: 97.6 F | DIASTOLIC BLOOD PRESSURE: 68 MMHG | HEART RATE: 65 BPM | RESPIRATION RATE: 18 BRPM

## 2021-06-05 LAB
ANION GAP SERPL CALCULATED.3IONS-SCNC: 8 MMOL/L (ref 7–19)
BASOPHILS ABSOLUTE: 0 K/UL (ref 0–0.2)
BASOPHILS RELATIVE PERCENT: 0.3 % (ref 0–1)
BUN BLDV-MCNC: 16 MG/DL (ref 8–23)
CALCIUM SERPL-MCNC: 9 MG/DL (ref 8.8–10.2)
CHLORIDE BLD-SCNC: 108 MMOL/L (ref 98–111)
CO2: 26 MMOL/L (ref 22–29)
CREAT SERPL-MCNC: 0.6 MG/DL (ref 0.5–0.9)
EOSINOPHILS ABSOLUTE: 0.1 K/UL (ref 0–0.6)
EOSINOPHILS RELATIVE PERCENT: 1.9 % (ref 0–5)
GFR AFRICAN AMERICAN: >59
GFR NON-AFRICAN AMERICAN: >60
GLUCOSE BLD-MCNC: 96 MG/DL (ref 74–109)
HCT VFR BLD CALC: 27.4 % (ref 37–47)
HEMOGLOBIN: 9.1 G/DL (ref 12–16)
IMMATURE GRANULOCYTES #: 0 K/UL
LYMPHOCYTES ABSOLUTE: 1.2 K/UL (ref 1.1–4.5)
LYMPHOCYTES RELATIVE PERCENT: 31.1 % (ref 20–40)
MCH RBC QN AUTO: 34.3 PG (ref 27–31)
MCHC RBC AUTO-ENTMCNC: 33.2 G/DL (ref 33–37)
MCV RBC AUTO: 103.4 FL (ref 81–99)
MONOCYTES ABSOLUTE: 0.6 K/UL (ref 0–0.9)
MONOCYTES RELATIVE PERCENT: 15.1 % (ref 0–10)
NEUTROPHILS ABSOLUTE: 1.9 K/UL (ref 1.5–7.5)
NEUTROPHILS RELATIVE PERCENT: 51.3 % (ref 50–65)
ORGANISM: ABNORMAL
PDW BLD-RTO: 14.6 % (ref 11.5–14.5)
PLATELET # BLD: 218 K/UL (ref 130–400)
PMV BLD AUTO: 10 FL (ref 9.4–12.3)
POTASSIUM REFLEX MAGNESIUM: 4.1 MMOL/L (ref 3.5–5)
RBC # BLD: 2.65 M/UL (ref 4.2–5.4)
SODIUM BLD-SCNC: 142 MMOL/L (ref 136–145)
URINE CULTURE, ROUTINE: ABNORMAL
URINE CULTURE, ROUTINE: ABNORMAL
WBC # BLD: 3.7 K/UL (ref 4.8–10.8)

## 2021-06-05 PROCEDURE — 6360000002 HC RX W HCPCS: Performed by: INTERNAL MEDICINE

## 2021-06-05 PROCEDURE — 85025 COMPLETE CBC W/AUTO DIFF WBC: CPT

## 2021-06-05 PROCEDURE — 80048 BASIC METABOLIC PNL TOTAL CA: CPT

## 2021-06-05 PROCEDURE — 6370000000 HC RX 637 (ALT 250 FOR IP): Performed by: INTERNAL MEDICINE

## 2021-06-05 PROCEDURE — 2580000003 HC RX 258: Performed by: INTERNAL MEDICINE

## 2021-06-05 PROCEDURE — 36415 COLL VENOUS BLD VENIPUNCTURE: CPT

## 2021-06-05 RX ORDER — CEFDINIR 300 MG/1
300 CAPSULE ORAL 2 TIMES DAILY
Qty: 14 CAPSULE | Refills: 0 | Status: SHIPPED | OUTPATIENT
Start: 2021-06-05 | End: 2021-06-12

## 2021-06-05 RX ADMIN — SODIUM CHLORIDE, PRESERVATIVE FREE 2000 MG: 5 INJECTION INTRAVENOUS at 08:42

## 2021-06-05 RX ADMIN — LEVOTHYROXINE SODIUM 88 MCG: 0.09 TABLET ORAL at 05:57

## 2021-06-05 RX ADMIN — PROPAFENONE HYDROCHLORIDE 225 MG: 225 TABLET, FILM COATED ORAL at 05:57

## 2021-06-05 RX ADMIN — SODIUM CHLORIDE, PRESERVATIVE FREE 10 ML: 5 INJECTION INTRAVENOUS at 08:42

## 2021-06-05 ASSESSMENT — PAIN SCALES - GENERAL
PAINLEVEL_OUTOF10: 0
PAINLEVEL_OUTOF10: 0

## 2021-06-05 NOTE — DISCHARGE SUMMARY
Daren Rosario  :  1932  MRN:  [id number]    Admit date:  6/3/2021  Discharge date:  2021       Admitting Physician:  Roberto Simeon MD    Advance Directive: Full Code    Consults Made:   IP CONSULT TO SOCIAL WORK  PALLIATIVE CARE EVAL      Primary Care Physician:  aMggi Joy DO    Discharge Diagnoses:  Principal Problem:    Pyelonephritis  Active Problems:    Paroxysmal A-fib ([facility] [address]    Chronic obstructive pulmonary disease ([facility] [address]    Acquired hypothyroidism    Palliative care patient  Resolved Problems:    * No resolved hospital problems. *      Pertinent Labs:  CBC with DIFF:  Recent Labs     21  1319 21  0200 21  0140   WBC 6.0 4.0* 3.7*   RBC 3.22* 3.01* 2.65*   HGB 10.5* 9.6* 9.1*   HCT 32.4* 30.2* 27.4*   .6* 100.3* 103.4*   MCH 32.6* 31.9* 34.3*   MCHC 32.4* 31.8* 33.2   RDW 14.5 14.2 14.6*    246 218   MPV 9.6 9.5 10.0   NEUTOPHILPCT 67.0* 54.7 51.3   LYMPHOPCT 21.6 31.2 31.1   MONOPCT 9.7 12.2* 15.1*   EOSRELPCT 1.2 1.2 1.9   BASOPCT 0.3 0.2 0.3   NEUTROABS 4.0 2.2 1.9   LYMPHSABS 1.3 1.3 1.2   MONOSABS 0.60 0.50 0.60   EOSABS 0.10 0.10 0.10   BASOSABS 0.00 0.00 0.00       CMP/BMP:  Recent Labs     21  1319 21  0200 21  0140    144 142   K 4.3 4.1 4.1    110 108   CO2 24 25 26   ANIONGAP 10 9 8   GLUCOSE 86 110* 96   BUN 20 18 16   CREATININE 0.8 0.8 0.6   LABGLOM >60 >60 >60   CALCIUM 9.7 9.2 9.0   PROT 6.3*  --   --    LABALBU 3.7  --   --    BILITOT 0.5  --   --    ALKPHOS 41  --   --    ALT 8  --   --    AST 19  --   --          CRP:  No results for input(s): CRP in the last 72 hours. Sed Rate:  No results for input(s): SEDRATE in the last 72 hours.       HgBA1c:  No components found for: HGBA1C  FLP:    Lab Results   Component Value Date    TRIG 81 2019    HDL 90 2019    LDLCALC 76 2019    LDLDIRECT 109 2014     TSH:    Lab Results   Component Value Date    TSH 2.250 2019     Troponin T: No results for input(s): TROPONINI in the last 72 hours. Pro-BNP: No results for input(s): BNP in the last 72 hours. INR: No results for input(s): INR in the last 72 hours. ABGs: No results found for: PHART, PO2ART, WGJ8HAT  UA:  Recent Labs     06/03/21  1150   COLORU RED*   PHUR 5.5   WBCUA TNTC*   RBCUA TNTC*   YEAST Present*   BACTERIA 4+*   CLARITYU CLOUDY*   SPECGRAV 1.016   LEUKOCYTESUR LARGE*   UROBILINOGEN 0.2   BILIRUBINUR SMALL*   BLOODU LARGE*   GLUCOSEU Negative         Blood Culture Recent: No results for input(s): BC in the last 720 hours. Cultures:   No results for input(s): CULTURE in the last 72 hours. No results for input(s): BC, Florentin Meiers in the last 72 hours. No results for input(s): CXSURG in the last 72 hours. No results for input(s): MG, PHOS in the last 72 hours. Recent Labs     06/03/21  1319   AST 19   ALT 8   BILITOT 0.5   ALKPHOS 41       Significant Diagnostic Studies:   CT KIDNEY WO CONTRAST    Result Date: 6/3/2021  EXAM: CT KIDNEY WO CONTRAST -- 6/3/2021 2:49 PM HISTORY: 89 years, Female, right flank pain, hematuria, recurrent urinary tract infection COMPARISON: 3/7/2018 DLP: 257 mGy cm. Automated exposure control was utilized to minimize patient radiation dose. TECHNIQUE: Unenhanced axial images of the abdomen and pelvis obtained with coronal and sagittal reformats. FINDINGS: Evaluation limited secondary to lack of intravenous contrast. LUNG BASES: No acute findings of the lung bases. No inferior pericardial or pleural effusion. Cardiomegaly. Motion limited exam. LIVER: The liver is normal in size and contour. GALLBLADDER and BILARY: The gallbladder is within normal limits. No radiodense cholelithiasis. PANCREAS: Atrophic. SPLEEN: The spleen is normal in size. ADRENAL: No adrenal mass. GENITOURINARY: Staghorn configuration calculus formation at the left kidney. No right nephrolithiasis. No ureteral calculus or hydronephrosis.  The urinary bladder is normal in appearance. Uterus appears surgically absent. PERITONEUM: Small free fluid in the pelvis. No free air. BOWEL: Stomach and duodenum have normal course and caliber. No abnormally dilated loops of bowel or bowel wall thickening. Large amount of colonic stool. Normal appendix on axial images 110-95. RETROPERITONEUM:   Aorta normal in course and caliber with calcified atherosclerosis. No abdominal or pelvic adenopathy. ABDOMINAL WALL: No acute findings. BONES: Right proximal femoral hardware. Central similar multilevel compression deformities compared to prior. Changes of prior L4-5 laminectomy with anterolisthesis L4 on L5. No acute bony finding. 1. Staghorn type calculus formation in the left kidney. No hydronephrosis or ureteral calculus. 2. Cardiomegaly. 3. Changes of prior hysterectomy. 4. Calcified atherosclerosis. 5. Multilevel compression deformities are similar compared to 3/7/2018. Prior lumbar spine surgery. Right proximal femoral hardware. Signed by Dr Deny Hinds on 6/3/2021 4:15 PM      Hospital Course:   Ms. Vanessa Henley 80 y.o. female with a history of COPD, paroxysmal atrial fibrillation (on rivaroxaban), presented to [facility] ED (06/03/2021), on account of mild to moderate right flank pain and blood in the urine.      Patient reportedly currently on levofloxacin, which is a second round of oral antibiotic treatment for persistent UTI.     UA, with 4+ bacteria, WBC and RBC TNTC,     Given patient has failed outpatient treatment on 2 separate occasions with oral antibiotic, patient being admitted, for further work-up and management, including IV antibiotic pending urine cultures.     Right Flank pain  Pyelonephritis  · Failed outpatient oral antibiotic treatment for persistent UTI  · UA, with 4+ bacteria, WBC and RBC TNTC,  · CT kidney  WO Con (06/03/2021): Impression: Staghorn type calculus formation in the left kidney. No hydronephrosis or ureteral calculus. Cardiomegaly. Changes of prior hysterectomy. friction rub. No gallop. Pulmonary:      Effort: Pulmonary effort is normal. No respiratory distress. Breath sounds: Normal breath sounds. No stridor. No wheezing, rhonchi or rales. Chest:      Chest wall: No tenderness. Abdominal:      General: Bowel sounds are normal. There is no distension. Palpations: Abdomen is soft. Tenderness: There is no abdominal tenderness. There is no guarding or rebound. Musculoskeletal:         General: No swelling, tenderness, deformity or signs of injury. Normal range of motion. Cervical back: Normal range of motion and neck supple. No rigidity. No muscular tenderness. Right lower leg: No edema. Left lower leg: No edema. Skin:     General: Skin is warm and dry. Capillary Refill: Capillary refill takes less than 2 seconds. Coloration: Skin is not jaundiced or pale. Findings: No bruising, erythema, lesion or rash. Neurological:      General: No focal deficit present. Mental Status: She is alert and oriented to person, place, and time. Cranial Nerves: No cranial nerve deficit. Sensory: No sensory deficit. Motor: No weakness. Coordination: Coordination normal.   Psychiatric:         Mood and Affect: Mood normal.         Behavior: Behavior normal.         Thought Content: Thought content normal.         Judgment: Judgment normal.         Discharge Medications:       Temi Bach   Home Medication Instructions S:[id number]    Printed on:06/05/21 1022   Medication Information                      Calcium Carbonate-Vitamin D (CALTRATE 600+D PO)  Take 600 mg by mouth 2 times daily.              cefdinir (OMNICEF) 300 MG capsule  Take 1 capsule by mouth 2 times daily for 7 days             furosemide (LASIX) 20 MG tablet  Take 1 tablet by mouth daily             levothyroxine (SYNTHROID) 88 MCG tablet  Take 1 tablet by mouth Daily             loratadine (CLARITIN) 10 MG tablet  Take 10 mg by mouth daily             meclizine (ANTIVERT) 12.5 MG tablet  Take 25 mg by mouth as needed Take 2 tablets (25 mg) as needed             midodrine (PROAMATINE) 10 MG tablet  Take 1 tablet by mouth 3 times daily             omeprazole (PRILOSEC) 10 MG delayed release capsule  Take 1 capsule by mouth daily             potassium chloride (KLOR-CON M) 20 MEQ extended release tablet  Take 1 tablet by mouth daily             propafenone (RYTHMOL) 225 MG tablet  Take 1 tablet by mouth every 8 hours             rivaroxaban (XARELTO) 20 MG TABS tablet  TAKE 1 TABLET DAILY WITH BREAKFAST                 Discharge Instructions: Follow up with Nehemias Hobson DO in 7 days. Take medications as directed. Resume activity as tolerated. Diet: ADULT DIET; Regular; Low Fat/Low Chol/High Fiber/2 gm Na        DISCHARGE STATUS:    Condition: Fair  Disposition: Patient is medically stable and will be discharged home    Extended Emergency Contact Information  Primary Emergency Contact: Victoria Reid  Address: [address] [place] [address], [address] [place] [address] Phone: [phone]  Relation: Child  Secondary Emergency Contact: Jose C Reid  Address: [address] [facility], [address] [place] [address] Phone: [phone]  Relation: Other       Time Spent on discharge is 32 mins in the examination, evaluation, counseling and review of medications and discharge plan. Electronically signed by   Jennifer Trent MD, MPH, MD,   Internal Medicine Hospitalist   6/5/2021 10:22 AM      Thank you AMRIT Miller DO for the opportunity to be involved in this patient's care. If you have any questions or concerns please feel free to contact me at [phone]        EMR Dragon/Transcription disclaimer:   Much of this encounter note is an electronic transcription/translation of spoken language to printed text.  The electronic translation of spoken language may permit erroneous, or at times, nonsensical words or phrases to be inadvertently transcribed; although attempts have made to review the note for such errors, some may still exist.

## 2021-06-05 NOTE — PLAN OF CARE
Patient to be discharged home this AM.  IV access discontinued. Awaiting on family for ride home. Patient denies any concerns at discharge.

## 2021-06-07 ENCOUNTER — CARE COORDINATION (OUTPATIENT)
Dept: CASE MANAGEMENT | Age: 86
End: 2021-06-07

## 2021-06-07 ENCOUNTER — TELEPHONE (OUTPATIENT)
Dept: PRIMARY CARE CLINIC | Age: 86
End: 2021-06-07

## 2021-06-07 DIAGNOSIS — N12 PYELONEPHRITIS: Primary | ICD-10-CM

## 2021-06-07 PROCEDURE — 1111F DSCHRG MED/CURRENT MED MERGE: CPT | Performed by: PEDIATRICS

## 2021-06-07 NOTE — TELEPHONE ENCOUNTER
Pt. Is needing hospital f/u from [facility]. Pt. Was d/c 6/5/21, dx kidney infection. Daughter is wanting to schedule in office and wanting to only see Dr. Jaleesa Ellis. I did not have anything I could schedule to accommodate. Please call to schedule.

## 2021-06-07 NOTE — TELEPHONE ENCOUNTER
Called to do TCM call on pt. Spoke with daughter and she said that when she had her VV this morning and spoke to you about pt, she said that you would get her in this week or next. There are not any available appts to put her in.  She will not see anyone else because she states they had to see someone else last time and that is how she ended up in the hospital.

## 2021-06-07 NOTE — CARE COORDINATION
Ryan 45 Transitions Initial Follow Up Call    Call within 2 business days of discharge: Yes    Patient: Cynthia Hernadez Patient : 1932   MRN: [id number]  Reason for Admission: Pyelonephritis  Discharge Date: 21 RARS: Readmission Risk Score: 12      Last Discharge [facility]       Complaint Diagnosis Description Type Department Provider    6/3/21 Hematuria Right flank pain . .. ED to Hosp-Admission (Discharged) (ADMITTED) L MED SURG Jennifer Trent MD; Martine Keene Pi. .. Spoke with: Daughter, [place]: [facility]      Non-face-to-face services provided:  Reviewed encounter information for continuity of care prior to follow up Care Transitions phone call - chart notes, consults, progress notes, test results, med list, appointments, AVS, other information. Care Transitions 24 Hour Call    Schedule Follow Up Appointment with PCP: Declined  Do you have any ongoing symptoms?: No  Do you have a copy of your discharge instructions?: Yes  Do you have all of your prescriptions and are they filled?: Yes  Have you been contacted by a [address]?: No  Have you scheduled your follow up appointment?: No  Were you discharged with any Home Care or Post Acute Services: No  Do you feel like you have everything you need to keep you well at home?: Yes  Care Transitions Interventions       Placed a call to the number listed for patient and spoke with her daughter, Carson Twiari. She said her number is listed so she can screen calls for patient. She said that patient is doing well. She denied any ongoing symptoms. She said that she is up and about, getting stronger. She said she warmed up her own lunch today. Eating well, appetite is good. Drinking fair she said. She has her meds and is taking all of them as ordered. Did a med review. She has called to schedule hospital follow up appointment and the doctor is booked solid she said.   The office is to call her back today.  She is not aware of any further needs or issues. Discussed COVID 19 information, risks, signs, quarantine, infection control, vaccines, etc.  Patient aware and voices understanding. Informed of CTC process, purpose, future calls, etc and is agreeable with appropriate follow up. Ensured to have CTN contact information to be used as needed. Encouraged to call as needed for new issues, questions, etc.  Given the opportunity to ask questions and answered appropriately. CTN to follow up as indicated. Transitions of Care Initial Call    Was this an external facility discharge? No    Challenges to be reviewed by the provider   Additional needs identified to be addressed with provider: No       Method of communication with provider : none      Advance Care Planning:   Does patient have an Advance Directive:  reviewed and current. Advance Care Planning   Healthcare Decision Maker:    Primary Decision Maker: Victoria Reid - Child - [phone]    Supplemental (Other) Decision Maker: FranklinJose C - Other - [phone]    Was this a readmission? No  Patient stated reason for admission: \"She had bad infection. \"  Patients top risk factors for readmission: functional physical ability and medical condition-Chronic UTI, COPD    Care Transition Nurse (CTN) contacted the caregiver by telephone to perform post hospital discharge assessment. Verified name and  with caregiver as identifiers. Provided introduction to self, and explanation of the CTN role. CTN reviewed discharge instructions, medical action plan and red flags with caregiver who verbalized understanding. Caregiver given an opportunity to ask questions and does not have any further questions or concerns at this time. Were discharge instructions available to patient? Yes.  Reviewed appropriate site of care based on symptoms and resources available to patient including: PCP, Specialist, Urgent care clinics, Home health, When to call 911 and [employer]. The caregiver agrees to contact the PCP office for questions related to their healthcare. Medication reconciliation was performed with caregiver, who verbalizes understanding of administration of home medications. Advised obtaining a 90-day supply of all daily and as-needed medications. Covid Risk Education     Educated patient about risk for severe COVID-19 due to risk factors according to CDC guidelines. CTN reviewed discharge instructions, medical action plan and red flag symptoms with the caregiver who verbalized understanding. Discussed COVID vaccination status: Yes. Education provided on COVID-19 vaccination as appropriate. Discussed exposure protocols and quarantine with CDC Guidelines. Caregiver was given an opportunity to verbalize any questions and concerns and agrees to contact CTN or health care provider for questions related to their healthcare. CTN provided contact information. Plan for follow-up call in 3-5 days based on severity of symptoms and risk factors.       Follow Up  Future Appointments   Date Time Provider Serge Copeland   10/6/2021 10:30 AM NAVYA Kilpatrick, [facility]       Magdi Weeks RN

## 2021-06-07 NOTE — TELEPHONE ENCOUNTER
Ryan 45 Transitions Initial Follow Up Call    Outreach made within 2 business days of discharge: Yes    Patient: Cynthia Hernadez Patient : 1932   MRN: [id number]  Reason for Admission: pyelonephritis  Discharge Date: 21       Spoke with: daughter    Discharge department/facility: [facility] Interactive Patient Contact:  Was patient able to fill all prescriptions: Yes  Was patient instructed to bring all medications to the follow-up visit: Yes  Is patient taking all medications as directed in the discharge summary?  Yes  Does patient understand their discharge instructions: Yes  Does patient have questions or concerns that need addressed prior to 7-14 day follow up office visit: no    Scheduled appointment with PCP within 7-14 days    Follow Up  Future Appointments   Date Time Provider Serge Copeland   10/6/2021 10:30 AM [address], [place]

## 2021-06-10 DIAGNOSIS — I48.0 PAROXYSMAL ATRIAL FIBRILLATION (HCC): ICD-10-CM

## 2021-06-10 RX ORDER — PROPAFENONE HYDROCHLORIDE 225 MG/1
TABLET, FILM COATED ORAL
Qty: 270 TABLET | Refills: 0 | Status: SHIPPED | OUTPATIENT
Start: 2021-06-10 | End: 2021-11-23

## 2021-06-14 ENCOUNTER — CARE COORDINATION (OUTPATIENT)
Dept: CARE COORDINATION | Age: 86
End: 2021-06-14

## 2021-06-14 DIAGNOSIS — K21.9 GASTROESOPHAGEAL REFLUX DISEASE WITHOUT ESOPHAGITIS: Chronic | ICD-10-CM

## 2021-06-14 NOTE — CARE COORDINATION
Ryan 45 Transitions Follow Up Call    2021    Patient: Marco A Vanegas  Patient : 1932   MRN: <[id number]>  Reason for Admission: pyelonephritis, staghorn calculus, flank pain  Discharge Date: 21 RARS: Readmission Risk Score: 12         Spoke with: Pt daughter, Jearl Felty reports the pt is doing well. She denies any recurring flank pain or blood in urine. States she is eating and drinking okay and eliminating her bladder with no issues. States she does have some trouble on and off with constipation and this was ongoing. She is scheduled for a hospital f/u on  with PCP. Care Transitions Subsequent and Final Call    Subsequent and Final Calls  Do you have any ongoing symptoms?: No  Have your medications changed?: No  Do you have any questions related to your medications?: No  Do you currently have any active services?: No  Do you have any needs or concerns that I can assist you with?: No  Care Transitions Interventions  Other Interventions:            Follow Up  Future Appointments   Date Time Provider Serge Copeland   2021 11:00 AM [address] [facility],[address], DO Amanda King [facility]   10/6/2021 10:30 AM NAVYA Stafford, [facility], [address] [place] [address]

## 2021-06-15 RX ORDER — OMEPRAZOLE 10 MG/1
10 CAPSULE, DELAYED RELEASE ORAL DAILY
Qty: 90 CAPSULE | Refills: 3 | Status: SHIPPED | OUTPATIENT
Start: 2021-06-15 | End: 2022-06-01

## 2021-06-15 NOTE — TELEPHONE ENCOUNTER
Received fax from pharmacy requesting refill on pts medication(s). Pt was last seen in office on 5/18/2021  and has a follow up scheduled for 6/22/2021. Will send request to  Dr. Bethany Weiner  for patient.      Requested Prescriptions     Pending Prescriptions Disp Refills    omeprazole (PRILOSEC) 10 MG delayed release capsule [Pharmacy Med Name: OMEPRAZOLE 10 MG CAP 10 Capsule] 90 capsule 3     Sig: TAKE 1 CAPSULE BY MOUTH DAILY

## 2021-06-22 ENCOUNTER — OFFICE VISIT (OUTPATIENT)
Dept: PRIMARY CARE CLINIC | Age: 86
End: 2021-06-22
Payer: MEDICARE

## 2021-06-22 ENCOUNTER — CARE COORDINATION (OUTPATIENT)
Dept: CASE MANAGEMENT | Age: 86
End: 2021-06-22

## 2021-06-22 VITALS
HEART RATE: 72 BPM | TEMPERATURE: 97.6 F | WEIGHT: 128 LBS | BODY MASS INDEX: 21.85 KG/M2 | OXYGEN SATURATION: 98 % | DIASTOLIC BLOOD PRESSURE: 88 MMHG | SYSTOLIC BLOOD PRESSURE: 130 MMHG | HEIGHT: 64 IN

## 2021-06-22 DIAGNOSIS — N10 ACUTE PYELONEPHRITIS: ICD-10-CM

## 2021-06-22 DIAGNOSIS — N12 PYELONEPHRITIS: Primary | ICD-10-CM

## 2021-06-22 DIAGNOSIS — Z09 HOSPITAL DISCHARGE FOLLOW-UP: ICD-10-CM

## 2021-06-22 PROCEDURE — 1123F ACP DISCUSS/DSCN MKR DOCD: CPT | Performed by: PEDIATRICS

## 2021-06-22 PROCEDURE — 1036F TOBACCO NON-USER: CPT | Performed by: PEDIATRICS

## 2021-06-22 PROCEDURE — 4040F PNEUMOC VAC/ADMIN/RCVD: CPT | Performed by: PEDIATRICS

## 2021-06-22 PROCEDURE — G8420 CALC BMI NORM PARAMETERS: HCPCS | Performed by: PEDIATRICS

## 2021-06-22 PROCEDURE — 1090F PRES/ABSN URINE INCON ASSESS: CPT | Performed by: PEDIATRICS

## 2021-06-22 PROCEDURE — 99214 OFFICE O/P EST MOD 30 MIN: CPT | Performed by: PEDIATRICS

## 2021-06-22 PROCEDURE — G8427 DOCREV CUR MEDS BY ELIG CLIN: HCPCS | Performed by: PEDIATRICS

## 2021-06-22 PROCEDURE — 1111F DSCHRG MED/CURRENT MED MERGE: CPT | Performed by: PEDIATRICS

## 2021-06-22 ASSESSMENT — ENCOUNTER SYMPTOMS
NAUSEA: 0
ABDOMINAL PAIN: 0
EYE DISCHARGE: 0
CHEST TIGHTNESS: 0
SORE THROAT: 0
SINUS PRESSURE: 0
VOMITING: 0
SHORTNESS OF BREATH: 0
WHEEZING: 0
COUGH: 0
BACK PAIN: 0
DIARRHEA: 0
CONSTIPATION: 0

## 2021-06-22 NOTE — PROGRESS NOTES
[address] [facility], [address]  Phone [phone]   Fax [phone]      OFFICE VISIT: 2021    Perry Bach-: 1932      HPI  Reason For Visit:  Iam Olson is a 80 y.o. Follow-Up from Hospital      Patient presents on follow-up from hospitalization. She was hospitalized at [facility] in [place]. Hospitalization was from 6/3/2021 to 2021. She was hospitalized for pyelonephritis. height is 5' 4\" (1.626 m) and weight is 128 lb (58.1 kg). Her temporal temperature is 97.6 °F (36.4 °C). Her blood pressure is 130/88 and her pulse is 72. Her oxygen saturation is 98%. Body mass index is 21.97 kg/m². I have reviewed the following with the Ms. Bach   Lab Review  Admission on 2021, Discharged on 2021   Component Date Value    WBC 2021 6.0     RBC 2021 3.22*    Hemoglobin 2021 10.5*    Hematocrit 2021 32.4*    MCV 2021 100.6*    MCH 2021 32.6*    MCHC 2021 32.4*    RDW 2021 14.5     Platelets  267     MPV 2021 9.6     Neutrophils % 2021 67.0*    Lymphocytes % 2021 21.6     Monocytes % 2021 9.7     Eosinophils % 2021 1.2     Basophils % 2021 0.3     Neutrophils Absolute 2021 4.0     Immature Granulocytes # 2021 0.0     Lymphocytes Absolute 2021 1.3     Monocytes Absolute 2021 0.60     Eosinophils Absolute 2021 0.10     Basophils Absolute 2021 0.00     Sodium 2021 139     Potassium reflex Magnesi* 2021 4.3     Chloride 2021 105     CO2 2021 24     Anion Gap 2021 10     Glucose 2021 86     BUN 2021 20     CREATININE 2021 0.8     GFR Non- 2021 >60     GFR  2021 >59     Calcium 2021 9.7     Total Protein 2021 6.3*    Albumin 2021 3.7     Total Bilirubin 06/03/2021 0.5     Alkaline Phosphatase 06/03/2021 41     ALT 06/03/2021 8     AST 06/03/2021 19     Color, UA 06/03/2021 RED*    Clarity, UA 06/03/2021 CLOUDY*    Glucose, Ur 06/03/2021 Negative     Bilirubin Urine 06/03/2021 SMALL*    Ketones, Urine 06/03/2021 Negative     Specific Gravity, UA 06/03/2021 1.016     Blood, Urine 06/03/2021 LARGE*    pH, UA 06/03/2021 5.5     Protein, UA 06/03/2021 100*    Urobilinogen, Urine 06/03/2021 0.2     Nitrite, Urine 06/03/2021 Negative     Leukocyte Esterase, Urine 06/03/2021 LARGE*    WBC, UA 06/03/2021 TNTC*    RBC, UA 06/03/2021 TNTC*    Epithelial Cells, UA 06/03/2021 0-2     Bacteria, UA 06/03/2021 4+*    Yeast, UA 06/03/2021 Present*    Urine Culture, Routine 06/03/2021 >50,000 CFU/ml mixed skin/urogenital cherelle. No further workup*    Organism 06/03/2021 Yeast*    Urine Culture, Routine 06/03/2021                      Value: Moderate growth  No further workup      SARS-CoV-2, NAAT 06/03/2021 Not Detected     TSH Reflex FT4 06/04/2021 2.07     Sodium 06/04/2021 144     Potassium reflex Magnesi* 06/04/2021 4.1     Chloride 06/04/2021 110     CO2 06/04/2021 25     Anion Gap 06/04/2021 9     Glucose 06/04/2021 110*    BUN 06/04/2021 18     CREATININE 06/04/2021 0.8     GFR Non- 06/04/2021 >60     GFR  06/04/2021 >59     Calcium 06/04/2021 9.2     WBC 06/04/2021 4.0*    RBC 06/04/2021 3.01*    Hemoglobin 06/04/2021 9.6*    Hematocrit 06/04/2021 30.2*    MCV 06/04/2021 100.3*    MCH 06/04/2021 31.9*    MCHC 06/04/2021 31.8*    RDW 06/04/2021 14.2     Platelets 99/27/3817 246     MPV 06/04/2021 9.5     Neutrophils % 06/04/2021 54.7     Lymphocytes % 06/04/2021 31.2     Monocytes % 06/04/2021 12.2*    Eosinophils % 06/04/2021 1.2     Basophils % 06/04/2021 0.2     Neutrophils Absolute 06/04/2021 2.2     Immature Granulocytes # 06/04/2021 0.0     Lymphocytes Absolute 06/04/2021 1.3  Monocytes Absolute 06/04/2021 0.50     Eosinophils Absolute 06/04/2021 0.10     Basophils Absolute 06/04/2021 0.00     Sodium 06/05/2021 142     Potassium reflex Magnesi* 06/05/2021 4.1     Chloride 06/05/2021 108     CO2 06/05/2021 26     Anion Gap 06/05/2021 8     Glucose 06/05/2021 96     BUN 06/05/2021 16     CREATININE 06/05/2021 0.6     GFR Non- 06/05/2021 >60     GFR  06/05/2021 >59     Calcium 06/05/2021 9.0     WBC 06/05/2021 3.7*    RBC 06/05/2021 2.65*    Hemoglobin 06/05/2021 9.1*    Hematocrit 06/05/2021 27.4*    MCV 06/05/2021 103.4*    MCH 06/05/2021 34.3*    MCHC 06/05/2021 33.2     RDW 06/05/2021 14.6*    Platelets 92/96/0202 218     MPV 06/05/2021 10.0     Neutrophils % 06/05/2021 51.3     Lymphocytes % 06/05/2021 31.1     Monocytes % 06/05/2021 15.1*    Eosinophils % 06/05/2021 1.9     Basophils % 06/05/2021 0.3     Neutrophils Absolute 06/05/2021 1.9     Immature Granulocytes # 06/05/2021 0.0     Lymphocytes Absolute 06/05/2021 1.2     Monocytes Absolute 06/05/2021 0.60     Eosinophils Absolute 06/05/2021 0.10     Basophils Absolute 06/05/2021 0.00    Orders Only on 05/27/2021   Component Date Value    Color, UA 05/27/2021 DARK YELLOW*    Clarity, UA 05/27/2021 CLOUDY*    Glucose, Ur 05/27/2021 Negative     Bilirubin Urine 05/27/2021 SMALL*    Ketones, Urine 05/27/2021 TRACE*    Specific Lam[place], UA 05/27/2021 1.031     Blood, Urine 05/27/2021 LARGE*    pH, UA 05/27/2021 5.5     Protein, UA 05/27/2021 100*    Urobilinogen, Urine 05/27/2021 0.2     Nitrite, Urine 05/27/2021 Negative     Leukocyte Esterase, Urine 05/27/2021 MODERATE*    WBC, UA 05/27/2021 TNTC*    RBC, UA 05/27/2021 TNTC*    Epithelial Cells, UA 05/27/2021 0-2     Bacteria, UA 05/27/2021 Rare*    Amorphous, UA 05/27/2021 Rare*    Crystals, UA 05/27/2021 1+ Ca.  Oxalate*    Urine Culture, Routine 05/27/2021 >50,000 CFU/ml mixed skin/urogenital cherelle. No further workup      Copies of these are in the chart. Current Outpatient Medications   Medication Sig Dispense Refill    omeprazole (PRILOSEC) 10 MG delayed release capsule Take 1 capsule by mouth daily 90 capsule 3    propafenone (RYTHMOL) 225 MG tablet TAKE ONE TABLET BY MOUTH EVERY EIGHT HOURS. 270 tablet 0    rivaroxaban (XARELTO) 20 MG TABS tablet TAKE 1 TABLET DAILY WITH BREAKFAST 90 tablet 0    loratadine (CLARITIN) 10 MG tablet Take 10 mg by mouth daily      midodrine (PROAMATINE) 10 MG tablet Take 1 tablet by mouth 3 times daily (Patient taking differently: Take 10 mg by mouth 2 times daily ) 270 tablet 3    levothyroxine (SYNTHROID) 88 MCG tablet Take 1 tablet by mouth Daily 90 tablet 3    potassium chloride (KLOR-CON M) 20 MEQ extended release tablet Take 1 tablet by mouth daily (Patient taking differently: Take 20 mEq by mouth daily Take with lasix) 30 tablet 5    furosemide (LASIX) 20 MG tablet Take 1 tablet by mouth daily (Patient taking differently: Take 20 mg by mouth daily as needed ) 60 tablet 3    meclizine (ANTIVERT) 12.5 MG tablet Take 25 mg by mouth as needed Take 2 tablets (25 mg) as needed      Calcium Carbonate-Vitamin D (CALTRATE 600+D PO) Take 600 mg by mouth 2 times daily. No current facility-administered medications for this visit.        Allergies: Nitrofuran derivatives, Quinine derivatives, Sulfa antibiotics, and Pcn [penicillins]     Past Medical History:   Diagnosis Date    A-fib ([facility]     Abnormal weight loss     Anemia     Arthritis     Bullae     CAD (coronary artery disease)     Conjunctivitis     COPD [facility] obstructive [facility]) [facility]     Depression     Fatigue     History of blood transfusion 2017    post op broken hip    Hypertension     Hypothyroidism     Kidney stone     Leukopenia     Leukopenia     Low back pain     Mild CAD 05/15/2017    Osteoporosis     Palliative care patient 06/04/2021    Paroxysmal A-fib ([address]     Prolonged emergence from general anesthesia     Sinus pause     10/2014    Thyroid disease     Urinary tract infection     Weakness        Family History   Problem Relation Age of Onset    Cancer Father         lung    Stroke Mother         mini    Hypotension Mother        Past Surgical History:   Procedure Laterality Date    BACK SURGERY      BACK SURGERY      CARDIAC CATHETERIZATION  8/14/14  [address]    Mild, non-occlusive CAD, EF 60%    CYSTOSCOPY Left 8/31/2017    CYSTOSCOPY; LEFT URETEROSCOPY; LEFT URETERAL LASER LITHOTRIPSY AND STONE EXTRACTION; INSERTION LEFT URETERAL DOUBLE J STENT performed by Janice Guevara MD at [facility] Bilateral 12/14/2017    CYSTOSCOPY STENT INSERTION performed by Janice Guevara MD at [place] [address] Right 10/11/2017    FEMUR IM NAIL MICHELLE INSERTION performed by Kwame Solis DO at [address] [place] [address]      HYSTERECTOMY      KNEE ARTHROPLASTY      x 2    LAMINECTOMY      2 lumbar segments    ID CYSTO/URETERO/PYELOSCOPY W/LITHOTRIPSY Left 1/24/2018    LITHOTRIPSY LASER performed by Janice Guevara MD at [facility] CYSTO/URETERO/PYELOSCOPY, DX Bilateral 1/24/2018    CYSTOSCOPY BILATERAL URETEROSCOPIES WITH STENT REMOVAL AND RETROGRADE PYELOGRAMS WITH BILATERAL STONE MANIPULATION AND RETRIEVAL  PLACEMENT OF DOUBLE J URETERAL STENTS BILATERAL performed by Janice Guevara MD at [place] [address]         Social History     Tobacco Use    Smoking status: Never Smoker    Smokeless tobacco: Never Used   Substance Use Topics    Alcohol use: No             Post-Discharge Transitional Care Management Services or Hospital Follow Up      Bryan [address]   YOB: 1932    Date of Office Visit:  6/22/2021  Date of Hospital Admission: 6/3/21  Date of Hospital Discharge: 6/5/21  Readmission Risk Score(high >=14%.  Medium >=10%):Readmission Risk Score: 12      Care management risk score Rising risk (score 2-5) and Complex Care (Scores >=6): 5     Non face to face  following discharge, date last encounter closed (first attempt may have been earlier): 6/7/2021  2:05 PM 6/7/2021  2:05 PM    Call initiated 2 business days of discharge: Yes     Principal diagnosis:   Pyelonephritis     Secondary diagnosis:  Patient Active Problem List   Diagnosis    Paroxysmal A-fib ([facility]    Mild CAD    Hx of fracture of right hip    Gastroesophageal reflux disease without esophagitis    Chronic obstructive pulmonary disease ([facility] [address] [facility]    Coronary artery disease involving native heart without angina pectoris    Non-rheumatic tricuspid valve insufficiency    Thalassemia minor    Anemia    Oral phase dysphagia    Acquired hypothyroidism    Pyelonephritis    Palliative care patient       Allergies   Allergen Reactions    Nitrofuran Derivatives Swelling    Quinine Derivatives     Sulfa Antibiotics     Pcn [Penicillins] Rash       Medications listed as ordered at the time of discharge from hospital   Temi Bach   Home Medication Instructions ANDREW:    Printed on:06/22/21 1214   Medication Information                      Calcium Carbonate-Vitamin D (CALTRATE 600+D PO)  Take 600 mg by mouth 2 times daily.              furosemide (LASIX) 20 MG tablet  Take 1 tablet by mouth daily             levothyroxine (SYNTHROID) 88 MCG tablet  Take 1 tablet by mouth Daily             loratadine (CLARITIN) 10 MG tablet  Take 10 mg by mouth daily             meclizine (ANTIVERT) 12.5 MG tablet  Take 25 mg by mouth as needed Take 2 tablets (25 mg) as needed             midodrine (PROAMATINE) 10 MG tablet  Take 1 tablet by mouth 3 times daily             omeprazole (PRILOSEC) 10 MG delayed release capsule  Take 1 capsule by mouth daily             potassium chloride (KLOR-CON M) 20 MEQ extended release tablet  Take 1 tablet by mouth daily             propafenone (RYTHMOL) 225 MG tablet  TAKE ONE TABLET BY MOUTH EVERY EIGHT HOURS.             rivaroxaban (XARELTO) 20 MG TABS tablet  TAKE 1 TABLET DAILY WITH BREAKFAST                   Medications marked \"taking\" at this time  Outpatient Medications Marked as Taking for the 6/22/21 encounter (Office Visit) with NAVYA Stafford DO   Medication Sig Dispense Refill    omeprazole (PRILOSEC) 10 MG delayed release capsule Take 1 capsule by mouth daily 90 capsule 3    propafenone (RYTHMOL) 225 MG tablet TAKE ONE TABLET BY MOUTH EVERY EIGHT HOURS. 270 tablet 0    rivaroxaban (XARELTO) 20 MG TABS tablet TAKE 1 TABLET DAILY WITH BREAKFAST 90 tablet 0    loratadine (CLARITIN) 10 MG tablet Take 10 mg by mouth daily      midodrine (PROAMATINE) 10 MG tablet Take 1 tablet by mouth 3 times daily (Patient taking differently: Take 10 mg by mouth 2 times daily ) 270 tablet 3    levothyroxine (SYNTHROID) 88 MCG tablet Take 1 tablet by mouth Daily 90 tablet 3    potassium chloride (KLOR-CON M) 20 MEQ extended release tablet Take 1 tablet by mouth daily (Patient taking differently: Take 20 mEq by mouth daily Take with lasix) 30 tablet 5    furosemide (LASIX) 20 MG tablet Take 1 tablet by mouth daily (Patient taking differently: Take 20 mg by mouth daily as needed ) 60 tablet 3    meclizine (ANTIVERT) 12.5 MG tablet Take 25 mg by mouth as needed Take 2 tablets (25 mg) as needed      Calcium Carbonate-Vitamin D (CALTRATE 600+D PO) Take 600 mg by mouth 2 times daily. Medications patient taking as of now reconciled against medications ordered at time of hospital discharge: Yes    Chief Complaint   Patient presents with    Follow-Up from Hospital       HPI  Ms. Froilan holden had a urinary tract infection diagnosed back on May 27 of 2021. She was treated with levofloxacin for persistent urinary tract infection. The culture did show greater than 50,000 colonies of skin cherelle. No other bacteria grew out.   She began to develop significant right-sided flank pain and noted gross hematuria which prompted her presentation to the urgency department. She was admitted for failure of outpatient management of urinary tract infection. CT scan showed a staghorn calculus on the left but nothing on the right. The bladder was normal in appearance. No evidence of pyelonephritis was noted on CT scan. She did not have any significant white blood cell count elevation  Electrolytes and renal function were normal  Urine culture again showed mixed skin cherelle    Inpatient course: Discharge summary reviewed- see chart. Interval history/Current status:   Since her discharge, she is doing well without any further issues. She has not had any further blood in her urine. Her R flank pain is now resolved. She completed her antibiotics. She is back to normal.    Review of Systems   Constitutional: Negative for appetite change, chills, fatigue and fever. HENT: Negative for congestion, ear discharge, ear pain, postnasal drip, sinus pressure and sore throat. Eyes: Negative for discharge and visual disturbance. Respiratory: Negative for cough, chest tightness, shortness of breath and wheezing. Cardiovascular: Negative for chest pain, palpitations and leg swelling. Gastrointestinal: Negative for abdominal pain, constipation, diarrhea, nausea and vomiting. Genitourinary: Negative for difficulty urinating, dysuria and menstrual problem. Musculoskeletal: Positive for arthralgias (mild diffuse). Negative for back pain, joint swelling and myalgias. Skin: Negative for rash. Neurological: Negative for dizziness, weakness and headaches. Hematological: Negative for adenopathy. Does not bruise/bleed easily. Psychiatric/Behavioral: Negative for sleep disturbance and suicidal ideas. The patient is not nervous/anxious.         Vitals:    06/22/21 1137   BP: 130/88   Site: Left Upper Arm   Position: Sitting   Cuff Size: Medium Adult   Pulse: 72   Temp: 97.6 °F (36.4 °C)   TempSrc: Temporal   SpO2: 98%   Weight: 128 lb (58.1 kg)   Height: 5' 4\" (1.626 m)     Body mass index is 21.97 kg/m². Wt Readings from Last 3 Encounters:   06/22/21 128 lb (58.1 kg)   06/05/21 130 lb 11.2 oz (59.3 kg)   05/18/21 129 lb 8 oz (58.7 kg)     BP Readings from Last 3 Encounters:   06/22/21 130/88   06/05/21 121/68   05/18/21 110/80       Physical Exam  Vitals reviewed. Constitutional:       General: She is not in acute distress. Appearance: She is well-developed and normal weight. She is not toxic-appearing. Comments: Body habitus is normal   HENT:      Head: Normocephalic and atraumatic. Right Ear: Ear canal and external ear normal. Decreased hearing noted. A middle ear effusion is present. Left Ear: Hearing, tympanic membrane, ear canal and external ear normal.      Nose: Nose normal.      Mouth/Throat:      Mouth: Mucous membranes are moist.      Pharynx: Oropharynx is clear. Eyes:      General: Lids are normal.      Extraocular Movements: Extraocular movements intact. Conjunctiva/sclera: Conjunctivae normal.      Pupils: Pupils are equal, round, and reactive to light. Neck:      Thyroid: No thyromegaly. Vascular: No carotid bruit or JVD. Trachea: Phonation normal.   Cardiovascular:      Rate and Rhythm: Normal rate and regular rhythm. No extrasystoles are present. Chest Wall: PMI is not displaced. Heart sounds: Normal heart sounds. No murmur heard. No friction rub. No gallop. Pulmonary:      Effort: Pulmonary effort is normal. No respiratory distress. Breath sounds: Normal breath sounds. No wheezing, rhonchi or rales. Abdominal:      General: Bowel sounds are normal. There is no distension. Palpations: Abdomen is soft. There is no mass. Tenderness: There is no abdominal tenderness. Genitourinary:     Comments: Examination deferred  Musculoskeletal:         General: Normal range of motion.       Cervical back: Neck supple. Comments: Joint examination reveals no acute arthritis or synovitis. Lymphadenopathy:      Cervical: No cervical adenopathy. Skin:     General: Skin is warm and dry. Findings: No rash. Neurological:      General: No focal deficit present. Mental Status: She is alert and oriented to person, place, and time. Cranial Nerves: No cranial nerve deficit (by gross examination). Sensory: No sensory deficit. Motor: No tremor or atrophy. Gait: Gait normal.      Comments: No focal deficits appreciated   Psychiatric:         Mood and Affect: Mood normal.         Speech: Speech normal.         Behavior: Behavior normal. Behavior is cooperative. Assessment/Plan:  1. Pyelonephritis  This is now much better. - [place] DISCHARGE MEDS RECONCILED W/ CURRENT OUTPATIENT MED LIST    2. Acute pyelonephritis  Now resolved. Medical Decision Making: high complexity      This was an in-house visit.

## 2021-06-30 ENCOUNTER — CARE COORDINATION (OUTPATIENT)
Dept: CASE MANAGEMENT | Age: 86
End: 2021-06-30

## 2021-06-30 NOTE — CARE COORDINATION
[facility] Transitions Follow Up Call    2021    Patient: Faye Mcpherson  Patient : 1932   MRN: [id number]    Discharge Date: 21 RARS: Readmission Risk Score: 12         Spoke with: N/A      Care Transitions Subsequent and Final Call    Subsequent and Final Calls  Care Transitions Interventions  Other Interventions:         Attempted to make contact with patient/caregiver for a routine follow up call without success. Unable to leave a HIPAA compliant message regarding intent of call and call back information. Call went to an unidentifiable voice messaging sytem (mobile voice mail or telephone answering machine). Will try again at a later time.          Follow Up  Future Appointments   Date Time Provider Serge Copeland   10/6/2021 10:30 AM NAVYA Pizarro, [address] [facility]       Johnna Andujar RN

## 2021-08-28 DIAGNOSIS — I48.0 PAROXYSMAL ATRIAL FIBRILLATION (HCC): ICD-10-CM

## 2021-10-06 ENCOUNTER — OFFICE VISIT (OUTPATIENT)
Dept: PRIMARY CARE CLINIC | Age: 86
End: 2021-10-06
Payer: MEDICARE

## 2021-10-06 VITALS
WEIGHT: 112 LBS | DIASTOLIC BLOOD PRESSURE: 88 MMHG | BODY MASS INDEX: 19.12 KG/M2 | HEART RATE: 53 BPM | OXYGEN SATURATION: 97 % | SYSTOLIC BLOOD PRESSURE: 122 MMHG | HEIGHT: 64 IN | TEMPERATURE: 97.3 F

## 2021-10-06 DIAGNOSIS — K21.9 GASTROESOPHAGEAL REFLUX DISEASE, UNSPECIFIED WHETHER ESOPHAGITIS PRESENT: ICD-10-CM

## 2021-10-06 DIAGNOSIS — I95.1 ORTHOSTATIC HYPOTENSION: Primary | ICD-10-CM

## 2021-10-06 DIAGNOSIS — I48.0 PAROXYSMAL A-FIB (HCC): ICD-10-CM

## 2021-10-06 DIAGNOSIS — I25.10 CORONARY ARTERY DISEASE INVOLVING NATIVE HEART WITHOUT ANGINA PECTORIS, UNSPECIFIED VESSEL OR LESION TYPE: ICD-10-CM

## 2021-10-06 DIAGNOSIS — R60.9 PERIPHERAL EDEMA: ICD-10-CM

## 2021-10-06 DIAGNOSIS — Z23 NEED FOR INFLUENZA VACCINATION: ICD-10-CM

## 2021-10-06 DIAGNOSIS — E03.9 ACQUIRED HYPOTHYROIDISM: ICD-10-CM

## 2021-10-06 PROCEDURE — 1123F ACP DISCUSS/DSCN MKR DOCD: CPT | Performed by: PEDIATRICS

## 2021-10-06 PROCEDURE — 4040F PNEUMOC VAC/ADMIN/RCVD: CPT | Performed by: PEDIATRICS

## 2021-10-06 PROCEDURE — G0008 ADMIN INFLUENZA VIRUS VAC: HCPCS | Performed by: PEDIATRICS

## 2021-10-06 PROCEDURE — 1036F TOBACCO NON-USER: CPT | Performed by: PEDIATRICS

## 2021-10-06 PROCEDURE — 1090F PRES/ABSN URINE INCON ASSESS: CPT | Performed by: PEDIATRICS

## 2021-10-06 PROCEDURE — G8427 DOCREV CUR MEDS BY ELIG CLIN: HCPCS | Performed by: PEDIATRICS

## 2021-10-06 PROCEDURE — G8484 FLU IMMUNIZE NO ADMIN: HCPCS | Performed by: PEDIATRICS

## 2021-10-06 PROCEDURE — 99214 OFFICE O/P EST MOD 30 MIN: CPT | Performed by: PEDIATRICS

## 2021-10-06 PROCEDURE — G8420 CALC BMI NORM PARAMETERS: HCPCS | Performed by: PEDIATRICS

## 2021-10-06 PROCEDURE — 90694 VACC AIIV4 NO PRSRV 0.5ML IM: CPT | Performed by: PEDIATRICS

## 2021-10-06 ASSESSMENT — ENCOUNTER SYMPTOMS
CHEST TIGHTNESS: 0
EYE DISCHARGE: 0
SHORTNESS OF BREATH: 0
BACK PAIN: 0
COUGH: 0
VOMITING: 0
SORE THROAT: 0
ABDOMINAL PAIN: 0
WHEEZING: 0
SINUS PRESSURE: 0
CONSTIPATION: 0
DIARRHEA: 0
NAUSEA: 0

## 2021-10-06 ASSESSMENT — PATIENT HEALTH QUESTIONNAIRE - PHQ9
SUM OF ALL RESPONSES TO PHQ QUESTIONS 1-9: 0
SUM OF ALL RESPONSES TO PHQ9 QUESTIONS 1 & 2: 0
1. LITTLE INTEREST OR PLEASURE IN DOING THINGS: 0
2. FEELING DOWN, DEPRESSED OR HOPELESS: 0
SUM OF ALL RESPONSES TO PHQ QUESTIONS 1-9: 0
SUM OF ALL RESPONSES TO PHQ QUESTIONS 1-9: 0

## 2021-10-06 ASSESSMENT — LIFESTYLE VARIABLES: HOW OFTEN DO YOU HAVE A DRINK CONTAINING ALCOHOL: 0

## 2021-10-06 NOTE — PROGRESS NOTES
[facility], [address]  Phone [phone]   Fax [phone]      OFFICE VISIT: 10/6/2021    Jose Bach-: 1932      HPI  Reason For Visit:  Jodi Tesfaye is a 80 y.o. Medicare AWV and Dizziness (Shakes and blacking out atleast once daily for the past 2 weeks, daughter has been staying with her. It is worse in the morning and gets better as the day goes on. )      Patient presents for routine annual wellness evaluation. She also presents with multiple health issues. Present concerns:  Passing out. Patient states that she is having episodes where she is becoming shaky and then she passes out. This has been happening daily for the past couple of weeks. This is worse in the morning and does get some better as the day goes on. Preceding symptoms: none. She just feels lightheaded and then goes down      She does have a history of orthostatic hypotension. Medication:   Midodrine 10 mg twice daily (this was scheduled 3 times daily)  Symptoms: As above      Paroxysmal Atrial Fibrillation:  Medications:              Rate control:                           Propafenone 25 mg 3 times daily              Anticoagulation:                      Xarelto 20 mg daily              Rhythm management:           Propafenone 25 mg 3 times daily  Symptoms:no appreciated syptoms        Mild Coronary Artery Disease:  Medications:              Beta-blockade:            None, she does not tolerate secondary to low blood pressure              RAAS Therapy:           None              Lipid Management:     None              Platelet Inhibition:       None, however she is on Xarelto              Anti-anginal:                None  Symptoms: no anginal symptoms.   Nitroglycerin use: none  Cardiology follow-up: She follows with [facility] cardiology  Additional studies: No recent studies       Peripheral edema:  Medication:              Lasix 20 mg as needed              Potassium chloride 20 mEq daily as needed   Symptoms: some edema, but rarely takes.       Hypothyroidism:  Medication:   Synthroid 88 mcg daily  Medication compliance:  compliant most of the time  Patient is  taking her medication consistently on an empty stomach. Symptoms: fatigue. Laboratory:  Lab Results   Component Value Date    TSH 2.250 11/20/2019    TSH 5.100 (H) 09/25/2019    TSH 2.220 02/12/2018     Lab Results   Component Value Date    T4FREE 1.42 11/20/2019    T4FREE 1.4 09/25/2019    T4FREE 1.3 08/03/2017        GERD:  Medication:              Omeprazole 10 mg daily  Symptoms:no recent symptoms         height is 5' 4\" (1.626 m) and weight is 112 lb (50.8 kg). Her temporal temperature is 97.3 °F (36.3 °C). Her blood pressure is 122/88 and her pulse is 53. Her oxygen saturation is 97%. Body mass index is 19.22 kg/m². I have reviewed the following with the Ms. Bach   Lab Review  Admission on 06/03/2021, Discharged on 06/05/2021   Component Date Value    WBC 06/03/2021 6.0     RBC 06/03/2021 3.22*    Hemoglobin 06/03/2021 10.5*    Hematocrit 06/03/2021 32.4*    MCV 06/03/2021 100.6*    MCH 06/03/2021 32.6*    MCHC 06/03/2021 32.4*    RDW 06/03/2021 14.5     Platelets 97/20/2648 267     MPV 06/03/2021 9.6     Neutrophils % 06/03/2021 67.0*    Lymphocytes % 06/03/2021 21.6     Monocytes % 06/03/2021 9.7     Eosinophils % 06/03/2021 1.2     Basophils % 06/03/2021 0.3     Neutrophils Absolute 06/03/2021 4.0     Immature Granulocytes # 06/03/2021 0.0     Lymphocytes Absolute 06/03/2021 1.3     Monocytes Absolute 06/03/2021 0.60     Eosinophils Absolute 06/03/2021 0.10     Basophils Absolute 06/03/2021 0.00     Sodium 06/03/2021 139     Potassium reflex Magnesi* 06/03/2021 4.3     Chloride 06/03/2021 105     CO2 06/03/2021 24     Anion Gap 06/03/2021 10     Glucose 06/03/2021 86     BUN 06/03/2021 20     CREATININE 06/03/2021 0.8     GFR Non- 06/03/2021 >60     GFR  06/03/2021 >59     Calcium 06/03/2021 9.7     Total Protein 06/03/2021 6.3*    Albumin 06/03/2021 3.7     Total Bilirubin 06/03/2021 0.5     Alkaline Phosphatase 06/03/2021 41     ALT 06/03/2021 8     AST 06/03/2021 19     Color, UA 06/03/2021 RED*    Clarity, UA 06/03/2021 CLOUDY*    Glucose, Ur 06/03/2021 Negative     Bilirubin Urine 06/03/2021 SMALL*    Ketones, Urine 06/03/2021 Negative     Specific Gravity, UA 06/03/2021 1.016     Blood, Urine 06/03/2021 LARGE*    pH, UA 06/03/2021 5.5     Protein, UA 06/03/2021 100*    Urobilinogen, Urine 06/03/2021 0.2     Nitrite, Urine 06/03/2021 Negative     Leukocyte Esterase, Urine 06/03/2021 LARGE*    WBC, UA 06/03/2021 TNTC*    RBC, UA 06/03/2021 TNTC*    Epithelial Cells, UA 06/03/2021 0-2     Bacteria, UA 06/03/2021 4+*    Yeast, UA 06/03/2021 Present*    Urine Culture, Routine 06/03/2021 >50,000 CFU/ml mixed skin/urogenital cherelle. No further workup*    Organism 06/03/2021 Yeast*    Urine Culture, Routine 06/03/2021                      Value: Moderate growth  No further workup      SARS-CoV-2, NAAT 06/03/2021 Not Detected     TSH Reflex FT4 06/04/2021 2.07     Sodium 06/04/2021 144     Potassium reflex Magnesi* 06/04/2021 4.1     Chloride 06/04/2021 110     CO2 06/04/2021 25     Anion Gap 06/04/2021 9     Glucose 06/04/2021 110*    BUN 06/04/2021 18     CREATININE 06/04/2021 0.8     GFR Non- 06/04/2021 >60     GFR  06/04/2021 >59     Calcium 06/04/2021 9.2     WBC 06/04/2021 4.0*    RBC 06/04/2021 3.01*    Hemoglobin 06/04/2021 9.6*    Hematocrit 06/04/2021 30.2*    MCV 06/04/2021 100.3*    MCH 06/04/2021 31.9*    MCHC 06/04/2021 31.8*    RDW 06/04/2021 14.2     Platelets 58/42/8094 246     MPV 06/04/2021 9.5     Neutrophils % 06/04/2021 54.7     Lymphocytes % 06/04/2021 31.2     Monocytes % 06/04/2021 12.2*    Eosinophils % 06/04/2021 1.2     Basophils % 06/04/2021 0.2     Neutrophils Absolute 06/04/2021 2.2     Immature Granulocytes # 06/04/2021 0.0     Lymphocytes Absolute 06/04/2021 1.3     Monocytes Absolute 06/04/2021 0.50     Eosinophils Absolute 06/04/2021 0.10     Basophils Absolute 06/04/2021 0.00     Sodium 06/05/2021 142     Potassium reflex Magnesi* 06/05/2021 4.1     Chloride 06/05/2021 108     CO2 06/05/2021 26     Anion Gap 06/05/2021 8     Glucose 06/05/2021 96     BUN 06/05/2021 16     CREATININE 06/05/2021 0.6     GFR Non- 06/05/2021 >60     GFR  06/05/2021 >59     Calcium 06/05/2021 9.0     WBC 06/05/2021 3.7*    RBC 06/05/2021 2.65*    Hemoglobin 06/05/2021 9.1*    Hematocrit 06/05/2021 27.4*    MCV 06/05/2021 103.4*    MCH 06/05/2021 34.3*    MCHC 06/05/2021 33.2     RDW 06/05/2021 14.6*    Platelets 21/46/6321 218     MPV 06/05/2021 10.0     Neutrophils % 06/05/2021 51.3     Lymphocytes % 06/05/2021 31.1     Monocytes % 06/05/2021 15.1*    Eosinophils % 06/05/2021 1.9     Basophils % 06/05/2021 0.3     Neutrophils Absolute 06/05/2021 1.9     Immature Granulocytes # 06/05/2021 0.0     Lymphocytes Absolute 06/05/2021 1.2     Monocytes Absolute 06/05/2021 0.60     Eosinophils Absolute 06/05/2021 0.10     Basophils Absolute 06/05/2021 0.00    Orders Only on 05/27/2021   Component Date Value    Color, UA 05/27/2021 DARK YELLOW*    Clarity, UA 05/27/2021 CLOUDY*    Glucose, Ur 05/27/2021 Negative     Bilirubin Urine 05/27/2021 SMALL*    Ketones, Urine 05/27/2021 TRACE*    Specific [place], UA 05/27/2021 1.031     Blood, Urine 05/27/2021 LARGE*    pH, UA 05/27/2021 5.5     Protein, UA 05/27/2021 100*    Urobilinogen, Urine 05/27/2021 0.2     Nitrite, Urine 05/27/2021 Negative     Leukocyte Esterase, Urine 05/27/2021 MODERATE*    WBC, UA 05/27/2021 TNTC*    RBC, UA 05/27/2021 TNTC*    Epithelial Cells, UA 05/27/2021 0-2     Bacteria, UA 05/27/2021 Rare*    Amorphous, UA 05/27/2021 Rare*    Crystals, UA 05/27/2021 1+ Ca. Oxalate*    Urine Culture, Routine 05/27/2021 >50,000 CFU/ml mixed skin/urogenital cherelle. No further workup      Copies of these are in the chart. Current Outpatient Medications   Medication Sig Dispense Refill    rivaroxaban (XARELTO) 20 MG TABS tablet TAKE 1 TABLET DAILY WITH BREAKFAST 90 tablet 3    omeprazole (PRILOSEC) 10 MG delayed release capsule Take 1 capsule by mouth daily 90 capsule 3    propafenone (RYTHMOL) 225 MG tablet TAKE ONE TABLET BY MOUTH EVERY EIGHT HOURS. 270 tablet 0    loratadine (CLARITIN) 10 MG tablet Take 10 mg by mouth daily      midodrine (PROAMATINE) 10 MG tablet Take 1 tablet by mouth 3 times daily (Patient taking differently: Take 10 mg by mouth 2 times daily ) 270 tablet 3    levothyroxine (SYNTHROID) 88 MCG tablet Take 1 tablet by mouth Daily 90 tablet 3    potassium chloride (KLOR-CON M) 20 MEQ extended release tablet Take 1 tablet by mouth daily (Patient taking differently: Take 20 mEq by mouth daily Take with lasix) 30 tablet 5    furosemide (LASIX) 20 MG tablet Take 1 tablet by mouth daily (Patient taking differently: Take 20 mg by mouth daily as needed ) 60 tablet 3    meclizine (ANTIVERT) 12.5 MG tablet Take 25 mg by mouth as needed Take 2 tablets (25 mg) as needed      Calcium Carbonate-Vitamin D (CALTRATE 600+D PO) Take 600 mg by mouth 2 times daily. No current facility-administered medications for this visit.        Allergies: Nitrofuran derivatives, Quinine derivatives, Sulfa antibiotics, and Pcn [penicillins]     Past Medical History:   Diagnosis Date    A-fib ([facility] [address]     Abnormal weight loss     Anemia     Arthritis     Bullae     CAD (coronary artery disease)     Conjunctivitis     COPD (chronic obstructive pulmonary disease) (HCC)     Depression     Fatigue     History of blood transfusion 2017    post op broken hip    Hypertension     Hypothyroidism     Kidney stone     Leukopenia  Leukopenia     Low back pain     Mild CAD 05/15/2017    Osteoporosis     Palliative care patient 06/04/2021    Paroxysmal A-fib (HCC)     Prolonged emergence from general anesthesia     Sinus pause     10/2014    Thyroid disease     Urinary tract infection     Weakness        Family History   Problem Relation Age of Onset    Cancer Father         lung    Stroke Mother         mini    Hypotension Mother        Past Surgical History:   Procedure Laterality Date    BACK SURGERY      BACK SURGERY      CARDIAC CATHETERIZATION  8/14/14  [facility]    Mild, non-occlusive CAD, EF 60%    CYSTOSCOPY Left 8/31/2017    CYSTOSCOPY; LEFT URETEROSCOPY; LEFT URETERAL LASER LITHOTRIPSY AND STONE EXTRACTION; INSERTION LEFT URETERAL DOUBLE J STENT performed by Hannah Anderson MD at [facility] Bilateral 12/14/2017    CYSTOSCOPY STENT INSERTION performed by Hannah Anderson MD at [place] [address] Right 10/11/2017    FEMUR IM NAIL MICHELLE INSERTION performed by Victor Hugo Merols DO at [address] [place] [address] Rd      HYSTERECTOMY      KNEE ARTHROPLASTY      x 2    LAMINECTOMY      2 lumbar segments    VT CYSTO/URETERO/PYELOSCOPY W/LITHOTRIPSY Left 1/24/2018    LITHOTRIPSY LASER performed by Hannah Anderson MD at [address] [facility],  Bilateral 1/24/2018    CYSTOSCOPY BILATERAL URETEROSCOPIES WITH STENT REMOVAL AND RETROGRADE PYELOGRAMS WITH BILATERAL STONE MANIPULATION AND RETRIEVAL  PLACEMENT OF DOUBLE J URETERAL STENTS BILATERAL performed by Hannah Anderson MD at [address] [place] [address]      URETER STENT PLACEMENT         Social History     Tobacco Use    Smoking status: Never Smoker    Smokeless tobacco: Never Used   Substance Use Topics    Alcohol use: No        Review of Systems   Constitutional: Negative for appetite change, chills, fatigue and fever.    HENT: Negative for congestion, ear discharge, ear pain, postnasal drip, sinus pressure and sore throat. Eyes: Negative for discharge and visual disturbance. Respiratory: Negative for cough, chest tightness, shortness of breath and wheezing. Cardiovascular: Negative for chest pain, palpitations and leg swelling. Gastrointestinal: Negative for abdominal pain, constipation, diarrhea, nausea and vomiting. Genitourinary: Negative for difficulty urinating, dysuria and menstrual problem. Musculoskeletal: Positive for arthralgias (mild diffuse). Negative for back pain, joint swelling and myalgias. Skin: Negative for rash. Neurological: Positive for dizziness, syncope (mostly in the mornings) and light-headedness. Negative for weakness and headaches. Hematological: Negative for adenopathy. Does not bruise/bleed easily. Psychiatric/Behavioral: Negative for sleep disturbance and suicidal ideas. The patient is not nervous/anxious. Physical Exam  Vitals reviewed. Constitutional:       General: She is not in acute distress. Appearance: She is well-developed and normal weight. She is not toxic-appearing. Comments: Body habitus is normal   HENT:      Head: Normocephalic and atraumatic. Right Ear: Ear canal and external ear normal. Decreased hearing noted. A middle ear effusion is present. Left Ear: Hearing, tympanic membrane, ear canal and external ear normal.      Nose: Nose normal.      Mouth/Throat:      Mouth: Mucous membranes are moist.      Pharynx: Oropharynx is clear. Eyes:      General: Lids are normal.      Extraocular Movements: Extraocular movements intact. Conjunctiva/sclera: Conjunctivae normal.      Pupils: Pupils are equal, round, and reactive to light. Neck:      Thyroid: No thyromegaly. Vascular: No carotid bruit or JVD. Trachea: Phonation normal.   Cardiovascular:      Rate and Rhythm: Normal rate and regular rhythm. No extrasystoles are present. Chest Wall: PMI is not displaced.       Heart sounds: Normal heart sounds. No murmur heard. No friction rub. No gallop. Pulmonary:      Effort: Pulmonary effort is normal. No respiratory distress. Breath sounds: Normal breath sounds. No wheezing, rhonchi or rales. Abdominal:      General: Bowel sounds are normal. There is no distension. Palpations: Abdomen is soft. There is no mass. Tenderness: There is no abdominal tenderness. Genitourinary:     Comments: Examination deferred  Musculoskeletal:         General: Normal range of motion. Cervical back: Neck supple. Comments: Joint examination reveals no acute arthritis or synovitis. Lymphadenopathy:      Cervical: No cervical adenopathy. Skin:     General: Skin is warm and dry. Findings: No rash. Neurological:      General: No focal deficit present. Mental Status: She is alert and oriented to person, place, and time. Cranial Nerves: No cranial nerve deficit (by gross examination). Sensory: No sensory deficit. Motor: No tremor or atrophy. Gait: Gait normal.      Comments: No focal deficits appreciated   Psychiatric:         Mood and Affect: Mood normal.         Speech: Speech normal.         Behavior: Behavior normal. Behavior is cooperative. ASSESSMENT      ICD-10-CM    1. Orthostatic hypotension  I95.1 CBC Auto Differential     Comprehensive Metabolic Panel   2. Paroxysmal A-fib (HCC)  I48.0 CBC Auto Differential     Comprehensive Metabolic Panel   3. Coronary artery disease involving native heart without angina pectoris, unspecified vessel or lesion type  I25.10 CBC Auto Differential     Comprehensive Metabolic Panel     Lipid Panel     Microalbumin / Creatinine Urine Ratio   4. Peripheral edema  R60.9    5. Acquired hypothyroidism  E03.9 TSH without Reflex     T4, Free   6. Gastroesophageal reflux disease, unspecified whether esophagitis present  K21.9    7.  Need for influenza vaccination  Z23 INFLUENZA, QUADV, ADJUVANTED, 65 YRS =, IM, PF, PREFILL SYR, 0.5ML (FLUAD)         PLAN    1. Orthostatic hypotension  We are going to increase the midodrine to tid    2. Paroxysmal A-fib ([address]  Doing well from that standpoint  She is appropriately anticoagulated and rate controlled    3. Coronary artery disease involving native heart without angina pectoris, unspecified vessel or lesion type  Stable without angina    4. Peripheral edema  Good today    5. Acquired hypothyroidism  Recheck thyroid    6. Gastroesophageal reflux disease, unspecified whether esophagitis present  Doing well on this regimen. The current medical regimen is effective;  continue present plan and medications. .      Orders Placed This Encounter   Procedures    INFLUENZA, QUADV, ADJUVANTED, 65 YRS =, IM, PF, PREFILL SYR, 0.5ML (FLUAD)    CBC Auto Differential    Comprehensive Metabolic Panel    Lipid Panel    Microalbumin / Creatinine Urine Ratio    TSH without Reflex    T4, Free        Return in about 6 months (around 4/6/2022) for 30.        This was an in-house visit

## 2021-11-04 ENCOUNTER — PATIENT MESSAGE (OUTPATIENT)
Dept: PRIMARY CARE CLINIC | Age: 86
End: 2021-11-04

## 2021-11-04 DIAGNOSIS — R42 VERTIGO: ICD-10-CM

## 2021-11-04 DIAGNOSIS — G89.4 CHRONIC PAIN SYNDROME: Primary | ICD-10-CM

## 2021-11-04 NOTE — TELEPHONE ENCOUNTER
From: Alissa Holcomb  To: Tato Marinelli DO  Sent: 11/4/2021 10:11 AM CDT  Subject: Prescription Question    This message is being sent by [facility] on behalf of Alissa Holcomb. This is Monilewis. Dr Smita Lance was supposed to send in 2 Prescriptions for mom yesterday. I talked to him about in my appointment Wednesday. 1 was some Norco tablets and the other was something to help her dizziness. I would like the as soon as I can get them for her.  Thanks

## 2021-11-05 RX ORDER — DIAZEPAM 2 MG/1
2 TABLET ORAL EVERY 8 HOURS PRN
Qty: 60 TABLET | Refills: 0 | Status: SHIPPED | OUTPATIENT
Start: 2021-11-05 | End: 2021-11-25

## 2021-11-05 RX ORDER — HYDROCODONE BITARTRATE AND ACETAMINOPHEN 5; 325 MG/1; MG/1
1 TABLET ORAL EVERY 6 HOURS PRN
Qty: 40 TABLET | Refills: 0 | Status: SHIPPED | OUTPATIENT
Start: 2021-11-05 | End: 2021-12-05

## 2021-11-22 DIAGNOSIS — I48.0 PAROXYSMAL ATRIAL FIBRILLATION (HCC): ICD-10-CM

## 2021-11-23 RX ORDER — PROPAFENONE HYDROCHLORIDE 225 MG/1
TABLET, FILM COATED ORAL
Qty: 270 TABLET | Refills: 0 | Status: SHIPPED | OUTPATIENT
Start: 2021-11-23 | End: 2022-03-07 | Stop reason: SDUPTHER

## 2022-02-23 NOTE — TELEPHONE ENCOUNTER
Received fax from pharmacy requesting refill on pts medication(s). Pt was last seen in office on 10/6/2021  and has a follow up scheduled for 4/7/2022. Will send request to  Dr. Soo Jacome  for authorization.      Requested Prescriptions     Pending Prescriptions Disp Refills    levothyroxine (SYNTHROID) 88 MCG tablet [Pharmacy Med Name: LEVOTHYROXINE 88 MCG 88 Tablet] 90 tablet 3     Sig: Take 1 tablet by mouth Daily

## 2022-02-24 RX ORDER — LEVOTHYROXINE SODIUM 88 UG/1
88 TABLET ORAL DAILY
Qty: 90 TABLET | Refills: 3 | Status: SHIPPED | OUTPATIENT
Start: 2022-02-24 | End: 2022-08-09 | Stop reason: SDUPTHER

## 2022-03-01 DIAGNOSIS — I48.0 PAROXYSMAL ATRIAL FIBRILLATION (HCC): ICD-10-CM

## 2022-03-02 RX ORDER — PROPAFENONE HYDROCHLORIDE 225 MG/1
TABLET, FILM COATED ORAL
Qty: 270 TABLET | Refills: 0 | OUTPATIENT
Start: 2022-03-02

## 2022-03-07 DIAGNOSIS — I48.0 PAROXYSMAL ATRIAL FIBRILLATION (HCC): ICD-10-CM

## 2022-03-07 RX ORDER — PROPAFENONE HYDROCHLORIDE 225 MG/1
TABLET, FILM COATED ORAL
Qty: 90 TABLET | Refills: 0 | Status: SHIPPED | OUTPATIENT
Start: 2022-03-07 | End: 2022-04-06 | Stop reason: SDUPTHER

## 2022-03-21 ENCOUNTER — TELEPHONE (OUTPATIENT)
Dept: PRIMARY CARE CLINIC | Age: 87
End: 2022-03-21

## 2022-03-21 DIAGNOSIS — R30.0 DYSURIA: Primary | ICD-10-CM

## 2022-03-21 DIAGNOSIS — R30.0 DYSURIA: ICD-10-CM

## 2022-03-21 NOTE — TELEPHONE ENCOUNTER
pts daughter called, she thinks she has a UTI. Wants to know if she can come get a cup and check it and bring it back in. She has blood in her urine.

## 2022-03-22 ENCOUNTER — TELEPHONE (OUTPATIENT)
Dept: PRIMARY CARE CLINIC | Age: 87
End: 2022-03-22

## 2022-03-22 RX ORDER — CEFDINIR 300 MG/1
300 CAPSULE ORAL 2 TIMES DAILY
Qty: 20 CAPSULE | Refills: 0 | Status: SHIPPED | OUTPATIENT
Start: 2022-03-22 | End: 2022-04-01

## 2022-03-22 NOTE — TELEPHONE ENCOUNTER
VO per michael hewitt aprn  Start cefdinir 300mg I po bid x 10 days 0 rf   Pt daughter aware and sent to [facility] pharmacy.

## 2022-04-06 DIAGNOSIS — I48.0 PAROXYSMAL ATRIAL FIBRILLATION (HCC): ICD-10-CM

## 2022-04-06 RX ORDER — PROPAFENONE HYDROCHLORIDE 225 MG/1
TABLET, FILM COATED ORAL
Qty: 30 TABLET | Refills: 0 | Status: SHIPPED | OUTPATIENT
Start: 2022-04-06 | End: 2022-04-11 | Stop reason: SDUPTHER

## 2022-04-07 ENCOUNTER — OFFICE VISIT (OUTPATIENT)
Dept: PRIMARY CARE CLINIC | Age: 87
End: 2022-04-07
Payer: MEDICARE

## 2022-04-07 VITALS
SYSTOLIC BLOOD PRESSURE: 114 MMHG | DIASTOLIC BLOOD PRESSURE: 72 MMHG | WEIGHT: 119 LBS | OXYGEN SATURATION: 98 % | TEMPERATURE: 97.8 F | HEIGHT: 64 IN | BODY MASS INDEX: 20.32 KG/M2 | HEART RATE: 54 BPM

## 2022-04-07 DIAGNOSIS — I25.10 CORONARY ARTERY DISEASE INVOLVING NATIVE HEART WITHOUT ANGINA PECTORIS, UNSPECIFIED VESSEL OR LESION TYPE: ICD-10-CM

## 2022-04-07 DIAGNOSIS — I48.0 PAROXYSMAL A-FIB (HCC): ICD-10-CM

## 2022-04-07 DIAGNOSIS — E03.9 ACQUIRED HYPOTHYROIDISM: ICD-10-CM

## 2022-04-07 DIAGNOSIS — J44.9 CHRONIC OBSTRUCTIVE PULMONARY DISEASE, UNSPECIFIED COPD TYPE (HCC): ICD-10-CM

## 2022-04-07 DIAGNOSIS — Z00.00 MEDICARE ANNUAL WELLNESS VISIT, SUBSEQUENT: ICD-10-CM

## 2022-04-07 DIAGNOSIS — I95.1 ORTHOSTATIC HYPOTENSION: ICD-10-CM

## 2022-04-07 DIAGNOSIS — N30.01 ACUTE CYSTITIS WITH HEMATURIA: ICD-10-CM

## 2022-04-07 DIAGNOSIS — R60.9 PERIPHERAL EDEMA: ICD-10-CM

## 2022-04-07 DIAGNOSIS — I95.1 ORTHOSTATIC HYPOTENSION: Primary | ICD-10-CM

## 2022-04-07 DIAGNOSIS — K21.9 GASTROESOPHAGEAL REFLUX DISEASE, UNSPECIFIED WHETHER ESOPHAGITIS PRESENT: ICD-10-CM

## 2022-04-07 LAB
ALBUMIN SERPL-MCNC: 4.6 G/DL (ref 3.5–5.2)
ALP BLD-CCNC: 62 U/L (ref 35–104)
ALT SERPL-CCNC: 11 U/L (ref 5–33)
ANION GAP SERPL CALCULATED.3IONS-SCNC: 17 MMOL/L (ref 7–19)
APPEARANCE FLUID: ABNORMAL
AST SERPL-CCNC: 16 U/L (ref 5–32)
BASOPHILS ABSOLUTE: 0 K/UL (ref 0–0.2)
BASOPHILS RELATIVE PERCENT: 0.3 % (ref 0–1)
BILIRUB SERPL-MCNC: 0.6 MG/DL (ref 0.2–1.2)
BILIRUBIN, POC: ABNORMAL
BLOOD URINE, POC: ABNORMAL
BUN BLDV-MCNC: 21 MG/DL (ref 8–23)
CALCIUM SERPL-MCNC: 10.8 MG/DL (ref 8.8–10.2)
CHLORIDE BLD-SCNC: 101 MMOL/L (ref 98–111)
CHOLESTEROL, TOTAL: 202 MG/DL (ref 160–199)
CLARITY, POC: ABNORMAL
CO2: 22 MMOL/L (ref 22–29)
COLOR, POC: ABNORMAL
CREAT SERPL-MCNC: 0.9 MG/DL (ref 0.5–0.9)
CREATININE URINE: 117.1 MG/DL (ref 4.2–622)
EOSINOPHILS ABSOLUTE: 0.2 K/UL (ref 0–0.6)
EOSINOPHILS RELATIVE PERCENT: 3.2 % (ref 0–5)
GFR AFRICAN AMERICAN: >59
GFR NON-AFRICAN AMERICAN: 59
GLUCOSE BLD-MCNC: 86 MG/DL (ref 74–109)
GLUCOSE URINE, POC: ABNORMAL
HCT VFR BLD CALC: 35.1 % (ref 37–47)
HDLC SERPL-MCNC: 87 MG/DL (ref 65–121)
HEMOGLOBIN: 11.5 G/DL (ref 12–16)
IMMATURE GRANULOCYTES #: 0 K/UL
KETONES, POC: 15
LDL CHOLESTEROL CALCULATED: 95 MG/DL
LEUKOCYTE EST, POC: ABNORMAL
LYMPHOCYTES ABSOLUTE: 1.4 K/UL (ref 1.1–4.5)
LYMPHOCYTES RELATIVE PERCENT: 24 % (ref 20–40)
MCH RBC QN AUTO: 35.4 PG (ref 27–31)
MCHC RBC AUTO-ENTMCNC: 32.8 G/DL (ref 33–37)
MCV RBC AUTO: 108 FL (ref 81–99)
MICROALBUMIN UR-MCNC: 220.7 MG/DL (ref 0–19)
MICROALBUMIN/CREAT UR-RTO: 1884.7 MG/G
MONOCYTES ABSOLUTE: 0.5 K/UL (ref 0–0.9)
MONOCYTES RELATIVE PERCENT: 7.6 % (ref 0–10)
NEUTROPHILS ABSOLUTE: 3.9 K/UL (ref 1.5–7.5)
NEUTROPHILS RELATIVE PERCENT: 64.7 % (ref 50–65)
NITRITE, POC: POSITIVE
PDW BLD-RTO: 14.9 % (ref 11.5–14.5)
PH, POC: 6
PLATELET # BLD: 380 K/UL (ref 130–400)
PMV BLD AUTO: 10.1 FL (ref 9.4–12.3)
POTASSIUM SERPL-SCNC: 4.7 MMOL/L (ref 3.5–5)
PROTEIN, POC: 300
RBC # BLD: 3.25 M/UL (ref 4.2–5.4)
SODIUM BLD-SCNC: 140 MMOL/L (ref 136–145)
SPECIFIC GRAVITY, POC: 1.02
T4 FREE: 1.58 NG/DL (ref 0.93–1.7)
TOTAL PROTEIN: 6.7 G/DL (ref 6.6–8.7)
TRIGL SERPL-MCNC: 98 MG/DL (ref 0–149)
TSH SERPL DL<=0.05 MIU/L-ACNC: 2.5 UIU/ML (ref 0.27–4.2)
UROBILINOGEN, POC: 1
WBC # BLD: 6 K/UL (ref 4.8–10.8)

## 2022-04-07 PROCEDURE — 81002 URINALYSIS NONAUTO W/O SCOPE: CPT | Performed by: PEDIATRICS

## 2022-04-07 PROCEDURE — G0439 PPPS, SUBSEQ VISIT: HCPCS | Performed by: PEDIATRICS

## 2022-04-07 PROCEDURE — 1123F ACP DISCUSS/DSCN MKR DOCD: CPT | Performed by: PEDIATRICS

## 2022-04-07 PROCEDURE — 99214 OFFICE O/P EST MOD 30 MIN: CPT | Performed by: PEDIATRICS

## 2022-04-07 PROCEDURE — 4040F PNEUMOC VAC/ADMIN/RCVD: CPT | Performed by: PEDIATRICS

## 2022-04-07 RX ORDER — THIAMINE HYDROCHLORIDE 100 MG/ML
100 INJECTION, SOLUTION INTRAMUSCULAR; INTRAVENOUS DAILY
COMMUNITY
End: 2022-04-11

## 2022-04-07 SDOH — ECONOMIC STABILITY: FOOD INSECURITY: WITHIN THE PAST 12 MONTHS, THE FOOD YOU BOUGHT JUST DIDN'T LAST AND YOU DIDN'T HAVE MONEY TO GET MORE.: NEVER TRUE

## 2022-04-07 SDOH — ECONOMIC STABILITY: FOOD INSECURITY: WITHIN THE PAST 12 MONTHS, YOU WORRIED THAT YOUR FOOD WOULD RUN OUT BEFORE YOU GOT MONEY TO BUY MORE.: NEVER TRUE

## 2022-04-07 ASSESSMENT — ENCOUNTER SYMPTOMS
VOMITING: 0
WHEEZING: 0
BACK PAIN: 0
CONSTIPATION: 0
DIARRHEA: 0
COUGH: 0
SINUS PRESSURE: 0
SHORTNESS OF BREATH: 0
CHEST TIGHTNESS: 0
ABDOMINAL PAIN: 0
SORE THROAT: 0
NAUSEA: 0
EYE DISCHARGE: 0

## 2022-04-07 ASSESSMENT — LIFESTYLE VARIABLES
HOW OFTEN DO YOU HAVE A DRINK CONTAINING ALCOHOL: NEVER
HOW MANY STANDARD DRINKS CONTAINING ALCOHOL DO YOU HAVE ON A TYPICAL DAY: 1 OR 2

## 2022-04-07 ASSESSMENT — SOCIAL DETERMINANTS OF HEALTH (SDOH): HOW HARD IS IT FOR YOU TO PAY FOR THE VERY BASICS LIKE FOOD, HOUSING, MEDICAL CARE, AND HEATING?: NOT HARD AT ALL

## 2022-04-07 ASSESSMENT — PATIENT HEALTH QUESTIONNAIRE - PHQ9
1. LITTLE INTEREST OR PLEASURE IN DOING THINGS: 1
SUM OF ALL RESPONSES TO PHQ QUESTIONS 1-9: 2
SUM OF ALL RESPONSES TO PHQ9 QUESTIONS 1 & 2: 2
2. FEELING DOWN, DEPRESSED OR HOPELESS: 1
SUM OF ALL RESPONSES TO PHQ QUESTIONS 1-9: 2

## 2022-04-07 NOTE — PATIENT INSTRUCTIONS
Personalized Preventive Plan for Keke Jara - 4/7/2022  Medicare offers a range of preventive health benefits. Some of the tests and screenings are paid in full while other may be subject to a deductible, co-insurance, and/or copay. Some of these benefits include a comprehensive review of your medical history including lifestyle, illnesses that may run in your family, and various assessments and screenings as appropriate. After reviewing your medical record and screening and assessments performed today your provider may have ordered immunizations, labs, imaging, and/or referrals for you. A list of these orders (if applicable) as well as your Preventive Care list are included within your After Visit Summary for your review. Other Preventive Recommendations:    · A preventive eye exam performed by an eye specialist is recommended every 1-2 years to screen for glaucoma; cataracts, macular degeneration, and other eye disorders. · A preventive dental visit is recommended every 6 months. · Try to get at least 150 minutes of exercise per week or 10,000 steps per day on a pedometer . · Order or download the FREE \"Exercise & Physical Activity: Your Everyday Guide\" from [employer]. Call 7-764.159.3716 or search [employer] online. · You need 1403-0828 mg of calcium and 3681-5597 IU of vitamin D per day. It is possible to meet your calcium requirement with diet alone, but a vitamin D supplement is usually necessary to meet this goal.  · When exposed to the sun, use a sunscreen that protects against both UVA and UVB radiation with an SPF of 30 or greater. Reapply every 2 to 3 hours or after sweating, drying off with a towel, or swimming. · Always wear a seat belt when traveling in a car. Always wear a helmet when riding a bicycle or motorcycle. Patient Education        Well Visit, Over 72: Care Instructions  Overview     Well visits can help you stay healthy. Your doctor has checked your overall health and may have suggested ways to take good care of yourself. Your doctor also may have recommended tests. At home, you can help prevent illness withhealthy eating, regular exercise, and other steps. Follow-up care is a key part of your treatment and safety. Be sure to make and go to all appointments, and call your doctor if you are having problems. It's also a good idea to know your test results and keep alist of the medicines you take. How can you care for yourself at home? Get screening tests that you and your doctor decide on. Screening helps find diseases before any symptoms appear. Eat healthy foods. Choose fruits, vegetables, whole grains, protein, and low-fat dairy foods. Limit fat, especially saturated fat. Reduce salt in your diet. Limit alcohol. If you are a man, have no more than 2 drinks a day or 14 drinks a week. If you are a woman, have no more than 1 drink a day or 7 drinks a week. Since alcohol affects older adults differently, you may want to limit alcohol even more. Or you may not want to drink at all. Get at least 30 minutes of exercise on most days of the week. Walking is a good choice. You also may want to do other activities, such as running, swimming, cycling, or playing tennis or team sports. Reach and stay at a healthy weight. This will lower your risk for many problems, such as obesity, diabetes, heart disease, and high blood pressure. Do not smoke. Smoking can make health problems worse. If you need help quitting, talk to your doctor about stop-smoking programs and medicines. These can increase your chances of quitting for good. Care for your mental health. It is easy to get weighed down by worry and stress. Learn strategies to manage stress, like deep breathing and mindfulness, and stay connected with your family and community. If you find you often feel sad or hopeless, talk with your doctor. Treatment can help.   Talk to your doctor about whether you have any risk factors for sexually transmitted infections (STIs). You can help prevent STIs if you wait to have sex with a new partner (or partners) until you've each been tested for STIs. It also helps if you use condoms (male or female condoms) and if you limit your sex partners to one person who only has sex with you. Vaccines are available for some STIs. If you think you may have a problem with alcohol or drug use, talk to your doctor. This includes prescription medicines (such as amphetamines and opioids) and illegal drugs (such as cocaine and methamphetamine). Your doctor can help you figure out what type of treatment is best for you. Protect your skin from too much sun. When you're outdoors from 10 a.m. to 4 p.m., stay in the shade or cover up with clothing and a hat with a wide brim. Wear sunglasses that block UV rays. Even when it's cloudy, put broad-spectrum sunscreen (SPF 30 or higher) on any exposed skin. See a dentist one or two times a year for checkups and to have your teeth cleaned. Wear a seat belt in the car. When should you call for help? Watch closely for changes in your health, and be sure to contact your doctor if you have any problems or symptoms that concern you. Where can you learn more? Go to https://Punch Bowl Socialsajaneb.healthArcxis Biotechnologiespartners. org and sign in to your Rostelecom account. Enter P478 in the [facility] box to learn more about \"Well Visit, Over 65: Care Instructions. \"     If you do not have an account, please click on the \"Sign Up Now\" link. Current as of: October 6, 2021               Content Version: 13.2  © 0794-3648 Healthwise, Incorporated. Care instructions adapted under license by [facility] ([facility]). If you have questions about a medical condition or this instruction, always ask your healthcare professional. [address] any warranty or liability for your use of this information.          Patient Education        Preventing Falls: Care Instructions  Your Care Instructions     Getting around your home safely can be a challenge if you have injuries or health problems that make it easy for you to fall. Loose rugs and furniture in walkways are among the dangers for many older people who have problems walking or who have poor eyesight. People who have conditions such as arthritis,osteoporosis, or dementia also have to be careful not to fall. You can make your home safer with a few simple measures. Follow-up care is a key part of your treatment and safety. Be sure to make and go to all appointments, and call your doctor if you are having problems. It's also a good idea to know your test results and keep alist of the medicines you take. How can you care for yourself at home? Taking care of yourself  Exercise regularly to improve your strength, muscle tone, and balance. Walk if you can. Swimming may be a good choice if you cannot walk easily. Have your vision and hearing checked each year or any time you notice a change. If you have trouble seeing and hearing, you might not be able to avoid objects and could lose your balance. Know the side effects of the medicines you take. Ask your doctor or pharmacist whether the medicines you take can affect your balance. Sleeping pills or sedatives can affect your balance. Limit the amount of alcohol you drink. Alcohol can impair your balance and other senses. Ask your doctor whether calluses or corns on your feet need to be removed. If you wear loose-fitting shoes because of calluses or corns, you can lose your balance and fall. Talk to your doctor if you have numbness in your feet. You may get dizzy if you do not drink enough water. To prevent dehydration, drink plenty of fluids. Choose water and other clear liquids. If you have kidney, heart, or liver disease and have to limit fluids, talk with your doctor before you increase the amount of fluids you drink.   Preventing falls at home  Remove raised doorway thresholds, throw rugs, and clutter. Repair loose carpet or raised areas in the floor. Move furniture and electrical cords to keep them out of walking paths. Use nonskid floor wax, and wipe up spills right away, especially on ceramic tile floors. If you use a walker or cane, put rubber tips on it. If you use crutches, clean the bottoms of them regularly with an abrasive pad, such as steel wool. Keep your house well lit, especially stairways, porches, and outside walkways. Use night-lights in areas such as hallways and bathrooms. Add extra light switches or use remote switches (such as switches that go on or off when you clap your hands) to make it easier to turn lights on if you have to get up during the night. Install sturdy handrails on stairways. Move items in your cabinets so that the things you use a lot are on the lower shelves (about waist level). Keep a cordless phone and a flashlight with new batteries by your bed. If possible, put a phone in each of the main rooms of your house, or carry a cell phone in case you fall and cannot reach a phone. Or, you can wear a device around your neck or wrist. You push a button that sends a signal for help. Wear low-heeled shoes that fit well and give your feet good support. Use footwear with nonskid soles. Check the heels and soles of your shoes for wear. Repair or replace worn heels or soles. Do not wear socks without shoes on wood floors. Walk on the grass when the sidewalks are slippery. If you live in an area that gets snow and ice in the winter, sprinkle salt on slippery steps and sidewalks. Or ask a family member or friend to do this for you. Preventing falls in the bath  Install grab bars and nonskid mats inside and outside your shower or tub and near the toilet and sinks. Use shower chairs and bath benches. Use a hand-held shower head that will allow you to sit while showering.   Get into a tub or shower by putting the weaker leg in first. Get out of a tub or shower with your strong side first.  Repair loose toilet seats and consider installing a raised toilet seat to make getting on and off the toilet easier. Keep your bathroom door unlocked while you are in the shower. Where can you learn more? Go to https://chpepiceweb.Temptster. org and sign in to your QuickBlox account. Enter 0476 [id number] in the [employer] box to learn more about \"Preventing Falls: Care Instructions. \"     If you do not have an account, please click on the \"Sign Up Now\" link. Current as of: September 8, 2021               Content Version: 13.2  © 2006-2022 [employer]. Care instructions adapted under license by [facility] ([facility]). If you have questions about a medical condition or this instruction, always ask your healthcare professional. [place] [address] any warranty or liability for your use of this information. Patient Education        Preventing Outdoor Falls: Care Instructions  Your Care Instructions     Worries about falls don't need to keep you indoors. Outdoor activities like walking have big benefits for your health. You will need to watch your step andlearn a few safety measures. If you are worried about having a fall outdoors, ask your doctor about exercises, classes, or physical therapy that may help. You can learn ways to gain strength, flexibility, and balance. Ask if it might help to use a cane orwalker. You can make your time outdoors safer with a few simple measures. Follow-up care is a key part of your treatment and safety. Be sure to make and go to all appointments, and call your doctor if you are having problems. It's also a good idea to know your test results and keep alist of the medicines you take. How can you prevent falls outdoors? Wear shoes with firm soles and low heels. If you have to walk on an icy surface, use grippers that can be worn over your shoes in bad weather.   Be extra careful if weather is bad. Walk on the grass when the sidewalks are slick. If you live in a place that gets snow and ice in the winter, sprinkle salt on slippery stairs and sidewalks. Be careful getting on or off buses and trains or getting in and out of cars. If handrails are available, use them. Be careful when you cross the street. Look for crosswalks or places where curb cuts or ramps are present. Try not to hurry, especially if you are carrying something. Be cautious in parking lots or garages. There may be curbs or changes in pavement, or the height of the pavement may vary. Make sure to wear the correct eyeglasses, if you need them. Reading glasses or bifocals can make it harder to see hazards that might be in your way. If you are walking outdoors for exercise, try to: Walk in well-lighted, well-maintained areas. These include high school or college tracks, shopping malls, and public spaces. Walk with a partner. Watch out for cracked sidewalks, curbs, changes in the height of the pavement, exposed tree roots, and debris such as fallen leaves or branches. Where can you learn more? Go to https://YOU On Demand HoldingspeAllBusiness.comeb.Information [employer] Associates. org and sign in to your Maana Mobile account. Enter H367 in the [facility] box to learn more about \"Preventing Outdoor Falls: Care Instructions. \"     If you do not have an account, please click on the \"Sign Up Now\" link. Current as of: September 8, 2021               Content Version: 13.2  © 2006-2022 Healthwise, Incorporated. Care instructions adapted under license by [facility] [address] If you have questions about a medical condition or this instruction, always ask your healthcare professional. [place] [address] any warranty or liability for your use of this information. Patient Education        Learning About Getting In and Out of a Bathtub Safely  Introduction  Many falls happen during bathing. All that water makes the bathroom a slipperyplace.   You may no longer be able to step over the tub wall comfortably and safely. You might lean on things that aren't meant to support your weight, like a towel bar or the shower curtain. There are several types of aids that will help keep you safe. You can buy themat [employer] or home improvement stores or online. What tools can you use to get in and out of the tub? Tub rail and grab bar    There are many types of bars and rails you can install on the walls next to your tub or on the edge of the tub. They will help keep you safe while you're getting in or out of the tub. It's important to have them permanentlyinstalled, rather than attached with suction. Transfer bench    There are several kinds of benches or seats to use in the bathtub. One type sits in the tub and extends out over the side. You sit on the bench. Lift one leg at a time into the tub, sliding your body over as you do so. Another common tub bench sits inside the tub. To use this type you need to be able to safelystep into the tub before sitting down. Nonskid mats    Water makes both the floor of the tub and the bathroom floor slippery. You can buy adhesive strips that stick to the floor of the tub. Or you can use a nonskid tub mat. Outside the tub, make sure you use a rug with a nonskidbottom. Don't use a plain towel. Hand-held shower faucet    With a hand-held faucet, you can get clean without having to lower yourself into the tub. Hang a shower curtain to keep water from spilling out onto thefloor. Follow-up care is a key part of your treatment and safety. Be sure to make and go to all appointments, and call your doctor if you are having problems. It's also a good idea to know your test results and keep alist of the medicines you take. Where can you learn more? Go to https://eugene.PHmHealth. org and sign in to your ViralGains account. Enter P534 in the [facility] box to learn more about \"Learning About Getting In and Out of a Bathtub Safely. \" If you do not have an account, please click on the \"Sign Up Now\" link. Current as of: July 1, 2021               Content Version: 13.2  © 2006-2022 Healthwise, Cellay. Care instructions adapted under license by [facility] ([facility]). If you have questions about a medical condition or this instruction, always ask your healthcare professional. [address] any warranty or liability for your use of this information. Patient Education        Learning About Dental Care for Older Adults  Dental care for older adults: Overview  Dental care for older people is much the same as for younger adults. But older adults do have concerns that younger adults do not. Older adults may have problems with gum disease and decay on the roots of their teeth. They may need missing teeth replaced or broken fillings fixed. Or they may have dentures thatneed to be cared for. Some older adults may have trouble holding a toothbrush. You can help remind the person you are caring for to brush and floss their teeth or to clean their dentures. In some cases, you may need to do the brushing and other dental care tasks. People who have trouble using their handsor who have dementia may need this extra help. How can you help with dental care? Normal dental care  To keep the teeth and gums healthy:  Brush the teeth with fluoride toothpaste twice a day--in the morning and at night--and floss at least once a day. Plaque can quickly build up on the teeth of older adults. Watch for the signs of gum disease. These signs include gums that bleed after brushing or after eating hard foods, such as apples. See a dentist regularly. Many experts recommend checkups every 6 months. Keep the dentist up to date on any new medications the person is taking. Encourage a balanced diet that includes whole grains, vegetables, and fruits, and that is low in saturated fat and sodium.   Encourage the person you're caring for not to use tobacco products. They can affect dental and general health. Many older adults have a fixed income and feel that they can't afford dental care. But most towns and cities have programs in which dentists help older adults by lowering fees. Contact your area's public health offices or socialservices for information about dental care in your area. Using a toothbrush  Older adults with arthritis sometimes have trouble brushing their teeth because they can't easily hold the toothbrush. Their hands and fingers may be stiff,painful, or weak. If this is the case, you can: Offer an electric toothbrush. Enlarge the handle of a non-electric toothbrush by wrapping a sponge, an elastic bandage, or adhesive tape around it. Push the toothbrush handle through a ball made of rubber or soft foam.  Make the handle longer and thicker by taping Popsicle sticks or tongue depressors to it. You may also be able to buy special toothbrushes, toothpaste dispensers, andfloss holders. Your doctor may recommend a soft-bristle toothbrush if the person you care for bleeds easily. Bleeding can happen because of a health problem or from certainmedicines. A toothpaste for sensitive teeth may help if the person you care for hassensitive teeth. How do you brush and floss someone's teeth? If the person you are caring for has a hard time cleaning their teeth on their own, you may need to brush and floss their teeth for them. It may be easiest to have the person sit and face away from you, and to sit or stand behind them. That way you can steady their head against your arm as you reach around to floss and brush their teeth. Choose a place that has good lighting and iscomfortable for both of you. Before you begin, gather your supplies. You will need gloves, floss, a toothbrush, and a container to hold water if you are not near a sink. Wash anddry your hands well and put on gloves.  Start by flossing:  Gently work a piece of floss between each of the teeth toward the gums. A plastic flossing tool may make this easier, and they are available at most [facility]. Curve the floss around each tooth into a U-shape and gently slide it under the gum line. Move the floss firmly up and down several times to scrape off the plaque. After you've finished flossing, throw away the used floss and begin brushing:  Wet the brush and apply toothpaste. Place the brush at a 45-degree angle where the teeth meet the gums. Press firmly, and move the brush in small circles over the surface of the teeth. Be careful not to brush too hard. Vigorous brushing can make the gums pull away from the teeth and can scratch the tooth enamel. Brush all surfaces of the teeth, on the tongue side and on the cheek side. Pay special attention to the front teeth and all surfaces of the back teeth. Brush chewing surfaces with short back-and-forth strokes. After you've finished, help the person rinse the remaining toothpaste fromtheir mouth. Where can you learn more? Go to https://MagicRooms [employer] (P)Ltd.peallyDVMeb.JG Real Estate. org and sign in to your SnagFilms account. Enter P906 in the [employer] box to learn more about \"Learning About Dental Care for Older Adults. \"     If you do not have an account, please click on the \"Sign Up Now\" link. Current as of: October 18, 2021               Content Version: 13.2  © 2006-2022 Healthwise, Incorporated. Care instructions adapted under license by [facility] ([facility]). If you have questions about a medical condition or this instruction, always ask your healthcare professional. [place] [address] any warranty or liability for your use of this information. Patient Education        Learning About Dental Care  What is basic dental care? Basic dental care involves brushing and flossing your teeth regularly to remove plaque. Plaque is a thin film of bacteria that sticks to teeth above and below the gum line.  It can build up and harden into tartar, which makes it harder to give the teeth a good cleaning. Tartar usually has to be removed by a dentalhygienist.  The bacteria in plaque use sugars to make acids. These acids can damage thegums and teeth. Be sure to see your dentist and dental hygienist for regular checkups andcleanings. How can dental care affect your health? Practicing basic dental care:  Removes plaque that can cause gum disease and tooth decay. Tooth decay can lead to a hole in the tooth (cavity). Helps prevent bad breath. Brushing and flossing rid your mouth of the bacteria that cause bad breath. Helps keep teeth white by preventing staining from food, drinks, and tobacco.  Makes it possible for your teeth to last a lifetime. What can you do to prevent dental problems? Brush your teeth twice a day, in the morning and at night. Use a toothbrush with soft, rounded-end bristles and a head that is small enough to reach all parts of your teeth and mouth. Replace your toothbrush every 3 to 4 months. Use a fluoride toothpaste. Place the brush at a 45-degree angle where the teeth meet the gums. Press firmly, and gently rock the brush back and forth using small circular movements. Brush chewing surfaces vigorously with short back-and-forth strokes. Brush your tongue from back to front. Floss at least once a day. Choose the type and flavor you like best.  Schedule checkups and cleanings as often as your dentist recommends it. Eat a healthy diet to help keep your gums healthy and your teeth strong. Choose foods that are good for your teeth, such as whole grains, vegetables, or fruits. Avoid foods that contain a lot of sugar, especially sticky, sweet foods like taffy. Don't snack before bedtime. Food left on the teeth is more likely to cause tooth decay overnight. Don't smoke or use smokeless tobacco. Tobacco can make tooth decay worse. If you need help quitting, talk to your doctor about stop-smoking programs and medicines.  These can increase your chance of quitting for good. Where can you learn more? Go to https://chpepiceweb.ElectraTherm. org and sign in to your WeMedia Alliance account. Enter P189 in the [facility] box to learn more about \"Learning About Dental Care. \"     If you do not have an account, please click on the \"Sign Up Now\" link. Current as of: June 30, 2021               Content Version: 13.2  © 2006-2022 [employer]. Care instructions adapted under license by [facility] Department ([facility]). If you have questions about a medical condition or this instruction, always ask your healthcare professional. [place] [address] any warranty or liability for your use of this information. Patient Education        Learning About Hearing Aids  What is a hearing aid? A hearing aid makes sounds louder. It can help some people with hearing problems to hear better. Hearing aids don't restore normal hearing. But theycan make it easier to communicate. What are the types? There are different types of hearing aids. Analog adjustable hearing aids make both speech and other sounds louder in the same amount. Your doctor can adjust them to fit your hearing. You can control loudness. These cost less than the other types of hearing aids. Analog programmable hearing aids have a computer chip that your doctor can program to fit your hearing. They can be set up for different places or events. For example, you can have a setting for quiet one-on-one conversations and another for noisy times like a dinner party in a restaurant. You can change hearing programs with a remote control. Digital programmable hearing aids can adjust themselves to work best where you are at any time. You also have more choices in setting them up than with analog hearing aids. Bone-anchored hearing systems transmit sound through the skull. This type of hearing aid is permanently implanted in the skull bone.  It may work for people who don't benefit from other types of hearing aids. There are also different styles of hearing aids. A behind-the-ear (BTE) hearing aid connects to a plastic ear mold that fits inside the outer ear. BTE hearing aids are used for all levels of hearing loss, especially very severe hearing loss. They may be better for children for safety and growth reasons. Poorly fitting BTE ear molds or a buildup of earwax may cause a whistling sound (feedback). An in-the-ear (ITE) hearing aid fits in the outer part of the ear. It can be used by people with mild to severe hearing loss. ITE hearing aids can be used with other hearing devices, such as a telecoil that improves hearing during phone calls. ITE hearing aids can be damaged by earwax and fluid draining from the ear. Their small size may be hard for some people to handle. They aren't often used in children. That's because the case must be replaced as the child grows. An in-the-canal (ITC) hearing aid fits into the ear canal. ITC hearing aids are used by people with mild to moderate hearing loss. They are made to fit the shape and the size of your ear canal. They can be damaged by earwax and fluid draining from the ear. Their small size may be hard for some people to handle. They are not made for children. With a bone-anchored hearing system, the sound processor sits behind the ear. No part of the system is within the ear itself. Who can prescribe hearing aids? You have some options if you have a hearing problem and are thinking about getting hearing aids. You can go to your doctor or an audiologist. They will do a hearing test and help you decide which type and style of hearing aid may be best for you. But if your hearing loss is mild to moderate, you might consider buying a good quality over-the-counter hearing aid. This may be called a personal sound amplification product, or PSAP. You can get these without ahearing test.  What else should you know about hearing aids?   Find out if your insurance covers hearing aids. They can be expensive. Different types of hearing aids come with different costs. Also find out abouta warranty or return policy in case you aren't happy with your hearing aids. Follow-up care is a key part of your treatment and safety. Be sure to make and go to all appointments, and call your doctor if you are having problems. It's also a good idea to know your test results and keep alist of the medicines you take. Where can you learn more? Go to [email or web address] [email or web address] org and sign in to your [facility] account. Enter W383 in the Epic [employer]ground box to learn more about \"Learning About Hearing Aids. \"     If you do not have an account, please click on the \"Sign Up Now\" link. Current as of: September 8, 2021               Content Version: 13.2  © 2006-2022 [employer]. Care instructions adapted under license by [facility] ([facility]). If you have questions about a medical condition or this instruction, always ask your healthcare professional. [place] [address] any warranty or liability for your use of this information. Patient Education        Hearing Tests: About These Tests  What are they? Hearing tests check how well you can hear. There are many types of hearing tests. If your doctor thinks that you might have hearing loss, he or she mayrefer you to a hearing specialist (audiologist) to do hearing tests. Why are these tests done? You may have hearing tests because you think you have hearing loss or you have ringing in your ears. Your doctor might want to find the type of hearing lossyou have and see how bad it is. How do you prepare for the test?  Avoid loud noises for 16 hours before you have a thorough hearing test.  Tell your doctor if you take or have taken antibiotics that can damage hearing, such as gentamicin. How are the tests done?   Before the test  Before you start any hearing tests, your ear canals may be checked for earwax. Wax can affect how well you hear. Any hardened wax may be removed. If you wear hearing aids, you may be asked to remove them for some of the tests. Tuning fork tests  Your doctor strikes the tuning fork to make it vibrate and produce a tone. Sometimes the tuning fork will be placed on your head or behind your ear. Pure tone audiometry  A machine called an audiometer plays a series of tones through headphones. The tones change in pitch and loudness. Your doctor will reduce the loudness of a tone until you can no longer hear it. Then the tone will get louder until you can hear it again. If you can hear the tone, you signal by raising your hand orpressing a button. The headphones will then be removed. A special vibrating device will be placedon the bone behind your ear. Again, you will signal each time you hear a tone. Speech reception and word recognition tests  In these tests, you hear a series of simple words spoken with different degrees of loudness. You are asked to repeat the words. Your doctor measures the levelat which you can no longer hear the words well enough to repeat them. Auditory brain stem response (ABR) testing  In this test, electrodes are placed on your scalp and on each earlobe. Clicking noises are then sent through earphones. The electrodes monitor your brain'sresponse to the clicking noises and record the response on a graph. How long do the tests take? The tests usually take about 1 hour. What happens after these tests? You will probably be able to go home right away. You can go back to your usual activities right away. Follow-up care is a key part of your treatment and safety. Be sure to make and go to all appointments, and call your doctor if you are having problems. It's also a good idea to keep a list of the medicines youtake. Ask your doctor when you can expect to have your test results. Where can you learn more?   Go to [email or web address] and sign in to your [employer] account. Enter G283 in the [facility] box to learn more about \"Hearing Tests: About These Tests. \"     If you do not have an account, please click on the \"Sign Up Now\" link. Current as of: September 8, 2021               Content Version: 13.2  © 2006-2022 [employer]. Care instructions adapted under license by [facility] ([facility]). If you have questions about a medical condition or this instruction, always ask your healthcare professional. [place] [address] any warranty or liability for your use of this information. Patient Education        Learning About Vision Tests  What are vision tests? The four most common vision tests are visual acuity tests, refraction, visualfield tests, and color vision tests. Visual acuity (sharpness) tests  These tests are used: To see if you need glasses or contact lenses. To monitor an eye problem. To check an eye injury. Visual acuity tests are done as part of routine exams. You may also have thistest when you get your 's license or apply for some types of jobs. Visual field tests  These tests are used: To check for vision loss in any area of your range of vision. To screen for certain eye diseases. To look for nerve damage after a stroke, head injury, or other problem that could reduce blood flow to the brain. Refraction and color tests  A refraction test is done to find the right prescription for glasses and contact lenses. A color vision test is done to check for color blindness. Color vision is often tested as part of a routine exam. You may also have this test when you apply for a job where recognizing different colors is important,such as , electronics, or the [employer]. How are vision tests done? Visual acuity test   You cover one eye at a time. You read aloud from a wall chart across the room.   You read aloud from a small card that you hold in your hand. Refraction   You look into a special device. The device puts lenses of different strengths in front of each eye to see how strong your glasses or contact lenses need to be. Visual field tests   Your doctor may have you look through special machines. Or your doctor may simply have you stare straight ahead while he or she moves a finger into and out of your field of vision. Color vision test   You look at pieces of printed test patterns in various colors. You say what number or symbol you see. Your doctor may have you trace the number or symbol using a pointer. How do these tests feel? There is very little chance of having a problem from this test. If dilating drops are used for a vision test, they may make the eyes sting and cause amedicine taste in the mouth. Follow-up care is a key part of your treatment and safety. Be sure to make and go to all appointments, and call your doctor if you are having problems. It's also a good idea to know your test results and keep alist of the medicines you take. Where can you learn more? Go to https://Velti.HelloNature. org and sign in to your [employer] account. Enter G551 in the [employer] box to learn more about \"Learning About Vision Tests. \"     If you do not have an account, please click on the \"Sign Up Now\" link. Current as of: January 24, 2022               Content Version: 13.2  © 4155-8926 [employer]wise, Incorporated. Care instructions adapted under license by [facility] [place] ([facility]). If you have questions about a medical condition or this instruction, always ask your healthcare professional. [place] [address] any warranty or liability for your use of this information. Patient Education        Reduced Vision: Care Instructions  Your Care Instructions     Reduced vision can be caused by many things. These include macular degenerationand glaucoma.   When you can't see as well, daily life can be more challenging. But you can dosome things to stay independent and keep doing the activities you enjoy. Follow-up care is a key part of your treatment and safety. Be sure to make and go to all appointments, and call your doctor if you are having problems. It's also a good idea to know your test results and keep alist of the medicines you take. How can you care for yourself at home? Use lighting  Point lighting at what you want to see. Don't point it at your eyes. Add lamps where you need extra lighting. Use curtains or shades to adjust how much natural light there is. Use good lighting in places where you could easily fall. These include entries and stairways. Use labels  Label things that are hard to recognize or that could be confusing. This might include medicines, spices, and foods. Use black letters on a white background. Or you can color-code the items. Benjamin the positions of the temperature settings you use the most on your stove and oven. Also benjamin the \"on\" and \"off\" positions. Benjamin the water temperatures you use on faucets in the kitchen and bathroom. To prevent overfilling a sink or bathtub, use waterproof markers or tape to benjamin the water level you want. Avoid falls in your home  Replace or remove any worn carpeting. Tape down or remove area rugs. Do not wax your floors. Use nonskid, nonglare  on smooth floors. Remove electrical cords from areas where you need to walk. Or tape them down so you won't trip on them. Make sure furniture doesn't stick out into areas where you walk. Keep chairs pushed in under tables and desks. Keep all drawers closed. Keep doors fully opened or fully closed. Don't leave them senior living open or shut. Use handrails on stairways and ramps. Make sure that they go beyond the top and bottom steps. Then you won't stumble if you miss a step. Use helpful technology  Use a magnifying lens. You can buy ones that you hold. Or you can buy ones that attach to glasses.  Some have lights built in. If your budget allows, you may want to think about a video magnifier system. These systems can make print, pictures, or other items bigger on a screen. If you have a computer:  Try to adjust the display. You can often change how big the text and pictures appear. Then they will be easier to see and read. You may want to try special software. Some software can recognize spoken commands or change dictated speech into text. Other software allows computers to speak text and read documents. Use large-print items. These include books, newspapers, magazines, and medicine labels. You can also listen to recordings of books. Think about using devices made for people with low vision. Examples are clocks and watches that announce the time. There are also clocks, telephones, and calculators with extra-large buttons. Be safe while you stay active  Ask your doctor what physical activities are safe for you. If you bend, lift things, or move fast, it may affect your health or vision. Ask a friend to read you the instructions for a new exercise and to check your technique. Walk with someone who can help look for things that may be a danger. If you swim laps, use a pool that has ropes between the lanes. When should you call for help? Watch closely for changes in your health, and be sure to contact your doctor if:    You have vision changes. Where can you learn more? Go to https://eugene.[email or web address] org and sign in to your [employer] account. Enter N503 in the [employer] box to learn more about \"Reduced Vision: Care Instructions. \"     If you do not have an account, please click on the \"Sign Up Now\" link. Current as of: January 24, 2022               Content Version: 13.2  © 0600-7464 Healthwise, Incorporated. Care instructions adapted under license by [facility] ([facility]).  If you have questions about a medical condition or this instruction, always ask your healthcare professional. [employer] disclaims any warranty or liability for your use of this information.

## 2022-04-07 NOTE — PROGRESS NOTES
[facility], [address]  Phone [phone]   Fax [phone]      OFFICE VISIT: 2022    Sierra Barkermeche-: 1932      HPI  Reason For Visit:  Yury Mackay is a 80 y.o. Medicare AWV and Other (follow up on kidney infection)    Presents for routine annual wellness evaluation   She also presents with multiple health issues. Present concerns:    She had a recent urinary tract infection. Urinalysis today shows large amount of blood  Greater than 300 mg/dL protein  Nitrite positive  Large leukocytes. This was determined at this office  Urine culture was not performed  She did complete a 10 day course of Cefdinir. She does have gross hematuria from time-to-time. No dysuria  No fever/ chills  No increased frequency. She is asymptomatic today    She did have a staghorn calculus in the left kidney on CT scan back on 6/3/2021  Her renal function is excellent. Orthostatic hypotension:  Medication:   Midodrine 10 mg 3 times daily  Symptoms: she is doing well on this regimen. Paroxysmal Atrial Fibrillation:  Medications:              Rate control:                            Propafenone 25 mg 3 times daily              Anticoagulation:                       Xarelto 20 mg daily              Rhythm management:            Propafenone 25 mg 3 times daily  Symptoms:no appreciated syptoms        Mild Coronary Artery Disease:  Medications:              Beta-blockade:            None, she does not tolerate secondary to low blood pressure              RAAS Therapy:           None              ZZNGV Management:      None              Platelet Inhibition:         None, however she is on Xarelto              DNTF-KDBJBGQ:                 None  Symptoms: no anginal symptoms.   Nitroglycerin use: none  Cardiology follow-up: She follows with [id number] [facility] cardiology  Additional studies: No recent studies        Peripheral edema:  Medication:              Lasix 20 mg as needed              Potassium chloride 20 mEq daily as needed   Symptoms: some edema, but rarely takes.        Hypothyroidism:  Medication:   Synthroid 88 mcg daily  Medication compliance:  compliant most of the time  Patient is  taking her medication consistently on an empty stomach. Symptoms: none. Laboratory:  Lab Results   Component Value Date    TSH 2.250 11/20/2019    TSH 5.100 (H) 09/25/2019    TSH 2.220 02/12/2018     Lab Results   Component Value Date    T4FREE 1.42 11/20/2019    T4FREE 1.4 09/25/2019    T4FREE 1.3 08/03/2017       GERD:  Medication:              Omeprazole 10 mg daily  Symptoms:no recent symptoms           height is 5' 4\" (1.626 m) and weight is 119 lb (54 kg). Her temporal temperature is 97.8 °F (36.6 °C). Her blood pressure is 114/72 and her pulse is 54. Her oxygen saturation is 98%. Body mass index is 20.43 kg/m². I have reviewed the following with the MsMaureen Dwaynemario   Lab Review  No visits with results within 6 Month(s) from this visit.    Latest known visit with results is:   Admission on 06/03/2021, Discharged on 06/05/2021   Component Date Value    WBC 06/03/2021 6.0     RBC 06/03/2021 3.22*    Hemoglobin 06/03/2021 10.5*    Hematocrit 06/03/2021 32.4*    MCV 06/03/2021 100.6*    MCH 06/03/2021 32.6*    MCHC 06/03/2021 32.4*    RDW 06/03/2021 14.5     Platelets 35/00/6644 267     MPV 06/03/2021 9.6     Neutrophils % 06/03/2021 67.0*    Lymphocytes % 06/03/2021 21.6     Monocytes % 06/03/2021 9.7     Eosinophils % 06/03/2021 1.2     Basophils % 06/03/2021 0.3     Neutrophils Absolute 06/03/2021 4.0     Immature Granulocytes # 06/03/2021 0.0     Lymphocytes Absolute 06/03/2021 1.3     Monocytes Absolute 06/03/2021 0.60     Eosinophils Absolute 06/03/2021 0.10     Basophils Absolute 06/03/2021 0.00     Sodium 06/03/2021 139     Potassium reflex Magnesi* 06/03/2021 4.3     Chloride 06/03/2021 105     CO2 06/03/2021 24     Anion Gap 06/03/2021 10     Glucose 06/03/2021 86     BUN 06/03/2021 20     CREATININE 06/03/2021 0.8     GFR Non- 06/03/2021 >60     GFR  06/03/2021 >59     Calcium 06/03/2021 9.7     Total Protein 06/03/2021 6.3*    Albumin 06/03/2021 3.7     Total Bilirubin 06/03/2021 0.5     Alkaline Phosphatase 06/03/2021 41     ALT 06/03/2021 8     AST 06/03/2021 19     Color, UA 06/03/2021 RED*    Clarity, UA 06/03/2021 CLOUDY*    Glucose, Ur 06/03/2021 Negative     Bilirubin Urine 06/03/2021 SMALL*    Ketones, Urine 06/03/2021 Negative     Specific Gravity, UA 06/03/2021 1.016     Blood, Urine 06/03/2021 LARGE*    pH, UA 06/03/2021 5.5     Protein, UA 06/03/2021 100*    Urobilinogen, Urine 06/03/2021 0.2     Nitrite, Urine 06/03/2021 Negative     Leukocyte Esterase, Urine 06/03/2021 LARGE*    WBC, UA 06/03/2021 TNTC*    RBC, UA 06/03/2021 TNTC*    Epithelial Cells, UA 06/03/2021 0-2     Bacteria, UA 06/03/2021 4+*    Yeast, UA 06/03/2021 Present*    Urine Culture, Routine 06/03/2021 >50,000 CFU/ml mixed skin/urogenital cherelle. No further workup*    Organism 06/03/2021 Yeast*    Urine Culture, Routine 06/03/2021                      Value: Moderate growth  No further workup      SARS-CoV-2, NAAT 06/03/2021 Not Detected     TSH Reflex FT4 06/04/2021 2.07     Sodium 06/04/2021 144     Potassium reflex Magnesi* 06/04/2021 4.1     Chloride 06/04/2021 110     CO2 06/04/2021 25     Anion Gap 06/04/2021 9     Glucose 06/04/2021 110*    BUN 06/04/2021 18     CREATININE 06/04/2021 0.8     GFR Non- 06/04/2021 >60     GFR  06/04/2021 >59     Calcium 06/04/2021 9.2     WBC 06/04/2021 4.0*    RBC 06/04/2021 3.01*    Hemoglobin 06/04/2021 9.6*    Hematocrit 06/04/2021 30.2*    MCV 06/04/2021 100.3*    MCH 06/04/2021 31.9*    MCHC 06/04/2021 31.8*    RDW 06/04/2021 14.2     Platelets 17/43/5980 246     MPV 06/04/2021 9.5     Neutrophils % 06/04/2021 54.7     Lymphocytes % 06/04/2021 31.2     Monocytes % 06/04/2021 12.2*    Eosinophils % 06/04/2021 1.2     Basophils % 06/04/2021 0.2     Neutrophils Absolute 06/04/2021 2.2     Immature Granulocytes # 06/04/2021 0.0     Lymphocytes Absolute 06/04/2021 1.3     Monocytes Absolute 06/04/2021 0.50     Eosinophils Absolute 06/04/2021 0.10     Basophils Absolute 06/04/2021 0.00     Sodium 06/05/2021 142     Potassium reflex Magnesi* 06/05/2021 4.1     Chloride 06/05/2021 108     CO2 06/05/2021 26     Anion Gap 06/05/2021 8     Glucose 06/05/2021 96     BUN 06/05/2021 16     CREATININE 06/05/2021 0.6     GFR Non- 06/05/2021 >60     GFR  06/05/2021 >59     Calcium 06/05/2021 9.0     WBC 06/05/2021 3.7*    RBC 06/05/2021 2.65*    Hemoglobin 06/05/2021 9.1*    Hematocrit 06/05/2021 27.4*    MCV 06/05/2021 103.4*    MCH 06/05/2021 34.3*    MCHC 06/05/2021 33.2     RDW 06/05/2021 14.6*    Platelets 21/03/4089 218     MPV 06/05/2021 10.0     Neutrophils % 06/05/2021 51.3     Lymphocytes % 06/05/2021 31.1     Monocytes % 06/05/2021 15.1*    Eosinophils % 06/05/2021 1.9     Basophils % 06/05/2021 0.3     Neutrophils Absolute 06/05/2021 1.9     Immature Granulocytes # 06/05/2021 0.0     Lymphocytes Absolute 06/05/2021 1.2     Monocytes Absolute 06/05/2021 0.60     Eosinophils Absolute 06/05/2021 0.10     Basophils Absolute 06/05/2021 0.00      Copies of these are in the chart. Current Outpatient Medications   Medication Sig Dispense Refill    thiamine (B-1) 100 MG/ML injection Infuse 100 mg intravenously daily      Cranberry 1000 MG CAPS Take 1 tablet by mouth daily      propafenone (RYTHMOL) 225 MG tablet TAKE ONE TABLET BY MOUTH EVERY EIGHT HOURS.  30 tablet 0    levothyroxine (SYNTHROID) 88 MCG tablet Take 1 tablet by mouth Daily 90 tablet 3    rivaroxaban (XARELTO) 20 MG TABS tablet TAKE 1 TABLET DAILY WITH BREAKFAST 90 tablet 3    omeprazole (PRILOSEC) 10 MG delayed release capsule Take 1 capsule by mouth daily 90 capsule 3    loratadine (CLARITIN) 10 MG tablet Take 10 mg by mouth daily      midodrine (PROAMATINE) 10 MG tablet Take 1 tablet by mouth 3 times daily 270 tablet 3    potassium chloride (KLOR-CON M) 20 MEQ extended release tablet Take 1 tablet by mouth daily (Patient taking differently: Take 20 mEq by mouth daily Take with lasix) 30 tablet 5    furosemide (LASIX) 20 MG tablet Take 1 tablet by mouth daily (Patient taking differently: Take 20 mg by mouth daily as needed ) 60 tablet 3    meclizine (ANTIVERT) 12.5 MG tablet Take 25 mg by mouth as needed Take 2 tablets (25 mg) as needed      Calcium Carbonate-Vitamin D (CALTRATE 600+D PO) Take 600 mg by mouth 2 times daily. No current facility-administered medications for this visit.        Allergies: Nitrofuran derivatives, Quinine derivatives, Sulfa antibiotics, and Pcn [penicillins]     Past Medical History:   Diagnosis Date    A-fib ([facility] [address]     Abnormal weight loss     Anemia     Arthritis     Bullae     CAD (coronary artery disease)     Conjunctivitis     COPD (chronic obstructive pulmonary disease) (HCC)     Depression     Fatigue     History of blood transfusion 2017    post op broken hip    Hypertension     Hypothyroidism     Kidney stone     Leukopenia     Leukopenia     Low back pain     Mild CAD 05/15/2017    Osteoporosis     Palliative care patient 06/04/2021    [facility]     Prolonged emergence from general anesthesia     Sinus pause     10/2014    Thyroid disease     Urinary tract infection     Weakness        Family History   Problem Relation Age of Onset    Cancer Father         lung    Stroke Mother         mini    Hypotension Mother        Past Surgical History:   Procedure Laterality Date    BACK SURGERY      BACK SURGERY      CARDIAC CATHETERIZATION  8/14/14  [address]    Mild, non-occlusive CAD, EF 60%    CYSTOSCOPY Left 8/31/2017    CYSTOSCOPY; LEFT URETEROSCOPY; LEFT URETERAL LASER LITHOTRIPSY AND STONE EXTRACTION; INSERTION LEFT URETERAL DOUBLE J STENT performed by Lola Velez MD at [facility] Bilateral 12/14/2017    CYSTOSCOPY STENT INSERTION performed by Lola Velez MD at [facility] [address] Right 10/11/2017    FEMUR IM NAIL MICHELLE INSERTION performed by Janet Kaplan DO at [facility]      x 2    LAMINECTOMY      2 lumbar segments    [place] CYSTO/URETERO/PYELOSCOPY W/LITHOTRIPSY Left 1/24/2018    LITHOTRIPSY LASER performed by Lola Velez MD at [facility] CYSTO/URETERO/PYELOSCOPY, DX Bilateral 1/24/2018    CYSTOSCOPY BILATERAL URETEROSCOPIES WITH STENT REMOVAL AND RETROGRADE PYELOGRAMS WITH BILATERAL STONE MANIPULATION AND RETRIEVAL  PLACEMENT OF DOUBLE J URETERAL STENTS BILATERAL performed by Lola Velez MD at [address] [facility]      URETER STENT PLACEMENT         Social History     Tobacco Use    Smoking status: Never Smoker    Smokeless tobacco: Never Used   Substance Use Topics    Alcohol use: No        Review of Systems   Constitutional: Negative for appetite change, chills, fatigue and fever. HENT: Negative for congestion, ear discharge, ear pain, postnasal drip, sinus pressure and sore throat. Eyes: Negative for discharge and visual disturbance. Respiratory: Negative for cough, chest tightness, shortness of breath and wheezing. Cardiovascular: Negative for chest pain, palpitations and leg swelling. Gastrointestinal: Negative for abdominal pain, constipation, diarrhea, nausea and vomiting. Genitourinary: Negative for difficulty urinating, dysuria and menstrual problem. Musculoskeletal: Positive for arthralgias (mild diffuse). Negative for back pain, joint swelling and myalgias. Skin: Negative for rash.    Neurological: Positive for dizziness, syncope (mostly in the mornings) and light-headedness. Negative for weakness and headaches. Hematological: Negative for adenopathy. Does not bruise/bleed easily. Psychiatric/Behavioral: Negative for sleep disturbance and suicidal ideas. The patient is not nervous/anxious. Physical Exam  Vitals reviewed. Constitutional:       General: She is not in acute distress. Appearance: She is well-developed and normal weight. She is not toxic-appearing. Comments: Body habitus is normal   HENT:      Head: Normocephalic and atraumatic. Right Ear: Ear canal and external ear normal. Decreased hearing noted. A middle ear effusion is present. Left Ear: Hearing, tympanic membrane, ear canal and external ear normal.      Nose: Nose normal.      Mouth/Throat:      Mouth: Mucous membranes are moist.      Pharynx: Oropharynx is clear. Eyes:      General: Lids are normal.      Extraocular Movements: Extraocular movements intact. Conjunctiva/sclera: Conjunctivae normal.      Pupils: Pupils are equal, round, and reactive to light. Neck:      Thyroid: No thyromegaly. Vascular: No carotid bruit or JVD. Trachea: Phonation normal.   Cardiovascular:      Rate and Rhythm: Normal rate and regular rhythm. No extrasystoles are present. Chest Wall: PMI is not displaced. Heart sounds: Normal heart sounds. No murmur heard. No friction rub. No gallop. Pulmonary:      Effort: Pulmonary effort is normal. No respiratory distress. Breath sounds: Normal breath sounds. No wheezing, rhonchi or rales. Abdominal:      General: Bowel sounds are normal. There is no distension. Palpations: Abdomen is soft. There is no mass. Tenderness: There is no abdominal tenderness. Genitourinary:     Comments: Examination deferred  Musculoskeletal:         General: Normal range of motion. Cervical back: Neck supple. Comments: Joint examination reveals no acute arthritis or synovitis.      Lymphadenopathy: Cervical: No cervical adenopathy. Skin:     General: Skin is warm and dry. Findings: No rash. Neurological:      General: No focal deficit present. Mental Status: She is alert and oriented to person, place, and time. Cranial Nerves: No cranial nerve deficit (by gross examination). Sensory: No sensory deficit. Motor: No tremor or atrophy. Gait: Gait normal.      Comments: No focal deficits appreciated   Psychiatric:         Mood and Affect: Mood normal.         Speech: Speech normal.         Behavior: Behavior normal. Behavior is cooperative. ASSESSMENT      ICD-10-CM    1. Orthostatic hypotension  I95.1 Comprehensive Metabolic Panel     Microalbumin / Creatinine Urine Ratio   2. Paroxysmal A-fib (HCC)  I48.0 CBC with Auto Differential   3. Peripheral edema  R60.9    4. Coronary artery disease involving native heart without angina pectoris, unspecified vessel or lesion type  I25.10 Lipid Panel   5. Chronic obstructive pulmonary disease, unspecified [facility]  J44.9    6. Gastroesophageal reflux disease, unspecified whether esophagitis present  K21.9    7. Acquired hypothyroidism  E03.9 T4, Free     TSH   8. Medicare annual wellness visit, subsequent  Z00.00    9. Acute cystitis with hematuria  N30.01 POCT Urinalysis no Micro         PLAN    1. Orthostatic hypotension  She is doing well with midodrine  - Comprehensive Metabolic Panel; Future  - Microalbumin / Creatinine Urine Ratio; Future    2. Paroxysmal A-fib (HCC)  Regular today  Rate controlled and anticoagulated  - CBC with Auto Differential; Future    3. Peripheral edema  This is not bothersome for her. 4. Coronary artery disease involving native heart without angina pectoris, unspecified vessel or lesion type  Stable without any angina at all. - Lipid Panel; Future    5.  Chronic obstructive pulmonary disease, unspecified COPD type ([facility] [address]  Stable and no issues with breathing  She is not very active. 6. Gastroesophageal reflux disease, unspecified whether esophagitis present  The current medical regimen is effective;  continue present plan and medications. 7. Acquired hypothyroidism  Will recheck thyroid  - T4, Free; Future  - TSH; Future    8. Medicare annual wellness visit, subsequent  Routine age-appropriate anticipatory guidance was provided. Annual wellness visit was performed in a separate note and issues were addressed as identified. 9. Acute cystitis with hematuria  Will hold off on abx at this time as asymptomatic. Will run culture. - POCT Urinalysis no Micro      Orders Placed This Encounter   Procedures    CBC with Auto Differential    Comprehensive Metabolic Panel    Lipid Panel    Microalbumin / Creatinine Urine Ratio    T4, Free    TSH    POCT Urinalysis no Micro        Return for [facility] Wellness Visit in 1 year. This was an in-house visit. Medicare Annual Wellness Visit    Bryan العلي [address] is here for Medicare AWV and Other (follow up on kidney infection)    Assessment & Plan   Orthostatic hypotension  -     Comprehensive Metabolic Panel; Future  -     Microalbumin / Creatinine Urine Ratio; Future  Paroxysmal A-fib (HCC)  -     CBC with Auto Differential; Future  Peripheral edema  Coronary artery disease involving native heart without angina pectoris, unspecified vessel or lesion type  -     Lipid Panel; Future  Chronic obstructive pulmonary disease, unspecified COPD type ([facility] [address]  Gastroesophageal reflux disease, unspecified whether esophagitis present  Acquired hypothyroidism  -     T4, Free; Future  -     TSH;  Future  Medicare annual wellness visit, subsequent  Acute cystitis with hematuria  -     POCT Urinalysis no Micro      Recommendations for Preventive Services Due: see orders and patient instructions/AVS.  Recommended screening schedule for the next 5-10 years is provided to the patient in written form: see Patient Instructions/AVS. Return for Medicare Annual Wellness Visit in 1 year. Subjective   The following acute and/or chronic problems were also addressed today:    Patient's complete Health Risk Assessment and screening values have been reviewed and are found in Flowsheets. The following problems were reviewed today and where indicated follow up appointments were made and/or referrals ordered. Positive Risk Factor Screenings with Interventions:    Fall Risk:  Do you feel unsteady or are you worried about falling? : (!) yes  2 or more falls in past year?: (!) yes  Fall with injury in past year?: no     Fall Risk Interventions:    · Home safety tips provided             Health Habits/Nutrition:     Physical Activity: Inactive    Days of Exercise per Week: 0 days    Minutes of Exercise per Session: 0 min     Have you lost any weight without trying in the past 3 months?: No  Body mass index: 20.42  Have you seen the dentist within the past year?: (!) No    Health Habits/Nutrition Interventions:  · Dental exam overdue:  patient encouraged to make appointment with his/her dentist    Hearing/Vision:  Do you or your family notice any trouble with your hearing that hasn't been managed with hearing aids?: (!) Yes  Do you have difficulty driving, watching TV, or doing any of your daily activities because of your eyesight?: No  Have you had an eye exam within the past year?: (!) No  No exam data present    Hearing/Vision Interventions:  · Hearing concerns:   Additional information was provided  · Vision concerns:  patient encouraged to make appointment with his/her eye specialist     ADLs:  In the past 7 days, did you need help from others to perform any of the following everyday activities: Eating, dressing, grooming, bathing, toileting, or walking/balance?: (!) Yes  Select all that apply: (!) Walking/Balance  In the past 7 days, did you need help from others to take care of any of the following: Laundry, housekeeping, banking/finances, shopping, telephone use, food preparation, transportation, or taking medications?: (!) Yes  Select all that apply: (!) Shopping,Food Preparation,Transportation,Housekeeping    ADL Interventions:  · Additional information was provided          Objective   Vitals:    04/07/22 1052   BP: 114/72   Site: Left Upper Arm   Position: Sitting   Cuff Size: Large Adult   Pulse: 54   Temp: 97.8 °F (36.6 °C)   TempSrc: Temporal   SpO2: 98%   Weight: 119 lb (54 kg)   Height: 5' 4\" (1.626 m)      Body mass index is 20.43 kg/m². Physical exam is documented elsewhere in a separate note      Allergies   Allergen Reactions    Nitrofuran Derivatives Swelling    Quinine Derivatives     Sulfa Antibiotics     Pcn [Penicillins] Rash     Prior to Visit Medications    Medication Sig Taking? Authorizing Provider   thiamine (B-1) 100 MG/ML injection Infuse 100 mg intravenously daily Yes Historical Provider, MD   Cranberry 1000 MG CAPS Take 1 tablet by mouth daily Yes Historical Provider, MD   propafenone (RYTHMOL) 225 MG tablet TAKE ONE TABLET BY MOUTH EVERY EIGHT HOURS.  Yes Marcelino MaaROACEL   levothyroxine (SYNTHROID) 88 MCG tablet Take 1 tablet by mouth Daily Yes Adryan CheeseROCAEL   rivaroxaban (XARELTO) 20 MG TABS tablet TAKE 1 TABLET DAILY WITH BREAKFAST Yes ROCAEL Healy   omeprazole (PRILOSEC) 10 MG delayed release capsule Take 1 capsule by mouth daily Yes NAVYA Moore DO   loratadine (CLARITIN) 10 MG tablet Take 10 mg by mouth daily Yes Historical Provider, MD   midodrine (PROAMATINE) 10 MG tablet Take 1 tablet by mouth 3 times daily Yes NAVYA Moore DO   potassium chloride (KLOR-CON M) 20 MEQ extended release tablet Take 1 tablet by mouth daily  Patient taking differently: Take 20 mEq by mouth daily Take with lasix Yes ROCAEL Reyes   furosemide (LASIX) 20 MG tablet Take 1 tablet by mouth daily  Patient taking differently: Take 20 mg by mouth daily as needed  Yes Catracho Snow MD meclizine (ANTIVERT) 12.5 MG tablet Take 25 mg by mouth as needed Take 2 tablets (25 mg) as needed Yes Historical Provider, MD   Calcium Carbonate-Vitamin D (CALTRATE 600+D PO) Take 600 mg by mouth 2 times daily. Yes Historical Provider, MD Whittaker (Including outside providers/suppliers regularly involved in providing care):   Patient Care Team:  [address] [facility],[address], DO as PCP - General (Pediatrics)  [address] [facility],[address], DO as PCP - [facility] Empaneled Provider  C. Carey Meckel, MD (Cardiology)  Agata Grullon MD as Consulting Physician (Otolaryngology)    Reviewed and updated this visit:  Tobacco  Allergies  Meds  Med Hx  Surg Hx  Soc Hx  Fam Hx            this was an in-house visit

## 2022-04-10 ENCOUNTER — PATIENT MESSAGE (OUTPATIENT)
Dept: PRIMARY CARE CLINIC | Age: 87
End: 2022-04-10

## 2022-04-10 DIAGNOSIS — N30.00 ACUTE CYSTITIS WITHOUT HEMATURIA: Primary | ICD-10-CM

## 2022-04-10 LAB
ORGANISM: ABNORMAL
ORGANISM: ABNORMAL
URINE CULTURE, ROUTINE: ABNORMAL

## 2022-04-11 ENCOUNTER — OFFICE VISIT (OUTPATIENT)
Dept: CARDIOLOGY CLINIC | Age: 87
End: 2022-04-11
Payer: MEDICARE

## 2022-04-11 ENCOUNTER — TELEPHONE (OUTPATIENT)
Dept: PRIMARY CARE CLINIC | Age: 87
End: 2022-04-11

## 2022-04-11 VITALS
HEIGHT: 62 IN | HEART RATE: 52 BPM | SYSTOLIC BLOOD PRESSURE: 118 MMHG | DIASTOLIC BLOOD PRESSURE: 60 MMHG | BODY MASS INDEX: 21.53 KG/M2 | WEIGHT: 117 LBS

## 2022-04-11 DIAGNOSIS — I95.1 ORTHOSTATIC HYPOTENSION: ICD-10-CM

## 2022-04-11 DIAGNOSIS — I25.10 MILD CAD: ICD-10-CM

## 2022-04-11 DIAGNOSIS — N39.0 URINARY TRACT INFECTION WITHOUT HEMATURIA, SITE UNSPECIFIED: Primary | ICD-10-CM

## 2022-04-11 DIAGNOSIS — R31.9 HEMATURIA, UNSPECIFIED TYPE: ICD-10-CM

## 2022-04-11 DIAGNOSIS — I48.0 PAROXYSMAL ATRIAL FIBRILLATION (HCC): Primary | ICD-10-CM

## 2022-04-11 PROCEDURE — 4040F PNEUMOC VAC/ADMIN/RCVD: CPT | Performed by: CLINICAL NURSE SPECIALIST

## 2022-04-11 PROCEDURE — 93000 ELECTROCARDIOGRAM COMPLETE: CPT | Performed by: CLINICAL NURSE SPECIALIST

## 2022-04-11 PROCEDURE — G8420 CALC BMI NORM PARAMETERS: HCPCS | Performed by: CLINICAL NURSE SPECIALIST

## 2022-04-11 PROCEDURE — 99213 OFFICE O/P EST LOW 20 MIN: CPT | Performed by: CLINICAL NURSE SPECIALIST

## 2022-04-11 PROCEDURE — 1123F ACP DISCUSS/DSCN MKR DOCD: CPT | Performed by: CLINICAL NURSE SPECIALIST

## 2022-04-11 PROCEDURE — G8427 DOCREV CUR MEDS BY ELIG CLIN: HCPCS | Performed by: CLINICAL NURSE SPECIALIST

## 2022-04-11 PROCEDURE — 1036F TOBACCO NON-USER: CPT | Performed by: CLINICAL NURSE SPECIALIST

## 2022-04-11 PROCEDURE — 1090F PRES/ABSN URINE INCON ASSESS: CPT | Performed by: CLINICAL NURSE SPECIALIST

## 2022-04-11 RX ORDER — PROPAFENONE HYDROCHLORIDE 225 MG/1
TABLET, FILM COATED ORAL
Qty: 270 TABLET | Refills: 3 | Status: SHIPPED | OUTPATIENT
Start: 2022-04-11 | End: 2022-08-09 | Stop reason: SDUPTHER

## 2022-04-11 RX ORDER — CIPROFLOXACIN 500 MG/1
500 TABLET, FILM COATED ORAL 2 TIMES DAILY
Qty: 20 TABLET | Refills: 0 | Status: SHIPPED | OUTPATIENT
Start: 2022-04-11 | End: 2022-04-21

## 2022-04-11 RX ORDER — CYANOCOBALAMIN (VITAMIN B-12) 500 MCG
1 TABLET ORAL DAILY
COMMUNITY

## 2022-04-11 RX ORDER — CEFDINIR 300 MG/1
300 CAPSULE ORAL 2 TIMES DAILY
Qty: 20 CAPSULE | Refills: 0 | Status: SHIPPED | OUTPATIENT
Start: 2022-04-11 | End: 2022-04-21

## 2022-04-11 RX ORDER — MECLIZINE HYDROCHLORIDE 25 MG/1
25 TABLET ORAL PRN
Status: ON HOLD | COMMUNITY
End: 2022-05-01 | Stop reason: HOSPADM

## 2022-04-11 ASSESSMENT — ENCOUNTER SYMPTOMS
NAUSEA: 0
ABDOMINAL PAIN: 0
FACIAL SWELLING: 0
COUGH: 0
SHORTNESS OF BREATH: 0
CHEST TIGHTNESS: 0
EYE REDNESS: 0
VOMITING: 0
WHEEZING: 0

## 2022-04-11 NOTE — PROGRESS NOTES
[facility], [address], [address]  [address]  Phone: [phone]  Fax: [phone]    OFFICE VISIT:  2022    Syd Barkermeche - : 1932    Reason For Visit:  Dimitri Rivera is a 80 y.o. female who is here for Follow-up (no cardiac symptoms) and Atrial Fibrillation       Diagnosis Orders   1. Paroxysmal atrial fibrillation (HCC)  EKG 12 lead    rivaroxaban (XARELTO) 15 MG TABS tablet    propafenone (RYTHMOL) 225 MG tablet   2. Mild CAD     3. Hematuria, unspecified type     4. Orthostatic hypotension           HPI  Patient follows with our office with a history of atrial fibrillation, mild coronary disease, hypertension. She has recently been diagnosed with kidney stones and has had some problems with hematuria. Her PCP recommended cutting back her Xarelto to every other day for a couple of weeks. Hematuria has improved    She denies recurrence of atrial fibrillation. There have been no palpitations or fast heart rate. She is chronically fatigued. She has some chronic dyspnea which is unchanged. No complaints of chest pain. No signs of fluid overload such as sudden weight gain, orthopnea, PND, significant edema     AMRIT Rao DO is PCP.   Rosanna Woody has the following history as recorded in [facility]:    Patient Active Problem List    Diagnosis Date Noted    Orthostatic hypotension 10/06/2021    Peripheral edema 10/06/2021    Palliative care patient 2021    Pyelonephritis 2021    Acquired hypothyroidism 2018    Oral phase dysphagia 2017    Anemia 2017    Non-rheumatic tricuspid valve insufficiency 12/15/2017    Thalassemia minor 12/15/2017    Hx of fracture of right hip 10/09/2017    Gastroesophageal reflux disease 10/09/2017    Chronic obstructive pulmonary disease (HCC)     Coronary artery disease involving native heart without angina pectoris     Mild CAD 05/15/2017    Paroxysmal A-fib [facility])      Past Medical History:   Diagnosis Date    A-fib ([address]     Abnormal weight loss     Anemia     Arthritis     Bullae     CAD (coronary artery disease)     Conjunctivitis     COPD (chronic obstructive pulmonary disease) (HCC)     Depression     Fatigue     History of blood transfusion 2017    post op broken hip    Hypertension     Hypothyroidism     Kidney infection     Kidney stone     Leukopenia     Leukopenia     Low back pain     Mild CAD 05/15/2017    Osteoporosis     Palliative care patient 06/04/2021    Paroxysmal A-fib (HCC)     Prolonged emergence from general anesthesia     Sinus pause     10/2014    Thyroid disease     Urinary tract infection     Weakness      Past Surgical History:   Procedure Laterality Date    BACK SURGERY      BACK SURGERY      CARDIAC CATHETERIZATION  [facility]14/14  [facility]    Mild, non-occlusive CAD, EF 60%    CYSTOSCOPY Left 8/31/2017    CYSTOSCOPY; LEFT URETEROSCOPY; LEFT URETERAL LASER LITHOTRIPSY AND STONE EXTRACTION; INSERTION LEFT URETERAL DOUBLE J STENT performed by Betito Max MD at [facility] Bilateral 12/14/2017    CYSTOSCOPY STENT INSERTION performed by Betito Max MD at [place] [address] Right 10/11/2017    FEMUR IM NAIL MICHELLE INSERTION performed by Monica Kaplan DO at [address] [place] [address] Rd      HYSTERECTOMY      KNEE ARTHROPLASTY      x 2    LAMINECTOMY      2 lumbar segments    [place] CYSTO/URETERO/PYELOSCOPY W/LITHOTRIPSY Left 1/24/2018    LITHOTRIPSY LASER performed by [facility] MD at [facility] CYSTO/URETERO/PYELOSCOPY, DX Bilateral 1/24/2018    CYSTOSCOPY BILATERAL URETEROSCOPIES WITH STENT REMOVAL AND RETROGRADE PYELOGRAMS WITH BILATERAL STONE MANIPULATION AND RETRIEVAL  PLACEMENT OF DOUBLE J URETERAL STENTS BILATERAL performed by Betito Max MD at [facility] OR    SHOULDER ARTHROPLASTY      URETER STENT PLACEMENT       Family History   Problem Relation Age of Onset    Cancer Father         lung    Stroke Mother         mini    Hypotension Mother      Social History     Tobacco Use    Smoking status: Never Smoker    Smokeless tobacco: Never Used   Substance Use Topics    Alcohol use: No      Current Outpatient Medications   Medication Sig Dispense Refill    cefdinir (OMNICEF) 300 MG capsule Take 1 capsule by mouth 2 times daily for 10 days 20 capsule 0    ciprofloxacin (CIPRO) 500 MG tablet Take 1 tablet by mouth 2 times daily for 10 days 20 tablet 0    meclizine (ANTIVERT) 25 MG tablet Take 25 mg by mouth daily Takes 2 pills at once      Folic Acid 0.8 MG CAPS Take by mouth      rivaroxaban (XARELTO) 15 MG TABS tablet TAKE 1 TABLET DAILY WITH BREAKFAST 90 tablet 3    propafenone (RYTHMOL) 225 MG tablet TAKE ONE TABLET BY MOUTH EVERY EIGHT HOURS. 270 tablet 3    Cranberry 1000 MG CAPS Take 1 tablet by mouth daily      levothyroxine (SYNTHROID) 88 MCG tablet Take 1 tablet by mouth Daily 90 tablet 3    omeprazole (PRILOSEC) 10 MG delayed release capsule Take 1 capsule by mouth daily 90 capsule 3    loratadine (CLARITIN) 10 MG tablet Take 10 mg by mouth daily      midodrine (PROAMATINE) 10 MG tablet Take 1 tablet by mouth 3 times daily 270 tablet 3    potassium chloride (KLOR-CON M) 20 MEQ extended release tablet Take 1 tablet by mouth daily (Patient taking differently: Take 20 mEq by mouth daily Take with lasix) 30 tablet 5    furosemide (LASIX) 20 MG tablet Take 1 tablet by mouth daily (Patient taking differently: Take 20 mg by mouth daily as needed ) 60 tablet 3    Calcium Carbonate-Vitamin D (CALTRATE 600+D PO) Take 600 mg by mouth 2 times daily.  meclizine (ANTIVERT) 12.5 MG tablet Take 25 mg by mouth as needed Take 2 tablets (25 mg) as needed (Patient not taking: Reported on 4/11/2022)       No current facility-administered medications for this visit.      Allergies: Nitrofuran derivatives, Quinine derivatives, Sulfa antibiotics, and Pcn [penicillins]    Review of Systems  Review of Systems   Constitutional: Positive for fatigue. Negative for activity change, diaphoresis, fever and unexpected weight change. HENT: Negative for facial swelling and nosebleeds. Eyes: Negative for redness and visual disturbance. Respiratory: Negative for cough, chest tightness, shortness of breath and wheezing. Cardiovascular: Negative for chest pain, palpitations and leg swelling. Gastrointestinal: Negative for abdominal pain, nausea and vomiting. Endocrine: Negative for cold intolerance and heat intolerance. Genitourinary: Negative for dysuria and hematuria. Musculoskeletal: Negative for arthralgias and myalgias. Skin: Negative for pallor and rash. Neurological: Negative for dizziness, seizures, syncope, weakness and light-headedness. Hematological: Does not bruise/bleed easily. Psychiatric/Behavioral: Negative for agitation. The patient is not nervous/anxious. Objective  Vital Signs - /60   Pulse 52   Ht 5' 2\" (1.575 m)   Wt 117 lb (53.1 kg)   BMI 21.40 kg/m²   Physical Exam  Vitals and nursing note reviewed. Constitutional:       General: She is not in acute distress. Appearance: She is well-developed. She is not diaphoretic. Comments: Elderly, deconditioned   HENT:      Head: Normocephalic and atraumatic. Right Ear: Hearing and external ear normal.      Left Ear: Hearing and external ear normal.      Nose: Nose normal.   Eyes:      General:         Right eye: No discharge. Left eye: No discharge. Pupils: Pupils are equal, round, and reactive to light. Neck:      Thyroid: No thyromegaly. Vascular: No carotid bruit or JVD. Trachea: No tracheal deviation. Cardiovascular:      Rate and Rhythm: Normal rate and regular rhythm. Heart sounds: Normal heart sounds. No murmur heard. No friction rub. No gallop.     Pulmonary:      Effort: Pulmonary effort is normal. No respiratory distress. Breath sounds: Normal breath sounds. No wheezing or rales. Abdominal:      Palpations: Abdomen is soft. Tenderness: There is no abdominal tenderness. Musculoskeletal:         General: No swelling or deformity. Cervical back: Neck supple. No muscular tenderness. Right lower leg: Edema (Trace) present. Left lower leg: Edema (Trace) present. Comments: Ambulates with walker   Skin:     General: Skin is warm and dry. Findings: No rash. Neurological:      General: No focal deficit present. Mental Status: She is alert and oriented to person, place, and time. Cranial Nerves: No cranial nerve deficit. Psychiatric:         Mood and Affect: Mood normal.         Behavior: Behavior normal.         Judgment: Judgment normal.         Data:  Lab Results   Component Value Date    WBC 6.0 04/07/2022    RBC 3.25 04/07/2022    HGB 11.5 04/07/2022    HCT 35.1 04/07/2022     04/07/2022      Lab Results   Component Value Date    CHOL 202 04/07/2022    TRIG 98 04/07/2022    HDL 87 04/07/2022    LDLCALC 95 04/07/2022     Lab Results   Component Value Date     04/07/2022    K 4.7 04/07/2022    K 4.1 06/05/2021     04/07/2022    CO2 22 04/07/2022    GLUCOSE 86 04/07/2022    BUN 21 04/07/2022    CREATININE 0.9 04/07/2022    CALCIUM 10.8 04/07/2022    ALT 11 04/07/2022    AST 16 04/07/2022     Lab Results   Component Value Date    TSH 2.500 04/07/2022     EKG shows sinus bradycardia rate 52 first-degree AV block with a QTc interval of 0.428 ms    Assessment:     Diagnosis Orders   1. Paroxysmal atrial fibrillation (HCC)  EKG 12 lead    rivaroxaban (XARELTO) 15 MG TABS tablet    propafenone (RYTHMOL) 225 MG tablet   2. Mild CAD     3. Hematuria, unspecified type     4. Orthostatic hypotension         Paroxysmal atrial fibrillation-maintaining sinus rhythm per EKG today. She notices no recurrence.   With recent hematuria related to kidney stones and her advanced age, will decrease her Xarelto to 15 mg daily and continue Propafenone    Mild CAD-history of mild nonobstructive disease. She has no angina symptoms currently, stable. Hematuria-as above related to kidney stone and UTI. Will decrease Xarelto to 15 mg daily    Orthostatic hypotension-currently well controlled on midodrine    Stable cardiovascular status. No evidence of overt heart failure,angina or dysrhythmia. Plan    Orders Placed This Encounter   Procedures    EKG 12 lead     Order Specific Question:   Reason for Exam?     Answer:   Irregular heart rate     Return in about 9 months (around 1/11/2023) for ROCAEL. Decrease Xarelto to 15mg daily (can use up 20mg tabs then go to 15mg)  Fall precautions    Call with any questionsor concerns  Follow up with [facility], DO for non cardiac problems  Report any new problems  Cardiovascular Fitness-Exercise as tolerated. Strive for 15 minutes of exercise most days of the week. Cardiac / HealthyDiet  Continue current medications as directed  Continue plan of treatment  It is always recommended that you bring your medicationsbottles with you to each visit - this is for your safety!        ROCAEL Hobson

## 2022-04-11 NOTE — TELEPHONE ENCOUNTER
From: Erna Pendleton  To: Dr. Sanders Fruits: 4/10/2022 8:35 AM CDT  Subject: Urine culture    This message is being sent by [facility] AT [facility] on behalf of Erna Pendleton. She has a low fever and nauseous this morning. You were waiting on this test to give a prescription. Please send one in today or Monday. If you can today send to [facility] in [place]. If Monday send to [address] [place] [address]. Let me know.  Thanks

## 2022-04-11 NOTE — PATIENT INSTRUCTIONS
Return in about 9 months (around 1/11/2023) for APRN. Decrease Xarelto to 15mg daily (can use up 20mg tabs then go to 15mg)  Fall precautions      Patient Education        Atrial Fibrillation: Care Instructions  Your Care Instructions     Atrial fibrillation is an irregular and often fast heartbeat. Treating this condition is important for several reasons. It can cause blood clots, which can travel from your heart to your brain and cause a stroke. If you have a fast heartbeat, you may feel lightheaded, dizzy, and weak. An irregular heartbeatcan also increase your risk for heart failure. Atrial fibrillation is often the result of another heart condition, such as high blood pressure or coronary artery disease. Making changes to improve yourheart condition will help you stay healthy and active. Follow-up care is a key part of your treatment and safety. Be sure to make and go to all appointments, and call your doctor if you are having problems. It's also a good idea to know your test results and keep alist of the medicines you take. How can you care for yourself at home? Medicines     Take your medicines exactly as prescribed. Call your doctor if you think you are having a problem with your medicine. You will get more details on the specific medicines your doctor prescribes.      If your doctor has given you a blood thinner to prevent a stroke, be sure you get instructions about how to take your medicine safely. Blood thinners can cause serious bleeding problems.      Do not take any vitamins, over-the-counter drugs, or herbal products without talking to your doctor first.   Lifestyle changes     Do not smoke. Smoking can increase your chance of a stroke and heart attack. If you need help quitting, talk to your doctor about stop-smoking programs and medicines. These can increase your chances of quitting for good.      Eat a heart-healthy diet.      Stay at a healthy weight.  Lose weight if you need to.      Limit alcohol to 2 drinks a day for men and 1 drink a day for women. Too much alcohol can cause health problems.      Avoid colds and flu. Get a pneumococcal vaccine shot. If you have had one before, ask your doctor whether you need another dose. Get a flu shot every year. If you must be around people with colds or flu, wash your hands often. Activity     If your doctor recommends it, get more exercise. Walking is a good choice. Bit by bit, increase the amount you walk every day. Try for at least 30 minutes on most days of the week. You also may want to swim, bike, or do other activities. Your doctor may suggest that you join a cardiac rehabilitation program so that you can have help increasing your physical activity safely.      Start light exercise if your doctor says it is okay. Even a small amount will help you get stronger, have more energy, and manage stress. Walking is an easy way to get exercise. Start out by walking a little more than you did in the hospital. Gradually increase the amount you walk.      When you exercise, watch for signs that your heart is working too hard. You are pushing too hard if you cannot talk while you are exercising. If you become short of breath or dizzy or have chest pain, sit down and rest immediately.      Check your pulse regularly. Place two fingers on the artery at the palm side of your wrist, in line with your thumb. If your heartbeat seems uneven or fast, talk to your doctor. When should you call for help? Call 911 anytime you think you may need emergency care. For example, call if:     You have symptoms of a heart attack. These may include:  ? Chest pain or pressure, or a strange feeling in the chest.  ? Sweating. ? Shortness of breath. ? Nausea or vomiting. ? Pain, pressure, or a strange feeling in the back, neck, jaw, or upper belly or in one or both shoulders or arms. ? Lightheadedness or sudden weakness. ? A fast or irregular heartbeat.   After you call 911, the  may tell you to chew 1 adult-strength or 2 to 4 low-dose aspirin. Wait for an ambulance. Do not try to drive yourself.      You have symptoms of a stroke. These may include:  ? Sudden numbness, tingling, weakness, or loss of movement in your face, arm, or leg, especially on only one side of your body. ? Sudden vision changes. ? Sudden trouble speaking. ? Sudden confusion or trouble understanding simple statements. ? Sudden problems with walking or balance. ? A sudden, severe headache that is different from past headaches.      You passed out (lost consciousness). Call your doctor now or seek immediate medical care if:     You have new or increased shortness of breath.      You feel dizzy or lightheaded, or you feel like you may faint.      Your heart rate becomes irregular.      You can feel your heart flutter in your chest or skip heartbeats. Tell your doctor if these symptoms are new or worse. Watch closely for changes in your health, and be sure to contact your doctor ifyou have any problems. Where can you learn more? Go to https://Goshi.[employer] Experience. org and sign in to your [employer] account. Enter U020 in the Infoxel box to learn more about \"Atrial Fibrillation: Care Instructions. \"     If you do not have an account, please click on the \"Sign Up Now\" link. Current as of: January 10, 2022               Content Version: 13.2  © 1097-8901 Healthwise, Incorporated. Care instructions adapted under license by [facility] ([facility]). If you have questions about a medical condition or this instruction, always ask your healthcare professional. [place] [address] any warranty or liability for your use of this information.

## 2022-04-29 ENCOUNTER — HOSPITAL ENCOUNTER (INPATIENT)
Age: 87
LOS: 2 days | Discharge: HOME | DRG: 689 | End: 2022-05-01
Attending: EMERGENCY MEDICINE | Admitting: STUDENT IN AN ORGANIZED HEALTH CARE EDUCATION/TRAINING PROGRAM
Payer: MEDICARE

## 2022-04-29 ENCOUNTER — APPOINTMENT (OUTPATIENT)
Dept: CT IMAGING | Age: 87
DRG: 689 | End: 2022-04-29
Payer: MEDICARE

## 2022-04-29 DIAGNOSIS — R41.82 ALTERED MENTAL STATUS, UNSPECIFIED ALTERED MENTAL STATUS TYPE: Primary | ICD-10-CM

## 2022-04-29 DIAGNOSIS — R94.31 ABNORMAL EKG: ICD-10-CM

## 2022-04-29 DIAGNOSIS — Z78.9 FAILURE OF OUTPATIENT TREATMENT: ICD-10-CM

## 2022-04-29 DIAGNOSIS — N30.00 ACUTE CYSTITIS WITHOUT HEMATURIA: ICD-10-CM

## 2022-04-29 PROBLEM — G93.49 INFECTIOUS ENCEPHALOPATHY: Status: ACTIVE | Noted: 2022-01-01

## 2022-04-29 PROBLEM — R44.3 HALLUCINATIONS: Status: ACTIVE | Noted: 2022-01-01

## 2022-04-29 PROBLEM — B99.9 INFECTIOUS ENCEPHALOPATHY: Status: ACTIVE | Noted: 2022-01-01

## 2022-04-29 PROBLEM — Z79.01 ANTICOAGULATED: Status: ACTIVE | Noted: 2022-01-01

## 2022-04-29 LAB
ALBUMIN SERPL-MCNC: 4 G/DL (ref 3.5–5.2)
ALP BLD-CCNC: 48 U/L (ref 35–104)
ALT SERPL-CCNC: 9 U/L (ref 5–33)
ANION GAP SERPL CALCULATED.3IONS-SCNC: 10 MMOL/L (ref 7–19)
APTT: 28.4 SEC (ref 26–36.2)
AST SERPL-CCNC: 15 U/L (ref 5–32)
BACTERIA: ABNORMAL /HPF
BASOPHILS ABSOLUTE: 0 K/UL (ref 0–0.2)
BASOPHILS RELATIVE PERCENT: 0.6 % (ref 0–1)
BILIRUB SERPL-MCNC: 0.3 MG/DL (ref 0.2–1.2)
BILIRUBIN URINE: NEGATIVE
BLOOD, URINE: ABNORMAL
BUN BLDV-MCNC: 21 MG/DL (ref 8–23)
CALCIUM SERPL-MCNC: 10.2 MG/DL (ref 8.8–10.2)
CHLORIDE BLD-SCNC: 102 MMOL/L (ref 98–111)
CLARITY: ABNORMAL
CO2: 28 MMOL/L (ref 22–29)
COLOR: ABNORMAL
CREAT SERPL-MCNC: 1 MG/DL (ref 0.5–0.9)
CRYSTALS, UA: ABNORMAL /HPF
EKG P AXIS: NORMAL DEGREES
EKG P-R INTERVAL: NORMAL MS
EKG Q-T INTERVAL: 512 MS
EKG QRS DURATION: 182 MS
EKG QTC CALCULATION (BAZETT): 493 MS
EKG T AXIS: -13 DEGREES
EOSINOPHILS ABSOLUTE: 0.2 K/UL (ref 0–0.6)
EOSINOPHILS RELATIVE PERCENT: 4.6 % (ref 0–5)
GFR AFRICAN AMERICAN: >59
GFR NON-AFRICAN AMERICAN: 52
GLUCOSE BLD-MCNC: 106 MG/DL (ref 74–109)
GLUCOSE URINE: NEGATIVE MG/DL
HCT VFR BLD CALC: 36.9 % (ref 37–47)
HEMOGLOBIN: 11.5 G/DL (ref 12–16)
IMMATURE GRANULOCYTES #: 0 K/UL
INR BLD: 1.07 (ref 0.88–1.18)
KETONES, URINE: ABNORMAL MG/DL
LACTIC ACID, SEPSIS: 0.7 MG/DL (ref 0.5–1.9)
LACTIC ACID, SEPSIS: 0.9 MG/DL (ref 0.5–1.9)
LEUKOCYTE ESTERASE, URINE: ABNORMAL
LV EF: 50 %
LVEF MODALITY: NORMAL
LYMPHOCYTES ABSOLUTE: 1.3 K/UL (ref 1.1–4.5)
LYMPHOCYTES RELATIVE PERCENT: 27.7 % (ref 20–40)
MAGNESIUM: 1.9 MG/DL (ref 1.7–2.3)
MCH RBC QN AUTO: 32.3 PG (ref 27–31)
MCHC RBC AUTO-ENTMCNC: 31.2 G/DL (ref 33–37)
MCV RBC AUTO: 103.7 FL (ref 81–99)
MONOCYTES ABSOLUTE: 0.5 K/UL (ref 0–0.9)
MONOCYTES RELATIVE PERCENT: 10.5 % (ref 0–10)
NEUTROPHILS ABSOLUTE: 2.7 K/UL (ref 1.5–7.5)
NEUTROPHILS RELATIVE PERCENT: 56.4 % (ref 50–65)
NITRITE, URINE: NEGATIVE
PDW BLD-RTO: 14.6 % (ref 11.5–14.5)
PH UA: 5.5 (ref 5–8)
PHOSPHORUS: 3.2 MG/DL (ref 2.5–4.5)
PLATELET # BLD: 418 K/UL (ref 130–400)
PMV BLD AUTO: 9.3 FL (ref 9.4–12.3)
POTASSIUM REFLEX MAGNESIUM: 3.8 MMOL/L (ref 3.5–5)
PRO-BNP: 2405 PG/ML (ref 0–1800)
PROTEIN UA: 300 MG/DL
PROTHROMBIN TIME: 13.8 SEC (ref 12–14.6)
RBC # BLD: 3.56 M/UL (ref 4.2–5.4)
RBC UA: ABNORMAL /HPF (ref 0–2)
SODIUM BLD-SCNC: 140 MMOL/L (ref 136–145)
SPECIFIC GRAVITY UA: 1.02 (ref 1–1.03)
TOTAL PROTEIN: 6 G/DL (ref 6.6–8.7)
TROPONIN: <0.01 NG/ML (ref 0–0.03)
TSH REFLEX FT4: 2.29 UIU/ML (ref 0.35–5.5)
UROBILINOGEN, URINE: 1 E.U./DL
WBC # BLD: 4.8 K/UL (ref 4.8–10.8)
WBC UA: ABNORMAL /HPF (ref 0–5)
YEAST: PRESENT /HPF

## 2022-04-29 PROCEDURE — 6370000000 HC RX 637 (ALT 250 FOR IP): Performed by: NURSE PRACTITIONER

## 2022-04-29 PROCEDURE — 81001 URINALYSIS AUTO W/SCOPE: CPT

## 2022-04-29 PROCEDURE — G0378 HOSPITAL OBSERVATION PER HR: HCPCS

## 2022-04-29 PROCEDURE — 2580000003 HC RX 258: Performed by: PHYSICIAN ASSISTANT

## 2022-04-29 PROCEDURE — 80053 COMPREHEN METABOLIC PANEL: CPT

## 2022-04-29 PROCEDURE — 93005 ELECTROCARDIOGRAM TRACING: CPT | Performed by: PHYSICIAN ASSISTANT

## 2022-04-29 PROCEDURE — 84484 ASSAY OF TROPONIN QUANT: CPT

## 2022-04-29 PROCEDURE — 84443 ASSAY THYROID STIM HORMONE: CPT

## 2022-04-29 PROCEDURE — 99285 EMERGENCY DEPT VISIT HI MDM: CPT

## 2022-04-29 PROCEDURE — 1210000000 HC MED SURG R&B

## 2022-04-29 PROCEDURE — 85025 COMPLETE CBC W/AUTO DIFF WBC: CPT

## 2022-04-29 PROCEDURE — 70450 CT HEAD/BRAIN W/O DYE: CPT

## 2022-04-29 PROCEDURE — 51701 INSERT BLADDER CATHETER: CPT

## 2022-04-29 PROCEDURE — 93306 TTE W/DOPPLER COMPLETE: CPT

## 2022-04-29 PROCEDURE — 2580000003 HC RX 258: Performed by: NURSE PRACTITIONER

## 2022-04-29 PROCEDURE — 87040 BLOOD CULTURE FOR BACTERIA: CPT

## 2022-04-29 PROCEDURE — 96361 HYDRATE IV INFUSION ADD-ON: CPT

## 2022-04-29 PROCEDURE — 83880 ASSAY OF NATRIURETIC PEPTIDE: CPT

## 2022-04-29 PROCEDURE — 84100 ASSAY OF PHOSPHORUS: CPT

## 2022-04-29 PROCEDURE — 93010 ELECTROCARDIOGRAM REPORT: CPT | Performed by: INTERNAL MEDICINE

## 2022-04-29 PROCEDURE — 83735 ASSAY OF MAGNESIUM: CPT

## 2022-04-29 PROCEDURE — 85610 PROTHROMBIN TIME: CPT

## 2022-04-29 PROCEDURE — 6360000002 HC RX W HCPCS: Performed by: PHYSICIAN ASSISTANT

## 2022-04-29 PROCEDURE — 85730 THROMBOPLASTIN TIME PARTIAL: CPT

## 2022-04-29 PROCEDURE — 96374 THER/PROPH/DIAG INJ IV PUSH: CPT

## 2022-04-29 PROCEDURE — 36415 COLL VENOUS BLD VENIPUNCTURE: CPT

## 2022-04-29 PROCEDURE — 83605 ASSAY OF LACTIC ACID: CPT

## 2022-04-29 PROCEDURE — 87086 URINE CULTURE/COLONY COUNT: CPT

## 2022-04-29 RX ORDER — LEVOTHYROXINE SODIUM 88 UG/1
88 TABLET ORAL DAILY
Status: DISCONTINUED | OUTPATIENT
Start: 2022-04-29 | End: 2022-05-01 | Stop reason: HOSPADM

## 2022-04-29 RX ORDER — CETIRIZINE HYDROCHLORIDE 10 MG/1
10 TABLET ORAL DAILY
Status: DISCONTINUED | OUTPATIENT
Start: 2022-04-29 | End: 2022-05-01 | Stop reason: HOSPADM

## 2022-04-29 RX ORDER — POLYETHYLENE GLYCOL 3350 17 G/17G
17 POWDER, FOR SOLUTION ORAL DAILY PRN
Status: DISCONTINUED | OUTPATIENT
Start: 2022-04-29 | End: 2022-05-01 | Stop reason: HOSPADM

## 2022-04-29 RX ORDER — ENOXAPARIN SODIUM 100 MG/ML
40 INJECTION SUBCUTANEOUS DAILY
Status: DISCONTINUED | OUTPATIENT
Start: 2022-04-29 | End: 2022-04-29

## 2022-04-29 RX ORDER — SODIUM CHLORIDE 9 MG/ML
INJECTION, SOLUTION INTRAVENOUS PRN
Status: DISCONTINUED | OUTPATIENT
Start: 2022-04-29 | End: 2022-05-01 | Stop reason: HOSPADM

## 2022-04-29 RX ORDER — SODIUM CHLORIDE 0.9 % (FLUSH) 0.9 %
5-40 SYRINGE (ML) INJECTION EVERY 12 HOURS SCHEDULED
Status: DISCONTINUED | OUTPATIENT
Start: 2022-04-29 | End: 2022-05-01 | Stop reason: HOSPADM

## 2022-04-29 RX ORDER — ONDANSETRON 4 MG/1
4 TABLET, ORALLY DISINTEGRATING ORAL EVERY 8 HOURS PRN
Status: DISCONTINUED | OUTPATIENT
Start: 2022-04-29 | End: 2022-05-01 | Stop reason: HOSPADM

## 2022-04-29 RX ORDER — ACETAMINOPHEN 325 MG/1
650 TABLET ORAL EVERY 6 HOURS PRN
Status: DISCONTINUED | OUTPATIENT
Start: 2022-04-29 | End: 2022-05-01 | Stop reason: HOSPADM

## 2022-04-29 RX ORDER — SODIUM CHLORIDE 0.9 % (FLUSH) 0.9 %
5-40 SYRINGE (ML) INJECTION PRN
Status: DISCONTINUED | OUTPATIENT
Start: 2022-04-29 | End: 2022-05-01 | Stop reason: HOSPADM

## 2022-04-29 RX ORDER — HYDROCODONE BITARTRATE AND ACETAMINOPHEN 5; 325 MG/1; MG/1
1 TABLET ORAL EVERY 6 HOURS PRN
COMMUNITY
End: 2022-08-09 | Stop reason: SDUPTHER

## 2022-04-29 RX ORDER — SODIUM CHLORIDE 9 MG/ML
INJECTION, SOLUTION INTRAVENOUS CONTINUOUS
Status: DISCONTINUED | OUTPATIENT
Start: 2022-04-29 | End: 2022-05-01 | Stop reason: HOSPADM

## 2022-04-29 RX ORDER — ONDANSETRON 2 MG/ML
4 INJECTION INTRAMUSCULAR; INTRAVENOUS EVERY 6 HOURS PRN
Status: DISCONTINUED | OUTPATIENT
Start: 2022-04-29 | End: 2022-05-01 | Stop reason: HOSPADM

## 2022-04-29 RX ORDER — MIDODRINE HYDROCHLORIDE 10 MG/1
10 TABLET ORAL 3 TIMES DAILY
Status: DISCONTINUED | OUTPATIENT
Start: 2022-04-29 | End: 2022-05-01 | Stop reason: HOSPADM

## 2022-04-29 RX ORDER — ACETAMINOPHEN 650 MG/1
650 SUPPOSITORY RECTAL EVERY 6 HOURS PRN
Status: DISCONTINUED | OUTPATIENT
Start: 2022-04-29 | End: 2022-05-01 | Stop reason: HOSPADM

## 2022-04-29 RX ORDER — DIAZEPAM 2 MG/1
2 TABLET ORAL EVERY 8 HOURS PRN
COMMUNITY

## 2022-04-29 RX ORDER — PROPAFENONE HYDROCHLORIDE 225 MG/1
225 TABLET, FILM COATED ORAL EVERY 8 HOURS SCHEDULED
Status: DISCONTINUED | OUTPATIENT
Start: 2022-04-29 | End: 2022-05-01 | Stop reason: HOSPADM

## 2022-04-29 RX ADMIN — WATER 1000 MG: 1 INJECTION INTRAMUSCULAR; INTRAVENOUS; SUBCUTANEOUS at 14:22

## 2022-04-29 RX ADMIN — PROPAFENONE HYDROCHLORIDE 225 MG: 225 TABLET, FILM COATED ORAL at 18:08

## 2022-04-29 RX ADMIN — MIDODRINE HYDROCHLORIDE 10 MG: 10 TABLET ORAL at 22:17

## 2022-04-29 RX ADMIN — SODIUM CHLORIDE, PRESERVATIVE FREE 10 ML: 5 INJECTION INTRAVENOUS at 22:17

## 2022-04-29 RX ADMIN — PROPAFENONE HYDROCHLORIDE 225 MG: 225 TABLET, FILM COATED ORAL at 22:17

## 2022-04-29 RX ADMIN — SODIUM CHLORIDE: 9 INJECTION, SOLUTION INTRAVENOUS at 16:31

## 2022-04-29 RX ADMIN — MIDODRINE HYDROCHLORIDE 10 MG: 10 TABLET ORAL at 16:51

## 2022-04-29 ASSESSMENT — ENCOUNTER SYMPTOMS
ABDOMINAL PAIN: 0
BACK PAIN: 0
EYE PAIN: 0
PHOTOPHOBIA: 0
SHORTNESS OF BREATH: 0
VOMITING: 0
RHINORRHEA: 0
COUGH: 0
DIARRHEA: 0
EYE DISCHARGE: 0
SORE THROAT: 0
NAUSEA: 0
COLOR CHANGE: 0
SHORTNESS OF BREATH: 1
ABDOMINAL DISTENTION: 0
APNEA: 0

## 2022-04-29 NOTE — ED NOTES
Per report from [facility]), ER physician and cardiology aware of pt's rhythm and no further orders at this time than to observe patient. Will continue to monitor patient.       Jamison Cruz RN  04/29/22 [id number]

## 2022-04-29 NOTE — ED NOTES
Report called to [facility]. Pt to go to ECHO and then up to room 504.       Kelvin Perez RN  04/29/22 0909

## 2022-04-29 NOTE — CARE COORDINATION
Date / Time of Evaluation: 4/29/2022 1:32 PM  Assessment Completed by: Parul Garcia    Patient Admission Status:     [address] [address] [phone] (home)   Telephone Information:   Mobile [phone]       (Best Practice:  Have patient / caregiver verify above address and phone number by stating out loud their current address and reachable phone number.)  Is above information correct? yes      Current PCP:  [address] [place] [address],[address], DO  PCP verified? yes    Initial Assessment Completed at bedside with:  Patient, daughter, and son    Emergency Contacts:  Extended Emergency Contact Information  Primary Emergency Contact: Tiffani Reidwis  Address: [address] [facility], [address] Phone: [phone]  Relation: Child  Secondary Emergency Contact: Jose C Reid  Address: [address] [facility], [address] Phone: [phone]  Relation: Other    Advance Directives: Code Status:  Prior    Financial:  Payor: Rock Reyna / Plan: [facility] SOLUTIONS / Product Type: *No Product type* /     Pharmacy:   [facility] [address] [place]   Phone: [phone] Fax: [phone] [address] [phone] - f [phone]  [address] [place] [address]  Phone: [phone] Fax: [phone]      Potential assistance purchasing medications?   no    ADLS:  Support System:    needs assistance; family support    Current Home Environment: lives with daughter     Plans to RETURN to current housing: yes  Barriers to RETURNING to current housing:  None at this time    Currently ACTIVE with Home Health CARE: no  [address]:  n/a    Had HOME OXYGEN prior to admission: no  Oxygen Company:  n/a  Informed of need to bring portable home O2 tank to hospital on day of DISCHARGE:  n/a  Name of person committed to bringing portable tank at discharge:  n/a    Active with HD/PD prior to admission: no  Nephrologist:  [address] [place] [address] for Discharge:  Family     Barriers to discharge: none at this time      Additional CM/SW Notes: SW met with pt, daughter, and son at bedside. Daughter states pt lives with her. She states until Sunday pt was ambulating with a walker, feeding herself, and able to put her clean clothes away. She states she had a \"shaking spell\" Sunday. She states this has happened in the past, however, it resolved on its own. Daughter states she has a walker, wheelchair, hospital bed, raised commodes, and shower chairs. She states pt will return home at NH. She states she has everything she needs at home at this time and feels as if she is capable of taking care of pt. She states \"I took care of her when she was 89 and had a feeding tube. We have everything\"         Leonora John and/or her family were provided with choice of provider.         Nilo   Care Management Department  Ph:  [phone]  WNV:[phone]

## 2022-04-29 NOTE — H&P
[facility] - History & Physical      PCP: [address],[address], DO    Date of Admission: 4/29/2022    Date of Service: 4/29/2022    Chief Complaint:  Hallucinations and tremor    History Of Present Illness: The patient is a 80 y.o. female who presented to [facility] ED complaining of hallucinations and tremor. Pt has history of atrial fibrillation, anticoagulation, CAD, COPD not requiring supplemental oxygen, hypothyroidism, kidney stone, hypothyroidism and HTN. Pt is here with her daughter who provides most of history. Pt has completed two separate ten day courses of antibiotics for UTI having had urine culture positive for Klebsiella. Family is unsure of the name of first antibiotic given. Daughter thinks the last antibiotic completed was cipro. She has been off antibiotics over past 8 days. She has had continued blood in urine. She developed confusion last night associated with seeing \"bugs\". She has had intermittent episodes of tremor of extremities similar to an episode of this a year ago not associated with loss of consciousness. She has had no vomiting, diarrhea or abdominal pain. She has had no chest pain, recent falls or injury. In ED, EKG noted with RBBB/wide complex qrs with hr in the 30's-60's. Pt was noted to have asymptomatic bradycardia and Dr. Shirin Newell was consulted. Troponin less than 0.01, Urine micro-tntc wbc, 50-75 rbc, presence of yeast.     CT of head-no acute intracranial abnormality with right sided otomastoiditis. Sodium 140, potassium 3.8, CO2 28, anion gap 10 creatinine 1.0/BUN 21, LFTs normal, INR 1.07. wbc 5, hgb 11.5, platelets 612. Pt is admitted to hospitalist service in consultation with cardiology for further evaluation and treatment.        Past Medical History:        Diagnosis Date    A-fib ([address]     Abnormal weight loss     Anemia     Arthritis     Bullae     CAD (coronary artery disease)     Conjunctivitis     COPD (chronic obstructive [facility] disease) [address] [address] [facility]     Depression     Fatigue     History of blood transfusion 2017    post op broken hip    Hypertension     Hypothyroidism     Kidney infection     Kidney stone     Leukopenia     Leukopenia     Low back pain     Mild CAD 05/15/2017    Osteoporosis     Palliative care patient 06/04/2021    Paroxysmal A-fib (HCC)     Prolonged emergence from general anesthesia     Sinus pause     10/2014    Thyroid disease     Urinary tract infection     Weakness        Past Surgical History:        Procedure Laterality Date    BACK SURGERY      BACK SURGERY      CARDIAC CATHETERIZATION  8/14/14  [facility]    Mild, non-occlusive CAD, EF 60%    CYSTOSCOPY Left 8/31/2017    CYSTOSCOPY; LEFT URETEROSCOPY; LEFT URETERAL LASER LITHOTRIPSY AND STONE EXTRACTION; INSERTION LEFT URETERAL DOUBLE J STENT performed by Sara Melo MD at [facility] 9 Bilateral 12/14/2017    CYSTOSCOPY STENT INSERTION performed by Sara Melo MD at [place] [address] Right 10/11/2017    FEMUR IM NAIL MICHELLE INSERTION performed by Guido Wells DO at [facility]      x 2    LAMINECTOMY      2 lumbar segments    [facility] CYSTO/URETERO/PYELOSCOPY W/LITHOTRIPSY Left 1/24/2018    LITHOTRIPSY LASER performed by Sara Melo MD at [address] [place] [address] Bilateral 1/24/2018    CYSTOSCOPY BILATERAL URETEROSCOPIES WITH STENT REMOVAL AND RETROGRADE PYELOGRAMS WITH BILATERAL STONE MANIPULATION AND RETRIEVAL  PLACEMENT OF DOUBLE J URETERAL STENTS BILATERAL performed by Sara Melo MD at [address] [facility] Medications:  Prior to Admission medications    Medication Sig Start Date End Date Taking?  Authorizing Provider   meclizine (ANTIVERT) 25 MG tablet Take 25 mg by mouth daily Takes 2 pills at once    Historical Provider, MD   Folic Acid 0.8 MG CAPS Take by mouth    Historical Provider, MD   rivaroxaban (XARELTO) 15 MG TABS tablet TAKE 1 TABLET DAILY WITH BREAKFAST 4/11/22   ROCAEL Gomez   propafenone (RYTHMOL) 225 MG tablet TAKE ONE TABLET BY MOUTH EVERY EIGHT HOURS. 4/11/22   RCOAEL Gomez   Cranberry 1000 MG CAPS Take 1 tablet by mouth daily    Historical Provider, MD   levothyroxine (SYNTHROID) 88 MCG tablet Take 1 tablet by mouth Daily 2/24/22   ROCAEL Louis   omeprazole (PRILOSEC) 10 MG delayed release capsule Take 1 capsule by mouth daily 6/15/21   NAVYA Isaacs DO   loratadine (CLARITIN) 10 MG tablet Take 10 mg by mouth daily    Historical Provider, MD   midodrine (PROAMATINE) 10 MG tablet Take 1 tablet by mouth 3 times daily 4/6/21   NAVYA Isaacs DO   potassium chloride (KLOR-CON M) 20 MEQ extended release tablet Take 1 tablet by mouth daily  Patient taking differently: Take 20 mEq by mouth daily Take with lasix 3/20/18   ROCAEL Byrd   furosemide (LASIX) 20 MG tablet Take 1 tablet by mouth daily  Patient taking differently: Take 20 mg by mouth daily as needed  12/22/17   Oscar Hooper MD   meclizine (ANTIVERT) 12.5 MG tablet Take 25 mg by mouth as needed Take 2 tablets (25 mg) as needed  Patient not taking: Reported on 4/11/2022    Historical Provider, MD   Calcium Carbonate-Vitamin D (CALTRATE 600+D PO) Take 600 mg by mouth 2 times daily. Historical Provider, MD       Allergies:    Nitrofuran derivatives, Quinine derivatives, Sulfa antibiotics, and Pcn [penicillins]    Social History:    The patient currently lives with daughter  Tobacco:   reports that she has never smoked. She has never used smokeless tobacco.  Alcohol:   reports no history of alcohol use.   Illicit Drugs: none    Family History:      Problem Relation Age of Onset    Cancer Father         lung    Stroke Mother         mini    Hypotension Mother        Review of Systems:   Review of Systems   Constitutional: Negative for fever. Respiratory: Positive for shortness of breath (similar to previous denies new or worsening soa). Cardiovascular: Negative. Gastrointestinal: Negative for abdominal pain, diarrhea and vomiting. Genitourinary: Positive for hematuria. Neurological: Positive for tremors. Psychiatric/Behavioral: Positive for hallucinations. All other systems reviewed and are negative. 14 point review of systems is negative except as specifically addressed above. Physical Examination:  BP (!) 135/96   Pulse 90   Temp 97.9 °F (36.6 °C)   Resp 22   SpO2 97%   Physical Exam  Vitals reviewed. Constitutional:       Comments: Pleasant, alert frail appearing 80 yr old female appears in no distress-no active hallucinations. Daughter and son at bedside   HENT:      Head: Normocephalic. Right Ear: External ear normal.      Left Ear: External ear normal.   Eyes:      Conjunctiva/sclera: Conjunctivae normal.      Pupils: Pupils are equal, round, and reactive to light. Cardiovascular:      Rate and Rhythm: Normal rate and regular rhythm. Heart sounds: Normal heart sounds. Comments: Wide complex QRS rhythm on bedside monitor hr 90's -100's  Pulmonary:      Effort: Pulmonary effort is normal.      Breath sounds: Normal breath sounds. Abdominal:      General: Bowel sounds are normal.      Palpations: Abdomen is soft. Musculoskeletal:         General: Normal range of motion. Cervical back: Neck supple. Comments: Moves bilateral upper/lower extremities equally     Skin:     General: Skin is warm and dry. Neurological:      Mental Status: She is alert.       Comments: No obvious tremor at present  No focal deficit  Tells me year is 25, 22 and month of June          Diagnostic Data:  CBC:  Recent Labs     04/29/22  1030   WBC 4.8   HGB 11.5*   HCT 36.9*   *     BMP:  Recent Labs     04/29/22  1030      K 3.8      CO2 28   BUN 21   CREATININE 1.0*   CALCIUM 10.2 Recent Labs     04/29/22  1030   AST 15   ALT 9   BILITOT 0.3   ALKPHOS 48     Coag Panel:   Recent Labs     04/29/22  1030   INR 1.07   PROTIME 13.8   APTT 28.4     Cardiac Enzymes:   Recent Labs     04/29/22  1030   TROPONINI <0.01     Urinalysis:  Lab Results   Component Value Date    NITRU Negative 04/29/2022    WBCUA TNTC 04/29/2022    BACTERIA TRACE 04/29/2022    RBCUA 50-75 04/29/2022    BLOODU LARGE 04/29/2022    SPECGRAV 1.023 04/29/2022    GLUCOSEU Negative 04/29/2022       CT HEAD WO CONTRAST    Result Date: 4/29/2022  EXAMINATION: CT HEAD WO CONTRAST 4/29/2022 12:52 PM HISTORY: Altered mental status, confusion, shaking spells. Generalized weakness. Previous fall in January 2022. DOSE: 741 mGycm. Automatic exposure control was utilized in an effort to use as little radiation as possible, without compromising image quality. REPORT: Axial CT of the head was performed without contrast, reconstructed sagittal and coronal images are also reviewed. COMPARISON: CT head without contrast 10/8/2017. No intracranial hemorrhage, mass or mass effect is identified. There is mild ventriculomegaly and mild to moderate diffuse atrophy. There is mildly decreased attenuation in the periventricular and subcortical white matter tracts compatible chronic small vessel white matter ischemic disease. Review of bone windows is remarkable for confluent effusion in the right mastoid air cells compatible with mastoiditis, there also appears to be fluid within the right middle ear cavity diffusely. No acute fracture is identified. .    Impression: No acute intracranial abnormality. Advanced atrophy and chronic small vessel white matter ischemic changes. Right-sided otomastoiditis. Signed by Dr Maria Esther Hannon.  [place]      Assessment/Plan:  Principal Problem:    Altered mental status  Active Problems:    Anticoagulated    Hallucinations    Abnormal EKG    Infectious encephalopathy    Paroxysmal A-fib (HCC)    Gastroesophageal reflux disease    Chronic obstructive pulmonary disease (HCC)    CAD (coronary artery disease)    Anemia    UTI (urinary tract infection)    Acquired hypothyroidism  Resolved Problems:    * No resolved hospital problems. *     Principal Problem:    Altered mental status/Infectious encephalopathy/UTI (urinary tract infection)   -NS at 75cc/hr   -follow blood cultures   -follow urine culture   -follow cbc/bmp   -rocephin 1g iv q24hrs   -fall precautions   -magnesium   -phosphorus   -I's and O's   -daily weights   Active Problems:      Paroxysmal A-fib/Anticoagulate/Abnormal EKG   -telemetry   -cards consult   -continue propafenone   -continue rivaroxaban      Chronic obstructive pulmonary disease (HCC)   -monitor for supplemental oxygen requirements   -incentive spirometry q4hrs wa        Acquired hypothyroidism   -tsh   -continue synthroid    Resolved Problems:    * No resolved hospital problems.  *  Signed:  ROCAEL Christiasnon CNP, 4/29/2022 2:01 PM

## 2022-04-29 NOTE — ED NOTES
Pt alert and oriented x4 at this time. Pt denies any hallucinations.       Isabella Arana RN  04/29/22 0836

## 2022-04-29 NOTE — ED NOTES
Pt awake, alert, oriented x3. VSS, NAD, pt denies chest pain, shortness of breath, palpitations, denies any other pain at this time. When asked if pt remembers hallucinating she states \"Well that's what they tell me. \" pt denies hallucinations at this time.       Korina Lim RN  04/29/22 1257

## 2022-04-29 NOTE — ED PROVIDER NOTES
[facility] EMERGENCY DEPT  eMERGENCY dEPARTMENT eNCOUnter      Pt Name: Flex Rosas  MRN: [id number]  Sunitagfpato 1/11/1932  Date of evaluation: 4/29/2022  Provider: Crow Alston MD    [address] [place] [address]       Chief Complaint   Patient presents with    Hallucinations     started on Sunday    Tremors     Seen independently of PA. Pt with increased AMS. Called to room. Pt's EKG from RBBB in 30s-60s, to wide complex, irregular, rate in 80s. bp stable, pt with no complaints. ekg sent to Dr Ji Harrison for review    PHYSICAL EXAM    (up to 7 for level 4, 8 or more for level 5)     ED Triage Vitals [04/29/22 1022]   BP Temp Temp src Pulse Resp SpO2 Height Weight   (!) 114/53 97.9 °F (36.6 °C) -- 51 16 94 % -- --       Physical Exam    DIAGNOSTIC RESULTS     EKG: All [facility]'s are [facility] who either signs or Co-signs this chart in the absence of a cardiologist.        RADIOLOGY:   Non-plain film images such as CT, Ultrasound and MRI are read by the radiologist. Plain radiographic images are visualized and preliminarily interpreted by the emergencyphysician with the below findings:      Interpretation per the Radiologist below, if available at the time of thisnote:    CT HEAD WO CONTRAST   Final Result   Impression: No acute intracranial abnormality. Advanced atrophy and   chronic small vessel white matter ischemic changes. Right-sided   otomastoiditis. Signed by Dr Suki Kilpatrick.  [facility]            ED BEDSIDE ULTRASOUND:   Performed by ED Physician - none    LABS:  Labs Reviewed   URINALYSIS WITH REFLEX TO CULTURE - Abnormal; Notable for the following components:       Result Value    Color, UA DARK YELLOW (*)     Clarity, UA TURBID (*)     Ketones, Urine TRACE (*)     Blood, Urine LARGE (*)     Leukocyte Esterase, Urine LARGE (*)     All other components within normal limits   CBC WITH AUTO DIFFERENTIAL - Abnormal; Notable for the following components:    RBC 3.56 (*)     Hemoglobin 11.5 (*) Hematocrit 36.9 (*)     .7 (*)     MCH 32.3 (*)     MCHC 31.2 (*)     RDW 14.6 (*)     Platelets 549 (*)     MPV 9.3 (*)     Monocytes % 10.5 (*)     All other components within normal limits   COMPREHENSIVE METABOLIC PANEL W/ REFLEX TO MG FOR LOW K - Abnormal; Notable for the following components:    CREATININE 1.0 (*)     GFR Non- 52 (*)     Total Protein 6.0 (*)     All other components within normal limits   BRAIN NATRIURETIC PEPTIDE - Abnormal; Notable for the following components:    Pro-BNP 2,405 (*)     All other components within normal limits   MICROSCOPIC URINALYSIS - Abnormal; Notable for the following components:    WBC, UA TNTC (*)     RBC, UA 50-75 (*)     Bacteria, UA TRACE (*)     Yeast, UA Present (*)     Crystals, UA NEG (*)     All other components within normal limits   CULTURE, URINE   CULTURE, BLOOD 2   CULTURE, BLOOD 1   TSH WITH REFLEX TO FT4   TROPONIN   PROTIME-INR   APTT   MAGNESIUM   PHOSPHORUS   LACTATE, SEPSIS   LACTATE, SEPSIS       All other labs were within normal range or not returned as of this dictation. EMERGENCY DEPARTMENT COURSE and DIFFERENTIAL DIAGNOSIS/MDM:   Vitals:    Vitals:    04/29/22 1351 04/29/22 1403 04/29/22 1412 04/29/22 1418   BP: (!) 126/91 114/80 123/78    Pulse: 99 90 89 89   Resp: 16 24 29 23   Temp:       SpO2: 96% 93% 95% 94%       MDM      CONSULTS:  IP CONSULT TO CARDIOLOGY    PROCEDURES:  Unless otherwise noted below, none      Procedures    FINAL IMPRESSION      1. Altered mental status, unspecified altered mental status type    2. Failure of outpatient treatment    3. Acute cystitis without hematuria    4. Abnormal EKG          DISPOSITION/PLAN   DISPOSITION Admitted    PATIENT REFERRED TO:  No follow-up provider specified.     DISCHARGE MEDICATIONS:  New Prescriptions    No medications on file          (Please note that portions of this note were completed with a voice recognition program.  Efforts were made to edit the dictations but occasionally words aremis-transcribed.)    Linda Noble MD (electronically signed)  Attending Emergency Physician           Linda Noble MD  04/29/22 0095

## 2022-04-29 NOTE — ED PROVIDER NOTES
[facility] 5 SURG SERVICES  eMERGENCYdEPARTMENT eNCOUnter      Pt Name: Chandan Franco  MRN: [id number]  [place] 1/11/1932  Date of evaluation: 4/29/2022  Provider:MARION Glasgow    CHIEF COMPLAINT       Chief Complaint   Patient presents with    Hallucinations     started on Sunday    Tremors         HISTORY OF PRESENT ILLNESS  (Location/Symptom, Timing/Onset, Context/Setting, Quality, Duration, Modifying Factors, Severity.)   Chandan Franco is a 80 y.o. female who presents to the emergency department with complaints of hallucinations shakes confusion hx of recurring UTI she has afib and on xarelto no prior mini stroke walks with walker baseline. Daughter is providing hx patient alert to place person and time. Patient has been seeing bugs crawling on her as well as rat nibbling on her toe. HPI    Nursing Notes were reviewed and I agree. REVIEW OF SYSTEMS    (2-9 systems for level 4, 10 or more for level 5)     Review of Systems   Constitutional: Negative for activity change, appetite change, chills and fever. HENT: Negative for congestion, postnasal drip, rhinorrhea and sore throat. Eyes: Negative for photophobia, pain, discharge and visual disturbance. Respiratory: Negative for apnea, cough and shortness of breath. Cardiovascular: Negative for chest pain and leg swelling. Gastrointestinal: Negative for abdominal distention, abdominal pain and nausea. Genitourinary: Negative for vaginal bleeding. Musculoskeletal: Negative for arthralgias, back pain, joint swelling, neck pain and neck stiffness. Skin: Negative for color change and rash. Neurological: Positive for tremors. Negative for dizziness, syncope, facial asymmetry and headaches. Hematological: Negative for adenopathy. Does not bruise/bleed easily. Psychiatric/Behavioral: Positive for confusion and hallucinations. Negative for agitation and behavioral problems.         Except as noted above the remainder of the review of systems was reviewed and negative.        PAST MEDICAL HISTORY     Past Medical History:   Diagnosis Date    A-fib ([address]     Abnormal weight loss     Anemia     Arthritis     Bullae     CAD (coronary artery disease)     Conjunctivitis     COPD (chronic obstructive pulmonary disease) (HCC)     Depression     Fatigue     History of blood transfusion 2017    post op broken hip    Hypertension     Hypothyroidism     Kidney infection     Kidney stone     Leukopenia     Leukopenia     Low back pain     Mild CAD 05/15/2017    Osteoporosis     Palliative care patient 06/04/2021    Paroxysmal A-fib (HCC)     Prolonged emergence from general anesthesia     Sinus pause     10/2014    Thyroid disease     Urinary tract infection     Weakness          SURGICAL HISTORY       Past Surgical History:   Procedure Laterality Date    BACK SURGERY      BACK SURGERY      CARDIAC CATHETERIZATION  8/14/14  [address]    Mild, non-occlusive CAD, EF 60%    CYSTOSCOPY Left 8/31/2017    CYSTOSCOPY; LEFT URETEROSCOPY; LEFT URETERAL LASER LITHOTRIPSY AND STONE EXTRACTION; INSERTION LEFT URETERAL DOUBLE J STENT performed by Gilson Zhou MD at [address] [place] [address] Bilateral 12/14/2017    CYSTOSCOPY STENT INSERTION performed by Gilson Zhou MD at [place] [address] Right 10/11/2017    FEMUR IM NAIL MICHELLE INSERTION performed by Sandrita Pena DO at [facility]      x 2    LAMINECTOMY      2 lumbar segments    IN CYSTO/URETERO/PYELOSCOPY W/LITHOTRIPSY Left 1/24/2018    LITHOTRIPSY LASER performed by Gilson Zhou MD at [address] [place] [address],  Bilateral 1/24/2018    CYSTOSCOPY BILATERAL URETEROSCOPIES WITH STENT REMOVAL AND RETROGRADE PYELOGRAMS WITH BILATERAL STONE MANIPULATION AND RETRIEVAL  PLACEMENT OF DOUBLE J URETERAL STENTS BILATERAL performed by Gilson Zhou MD at [place] [address] ARTHROPLASTY      URETER STENT PLACEMENT           CURRENT MEDICATIONS       Current Discharge Medication List      CONTINUE these medications which have NOT CHANGED    Details   HYDROcodone-acetaminophen (NORCO) 5-325 MG per tablet Take 1 tablet by mouth every 6 hours as needed for Pain.      diazePAM (VALIUM) 2 MG tablet Take 2 mg by mouth every 8 hours as needed for Anxiety. !! meclizine (ANTIVERT) 25 MG tablet Take 25 mg by mouth as needed Takes 2 pills at once       Folic Acid 0.8 MG CAPS Take 1 tablet by mouth daily       rivaroxaban (XARELTO) 15 MG TABS tablet TAKE 1 TABLET DAILY WITH BREAKFAST  Qty: 90 tablet, Refills: 3    Associated Diagnoses: Paroxysmal atrial fibrillation (HCC)      propafenone (RYTHMOL) 225 MG tablet TAKE ONE TABLET BY MOUTH EVERY EIGHT HOURS.   Qty: 270 tablet, Refills: 3    Associated Diagnoses: Paroxysmal atrial fibrillation (HCC)      Cranberry 1000 MG CAPS Take 1 tablet by mouth daily      levothyroxine (SYNTHROID) 88 MCG tablet Take 1 tablet by mouth Daily  Qty: 90 tablet, Refills: 3      omeprazole (PRILOSEC) 10 MG delayed release capsule Take 1 capsule by mouth daily  Qty: 90 capsule, Refills: 3    Associated Diagnoses: Gastroesophageal reflux disease without esophagitis      loratadine (CLARITIN) 10 MG tablet Take 10 mg by mouth daily      midodrine (PROAMATINE) 10 MG tablet Take 1 tablet by mouth 3 times daily  Qty: 270 tablet, Refills: 3    Associated Diagnoses: Orthostatic hypotension; Routine general medical examination at a health care facility      potassium chloride (KLOR-CON M) 20 MEQ extended release tablet Take 1 tablet by mouth daily  Qty: 30 tablet, Refills: 5    Associated Diagnoses: Hypokalemia      furosemide (LASIX) 20 MG tablet Take 1 tablet by mouth daily  Qty: 60 tablet, Refills: 3      !! meclizine (ANTIVERT) 12.5 MG tablet Take 25 mg by mouth as needed Take 2 tablets (25 mg) as needed      Calcium Carbonate-Vitamin D (CALTRATE 600+D PO) Take 600 mg by mouth 2 times daily. !! - Potential duplicate medications found. Please discuss with provider. ALLERGIES     Nitrofuran derivatives, Quinine derivatives, Sulfa antibiotics, and Pcn [penicillins]    FAMILY HISTORY       Family History   Problem Relation Age of Onset    Cancer Father         lung    Stroke Mother         mini    Hypotension Mother           SOCIAL HISTORY       Social History     Socioeconomic History    Marital status:      Spouse name: Not on file    Number of children: Not on file    Years of education: Not on file    Highest education level: Not on file   Occupational History    Not on file   Tobacco Use    Smoking status: Never Smoker    Smokeless tobacco: Never Used   Vaping Use    Vaping Use: Never used   Substance and Sexual Activity    Alcohol use: No    Drug use: No    Sexual activity: Yes   Other Topics Concern    Not on file   Social History Narrative    Not on file     Social Determinants of Health     Financial Resource Strain: Low Risk     Difficulty of Paying Living Expenses: Not hard at all   Food Insecurity: No Food Insecurity    Worried About [address] in the Last Year: Never true    [facility] in the Last Year: Never true   Transportation Needs:     Lack of Transportation (Medical): Not on file    Lack of Transportation (Non-Medical):  Not on file   Physical Activity: Inactive    Days of Exercise per Week: 0 days    Minutes of Exercise per Session: 0 min   Stress:     Feeling of Stress : Not on file   Social Connections:     Frequency of Communication with Friends and Family: Not on file    Frequency of Social Gatherings with Friends and Family: Not on file    Attends Rastafarian Services: Not on file    Active Member of Clubs or Organizations: Not on file    Attends Club or Organization Meetings: Not on file    Marital Status: Not on file   Intimate Partner Violence:     Fear of Current or Ex-Partner: Not on file   [address] [place] [address] Emotionally Abused: Not on file    Physically Abused: Not on file    Sexually Abused: Not on file   Housing Stability:     Unable to Pay for Housing in the Last Year: Not on file    Number of Places Lived in the Last Year: Not on file    Unstable Housing in the Last Year: Not on file       SCREENINGS    [place] Coma Scale  Eye Opening: Spontaneous  Best Verbal Response: Oriented  Best Motor Response: Obeys commands  Fosters Coma Scale Score: 15      PHYSICAL EXAM    (up to 7 forlevel 4, 8 or more for level 5)     ED Triage Vitals [04/29/22 1022]   BP Temp Temp src Pulse Resp SpO2 Height Weight   (!) 114/53 97.9 °F (36.6 °C) -- 51 16 94 % -- --       Physical Exam  Vitals and nursing note reviewed. Constitutional:       General: She is not in acute distress. Appearance: Normal appearance. She is well-developed. She is not diaphoretic. HENT:      Head: Normocephalic and atraumatic. Right Ear: Tympanic membrane, ear canal and external ear normal.      Left Ear: Tympanic membrane, ear canal and external ear normal.      Nose: Nose normal.      Mouth/Throat:      Mouth: Mucous membranes are moist.      Pharynx: No oropharyngeal exudate. Eyes:      General:         Right eye: No discharge. Left eye: No discharge. Pupils: Pupils are equal, round, and reactive to light. Neck:      Thyroid: No thyromegaly. Cardiovascular:      Rate and Rhythm: Normal rate and regular rhythm. Pulses: Normal pulses. Heart sounds: Normal heart sounds. No murmur heard. No friction rub. Pulmonary:      Effort: Pulmonary effort is normal. No respiratory distress. Breath sounds: Normal breath sounds. No stridor. No wheezing. Abdominal:      General: Abdomen is flat. Bowel sounds are normal. There is no distension. Palpations: Abdomen is soft. Tenderness: There is no abdominal tenderness. Musculoskeletal:         General: Normal range of motion.       Cervical back: Normal range of motion and neck supple. Skin:     General: Skin is warm and dry. Capillary Refill: Capillary refill takes less than 2 seconds. Findings: No rash. Neurological:      General: No focal deficit present. Mental Status: She is alert and oriented to person, place, and time. Mental status is at baseline. Cranial Nerves: No cranial nerve deficit. Sensory: No sensory deficit. Motor: No weakness. Coordination: Coordination normal.      Gait: Gait normal.      Deep Tendon Reflexes: Reflexes normal.   Psychiatric:         Mood and Affect: Mood normal.         Behavior: Behavior normal.         Thought Content: Thought content normal.         Judgment: Judgment normal.           DIAGNOSTIC RESULTS     RADIOLOGY:   Non-plain film images such as CT, Ultrasound and MRI are read by the radiologist. Plain radiographic images are visualized and preliminarilyinterpreted by Natalie Jay MD with the below findings:      Interpretation per the Radiologist below, if available at the time of this note:    CT HEAD WO CONTRAST   Final Result   Impression: No acute intracranial abnormality. Advanced atrophy and   chronic small vessel white matter ischemic changes. Right-sided   otomastoiditis. Signed by Dr Everardo Moser.  [place]          LABS:  Labs Reviewed   URINALYSIS WITH REFLEX TO CULTURE - Abnormal; Notable for the following components:       Result Value    Color, UA DARK YELLOW (*)     Clarity, UA TURBID (*)     Ketones, Urine TRACE (*)     Blood, Urine LARGE (*)     Leukocyte Esterase, Urine LARGE (*)     All other components within normal limits   CBC WITH AUTO DIFFERENTIAL - Abnormal; Notable for the following components:    RBC 3.56 (*)     Hemoglobin 11.5 (*)     Hematocrit 36.9 (*)     .7 (*)     MCH 32.3 (*)     MCHC 31.2 (*)     RDW 14.6 (*)     Platelets 806 (*)     MPV 9.3 (*)     Monocytes % 10.5 (*)     All other components within normal limits   COMPREHENSIVE METABOLIC PANEL W/ REFLEX TO MG FOR LOW K - Abnormal; Notable for the following components:    CREATININE 1.0 (*)     GFR Non- 52 (*)     Total Protein 6.0 (*)     All other components within normal limits   BRAIN NATRIURETIC PEPTIDE - Abnormal; Notable for the following components:    Pro-BNP 2,405 (*)     All other components within normal limits   MICROSCOPIC URINALYSIS - Abnormal; Notable for the following components:    WBC, UA TNTC (*)     RBC, UA 50-75 (*)     Bacteria, UA TRACE (*)     Yeast, UA Present (*)     Crystals, UA NEG (*)     All other components within normal limits   CULTURE, URINE   CULTURE, BLOOD 2   CULTURE, BLOOD 1   TSH WITH REFLEX TO FT4   TROPONIN   PROTIME-INR   APTT   LACTATE, SEPSIS   LACTATE, SEPSIS   MAGNESIUM   PHOSPHORUS   BASIC METABOLIC PANEL W/ REFLEX TO MG FOR LOW K   CBC WITH AUTO DIFFERENTIAL       All other labs were within normal range or notreturned as of this dictation. RE-ASSESSMENT        EMERGENCY DEPARTMENT COURSE and DIFFERENTIAL DIAGNOSIS/MDM:   Vitals:    Vitals:    04/29/22 1503 04/29/22 1511 04/29/22 1600 04/29/22 1611   BP: (!) 120/92 128/88 112/68    Pulse: 90 100 92    Resp: 18 21 20    Temp:   97.9 °F (36.6 °C)    TempSrc:   Temporal    SpO2: 96% 96% 97%    Weight:    106 lb 9 oz (48.3 kg)   Height:    5' 2\" (1.575 m)     EKG afib 43 rate similar to 4/11/22 no depression elevation. MDM  Dr. Jeffory Boast with cardiology agrees to see in consult he thinks this EKG is likely related to right bundle branch block. She has a failure of outpatient treatment with an obvious UTI persisting today we have ordered cephalosporin based on prior Klebsiella growing out she continues to remain altered slightly but no acute findings on head CT for stroke plan will be for admission to hospital service  PROCEDURES:    Procedures      FINAL IMPRESSION      1. Altered mental status, unspecified altered mental status type    2. Failure of outpatient treatment    3.  Acute cystitis without hematuria    4. Abnormal EKG          DISPOSITION/PLAN   DISPOSITION Admitted 04/29/2022 01:34:36 PM      PATIENT REFERRED TO:  No follow-up provider specified.     DISCHARGE MEDICATIONS:  Current Discharge Medication List          (Please note that portions of this note were completed with a voice recognition program.  Efforts were made to edit the dictations but occasionallywords are mis-transcribed.)    Daniel Whitney [address] [place] [address]  04/29/22 7538

## 2022-04-30 LAB
ANION GAP SERPL CALCULATED.3IONS-SCNC: 11 MMOL/L (ref 7–19)
BASOPHILS ABSOLUTE: 0 K/UL (ref 0–0.2)
BASOPHILS RELATIVE PERCENT: 0.4 % (ref 0–1)
BUN BLDV-MCNC: 23 MG/DL (ref 8–23)
CALCIUM SERPL-MCNC: 9.1 MG/DL (ref 8.8–10.2)
CHLORIDE BLD-SCNC: 106 MMOL/L (ref 98–111)
CO2: 24 MMOL/L (ref 22–29)
CREAT SERPL-MCNC: 0.9 MG/DL (ref 0.5–0.9)
EOSINOPHILS ABSOLUTE: 0.2 K/UL (ref 0–0.6)
EOSINOPHILS RELATIVE PERCENT: 4.7 % (ref 0–5)
GFR AFRICAN AMERICAN: >59
GFR NON-AFRICAN AMERICAN: 59
GLUCOSE BLD-MCNC: 68 MG/DL (ref 74–109)
HCT VFR BLD CALC: 31.6 % (ref 37–47)
HEMOGLOBIN: 9.9 G/DL (ref 12–16)
IMMATURE GRANULOCYTES #: 0 K/UL
LYMPHOCYTES ABSOLUTE: 1.4 K/UL (ref 1.1–4.5)
LYMPHOCYTES RELATIVE PERCENT: 27.7 % (ref 20–40)
MCH RBC QN AUTO: 32.4 PG (ref 27–31)
MCHC RBC AUTO-ENTMCNC: 31.3 G/DL (ref 33–37)
MCV RBC AUTO: 103.3 FL (ref 81–99)
MONOCYTES ABSOLUTE: 0.6 K/UL (ref 0–0.9)
MONOCYTES RELATIVE PERCENT: 11.9 % (ref 0–10)
NEUTROPHILS ABSOLUTE: 2.8 K/UL (ref 1.5–7.5)
NEUTROPHILS RELATIVE PERCENT: 55.1 % (ref 50–65)
PDW BLD-RTO: 14.8 % (ref 11.5–14.5)
PLATELET # BLD: 351 K/UL (ref 130–400)
PMV BLD AUTO: 10 FL (ref 9.4–12.3)
POTASSIUM REFLEX MAGNESIUM: 4.1 MMOL/L (ref 3.5–5)
RBC # BLD: 3.06 M/UL (ref 4.2–5.4)
SODIUM BLD-SCNC: 141 MMOL/L (ref 136–145)
WBC # BLD: 5.1 K/UL (ref 4.8–10.8)

## 2022-04-30 PROCEDURE — 96361 HYDRATE IV INFUSION ADD-ON: CPT

## 2022-04-30 PROCEDURE — 6360000002 HC RX W HCPCS: Performed by: NURSE PRACTITIONER

## 2022-04-30 PROCEDURE — 80048 BASIC METABOLIC PNL TOTAL CA: CPT

## 2022-04-30 PROCEDURE — A4216 STERILE WATER/SALINE, 10 ML: HCPCS | Performed by: NURSE PRACTITIONER

## 2022-04-30 PROCEDURE — 85025 COMPLETE CBC W/AUTO DIFF WBC: CPT

## 2022-04-30 PROCEDURE — 1210000000 HC MED SURG R&B

## 2022-04-30 PROCEDURE — 6370000000 HC RX 637 (ALT 250 FOR IP): Performed by: NURSE PRACTITIONER

## 2022-04-30 PROCEDURE — G0378 HOSPITAL OBSERVATION PER HR: HCPCS

## 2022-04-30 PROCEDURE — 2580000003 HC RX 258: Performed by: NURSE PRACTITIONER

## 2022-04-30 PROCEDURE — 96376 TX/PRO/DX INJ SAME DRUG ADON: CPT

## 2022-04-30 PROCEDURE — 99223 1ST HOSP IP/OBS HIGH 75: CPT | Performed by: INTERNAL MEDICINE

## 2022-04-30 RX ORDER — FLUCONAZOLE 100 MG/1
200 TABLET ORAL DAILY
Status: DISCONTINUED | OUTPATIENT
Start: 2022-04-30 | End: 2022-05-01 | Stop reason: HOSPADM

## 2022-04-30 RX ADMIN — MIDODRINE HYDROCHLORIDE 10 MG: 10 TABLET ORAL at 09:45

## 2022-04-30 RX ADMIN — MIDODRINE HYDROCHLORIDE 10 MG: 10 TABLET ORAL at 14:41

## 2022-04-30 RX ADMIN — FLUCONAZOLE 200 MG: 100 TABLET ORAL at 13:30

## 2022-04-30 RX ADMIN — PROPAFENONE HYDROCHLORIDE 225 MG: 225 TABLET, FILM COATED ORAL at 04:28

## 2022-04-30 RX ADMIN — SODIUM CHLORIDE 1000 MG: 9 INJECTION INTRAMUSCULAR; INTRAVENOUS; SUBCUTANEOUS at 14:40

## 2022-04-30 RX ADMIN — PROPAFENONE HYDROCHLORIDE 225 MG: 225 TABLET, FILM COATED ORAL at 20:02

## 2022-04-30 RX ADMIN — RIVAROXABAN 15 MG: 15 TABLET, FILM COATED ORAL at 18:50

## 2022-04-30 RX ADMIN — SODIUM CHLORIDE: 9 INJECTION, SOLUTION INTRAVENOUS at 04:27

## 2022-04-30 RX ADMIN — LEVOTHYROXINE SODIUM 88 MCG: 88 TABLET ORAL at 04:27

## 2022-04-30 RX ADMIN — SODIUM CHLORIDE, PRESERVATIVE FREE 10 ML: 5 INJECTION INTRAVENOUS at 20:02

## 2022-04-30 RX ADMIN — CETIRIZINE HYDROCHLORIDE 10 MG: 10 TABLET, FILM COATED ORAL at 09:45

## 2022-04-30 RX ADMIN — MIDODRINE HYDROCHLORIDE 10 MG: 10 TABLET ORAL at 20:02

## 2022-04-30 RX ADMIN — PROPAFENONE HYDROCHLORIDE 225 MG: 225 TABLET, FILM COATED ORAL at 14:42

## 2022-04-30 ASSESSMENT — ENCOUNTER SYMPTOMS
EYES NEGATIVE: 1
NAUSEA: 0
SHORTNESS OF BREATH: 0
DIARRHEA: 0
VOMITING: 0
ABDOMINAL PAIN: 0
BACK PAIN: 0
CONSTIPATION: 0
RESPIRATORY NEGATIVE: 1
CHEST TIGHTNESS: 0
GASTROINTESTINAL NEGATIVE: 1

## 2022-04-30 NOTE — CONSULTS
[address] [facility]  Cardiology Consult      Requesting MD:  Ayanna Rankin MD   Admit Status:         History obtained from:   [] Patient  [] Other (specify):     Patient:  Rosanna Woody  [id number]     Chief Complaint:   Chief Complaint   Patient presents with    Hallucinations     started on Sunday    Tremors         HPI: Ms. Elaine Barbour is a 80 y.o. female with a history of paroxysmal atrial fibrillation coronary artery disease COPD admitted 4/29/2022 with complaints of hallucination and tremor altered mental status. Recently completed 2 separate 10-day courses of antibiotics for urinary tract infection urine culture positive for Klebsiella. Daughter thought the last antibiotic completed with ciprofloxacin. No reported chest pain denies significant dyspnea. Asymptomatic bradycardia noted. proBNP 2405. All troponins negative. Review of Systems:  Review of Systems   Constitutional: Negative. Negative for chills, fever and unexpected weight change. HENT: Negative. Eyes: Negative. Respiratory: Negative. Negative for shortness of breath. Cardiovascular: Negative. Negative for chest pain. Gastrointestinal: Negative. Negative for diarrhea, nausea and vomiting. Endocrine: Negative. Genitourinary: Negative. Musculoskeletal: Negative. Skin: Negative. Neurological: Negative. All other systems reviewed and are negative.       Cardiac Specific Data:  Specialty Problems        Cardiology Problems    Paroxysmal A-fib [facility])        Mild CAD        CAD (coronary artery disease)        Non-rheumatic tricuspid valve insufficiency        Orthostatic hypotension              Past Medical History:  Past Medical History:   Diagnosis Date    A-fib ([facility] [address]     Abnormal weight loss     Anemia     Arthritis     Bullae     CAD (coronary artery disease)     Conjunctivitis     COPD (chronic obstructive pulmonary disease) (HCC)     Depression     Fatigue     History of blood transfusion 2017    post op broken hip    Hypertension     Hypothyroidism     Kidney infection     Kidney stone     Leukopenia     Leukopenia     Low back pain     Mild CAD 05/15/2017    Osteoporosis     Palliative care patient 06/04/2021    Paroxysmal A-fib (HCC)     Prolonged emergence from general anesthesia     Sinus pause     10/2014    Thyroid disease     Urinary tract infection     Weakness         Past Surgical History:  Past Surgical History:   Procedure Laterality Date    BACK SURGERY      BACK SURGERY      CARDIAC CATHETERIZATION  8/14/14  [facility]    Mild, non-occlusive CAD, EF 60%    CYSTOSCOPY Left 8/31/2017    CYSTOSCOPY; LEFT URETEROSCOPY; LEFT URETERAL LASER LITHOTRIPSY AND STONE EXTRACTION; INSERTION LEFT URETERAL DOUBLE J STENT performed by Ashlee Ying MD at [facility] Bilateral 12/14/2017    CYSTOSCOPY STENT INSERTION performed by Ashlee Ying MD at [place] [address] Right 10/11/2017    FEMUR IM NAIL MICHELLE INSERTION performed by Vida Neri DO at [address] [place] [address]      HYSTERECTOMY      KNEE ARTHROPLASTY      x 2    LAMINECTOMY      2 lumbar segments    IN CYSTO/URETERO/PYELOSCOPY W/LITHOTRIPSY Left 1/24/2018    LITHOTRIPSY LASER performed by Ashlee Ying MD at [address] [place] [address],  Bilateral 1/24/2018    CYSTOSCOPY BILATERAL URETEROSCOPIES WITH STENT REMOVAL AND RETROGRADE PYELOGRAMS WITH BILATERAL STONE MANIPULATION AND RETRIEVAL  PLACEMENT OF DOUBLE J URETERAL STENTS BILATERAL performed by Ashlee Ying MD at [place] [address]         Past Family History:  Family History   Problem Relation Age of Onset    Cancer Father         lung    Stroke Mother         mini    Hypotension Mother        Past Social History:  Social History     Socioeconomic History    Marital status:       Spouse name: Not on file    Number of children: Not on file    Years of education: Not on file    Highest education level: Not on file   Occupational History    Not on file   Tobacco Use    Smoking status: Never Smoker    Smokeless tobacco: Never Used   Vaping Use    Vaping Use: Never used   Substance and Sexual Activity    Alcohol use: No    Drug use: No    Sexual activity: Yes   Other Topics Concern    Not on file   Social History Narrative    Not on file     Social Determinants of Health     Financial Resource Strain: Low Risk     Difficulty of Paying Living Expenses: Not hard at all   Food Insecurity: No Food Insecurity    Worried About [address] [employer] in the Last Year: Never true    [facility] in the Last Year: Never true   Transportation Needs:     Lack of Transportation (Medical): Not on file    Lack of Transportation (Non-Medical): Not on file   Physical Activity: Inactive    Days of Exercise per Week: 0 days    Minutes of Exercise per Session: 0 min   Stress:     Feeling of Stress : Not on file   Social Connections:     Frequency of Communication with Friends and Family: Not on file    Frequency of Social Gatherings with Friends and Family: Not on file    Attends Congregation Services: Not on file    Active Member of Clubs or Organizations: Not on file    Attends Club or Organization Meetings: Not on file    Marital Status: Not on file   Intimate Partner Violence:     Fear of Current or Ex-Partner: Not on file    Emotionally Abused: Not on file    Physically Abused: Not on file    Sexually Abused: Not on file   Housing Stability:     Unable to Pay for Housing in the Last Year: Not on file    Number of [place] in the Last Year: Not on file    Unstable Housing in the Last Year: Not on file       Allergies:   Allergies   Allergen Reactions    Nitrofuran Derivatives Swelling    Quinine Derivatives     Sulfa Antibiotics     Pcn [Penicillins] Rash       Home Meds:  Prior to Admission medications Medication Sig Start Date End Date Taking? Authorizing Provider   HYDROcodone-acetaminophen (NORCO) 5-325 MG per tablet Take 1 tablet by mouth every 6 hours as needed for Pain. Yes Historical Provider, MD   diazePAM (VALIUM) 2 MG tablet Take 2 mg by mouth every 8 hours as needed for Anxiety. Yes Historical Provider, MD   meclizine (ANTIVERT) 25 MG tablet Take 25 mg by mouth as needed Takes 2 pills at once     Historical Provider, MD   Folic Acid 0.8 MG CAPS Take 1 tablet by mouth daily     Historical Provider, MD   rivaroxaban (XARELTO) 15 MG TABS tablet TAKE 1 TABLET DAILY WITH BREAKFAST 4/11/22   ROCAEL Workman   propafenone (RYTHMOL) 225 MG tablet TAKE ONE TABLET BY MOUTH EVERY EIGHT HOURS. 4/11/22   ROCAEL Workman   Cranberry 1000 MG CAPS Take 1 tablet by mouth daily    Historical Provider, MD   levothyroxine (SYNTHROID) 88 MCG tablet Take 1 tablet by mouth Daily 2/24/22   ROCAEL London   omeprazole (PRILOSEC) 10 MG delayed release capsule Take 1 capsule by mouth daily 6/15/21   NAVYA Ellison DO   loratadine (CLARITIN) 10 MG tablet Take 10 mg by mouth daily    Historical Provider, MD   midodrine (PROAMATINE) 10 MG tablet Take 1 tablet by mouth 3 times daily 4/6/21   NAVYA Ellison DO   potassium chloride (KLOR-CON M) 20 MEQ extended release tablet Take 1 tablet by mouth daily  Patient taking differently: Take 20 mEq by mouth daily Take with lasix 3/20/18   ROCAEL Mata   furosemide (LASIX) 20 MG tablet Take 1 tablet by mouth daily  Patient taking differently: Take 20 mg by mouth daily as needed  12/22/17   Adan Davis MD   meclizine (ANTIVERT) 12.5 MG tablet Take 25 mg by mouth as needed Take 2 tablets (25 mg) as needed  Patient not taking: Reported on 4/11/2022    Historical Provider, MD   Calcium Carbonate-Vitamin D (CALTRATE 600+D PO) Take 600 mg by mouth 2 times daily.     Historical Provider, MD       Current Meds:   sodium chloride flush  5-40 mL IntraVENous 2 times per day    cefTRIAXone (ROCEPHIN) IV  1,000 mg IntraVENous Q24H    levothyroxine  88 mcg Oral Daily    cetirizine  10 mg Oral Daily    midodrine  10 mg Oral TID    propafenone  225 mg Oral 3 times per day    rivaroxaban  15 mg Oral Daily       Current Infused Meds:   sodium chloride      sodium chloride 75 mL/hr at 04/30/22 0427       Physical Exam:  Vitals:    04/30/22 0754   BP: 102/74   Pulse: 94   Resp: 16   Temp: 98 °F (36.7 °C)   SpO2: 97%       Intake/Output Summary (Last 24 hours) at 4/30/2022 1300  Last data filed at 4/30/2022 0800  Gross per 24 hour   Intake 1645 ml   Output --   Net 1645 ml     Estimated body mass index is 19.49 kg/m² as calculated from the following:    Height as of this encounter: 5' 2\" (1.575 m). Weight as of this encounter: 106 lb 9 oz (48.3 kg). Physical Exam  Vitals reviewed. Constitutional:       General: She is not in acute distress. Appearance: Normal appearance. She is well-developed and normal weight. She is not ill-appearing, toxic-appearing or diaphoretic. HENT:      Head: Normocephalic and atraumatic. Nose: Nose normal.      Mouth/Throat:      Mouth: Mucous membranes are moist.      Pharynx: Oropharynx is clear. Eyes:      General: No scleral icterus. Extraocular Movements: Extraocular movements intact. Pupils: Pupils are equal, round, and reactive to light. Neck:      Vascular: No carotid bruit or JVD. Cardiovascular:      Rate and Rhythm: Normal rate and regular rhythm. Heart sounds: Normal heart sounds. No murmur heard. No friction rub. No gallop. Pulmonary:      Effort: Pulmonary effort is normal. No respiratory distress. Breath sounds: Normal breath sounds. No stridor. No wheezing, rhonchi or rales. Chest:      Chest wall: No tenderness. Abdominal:      General: Abdomen is flat. Bowel sounds are normal. There is no distension. Palpations: Abdomen is soft. There is no mass. Tenderness: There is no abdominal tenderness. There is no right CVA tenderness, left CVA tenderness, guarding or rebound. Hernia: No hernia is present. Musculoskeletal:         General: No swelling, tenderness, deformity or signs of injury. Cervical back: Normal range of motion and neck supple. No rigidity or tenderness. Right lower leg: No edema. Left lower leg: No edema. Lymphadenopathy:      Cervical: No cervical adenopathy. Skin:     General: Skin is warm and dry. Neurological:      General: No focal deficit present. Mental Status: She is alert and oriented to person, place, and time. Mental status is at baseline. Cranial Nerves: No cranial nerve deficit. Sensory: No sensory deficit. Motor: No weakness. Coordination: Coordination normal.   Psychiatric:         Mood and Affect: Mood normal.         Behavior: Behavior normal.         Thought Content: Thought content normal.         Judgment: Judgment normal.         Labs:  Recent Labs     04/29/22  1030 04/30/22  0323   WBC 4.8 5.1   HGB 11.5* 9.9*   * 351       Recent Labs     04/29/22  1000 04/29/22  1030 04/30/22  0323   NA  --  140 141   K  --  3.8 4.1   CL  --  102 106   CO2  --  28 24   BUN  --  21 23   CREATININE  --  1.0* 0.9   LABGLOM  --  52* 59*   MG 1.9  --   --    CALCIUM  --  10.2 9.1   PHOS 3.2  --   --        CK, CKMB, Troponin: @LABRCNT (CKTOTAL:3, CKMB:3, TROPONINI:3)@    Last 3 BNP:  No results for input(s): BNP in the last 72 hours. IMAGING:  CT HEAD WO CONTRAST    Result Date: 4/29/2022  EXAMINATION: CT HEAD WO CONTRAST 4/29/2022 12:52 PM HISTORY: Altered mental status, confusion, shaking spells. Generalized weakness. Previous fall in January 2022. DOSE: 741 mGycm. Automatic exposure control was utilized in an effort to use as little radiation as possible, without compromising image quality.  REPORT: Axial CT of the head was performed without contrast, reconstructed sagittal and coronal images are also reviewed. COMPARISON: CT head without contrast 10/8/2017. No intracranial hemorrhage, mass or mass effect is identified. There is mild ventriculomegaly and mild to moderate diffuse atrophy. There is mildly decreased attenuation in the periventricular and subcortical white matter tracts compatible chronic small vessel white matter ischemic disease. Review of bone windows is remarkable for confluent effusion in the right mastoid air cells compatible with mastoiditis, there also appears to be fluid within the right middle ear cavity diffusely. No acute fracture is identified. .    Impression: No acute intracranial abnormality. Advanced atrophy and chronic small vessel white matter ischemic changes. Right-sided otomastoiditis. Signed by Dr Marta Valenzuela. [facility]      Assessment:  1. Admission 4/29/2022 for hallucinations and tremor reported altered mental status. 2. History of atrial fibrillation  3. 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 Chronic anticoagulation  5. Coronary artery disease  6. COPD  7. Hypothyroidism  8. History of kidney stones  9. Hypothyroidism  10. Hypertension. 11. Paroxysmal atrial fibrillation  12. Generalized weakness  13. Depression  14. Fatigue  15. COPD  12. CT of the head yesterday no acute findings advanced atrophy and chronic small vessel white matter ischemic changes right-sided osteomastoiditis  17.  CT abdomen pelvis 12/11/2017 Marked improvement in size of left perinephric fluid accumulation or urinoma bilateral renal calculi      Recommendations:  1. Echocardiogram further comments after review

## 2022-04-30 NOTE — PROGRESS NOTES
[facility] Hospital[facility]      Patient:  Shalini Bui  YOB: 1932  Date of Service: 4/30/2022  MRN: [id number]   Acct: [de-identified]   Primary Care Physician: [id number], DO  Advance Directive: Full Code  Admit Date: 4/29/2022       Hospital Day: 1  Portions of this note have been copied forward, however, changed to reflect the most current clinical status of this patient. CHIEF COMPLAINT hallucinations and tremor    SUBJECTIVE: Patient is alert and oriented today. She is sitting up in bed doing crossword puzzle. Patient and family deny any further hallucinations. CUMULATIVE HOSPITAL COURSE:  The patient is a 80-year-old female with a past medical history of atrial fibrillation, CAD, COPD, hypothyroidism, kidney stones, and hypertension who presented to [facility] ED with complaints of hallucinations and tremor. On admission daughter provided HPI and indicated the patient had had 2 separate 10-day courses of antibiotics for UTI. Patient had completed all of her antibiotics and had been off of them for approximately 8 days. Though she had finished her antibiotics patient continued to have blood in her urine. Not before admission patient began having hallucinations and seeing \" bugs. \"  Family also reported intermittent tremors with the last episode being approximately a year ago. Daughter indicated tremors began worsening a few days before the hallucinations. Patient denied any loss of consciousness, recent injury, vomiting, diarrhea, and abdominal pain. Patient denied chest pain and shortness of breath. In ED EKG was noted to have right bundle branch block with wide-complex QRS with heart rate ranging from 30-60 and cardiology was consulted from ED for asymptomatic bradycardia. Urinalysis indicated WBC too numerous to count, RBC 50-75, and the presence of yeast.  CT head indicated no acute intracranial abnormality with right sided otomastoiditis.   Patient was admitted to the hospitalist service for altered mental status with cardiology consult for bradycardia. Patient was started on Rocephin for UTI given gentle IV hydration overnight. Today patient is alert and oriented no further complaint of tremor or altered mental status including hallucinations. Urine culture indicates growth to Young requiring further incubation. Diflucan added for yeast coverage. PT/OT ordered. Awaiting cardiology recommendations. Patient monitored on telemetry overnight indicates atrial fibrillation with rate ranging between 80 and 100. Patient denies chest pain and shortness of breath. Review of Systems:   Review of Systems   Constitutional: Negative for activity change, appetite change, chills and fever. Respiratory: Negative for chest tightness and shortness of breath. Cardiovascular: Negative for chest pain, palpitations and leg swelling. Gastrointestinal: Negative for abdominal pain, constipation, diarrhea, nausea and vomiting. Genitourinary: Positive for hematuria. Negative for difficulty urinating and dysuria. Musculoskeletal: Negative for back pain and gait problem. Neurological: Negative for dizziness, tremors and headaches. Psychiatric/Behavioral:        Patient and family report no further hallucinations or tremor       14 point review of systems is negative except as specifically addressed above. Objective:   VITALS:  /74   Pulse 94   Temp 98 °F (36.7 °C) (Temporal)   Resp 16   Ht 5' 2\" (1.575 m)   Wt 106 lb 9 oz (48.3 kg)   SpO2 97%   BMI 19.49 kg/m²   24HR INTAKE/OUTPUT:    Intake/Output Summary (Last 24 hours) at 4/30/2022 1252  Last data filed at 4/30/2022 0800  Gross per 24 hour   Intake 1645 ml   Output --   Net 1645 ml       Physical Exam  Vitals reviewed. Constitutional:       Appearance: She is not ill-appearing. HENT:      Head: Normocephalic. Mouth/Throat:      Mouth: Mucous membranes are moist.      Pharynx: Oropharynx is clear.    Eyes: Pupils: Pupils are equal, round, and reactive to light. Cardiovascular:      Rate and Rhythm: Normal rate. Rhythm irregular. Pulses: Normal pulses. Heart sounds: Normal heart sounds. Pulmonary:      Effort: Pulmonary effort is normal.      Breath sounds: Normal breath sounds. Abdominal:      General: Bowel sounds are normal. There is no distension. Palpations: Abdomen is soft. Tenderness: There is no abdominal tenderness. There is no guarding. Musculoskeletal:         General: Normal range of motion. Cervical back: Normal range of motion and neck supple. Right lower leg: No edema. Left lower leg: No edema. Skin:     General: Skin is warm and dry. Capillary Refill: Capillary refill takes less than 2 seconds. Neurological:      Mental Status: She is alert and oriented to person, place, and time. Motor: No weakness. Medications:      sodium chloride      sodium chloride 75 mL/hr at 04/30/22 0427      sodium chloride flush  5-40 mL IntraVENous 2 times per day    cefTRIAXone (ROCEPHIN) IV  1,000 mg IntraVENous Q24H    levothyroxine  88 mcg Oral Daily    cetirizine  10 mg Oral Daily    midodrine  10 mg Oral TID    propafenone  225 mg Oral 3 times per day    rivaroxaban  15 mg Oral Daily     sodium chloride flush, sodium chloride, ondansetron **OR** ondansetron, polyethylene glycol, acetaminophen **OR** acetaminophen  ADULT DIET; Easy to Chew     Lab and other Data:     Recent Labs     04/29/22  1030 04/30/22  0323   WBC 4.8 5.1   HGB 11.5* 9.9*   * 351     Recent Labs     04/29/22  1030 04/30/22  0323    141   K 3.8 4.1    106   CO2 28 24   BUN 21 23   CREATININE 1.0* 0.9   GLUCOSE 106 68*     Recent Labs     04/29/22  1030   AST 15   ALT 9   BILITOT 0.3   ALKPHOS 48     Troponin T:   Recent Labs     04/29/22  1030   TROPONINI <0.01     Pro-BNP: No results for input(s): BNP in the last 72 hours.   INR:   Recent Labs 04/29/22  1030   INR 1.07     UA:  Recent Labs     04/29/22  1240   COLORU DARK YELLOW*   PHUR 5.5   WBCUA TNTC*   RBCUA 50-75*   YEAST Present*   BACTERIA TRACE*   CLARITYU TURBID*   SPECGRAV 1.023   LEUKOCYTESUR LARGE*   UROBILINOGEN 1.0   BILIRUBINUR Negative   BLOODU LARGE*   GLUCOSEU Negative     A1C: No results for input(s): LABA1C in the last 72 hours. ABG:No results for input(s): PHART, XIU0SRO, PO2ART, LVZ2PYF, BEART, HGBAE, M7PSOIPZ, CARBOXHGBART in the last 72 hours. RAD:   CT HEAD WO CONTRAST    Result Date: 4/29/2022  Impression: No acute intracranial abnormality. Advanced atrophy and chronic small vessel white matter ischemic changes. Right-sided otomastoiditis. Signed by Dr Leslie Armenta. [place]       Echo: pending report  Micro:   Culture, Urine  Order: [id number]   Status: Preliminary result     Visible to patient: No (not released)     Next appt: 10/07/2022 at 10:30 AM in Primary Care (Rukhsana Marquez DO)    Specimen Information: Urine, clean catch         0 Result Notes    Component 4/29/22 1240   Urine Culture, Routine Growth too young, additional incubation required [address] Lab             Narrative  Performed by: [address] [place] [address] Lab  ORDER#: [id number]                          ORDERED BY: MANUEL Nunez   SOURCE: Urine Clean Catch                  COLLECTED:  04/29/22 12:40   ANTIBIOTICS AT [facility]. :                      RECEIVED :  04/29/22 12:47      Specimen Collected: 04/29/22 12:40 Last Resulted: 04/30/22 08:20               Assessment/Plan   Principal Problem:    Altered mental status/Infectious encephalopathy/UTI (urinary tract infection)              -NS at 75cc/hr              -follow blood cultures              -follow urine culture, growth too young              -follow cbc/bmp              -rocephin 1g iv q24hrs              -fall precautions              -magnesium and phosphorus              -monitor I's and O's              -daily weights -neuro checks    -diflucan for yeast coverage    Active Problems:      Paroxysmal A-fib/Anticoagulate/Abnormal EKG              -telemetry              -cards consult              -continue propafenone              -continue rivaroxaban        Chronic obstructive pulmonary disease ([facility] [address]              -monitor for supplemental oxygen requirements              -incentive spirometry q4hrs wa          Acquired hypothyroidism              -tsh              -continue synthroid    Antibiotic: Rocephin     DVT Prophylaxis: ROCAEL Presley - CNP, 4/30/2022 12:52 PM

## 2022-04-30 NOTE — PROGRESS NOTES
Physician Progress Note      Reba Denson  CSN #:                  [id number]  :                       1932  ADMIT DATE:       2022 10:17 AM  DISCH DATE:  RESPONDING  PROVIDER #:        Terrell COTE        QUERY TEXT:    Type of Anemia: Please provide further specificity, if known. Clinical indicators include: rbc, hgb, platelets, blood transfusion, cancer,   hematuria, hct, hemorrhage, anemia  Options provided:  -- Anemia due to acute blood loss  -- Anemia due to chronic blood loss  -- Anemia due to iron deficiency  -- Anemia due to postoperative blood loss  -- Anemia due to chronic disease  -- Other - I will add my own diagnosis  -- Disagree - Not applicable / Not valid  -- Disagree - Clinically Unable to determine / Unknown        PROVIDER RESPONSE TEXT:    The patient has anemia due to chronic disease.       Electronically signed by:  Terrell COTE 2022 2:03 PM

## 2022-05-01 VITALS
WEIGHT: 106.56 LBS | BODY MASS INDEX: 19.61 KG/M2 | RESPIRATION RATE: 18 BRPM | OXYGEN SATURATION: 99 % | HEART RATE: 80 BPM | DIASTOLIC BLOOD PRESSURE: 70 MMHG | SYSTOLIC BLOOD PRESSURE: 140 MMHG | TEMPERATURE: 97.1 F | HEIGHT: 62 IN

## 2022-05-01 LAB
ANION GAP SERPL CALCULATED.3IONS-SCNC: 9 MMOL/L (ref 7–19)
BASOPHILS ABSOLUTE: 0 K/UL (ref 0–0.2)
BASOPHILS RELATIVE PERCENT: 0.7 % (ref 0–1)
BUN BLDV-MCNC: 17 MG/DL (ref 8–23)
CALCIUM SERPL-MCNC: 8.8 MG/DL (ref 8.8–10.2)
CHLORIDE BLD-SCNC: 108 MMOL/L (ref 98–111)
CO2: 23 MMOL/L (ref 22–29)
CREAT SERPL-MCNC: 0.7 MG/DL (ref 0.5–0.9)
EOSINOPHILS ABSOLUTE: 0.3 K/UL (ref 0–0.6)
EOSINOPHILS RELATIVE PERCENT: 6.1 % (ref 0–5)
GFR AFRICAN AMERICAN: >59
GFR NON-AFRICAN AMERICAN: >60
GLUCOSE BLD-MCNC: 83 MG/DL (ref 74–109)
HCT VFR BLD CALC: 31 % (ref 37–47)
HEMOGLOBIN: 9.7 G/DL (ref 12–16)
IMMATURE GRANULOCYTES #: 0 K/UL
LYMPHOCYTES ABSOLUTE: 1.4 K/UL (ref 1.1–4.5)
LYMPHOCYTES RELATIVE PERCENT: 31.4 % (ref 20–40)
MCH RBC QN AUTO: 32.3 PG (ref 27–31)
MCHC RBC AUTO-ENTMCNC: 31.3 G/DL (ref 33–37)
MCV RBC AUTO: 103.3 FL (ref 81–99)
MONOCYTES ABSOLUTE: 0.5 K/UL (ref 0–0.9)
MONOCYTES RELATIVE PERCENT: 10.3 % (ref 0–10)
NEUTROPHILS ABSOLUTE: 2.3 K/UL (ref 1.5–7.5)
NEUTROPHILS RELATIVE PERCENT: 51.3 % (ref 50–65)
ORGANISM: ABNORMAL
PDW BLD-RTO: 14.7 % (ref 11.5–14.5)
PLATELET # BLD: 320 K/UL (ref 130–400)
PMV BLD AUTO: 9.3 FL (ref 9.4–12.3)
POTASSIUM REFLEX MAGNESIUM: 3.9 MMOL/L (ref 3.5–5)
RBC # BLD: 3 M/UL (ref 4.2–5.4)
SODIUM BLD-SCNC: 140 MMOL/L (ref 136–145)
URINE CULTURE, ROUTINE: ABNORMAL
URINE CULTURE, ROUTINE: ABNORMAL
WBC # BLD: 4.6 K/UL (ref 4.8–10.8)

## 2022-05-01 PROCEDURE — 2580000003 HC RX 258: Performed by: NURSE PRACTITIONER

## 2022-05-01 PROCEDURE — A4216 STERILE WATER/SALINE, 10 ML: HCPCS | Performed by: NURSE PRACTITIONER

## 2022-05-01 PROCEDURE — 6360000002 HC RX W HCPCS: Performed by: NURSE PRACTITIONER

## 2022-05-01 PROCEDURE — 96361 HYDRATE IV INFUSION ADD-ON: CPT

## 2022-05-01 PROCEDURE — 96376 TX/PRO/DX INJ SAME DRUG ADON: CPT

## 2022-05-01 PROCEDURE — 85025 COMPLETE CBC W/AUTO DIFF WBC: CPT

## 2022-05-01 PROCEDURE — 6370000000 HC RX 637 (ALT 250 FOR IP): Performed by: NURSE PRACTITIONER

## 2022-05-01 PROCEDURE — G0378 HOSPITAL OBSERVATION PER HR: HCPCS

## 2022-05-01 PROCEDURE — 80048 BASIC METABOLIC PNL TOTAL CA: CPT

## 2022-05-01 PROCEDURE — 36415 COLL VENOUS BLD VENIPUNCTURE: CPT

## 2022-05-01 PROCEDURE — 93246 EXT ECG>7D<15D RECORDING: CPT

## 2022-05-01 RX ORDER — FLUCONAZOLE 200 MG/1
200 TABLET ORAL DAILY
Qty: 12 TABLET | Refills: 0 | Status: SHIPPED | OUTPATIENT
Start: 2022-05-02 | End: 2022-05-14

## 2022-05-01 RX ORDER — CEFDINIR 300 MG/1
300 CAPSULE ORAL 2 TIMES DAILY
Qty: 10 CAPSULE | Refills: 0 | Status: SHIPPED | OUTPATIENT
Start: 2022-05-01 | End: 2022-05-06

## 2022-05-01 RX ADMIN — LEVOTHYROXINE SODIUM 88 MCG: 88 TABLET ORAL at 05:40

## 2022-05-01 RX ADMIN — PROPAFENONE HYDROCHLORIDE 225 MG: 225 TABLET, FILM COATED ORAL at 15:06

## 2022-05-01 RX ADMIN — FLUCONAZOLE 200 MG: 100 TABLET ORAL at 09:38

## 2022-05-01 RX ADMIN — MIDODRINE HYDROCHLORIDE 10 MG: 10 TABLET ORAL at 15:05

## 2022-05-01 RX ADMIN — PROPAFENONE HYDROCHLORIDE 225 MG: 225 TABLET, FILM COATED ORAL at 05:39

## 2022-05-01 RX ADMIN — SODIUM CHLORIDE 1000 MG: 9 INJECTION INTRAMUSCULAR; INTRAVENOUS; SUBCUTANEOUS at 14:25

## 2022-05-01 RX ADMIN — MIDODRINE HYDROCHLORIDE 10 MG: 10 TABLET ORAL at 09:37

## 2022-05-01 RX ADMIN — CETIRIZINE HYDROCHLORIDE 10 MG: 10 TABLET, FILM COATED ORAL at 09:38

## 2022-05-01 NOTE — DISCHARGE INSTR - DIET
Good nutrition is important when healing from an illness, injury, or surgery. Follow any nutrition recommendations given to you during your hospital stay. If you were given an oral nutrition supplement while in the hospital, continue to take this supplement at home. You can take it with meals, in-between meals, and/or before bedtime. These supplements can be purchased at most local grocery stores, pharmacies, and chain [employer]stores. If you have any questions about your diet or nutrition, call the hospital and ask for the dietitian.         Resume diet as prior to hospitalization

## 2022-05-01 NOTE — DISCHARGE SUMMARY
Keke Jara  :  1932  MRN:  [id number]    Admit date:  2022  Discharge date:  2022    Discharging Physician:  Dr. Claudean Darby Directive: Full Code    Consults: Nathan Dennis     Primary Care Physician:  Kyra Hughes,     Discharge Diagnoses:  Principal Problem:    Altered mental status  Active Problems:    Anticoagulated    Hallucinations    Abnormal EKG    Infectious encephalopathy    Paroxysmal A-fib (HCC)    Gastroesophageal reflux disease    Chronic obstructive pulmonary disease (HCC)    CAD (coronary artery disease)    Anemia    UTI (urinary tract infection)    Acquired hypothyroidism  Resolved Problems:    * No resolved hospital problems. *      Portions of this note have been copied forward, however, changed to reflect the most current clinical status of this patient. Hospital Course: The patient is a 49-year-old female with a past medical history of atrial fibrillation, CAD, COPD, hypothyroidism, kidney stones, and hypertension who presented to [facility] ED with complaints of hallucinations and tremor. On admission daughter provided HPI and indicated the patient had had 2 separate 10-day courses of antibiotics for UTI. Patient had completed all of her antibiotics and had been off of them for approximately 8 days. Though she had finished her antibiotics patient continued to have blood in her urine. Not before admission patient began having hallucinations and seeing \" bugs. \"  Family also reported intermittent tremors with the last episode being approximately a year ago. Daughter indicated tremors began worsening a few days before the hallucinations. Patient denied any loss of consciousness, recent injury, vomiting, diarrhea, and abdominal pain. Patient denied chest pain and shortness of breath.   In ED EKG was noted to have right bundle branch block with wide-complex QRS with heart rate ranging from 30-60 and cardiology was consulted from ED for asymptomatic bradycardia. Urinalysis indicated WBC too numerous to count, RBC 50-75, and the presence of yeast.  CT head indicated no acute intracranial abnormality with right sided otomastoiditis. Patient was admitted to the hospitalist service for altered mental status with cardiology consult for bradycardia. Patient was started on Rocephin for UTI given gentle IV hydration overnight. Today patient is alert and oriented no further complaint of tremor or altered mental status including hallucinations. Urine culture indicated candida albicans. After further chart review this is common occurrence for this patient. There was rare bacteria and no bacterial growth on urine culture. Diflucan added for yeast coverage. No witnessed tremor or hallucination during admission. Cardiology recommended Echo, which indicated mild tricuspid regurgitation, severely dilated left atrium, left ventricular EF estimated at 50%. Patient monitored on telemetry overnight indicates atrial fibrillation with rate ranging between 70 and 100. Patient denies chest pain and shortness of breath. Zio patch at discharge with cardiology follow-up. Patient is now medically stable to be discharged home with close follow-up by cardiology and PCP.     Significant Diagnostic Studies:   ECHO Complete 2D W Doppler W Color    Result Date: 4/30/2022  Transthoracic Echocardiography Report (TTE)  Demographics   Patient Name  Yuli Welch Date of Study         04/29/2022   MRN           [id number]              Gender                Female   Date of Birth 01/11/1932          Room Number           EZO-0765   Age           80 year(s)   Height:       62 inches           Referring Physician   Melanie Cano MD   Weight:       117 pounds          Sonographer           Marie Caceres [facility]   BSA:          1.52 m^2            Interpreting          Melanie Cano MD                                    Physician   BMI:          21.4 kg/m^2  Procedure Type of Study   TTE procedure:ECHO NO CONTRAST WITH DOP/COLR. Study Location: Echo Lab Technical Quality: Adequate visualization Patient Status: Inpatient Rhythm: Within normal limits HR: 92 bpm BP: 123/78 mmHg Indications:Abnormal ECG. Conclusions   Summary  Mitral valve leaflets are mildly thickened with preserved leaflet  mobility. Mild mitral regurgitation is present. Mildly thickened aortic valve leaflets with preserved leaflet mobility. Tricuspid valve is structurally normal.  Mild tricuspid regurgitation with estimated RVSP of 33 mm Hg. Severely dilated left atrium. Left ventricular ejection fraction is visually estimated at 50%. Normal left ventricular wall thickness. No regional wall motion abnormalities. Moderately enlarged right atrium size. Mildly enlarged right ventricle cavity. Signature   ----------------------------------------------------------------  Electronically signed by Alex Saldivar MD(Interpreting  physician) on 04/30/2022 11:35 PM  ----------------------------------------------------------------   Findings   Mitral Valve  Mitral valve leaflets are mildly thickened with preserved leaflet  mobility. Mild mitral regurgitation is present. Aortic Valve  Mildly thickened aortic valve leaflets with preserved leaflet mobility. Tricuspid Valve  Tricuspid valve is structurally normal.  Mild tricuspid regurgitation with estimated RVSP of 33 mm Hg. Pulmonic Valve  The pulmonic valve was not well visualized . Trace pulmonic regurgitation present. Left Atrium  Severely dilated left atrium. Left Ventricle  Left ventricular ejection fraction is visually estimated at 50%. Normal left ventricular wall thickness. No regional wall motion abnormalities. Right Atrium  Moderately enlarged right atrium size. Right Ventricle  Mildly enlarged right ventricle cavity. Pericardial Effusion  No evidence of significant pericardial effusion is noted.    Pleural Effusion  No evidence of pleural effusion. Allergies   - Penicillins. 2D Measurements and Calculations(cm)   LVIDd: 4.54 cm                       LVIDs: 3.44 cm  IVSd: 0.87 cm  LVPWd: 0.92 cm                       AO Root Dimension: 3 cm  % Ejection Fraction: 50 %            LA Dimension: 4.5 cm  LA Volume: 110 ml                    LA Area: 29.5 cm^2  LA Volume Index: 72 ml/m^2           LV Systolic dimension: 1.36PP  LV dimension: 4.54 cm                LV PW diastolic: 1.97FG                                       LVOT diameter: 2 cm  LVEDV: 49.8 ml                       RA Systolic pressure: 3mmHg  LVESV:28 ml                          LVEDVI: 33 ml/m^2  Cardic Output (CO): 3.58l/min        LVESVI: 18 ml/m^2  Doppler Measurements and Calculations   AV Peak Velocity:115 cm/s  AV Mean Velocity:80.7 cm/s                      MV Mean velocity:  AV Peak Gradient: 5.29 mmHg  AV Mean Gradient: 3 mmHg  AV Area (Continuity):1.96 cm^2  AV VTI:19.9 cm/s  TR Velocity:276 cm/s  TR Gradient:30.47 mmHg  Estimated RAP:3 mmHg  RVSP:33 mmHg   MV E' septal velocity: 4.57cm/s  MV E' lateral velocity:7.62 cm/s      CT HEAD WO CONTRAST    Result Date: 4/29/2022  EXAMINATION: CT HEAD WO CONTRAST 4/29/2022 12:52 PM HISTORY: Altered mental status, confusion, shaking spells. Generalized weakness. Previous fall in January 2022. DOSE: 741 mGycm. Automatic exposure control was utilized in an effort to use as little radiation as possible, without compromising image quality. REPORT: Axial CT of the head was performed without contrast, reconstructed sagittal and coronal images are also reviewed. COMPARISON: CT head without contrast 10/8/2017. No intracranial hemorrhage, mass or mass effect is identified. There is mild ventriculomegaly and mild to moderate diffuse atrophy. There is mildly decreased attenuation in the periventricular and subcortical white matter tracts compatible chronic small vessel white matter ischemic disease.  Review of bone windows is remarkable for confluent effusion in the right mastoid air cells compatible with mastoiditis, there also appears to be fluid within the right middle ear cavity diffusely. No acute fracture is identified. .    Impression: No acute intracranial abnormality. Advanced atrophy and chronic small vessel white matter ischemic changes. Right-sided otomastoiditis. Signed by Dr Leslie Armenta. [place]      Pertinent Labs:   CBC:   Recent Labs     04/29/22  1030 04/30/22  0323 05/01/22  0251   WBC 4.8 5.1 4.6*   HGB 11.5* 9.9* 9.7*   * 351 320     BMP:    Recent Labs     04/29/22  1030 04/30/22  0323 05/01/22  0251    141 140   K 3.8 4.1 3.9    106 108   CO2 28 24 23   BUN 21 23 17   CREATININE 1.0* 0.9 0.7   GLUCOSE 106 68* 83     INR:   Recent Labs     04/29/22  1030   INR 1.07       Physical Exam:  Vital Signs: /68   Pulse 80   Temp 97.1 °F (36.2 °C) (Temporal)   Resp 18   Ht 5' 2\" (1.575 m)   Wt 106 lb 9 oz (48.3 kg)   SpO2 99%   BMI 19.49 kg/m²   Physical Exam  Vitals reviewed. Constitutional:       Appearance: She is not ill-appearing. HENT:      Head: Normocephalic. Mouth/Throat:      Mouth: Mucous membranes are moist.      Pharynx: Oropharynx is clear. Eyes:      Pupils: Pupils are equal, round, and reactive to light. Cardiovascular:      Rate and Rhythm: Normal rate. Rhythm irregular. Pulses: Normal pulses. Heart sounds: Normal heart sounds. Pulmonary:      Effort: Pulmonary effort is normal.      Breath sounds: Normal breath sounds. Abdominal:      General: Bowel sounds are normal. There is no distension. Palpations: Abdomen is soft. Tenderness: There is no abdominal tenderness. There is no guarding. Musculoskeletal:         General: Normal range of motion. Cervical back: Normal range of motion and neck supple. Right lower leg: No edema. Left lower leg: No edema. Skin:     General: Skin is warm and dry.       Capillary Refill: Capillary refill takes less than 2 seconds. Neurological:      Mental Status: She is alert and oriented to person, place, and time. Motor: No weakness. Discharge Medications:         Medication List      START taking these medications    cefdinir 300 MG capsule  Commonly known as: OMNICEF  Take 1 capsule by mouth 2 times daily for 5 days     fluconazole 200 MG tablet  Commonly known as: DIFLUCAN  Take 1 tablet by mouth daily for 12 doses  Start taking on: May 2, 2022        CHANGE how you take these medications    furosemide 20 MG tablet  Commonly known as: LASIX  Take 1 tablet by mouth daily  What changed:   · when to take this  · reasons to take this     potassium chloride 20 MEQ extended release tablet  Commonly known as: KLOR-CON M  Take 1 tablet by mouth daily  What changed: additional instructions        CONTINUE taking these medications    CALTRATE 600+D PO     Claritin 10 MG tablet  Generic drug: loratadine     Cranberry 1000 MG Caps     diazePAM 2 MG tablet  Commonly known as: VALIUM     Folic Acid 0.8 MG Caps     HYDROcodone-acetaminophen 5-325 MG per tablet  Commonly known as: NORCO     levothyroxine 88 MCG tablet  Commonly known as: SYNTHROID  Take 1 tablet by mouth Daily     midodrine 10 MG tablet  Commonly known as: PROAMATINE  Take 1 tablet by mouth 3 times daily     omeprazole 10 MG delayed release capsule  Commonly known as: PRILOSEC  Take 1 capsule by mouth daily     propafenone 225 MG tablet  Commonly known as: RYTHMOL  TAKE ONE TABLET BY MOUTH EVERY EIGHT HOURS.     rivaroxaban 15 MG Tabs tablet  Commonly known as: Xarelto  TAKE 1 TABLET DAILY WITH BREAKFAST        STOP taking these medications    meclizine 12.5 MG tablet  Commonly known as: ANTIVERT     meclizine 25 MG tablet  Commonly known as: ANTIVERT           Where to Get Your Medications      These medications were sent to [facility]. [address]  [facility] [address], [place] [address]    Phone: [phone]   · cefdinir 300 MG capsule  · fluconazole 200 MG tablet         Discharge Instructions: Follow up with Homa Devonte, DO in 5-7 days. Take medications as directed. Resume activity as tolerated. Diet: ADULT DIET; Easy to Chew     Disposition: Patient is Stableand will be discharged to Home. Time spent on discharge 33 minutes spent in assessing patient, reviewing medications, discussion with nursing, confirming safe discharge plan and preparation of discharge summary.     Signed:  ROCAEL Spencer CNP, 5/1/2022 4:25 PM

## 2022-05-02 ENCOUNTER — TELEPHONE (OUTPATIENT)
Dept: CARDIOLOGY CLINIC | Age: 87
End: 2022-05-02

## 2022-05-02 ENCOUNTER — TELEPHONE (OUTPATIENT)
Dept: PRIMARY CARE CLINIC | Age: 87
End: 2022-05-02

## 2022-05-02 NOTE — TELEPHONE ENCOUNTER
Rodger Ibanez called to reschedule a [facility] HFU . The pt was seen in the ED for a UTI and had a heart episode during testing. The pt has been given a Zio patch and need to follow up in 2 weeks. The pt was discharged on 05/01. [facility] unable to schedule at the [facility] location within the recommended time frame. Please be advised that the best time to call her to accommodate their needs is Anytime. Thank you.

## 2022-05-02 NOTE — TELEPHONE ENCOUNTER
[facility] Initial Follow Up Call    Outreach made within 2 business days of discharge: Yes    Patient: Sully Calderón Patient : 1932   MRN: [id number]  Reason for Admission: There are no discharge diagnoses documented for the most recent discharge. Discharge Date: 22       Spoke with: pts daughter    Discharge department/facility: [address] [place] [address] Interactive Patient Contact:  Was patient able to fill all prescriptions: Yes  Was patient instructed to bring all medications to the follow-up visit: Yes  Is patient taking all medications as directed in the discharge summary?  Yes  Does patient understand their discharge instructions: Yes  Does patient have questions or concerns that need addressed prior to 7-14 day follow up office visit: no   made her a follow up  Scheduled appointment with PCP within 7-14 days    Follow Up  Future Appointments   Date Time Provider Serge Copeland   2022  [address] ROCAEL Stephens RMA [facility]   10/7/2022 10:30 AM [address] [facility],[address], DO Amanda King [facility]   2023  1:00 PM ROCAEL Lema RMA [facility]       Wyatt Bentley MA

## 2022-05-03 ENCOUNTER — APPOINTMENT (OUTPATIENT)
Dept: GENERAL RADIOLOGY | Age: 87
End: 2022-05-03
Payer: MEDICARE

## 2022-05-03 ENCOUNTER — APPOINTMENT (OUTPATIENT)
Dept: CT IMAGING | Age: 87
End: 2022-05-03
Payer: MEDICARE

## 2022-05-03 ENCOUNTER — HOSPITAL ENCOUNTER (EMERGENCY)
Age: 87
Discharge: HOME | End: 2022-05-03
Attending: EMERGENCY MEDICINE
Payer: MEDICARE

## 2022-05-03 ENCOUNTER — TELEPHONE (OUTPATIENT)
Dept: PRIMARY CARE CLINIC | Age: 87
End: 2022-05-03

## 2022-05-03 VITALS
SYSTOLIC BLOOD PRESSURE: 152 MMHG | OXYGEN SATURATION: 94 % | HEART RATE: 52 BPM | DIASTOLIC BLOOD PRESSURE: 76 MMHG | RESPIRATION RATE: 22 BRPM | TEMPERATURE: 97.7 F

## 2022-05-03 DIAGNOSIS — R82.81 PYURIA: Primary | ICD-10-CM

## 2022-05-03 DIAGNOSIS — R25.1 TREMOR: ICD-10-CM

## 2022-05-03 DIAGNOSIS — N20.0 STAGHORN CALCULUS: ICD-10-CM

## 2022-05-03 DIAGNOSIS — K43.9 VENTRAL HERNIA WITHOUT OBSTRUCTION OR GANGRENE: ICD-10-CM

## 2022-05-03 LAB
ALBUMIN SERPL-MCNC: 4 G/DL (ref 3.5–5.2)
ALP BLD-CCNC: 48 U/L (ref 35–104)
ALT SERPL-CCNC: 10 U/L (ref 5–33)
ANION GAP SERPL CALCULATED.3IONS-SCNC: 10 MMOL/L (ref 7–19)
AST SERPL-CCNC: 15 U/L (ref 5–32)
BACTERIA: ABNORMAL /HPF
BASOPHILS ABSOLUTE: 0 K/UL (ref 0–0.2)
BASOPHILS RELATIVE PERCENT: 0.3 % (ref 0–1)
BILIRUB SERPL-MCNC: 0.4 MG/DL (ref 0.2–1.2)
BILIRUBIN URINE: NEGATIVE
BLOOD, URINE: ABNORMAL
BUN BLDV-MCNC: 12 MG/DL (ref 8–23)
CALCIUM SERPL-MCNC: 9.9 MG/DL (ref 8.8–10.2)
CHLORIDE BLD-SCNC: 106 MMOL/L (ref 98–111)
CLARITY: ABNORMAL
CO2: 27 MMOL/L (ref 22–29)
COLOR: YELLOW
CREAT SERPL-MCNC: 0.8 MG/DL (ref 0.5–0.9)
EKG P AXIS: -45 DEGREES
EKG P-R INTERVAL: 284 MS
EKG Q-T INTERVAL: 476 MS
EKG QRS DURATION: 168 MS
EKG QTC CALCULATION (BAZETT): 455 MS
EKG T AXIS: -147 DEGREES
EOSINOPHILS ABSOLUTE: 0.2 K/UL (ref 0–0.6)
EOSINOPHILS RELATIVE PERCENT: 2.8 % (ref 0–5)
EPITHELIAL CELLS, UA: ABNORMAL /HPF
GFR AFRICAN AMERICAN: >59
GFR NON-AFRICAN AMERICAN: >60
GLUCOSE BLD-MCNC: 115 MG/DL (ref 74–109)
GLUCOSE URINE: NEGATIVE MG/DL
HCT VFR BLD CALC: 34.3 % (ref 37–47)
HEMOGLOBIN: 10.9 G/DL (ref 12–16)
IMMATURE GRANULOCYTES #: 0 K/UL
KETONES, URINE: ABNORMAL MG/DL
LACTIC ACID: 1.7 MMOL/L (ref 0.5–1.9)
LEUKOCYTE ESTERASE, URINE: ABNORMAL
LIPASE: 13 U/L (ref 13–60)
LYMPHOCYTES ABSOLUTE: 1 K/UL (ref 1.1–4.5)
LYMPHOCYTES RELATIVE PERCENT: 12.7 % (ref 20–40)
MCH RBC QN AUTO: 33.4 PG (ref 27–31)
MCHC RBC AUTO-ENTMCNC: 31.8 G/DL (ref 33–37)
MCV RBC AUTO: 105.2 FL (ref 81–99)
MONOCYTES ABSOLUTE: 0.7 K/UL (ref 0–0.9)
MONOCYTES RELATIVE PERCENT: 8.3 % (ref 0–10)
NEUTROPHILS ABSOLUTE: 5.9 K/UL (ref 1.5–7.5)
NEUTROPHILS RELATIVE PERCENT: 75.4 % (ref 50–65)
NITRITE, URINE: NEGATIVE
PDW BLD-RTO: 14.8 % (ref 11.5–14.5)
PH UA: 7 (ref 5–8)
PLATELET # BLD: 361 K/UL (ref 130–400)
PMV BLD AUTO: 9.8 FL (ref 9.4–12.3)
POTASSIUM REFLEX MAGNESIUM: 4.5 MMOL/L (ref 3.5–5)
PROTEIN UA: 100 MG/DL
RBC # BLD: 3.26 M/UL (ref 4.2–5.4)
RBC UA: ABNORMAL /HPF (ref 0–2)
SODIUM BLD-SCNC: 143 MMOL/L (ref 136–145)
SPECIFIC GRAVITY UA: 1.01 (ref 1–1.03)
TOTAL PROTEIN: 6 G/DL (ref 6.6–8.7)
UROBILINOGEN, URINE: 1 E.U./DL
WBC # BLD: 7.9 K/UL (ref 4.8–10.8)
WBC UA: ABNORMAL /HPF (ref 0–5)

## 2022-05-03 PROCEDURE — 87086 URINE CULTURE/COLONY COUNT: CPT

## 2022-05-03 PROCEDURE — 2580000003 HC RX 258: Performed by: EMERGENCY MEDICINE

## 2022-05-03 PROCEDURE — 81001 URINALYSIS AUTO W/SCOPE: CPT

## 2022-05-03 PROCEDURE — 83690 ASSAY OF LIPASE: CPT

## 2022-05-03 PROCEDURE — 6360000004 HC RX CONTRAST MEDICATION: Performed by: EMERGENCY MEDICINE

## 2022-05-03 PROCEDURE — 85025 COMPLETE CBC W/AUTO DIFF WBC: CPT

## 2022-05-03 PROCEDURE — 80053 COMPREHEN METABOLIC PANEL: CPT

## 2022-05-03 PROCEDURE — 36415 COLL VENOUS BLD VENIPUNCTURE: CPT

## 2022-05-03 PROCEDURE — 93010 ELECTROCARDIOGRAM REPORT: CPT | Performed by: INTERNAL MEDICINE

## 2022-05-03 PROCEDURE — 99285 EMERGENCY DEPT VISIT HI MDM: CPT

## 2022-05-03 PROCEDURE — 74177 CT ABD & PELVIS W/CONTRAST: CPT

## 2022-05-03 PROCEDURE — 6370000000 HC RX 637 (ALT 250 FOR IP): Performed by: EMERGENCY MEDICINE

## 2022-05-03 PROCEDURE — 71045 X-RAY EXAM CHEST 1 VIEW: CPT

## 2022-05-03 PROCEDURE — 83605 ASSAY OF LACTIC ACID: CPT

## 2022-05-03 PROCEDURE — 93005 ELECTROCARDIOGRAM TRACING: CPT | Performed by: EMERGENCY MEDICINE

## 2022-05-03 RX ORDER — PROPAFENONE HYDROCHLORIDE 225 MG/1
225 TABLET, FILM COATED ORAL ONCE
Status: COMPLETED | OUTPATIENT
Start: 2022-05-03 | End: 2022-05-03

## 2022-05-03 RX ORDER — MIDODRINE HYDROCHLORIDE 10 MG/1
10 TABLET ORAL ONCE
Status: COMPLETED | OUTPATIENT
Start: 2022-05-03 | End: 2022-05-03

## 2022-05-03 RX ORDER — SODIUM CHLORIDE, SODIUM LACTATE, POTASSIUM CHLORIDE, AND CALCIUM CHLORIDE .6; .31; .03; .02 G/100ML; G/100ML; G/100ML; G/100ML
1000 INJECTION, SOLUTION INTRAVENOUS ONCE
Status: COMPLETED | OUTPATIENT
Start: 2022-05-03 | End: 2022-05-03

## 2022-05-03 RX ADMIN — PROPAFENONE HYDROCHLORIDE 225 MG: 225 TABLET, FILM COATED ORAL at 14:17

## 2022-05-03 RX ADMIN — MIDODRINE HYDROCHLORIDE 10 MG: 10 TABLET ORAL at 14:17

## 2022-05-03 RX ADMIN — SODIUM CHLORIDE, SODIUM LACTATE, POTASSIUM CHLORIDE, AND CALCIUM CHLORIDE 1000 ML: 600; 310; 30; 20 INJECTION, SOLUTION INTRAVENOUS at 12:20

## 2022-05-03 RX ADMIN — IOPAMIDOL 90 ML: 755 INJECTION, SOLUTION INTRAVENOUS at 12:25

## 2022-05-03 ASSESSMENT — ENCOUNTER SYMPTOMS
CONSTIPATION: 0
VOMITING: 0
ABDOMINAL PAIN: 0
APNEA: 0
SORE THROAT: 0
BLOOD IN STOOL: 0
EYE DISCHARGE: 0
DIARRHEA: 0
SINUS PRESSURE: 0
VOICE CHANGE: 0
NAUSEA: 0
COUGH: 0
SHORTNESS OF BREATH: 0
FACIAL SWELLING: 0
CHOKING: 0

## 2022-05-03 NOTE — ED NOTES
Pt's daughter states pt was discharged home on oral abx but started having a fever and \"the shakes\" this AM. Pt's daughter states she called pt's primary who suggested they come back to the ER.       Gume Barragan RN  05/03/22 [id number]

## 2022-05-03 NOTE — TELEPHONE ENCOUNTER
Patient daughter called in stating she is running a fever, will not eat and believes she has a UTI. Spoke with Dr. Kenneth Nichole, he would like her to come get urine culture.

## 2022-05-03 NOTE — ED PROVIDER NOTES
[facility] EMERGENCY DEPT  eMERGENCY dEPARTMENT eNCOUnter      Pt Name: Chandan Franco  MRN: [id number]  [place] 1/11/1932  Date of evaluation: 5/3/2022  Provider: Tyrone Esqueda MD    [address] [place] [address]       Chief Complaint   Patient presents with    Fatigue     Recently treated for UTI and went home feeling much better, new weakness and tremmor started two days ago with low grade fever. Afebrile at this time         HISTORY OF PRESENT ILLNESS   (Location/Symptom, Timing/Onset,Context/Setting, Quality, Duration, Modifying Factors, Severity)  Note limiting factors. Chandan Franco is a 80 y.o. female who presents to the emergency department valuation of vomiting shaking and urinary tract infection. 29-year-old female recently discharged 2 days ago. With persistent or recurrent urinary tract infection. She was doing well when she left the hospital.  She has been eating and drinking at home. But the day she developed shaking to the point she could not transfer to her wheelchair. Temperature was only 99.5. No acetaminophen is being given and her temp is back down on arrival.  She has had loose bowels. She is not complaining of any significant pain. She lives at home and her daughter takes good care of her. The history is provided by the patient, a relative and medical records. NursingNotes were reviewed. REVIEW OF SYSTEMS    (2-9 systems for level 4, 10 or more for level 5)     Review of Systems   Constitutional: Positive for chills and fever. HENT: Negative for congestion, drooling, facial swelling, nosebleeds, sinus pressure, sore throat and voice change. Eyes: Negative for discharge. Respiratory: Negative for apnea, cough, choking and shortness of breath. Cardiovascular: Negative for chest pain and leg swelling. Gastrointestinal: Negative for abdominal pain, blood in stool, constipation, diarrhea, nausea and vomiting. Genitourinary: Positive for enuresis. Negative for dysuria. Musculoskeletal: Negative for joint swelling. Skin: Negative for rash and wound. Neurological: Negative for seizures and syncope. Psychiatric/Behavioral: Negative for behavioral problems, hallucinations and suicidal ideas. All other systems reviewed and are negative. A complete review of systems was performed and is negative except as noted above in the HPI.        PAST MEDICAL HISTORY     Past Medical History:   Diagnosis Date    A-fib ([address]     Abnormal weight loss     Anemia     Arthritis     Bullae     CAD (coronary artery disease)     Conjunctivitis     COPD [facility]chronic obstructive pulmonary disease) ([facility]     Depression     Fatigue     History of blood transfusion 2017    post op broken hip    Hypertension     Hypothyroidism     Kidney infection     Kidney stone     Leukopenia     Leukopenia     Low back pain     Mild CAD 05/15/2017    Osteoporosis     Palliative care patient 06/04/2021    Paroxysmal A-fib ([facility])     Prolonged emergence from general anesthesia     Sinus pause     10/2014    Thyroid disease     Urinary tract infection     Weakness          SURGICAL HISTORY       Past Surgical History:   Procedure Laterality Date    BACK SURGERY      BACK SURGERY      CARDIAC CATHETERIZATION  8/14/14  [address]    Mild, non-occlusive CAD, EF 60%    CYSTOSCOPY Left 8/31/2017    CYSTOSCOPY; LEFT URETEROSCOPY; LEFT URETERAL LASER LITHOTRIPSY AND STONE EXTRACTION; INSERTION LEFT URETERAL DOUBLE J STENT performed by Essie Santana MD at [facility] Bilateral 12/14/2017    CYSTOSCOPY STENT INSERTION performed by Essie Santana MD at [place] [address] Right 10/11/2017    FEMUR IM NAIL MICHELLE INSERTION performed by Priyank Staley DO at [address] [place] [address]      HIP SURGERY      HYSTERECTOMY      KNEE ARTHROPLASTY      x 2    LAMINECTOMY      2 lumbar segments    FL CYSTO/URETERO/PYELOSCOPY W/LITHOTRIPSY Left 1/24/2018    LITHOTRIPSY LASER performed by Shantell Sandhu MD at [facility] CYSTO/URETERO/PYELOSCOPY, DX Bilateral 1/24/2018    CYSTOSCOPY BILATERAL URETEROSCOPIES WITH STENT REMOVAL AND RETROGRADE PYELOGRAMS WITH BILATERAL STONE MANIPULATION AND RETRIEVAL  PLACEMENT OF DOUBLE J URETERAL STENTS BILATERAL performed by Shantell Sandhu MD at [facility] STENT PLACEMENT           CURRENT MEDICATIONS       Previous Medications    CALCIUM CARBONATE-VITAMIN D (CALTRATE 600+D PO)    Take 600 mg by mouth 2 times daily. CEFDINIR (OMNICEF) 300 MG CAPSULE    Take 1 capsule by mouth 2 times daily for 5 days    CRANBERRY 1000 MG CAPS    Take 1 tablet by mouth daily    DIAZEPAM (VALIUM) 2 MG TABLET    Take 2 mg by mouth every 8 hours as needed for Anxiety. FLUCONAZOLE (DIFLUCAN) 200 MG TABLET    Take 1 tablet by mouth daily for 12 doses    FOLIC ACID 0.8 MG CAPS    Take 1 tablet by mouth daily     FUROSEMIDE (LASIX) 20 MG TABLET    Take 1 tablet by mouth daily    HYDROCODONE-ACETAMINOPHEN (NORCO) 5-325 MG PER TABLET    Take 1 tablet by mouth every 6 hours as needed for Pain. LEVOTHYROXINE (SYNTHROID) 88 MCG TABLET    Take 1 tablet by mouth Daily    LORATADINE (CLARITIN) 10 MG TABLET    Take 10 mg by mouth daily    MIDODRINE (PROAMATINE) 10 MG TABLET    Take 1 tablet by mouth 3 times daily    OMEPRAZOLE (PRILOSEC) 10 MG DELAYED RELEASE CAPSULE    Take 1 capsule by mouth daily    POTASSIUM CHLORIDE (KLOR-CON M) 20 MEQ EXTENDED RELEASE TABLET    Take 1 tablet by mouth daily    PROPAFENONE (RYTHMOL) 225 MG TABLET    TAKE ONE TABLET BY MOUTH EVERY EIGHT HOURS.     RIVAROXABAN (XARELTO) 15 MG TABS TABLET    TAKE 1 TABLET DAILY WITH BREAKFAST       ALLERGIES     Nitrofuran derivatives, Quinine derivatives, Sulfa antibiotics, and Pcn [penicillins]    FAMILY HISTORY       Family History   Problem Relation Age of Onset    Cancer Father         lung    Stroke Mother         mini    Hypotension Mother           SOCIAL HISTORY       Social History     Socioeconomic History    Marital status:      Spouse name: Not on file    Number of children: Not on file    Years of education: Not on file    Highest education level: Not on file   Occupational History    Not on file   Tobacco Use    Smoking status: Never Smoker    Smokeless tobacco: Never Used   Vaping Use    Vaping Use: Never used   Substance and Sexual Activity    Alcohol use: No    Drug use: No    Sexual activity: Yes   Other Topics Concern    Not on file   Social History Narrative    Not on file     Social Determinants of Health     Financial Resource Strain: Low Risk     Difficulty of Paying Living Expenses: Not hard at all   Food Insecurity: No Food Insecurity    Worried About [address] in the Last Year: Never true    [address] [place] in the Last Year: Never true   Transportation Needs:     Lack of Transportation (Medical): Not on file    Lack of Transportation (Non-Medical):  Not on file   Physical Activity: Inactive    Days of Exercise per Week: 0 days    Minutes of Exercise per Session: 0 min   Stress:     Feeling of Stress : Not on file   Social Connections:     Frequency of Communication with Friends and Family: Not on file    Frequency of Social Gatherings with Friends and Family: Not on file    Attends Orthodox Services: Not on file    Active Member of Clubs or Organizations: Not on file    Attends Club or Organization Meetings: Not on file    Marital Status: Not on file   Intimate Partner Violence:     Fear of Current or Ex-Partner: Not on file    Emotionally Abused: Not on file    Physically Abused: Not on file    Sexually Abused: Not on file   Housing Stability:     Unable to Pay for Housing in the Last Year: Not on file    Number of [place] in the Last Year: Not on file    Unstable Housing in the Last Year: Not on file       SCREENINGS    [place] Coma Scale  Eye Opening: Spontaneous  Best Verbal Response: Oriented  Best Motor Response: Obeys commands  [place] Coma Scale Score: 15        PHYSICAL EXAM    (up to 7 for level 4, 8 or more for level 5)     ED Triage Vitals   BP Temp Temp src Pulse Resp SpO2 Height Weight   -- -- -- -- -- -- -- --       Physical Exam  Vitals and nursing note reviewed. Constitutional:       General: She is not in acute distress. Appearance: She is well-developed. Comments: She appears her stated age   HENT:      Head: Normocephalic and atraumatic. Right Ear: External ear normal.      Left Ear: External ear normal.      Nose: Nose normal.      Mouth/Throat:      Mouth: Mucous membranes are moist.      Pharynx: Oropharynx is clear. Eyes:      General: No scleral icterus. Conjunctiva/sclera: Conjunctivae normal.      Pupils: Pupils are equal, round, and reactive to light. Cardiovascular:      Rate and Rhythm: Normal rate and regular rhythm. Pulses: Normal pulses. Heart sounds: Normal heart sounds. No murmur heard. Pulmonary:      Effort: Pulmonary effort is normal. No respiratory distress. Breath sounds: Normal breath sounds. Abdominal:      General: Bowel sounds are normal.      Palpations: Abdomen is soft. Tenderness: There is no abdominal tenderness. Musculoskeletal:         General: Normal range of motion. Cervical back: Normal range of motion and neck supple. Skin:     General: Skin is warm and dry. Coloration: Skin is not jaundiced or pale. Neurological:      General: No focal deficit present. Mental Status: She is alert and oriented to person, place, and time. Psychiatric:         Mood and Affect: Mood normal.         Behavior: Behavior normal.         DIAGNOSTIC RESULTS     EKG: All EKG's are interpreted by the Emergency Department Physician who either signs or Co-signs this chart in the absence of a cardiologist.    Atrial fibrillation rate 48. QTc 548.   Same rhythm based on EKG 4/29/2022  RADIOLOGY: Non-plain film images such as CT, Ultrasound and MRI are read by the radiologist. Plainradiographic images are visualized and preliminarily interpreted by the emergency physician with the below findings:    I reviewed the images and results. Interpretation per the Radiologist below, if available at the time of this note:    CT ABDOMEN PELVIS W IV CONTRAST Additional Contrast? None   Final Result   1. Chronic atrophy of the left kidney with associated mild   hydronephrosis and staghorn calculi are present in the left inferior   pole collecting structures and extending into the renal pelvis, the   largest stone measures approximately 28 x 13 mm. Urothelial thickening   of the upper left renal pelvis is suspicious for UTI. The left ureter   is decompressed. 2. Hepatic steatosis. 3. There is a low midline ventral hernia just superior to the pubic   symphysis which contains fat and a nonobstructed small bowel loop. The   hernia sac measures 5.5 cm. Previously, the hernia only contained fat. 4. Multiple chronic lumbar compression deformities most likely   insufficiency fractures. These appear stable. 5. Trace bilateral pleural effusions with mild bibasilar atelectasis. Signed by Dr Minoo Puckett. [place]      XR CHEST PORTABLE   Final Result   1. Hypoventilation. Vascular crowding. 2. Mild patchy infiltrate left lung base likely represents   atelectasis. Pneumonia is also considered given the patient's history. 3. Heart size is borderline.    Signed by Dr Libby Diallo            ED BEDSIDE ULTRASOUND:   Performed by ED Physician - none    LABS:  Labs Reviewed   CBC WITH AUTO DIFFERENTIAL - Abnormal; Notable for the following components:       Result Value    RBC 3.26 (*)     Hemoglobin 10.9 (*)     Hematocrit 34.3 (*)     .2 (*)     MCH 33.4 (*)     MCHC 31.8 (*)     RDW 14.8 (*)     Neutrophils % 75.4 (*)     Lymphocytes % 12.7 (*)     Lymphocytes Absolute 1.0 (*)     All other components within normal limits   COMPREHENSIVE METABOLIC PANEL W/ REFLEX TO MG FOR LOW K - Abnormal; Notable for the following components:    Glucose 115 (*)     Total Protein 6.0 (*)     All other components within normal limits   URINALYSIS WITH REFLEX TO CULTURE - Abnormal; Notable for the following components:    Clarity, UA TURBID (*)     Ketones, Urine TRACE (*)     Blood, Urine LARGE (*)     Protein,  (*)     Leukocyte Esterase, Urine LARGE (*)     All other components within normal limits   MICROSCOPIC URINALYSIS - Abnormal; Notable for the following components:    WBC, UA TNTC (*)     RBC, UA 21-30 (*)     Bacteria, UA None Seen (*)     All other components within normal limits   CULTURE, URINE   LIPASE   LACTIC ACID       All other labs were within normal range or not returned as of this dictation. EMERGENCY DEPARTMENT COURSE and DIFFERENTIALDIAGNOSIS/MDM:   Vitals:    Vitals:    05/03/22 1015 05/03/22 1245 05/03/22 1330   BP: (!) 116/56 (!) 145/77 (!) 152/76   Pulse: (!) 45 (!) 47 52   Resp: 18 22 22   Temp: 97.7 °F (36.5 °C)     TempSrc: Oral     SpO2: 94%  94%       MDM  Number of Diagnoses or Management Options  Pyuria  Staghorn calculus  Tremor  Ventral hernia without obstruction or gangrene  Diagnosis management comments: I discussed the case with Leti Porras at urology group. Nothing looks emergent. I be happy to follow her up. Patient is 80years old so the intervention might be quite limited but no emergency exists. Quite a bit of pyuria present probably from the staghorn. She also has a soft reducible hernia. Again repair would be based on severity with her advanced age and comorbid condition. The daughter was concerned about her tremor she does have some essential tremor. I am sure it is worse when she is in pain or ill. Follow-up with her doctor to consider therapy. I suggest the patient finish her antibiotics at least till we get a follow-up culture.   Continue to watch for fevers. CONSULTS:  IP CONSULT TO UROLOGY    PROCEDURES:  Unless otherwise notedbelow, none     Procedures    FINAL IMPRESSION     1. Pyuria    2. Staghorn calculus    3. Ventral hernia without obstruction or gangrene    4.  Tremor          DISPOSITION/PLAN   DISPOSITION Decision To Discharge 05/03/2022 01:57:07 PM      PATIENT REFERRED TO:  @FUP@    DISCHARGE MEDICATIONS:  New Prescriptions    No medications on file          (Please note that portions of this note were completed with a voice recognition program.  Efforts were made to edit the dictations butoccasionally words are mis-transcribed.)    Tsering Mayes MD (electronically signed)  AttendingEmergency Physician          Amaya Rodas MD  [facility]/03/22 [facility] Keaton Chahal MD  05/03/22 9987

## 2022-05-03 NOTE — PROGRESS NOTES
Physician Progress Note      Zachary Hansen  CSN #:                  [id number]  :                       1932  ADMIT DATE:       2022 10:17 AM  Shashi Chinchilla DATE:        2022 6:28 PM  RESPONDING  PROVIDER #:        Nj Dalton MD          QUERY TEXT:    Pt admitted with hallucinations and tremor . Pt noted to have UTI, RBBB with   wide complex QRS and bradycardia. . If possible, please document in the   progress notes and discharge summary if you are evaluating and / or treating   any of the following: The medical record reflects the following:  Risk Factors: Recurrent UTI, Bradycardia,  Clinical Indicators: Afib, Urinalysis indicated WBC too numerous to count, RBC   50-75, and the presence of yeast.  Treatment: Discharged on Cefdinir 300 mg  b.i.d. Rocephin, IVF, Telemetry, IVF   75 ml/hr, Diflucan 200 mg po daily    Thank you    Temi Rodas RN, BSN, [facility]  [phone]  Options provided:  -- Metabolic encephalopathy  -- Toxic encephalopathy  -- Toxic metabolic encephalopathy  -- Delirium due to, Please specify cause. -- Delirium  -- Other - I will add my own diagnosis  -- Disagree - Not applicable / Not valid  -- Disagree - Clinically unable to determine / Unknown  -- Refer to Clinical Documentation Reviewer    PROVIDER RESPONSE TEXT:    This patient has toxic metabolic encephalopathy.     Query created by: Javier Guillermo on 5/3/2022 8:12 AM      Electronically signed by:  Nj Dalton MD 5/3/2022 12:29 PM

## 2022-05-03 NOTE — ED NOTES
Called Dr. Roque Hem office @ [phone]. They said that Dr. Zach Shah is out of town and Jennifer Duong will be calling us for him.       Alan Lyn  05/03/22 3424

## 2022-05-04 LAB
BLOOD CULTURE, ROUTINE: NORMAL
CULTURE, BLOOD 2: NORMAL

## 2022-05-04 NOTE — ADT AUTH CERT
Utilization Reviews         Urinary Tract Infection (UTI) - Care Day 2 (4/30/2022) by Nan Mora RN       Review Status Review Entered   Completed 5/3/2022 13:52      Criteria Review      Care Day: 2 Care Date: 4/30/2022 Level of Care: Inpatient Floor    Guideline Day 2    Clinical Status    (X) * Hypotension absent    5/3/2022 2:52 PM EDT by An Rojas      /74    (X) * Mental status at baseline    5/3/2022 2:52 PM EDT by Santos Whitfield Patient is alert and oriented today.  She is sitting up in bed doing crossword puzzle.  Patient and family deny any further hallucinations.    (X) * Afebrile or fever improved    5/3/2022 2:52 PM EDT by An Rojas      Temp 98 °    (X) * Vomiting absent or improved    Activity    (X) * Ambulatory    Routes    (X) IV fluids    5/3/2022 2:52 PM EDT by An Rojas      sodium chloride   sodium [id number] mL/hr at 04/30/22 0427    Interventions    (X) * Reversible urinary system abnormality (eg, obstruction, abscess) absent, addressed, or to be treated at next level of care    (X) WBC    5/3/2022 2:52 PM EDT by An Rojas      5.1    (X) Renal function tests    5/3/2022 2:52 PM EDT by An Rojas      23/0.9    Medications    (X) Antibiotics    5/3/2022 2:52 PM EDT by An Rojas      cefTRIAXone (ROCEPHIN) IV 1,000 [id number]    * Milestone   Additional Notes   CARE DAY 2    4/30/2022   LOC:INPATIENT       SUBJECTIVE: Patient is alert and oriented today.  She is sitting up in bed doing crossword puzzle.  Patient and family deny any further hallucinations.       Objective:   VITALS:  /74   Pulse 94   Temp 98 °F (36.7 °C) (Temporal)   Resp 16   Ht 5' 2\" (1.575 m)   Wt 106 lb 9 oz (48.3 kg)   SpO2 97%   BMI 19.49 kg/m²       Assessment/Plan   Principal Problem:     Altered mental status/Infectious encephalopathy/UTI (urinary tract infection)               -NS at 75cc/hr               -follow blood cultures               -follow urine culture, growth too young             -follow cbc/bmp               -rocephin 1g iv q24hrs               -fall precautions               -magnesium and phosphorus               -monitor I's and O's               -daily weights                -neuro checks                -diflucan for yeast coverage       Active Problems:       Paroxysmal A-fib/Anticoagulate/Abnormal EKG               -telemetry               -cards consult               -continue propafenone               -continue rivaroxaban           Chronic obstructive pulmonary disease ([address]               -monitor for supplemental oxygen requirements               -incentive spirometry q4hrs wa             Acquired hypothyroidism               -tsh               -continue synthroid       Antibiotic: Rocephin        DVT Prophylaxis: xarelto       Cardiology Consult   HPI: Ms. Antoinette Gould is a 80 y.o. female with a history of paroxysmal atrial fibrillation coronary artery disease COPD admitted 4/29/2022 with complaints of hallucination and tremor altered mental status.  Recently completed 2 separate 10-day courses of antibiotics for urinary tract infection urine culture positive for Klebsiella.  Daughter thought the last antibiotic completed with ciprofloxacin.  No reported chest pain denies significant dyspnea.  Asymptomatic bradycardia noted.  proBNP 2405.  All troponins negative. Assessment:   1. Admission 4/29/2022 for hallucinations and tremor reported altered mental status. 2. History of atrial fibrillation   3. 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 Chronic anticoagulation   5. Coronary artery disease   6. COPD   7. Hypothyroidism   8. History of kidney stones   9. Hypothyroidism   10. Hypertension. 11. Paroxysmal atrial fibrillation   12. Generalized weakness   13. Depression   14. Fatigue   15.  COPD   12. CT of the head yesterday no acute findings advanced atrophy and chronic small vessel white matter ischemic changes right-sided osteomastoiditis   17.  CT abdomen pelvis 12/11/2017 Marked improvement in size of left perinephric fluid accumulation or urinoma bilateral renal calculi           Recommendations:   1. Echocardiogram further comments after review

## 2022-05-05 LAB
ORGANISM: ABNORMAL
URINE CULTURE, ROUTINE: ABNORMAL
URINE CULTURE, ROUTINE: ABNORMAL

## 2022-05-09 ENCOUNTER — TELEMEDICINE (OUTPATIENT)
Dept: PRIMARY CARE CLINIC | Age: 87
End: 2022-05-09
Payer: MEDICARE

## 2022-05-09 DIAGNOSIS — R41.0 DELIRIUM, ACUTE: ICD-10-CM

## 2022-05-09 DIAGNOSIS — N30.01 ACUTE CYSTITIS WITH HEMATURIA: ICD-10-CM

## 2022-05-09 DIAGNOSIS — R25.1 TREMOR: ICD-10-CM

## 2022-05-09 DIAGNOSIS — N39.0 RECURRENT UTI: ICD-10-CM

## 2022-05-09 DIAGNOSIS — E87.6 HYPOKALEMIA: ICD-10-CM

## 2022-05-09 DIAGNOSIS — Z09 HOSPITAL DISCHARGE FOLLOW-UP: Primary | ICD-10-CM

## 2022-05-09 DIAGNOSIS — N12 PYELONEPHRITIS: ICD-10-CM

## 2022-05-09 DIAGNOSIS — R60.9 PERIPHERAL EDEMA: ICD-10-CM

## 2022-05-09 PROCEDURE — 99215 OFFICE O/P EST HI 40 MIN: CPT | Performed by: PEDIATRICS

## 2022-05-09 PROCEDURE — 1111F DSCHRG MED/CURRENT MED MERGE: CPT | Performed by: PEDIATRICS

## 2022-05-09 RX ORDER — CEFDINIR 300 MG/1
300 CAPSULE ORAL 2 TIMES DAILY
Qty: 20 CAPSULE | Refills: 0 | Status: SHIPPED | OUTPATIENT
Start: 2022-05-09 | End: 2022-05-15 | Stop reason: ALTCHOICE

## 2022-05-09 RX ORDER — CEPHALEXIN 500 MG/1
500 CAPSULE ORAL DAILY
Qty: 30 CAPSULE | Refills: 5 | Status: SHIPPED | OUTPATIENT
Start: 2022-05-09 | End: 2022-05-15 | Stop reason: ALTCHOICE

## 2022-05-09 RX ORDER — FUROSEMIDE 20 MG/1
20 TABLET ORAL DAILY
Qty: 60 TABLET | Refills: 3 | Status: SHIPPED | OUTPATIENT
Start: 2022-05-09 | End: 2022-08-09 | Stop reason: ALTCHOICE

## 2022-05-09 RX ORDER — PRAMIPEXOLE DIHYDROCHLORIDE 0.25 MG/1
0.25 TABLET ORAL NIGHTLY
Qty: 90 TABLET | Refills: 3 | Status: SHIPPED | OUTPATIENT
Start: 2022-05-09

## 2022-05-09 RX ORDER — POTASSIUM CHLORIDE 20 MEQ/1
20 TABLET, EXTENDED RELEASE ORAL DAILY
Qty: 30 TABLET | Refills: 5 | Status: SHIPPED | OUTPATIENT
Start: 2022-05-09

## 2022-05-09 ASSESSMENT — ENCOUNTER SYMPTOMS
WHEEZING: 0
EYE DISCHARGE: 0
CHEST TIGHTNESS: 0
SINUS PRESSURE: 0
SHORTNESS OF BREATH: 0
ABDOMINAL PAIN: 0
NAUSEA: 0
COUGH: 0
DIARRHEA: 0
BACK PAIN: 0
CONSTIPATION: 0
VOMITING: 0
SORE THROAT: 0

## 2022-05-09 NOTE — PATIENT INSTRUCTIONS
Patient Education        Urinary Tract Infection (UTI) in Women: Care Instructions  Overview     A urinary tract infection, or UTI, is a general term for an infection anywhere between the kidneys and the urethra (where urine comes out). Most UTIs arebladder infections. They often cause pain or burning when you urinate. UTIs are caused by bacteria and can be cured with antibiotics. Be sure tocomplete your treatment so that the infection does not get worse. Follow-up care is a key part of your treatment and safety. Be sure to make and go to all appointments, and call your doctor if you are having problems. It's also a good idea to know your test results and keep alist of the medicines you take. How can you care for yourself at home?  Take your antibiotics as directed. Do not stop taking them just because you feel better. You need to take the full course of antibiotics.  Drink extra water and other fluids for the next day or two. This will help make the urine less concentrated and help wash out the bacteria that are causing the infection. (If you have kidney, heart, or liver disease and have to limit fluids, talk with your doctor before you increase the amount of fluids you drink.)   Avoid drinks that are carbonated or have caffeine. They can irritate the bladder.  Urinate often. Try to empty your bladder each time.  To relieve pain, take a hot bath or lay a heating pad set on low over your lower belly or genital area. Never go to sleep with a heating pad in place. To prevent UTIs   Drink plenty of water each day. This helps you urinate often, which clears bacteria from your system. (If you have kidney, heart, or liver disease and have to limit fluids, talk with your doctor before you increase the amount of fluids you drink.)   Urinate when you need to.  If you are sexually active, urinate right after you have sex.  Change sanitary pads often.    Avoid douches, bubble baths, feminine hygiene sprays, and other feminine hygiene products that have deodorants.  After going to the bathroom, wipe from front to back. When should you call for help? Call your doctor now or seek immediate medical care if:     Symptoms such as fever, chills, nausea, or vomiting get worse or appear for the first time.      You have new pain in your back just below your rib cage. This is called flank pain.      There is new blood or pus in your urine.      You have any problems with your antibiotic medicine. Watch closely for changes in your health, and be sure to contact your doctor if:     You are not getting better after taking an antibiotic for 2 days.      Your symptoms go away but then come back. Where can you learn more? Go to https://DaylifepeDUNCAN & Toddeb.[email or web address] org and sign in to your [employer] account. Enter Y945 in the [employer] box to learn more about \"Urinary Tract Infection (UTI) in Women: Care Instructions. \"     If you do not have an account, please click on the \"Sign Up Now\" link. Current as of: October 18, 2021               Content Version: 13.2  © 9585-8567 [employer]. Care instructions adapted under license by [facility] ([facility]). If you have questions about a medical condition or this instruction, always ask your healthcare professional. [address] any warranty or liability for your use of this information.

## 2022-05-09 NOTE — PROGRESS NOTES
[address] [facility], [address]  Phone [phone]   Fax [phone]      OFFICE VISIT: 2022    Elizabeth Bach-: 1932      HPI  Reason For Visit:  Cedric Gibbons is a 80 y.o. No chief complaint on file. Patient presents via [employer] video conferencing on follow-up for hospital admission. She was admitted to [facility] from 2022 to 2022. She presented with altered mental status secondary to urinary tract infection and delirium. She was also experiencing hallucinations. vitals were not taken for this visit. There is no height or weight on file to calculate BMI. I have reviewed the following with the Ms. Bach   Lab Review  Admission on 2022, Discharged on 2022   Component Date Value    P-R Interval 2022 284     QRS Duration 2022 168     Q-T Interval 2022 476     QTc Calculation (Bazett) 2022 455     P Axis 2022 -45     T Axis 2022 -147     WBC 2022 7.9     RBC 2022 3.26*    Hemoglobin 2022 10.9*    Hematocrit 2022 34.3*    MCV 2022 105.2*    MCH 2022 33.4*    MCHC 2022 31.8*    RDW 2022 14.8*    Platelets  361     MPV 2022 9.8     Neutrophils % 2022 75.4*    Lymphocytes % 2022 12.7*    Monocytes % 2022 8.3     Eosinophils % 2022 2.8     Basophils % 2022 0.3     Neutrophils Absolute 2022 5.9     Immature Granulocytes # 2022 0.0     Lymphocytes Absolute 2022 1.0*    Monocytes Absolute 2022 0.70     Eosinophils Absolute 2022 0.20     Basophils Absolute 2022 0.00     Sodium 2022 143     Potassium reflex Magnesi* 2022 4.5     Chloride 2022 106     CO2 2022 27     Anion Gap 2022 10     Glucose 2022 115*    BUN 2022 12     CREATININE 2022 0.8     GFR Non- 05/03/2022 >60     GFR  05/03/2022 >59     Calcium 05/03/2022 9.9     Total Protein 05/03/2022 6.0*    Albumin 05/03/2022 4.0     Total Bilirubin 05/03/2022 0.4     Alkaline Phosphatase 05/03/2022 48     ALT 05/03/2022 10     AST 05/03/2022 15     Lipase 05/03/2022 13     Color, UA 05/03/2022 YELLOW     Clarity, UA 05/03/2022 TURBID*    Glucose, Ur 05/03/2022 Negative     Bilirubin Urine 05/03/2022 Negative     Ketones, Urine 05/03/2022 TRACE*    Specific Matheson, UA 05/03/2022 1.015     Blood, Urine 05/03/2022 LARGE*    pH, UA 05/03/2022 7.0     Protein, UA 05/03/2022 100*    Urobilinogen, Urine 05/03/2022 1.0     Nitrite, Urine 05/03/2022 Negative     Leukocyte Esterase, Urine 05/03/2022 LARGE*    Lactic Acid 05/03/2022 1.7     WBC, UA 05/03/2022 TNTC*    RBC, UA 05/03/2022 21-30*    Epithelial Cells, UA 05/03/2022 3-5     Bacteria, UA 05/03/2022 None Seen*    Urine Culture, Routine 05/03/2022 >50,000 CFU/ml*    Organism 05/03/2022 Candida albicans*    Urine Culture, Routine 05/03/2022                      Value: Moderate growth  No further workup     Admission on 04/29/2022, Discharged on 05/01/2022   Component Date Value    Color, UA 04/29/2022 DARK YELLOW*    Clarity, UA 04/29/2022 TURBID*    Glucose, Ur 04/29/2022 Negative     Bilirubin Urine 04/29/2022 Negative     Ketones, Urine 04/29/2022 TRACE*    Specific Matheson, UA 04/29/2022 1.023     Blood, Urine 04/29/2022 LARGE*    pH, UA 04/29/2022 5.5     Protein, UA 04/29/2022 300     Urobilinogen, Urine 04/29/2022 1.0     Nitrite, Urine 04/29/2022 Negative     Leukocyte Esterase, Urine 04/29/2022 LARGE*    WBC 04/29/2022 4.8     RBC 04/29/2022 3.56*    Hemoglobin 04/29/2022 11.5*    Hematocrit 04/29/2022 36.9*    MCV 04/29/2022 103.7*    MCH 04/29/2022 32.3*    MCHC 04/29/2022 31.2*    RDW 04/29/2022 14.6*    Platelets 45/74/6537 418*    MPV 04/29/2022 9.3*    Neutrophils % 04/29/2022 56.4     Lymphocytes % 04/29/2022 27.7     Monocytes % 04/29/2022 10.5*    Eosinophils % 04/29/2022 4.6     Basophils % 04/29/2022 0.6     Neutrophils Absolute 04/29/2022 2.7     Immature Granulocytes # 04/29/2022 0.0     Lymphocytes Absolute 04/29/2022 1.3     Monocytes Absolute 04/29/2022 0.50     Eosinophils Absolute 04/29/2022 0.20     Basophils Absolute 04/29/2022 0.00     Sodium 04/29/2022 140     Potassium reflex Magnesi* 04/29/2022 3.8     Chloride 04/29/2022 102     CO2 04/29/2022 28     Anion Gap 04/29/2022 10     Glucose 04/29/2022 106     BUN 04/29/2022 21     CREATININE 04/29/2022 1.0*    GFR Non- 04/29/2022 52*    GFR  04/29/2022 >59     Calcium 04/29/2022 10.2     Total Protein 04/29/2022 6.0*    Albumin 04/29/2022 4.0     Total Bilirubin 04/29/2022 0.3     Alkaline Phosphatase 04/29/2022 48     ALT 04/29/2022 9     AST 04/29/2022 15     TSH Reflex FT4 04/29/2022 2.29     Troponin 04/29/2022 <0.01     Pro-BNP 04/29/2022 2,405*    Protime 04/29/2022 13.8     INR 04/29/2022 1.07     aPTT 04/29/2022 28.4     QRS Duration 04/29/2022 182     Q-T Interval 04/29/2022 512     QTc Calculation (Bazett) 04/29/2022 493     T Axis 04/29/2022 -13     WBC, UA 04/29/2022 TNTC*    RBC, UA 04/29/2022 50-75*    Bacteria, UA 04/29/2022 TRACE*    Yeast, UA 04/29/2022 Present*    Crystals, UA 04/29/2022 NEG*    Urine Culture, Routine 04/29/2022 *                    Value:>50,000 CFU/ml  Mixed skin cherelle present      Organism 04/29/2022 Candida albicans*    Urine Culture, Routine 04/29/2022                      Value:Light growth  No further workup      Lactic Acid, Sepsis 04/29/2022 0.7     Lactic Acid, Sepsis 04/29/2022 0.9     Culture, Blood 2 04/29/2022 No growth after 5 days of incubation.  Blood Culture, Routine 04/29/2022 No growth after 5 days of incubation.      Magnesium 04/29/2022 1.9     Phosphorus 04/29/2022 3.2     Sodium 04/30/2022 141     Potassium reflex Magnesi* 04/30/2022 4.1     Chloride 04/30/2022 106     CO2 04/30/2022 24     Anion Gap 04/30/2022 11     Glucose 04/30/2022 68*    BUN 04/30/2022 23     CREATININE 04/30/2022 0.9     GFR Non- 04/30/2022 59*    GFR  04/30/2022 >59     Calcium 04/30/2022 9.1     WBC 04/30/2022 5.1     RBC 04/30/2022 3.06*    Hemoglobin 04/30/2022 9.9*    Hematocrit 04/30/2022 31.6*    MCV 04/30/2022 103.3*    MCH 04/30/2022 32.4*    MCHC 04/30/2022 31.3*    RDW 04/30/2022 14.8*    Platelets 41/10/3505 351     MPV 04/30/2022 10.0     Neutrophils % 04/30/2022 55.1     Lymphocytes % 04/30/2022 27.7     Monocytes % 04/30/2022 11.9*    Eosinophils % 04/30/2022 4.7     Basophils % 04/30/2022 0.4     Neutrophils Absolute 04/30/2022 2.8     Immature Granulocytes # 04/30/2022 0.0     Lymphocytes Absolute 04/30/2022 1.4     Monocytes Absolute 04/30/2022 0.60     Eosinophils Absolute 04/30/2022 0.20     Basophils Absolute 04/30/2022 0.00     Sodium 05/01/2022 140     Potassium reflex Magnesi* 05/01/2022 3.9     Chloride 05/01/2022 108     CO2 05/01/2022 23     Anion Gap 05/01/2022 9     Glucose 05/01/2022 83     BUN 05/01/2022 17     CREATININE 05/01/2022 0.7     GFR Non- 05/01/2022 >60     GFR  05/01/2022 >59     Calcium 05/01/2022 8.8     WBC 05/01/2022 4.6*    RBC 05/01/2022 3.00*    Hemoglobin 05/01/2022 9.7*    Hematocrit 05/01/2022 31.0*    MCV 05/01/2022 103.3*    MCH 05/01/2022 32.3*    MCHC 05/01/2022 31.3*    RDW 05/01/2022 14.7*    Platelets 74/94/1384 320     MPV 05/01/2022 9.3*    Neutrophils % 05/01/2022 51.3     Lymphocytes % 05/01/2022 31.4     Monocytes % 05/01/2022 10.3*    Eosinophils % 05/01/2022 6.1*    Basophils % 05/01/2022 0.7     Neutrophils Absolute 05/01/2022 2.3     Immature Granulocytes # 05/01/2022 0.0     Lymphocytes Absolute 05/01/2022 1.4     Monocytes Absolute 05/01/2022 0.50     Eosinophils Absolute 05/01/2022 0.30     Basophils Absolute 05/01/2022 0.00    Orders Only on 04/07/2022   Component Date Value    TSH 04/07/2022 2.500     T4 Free 04/07/2022 1.58     Cholesterol, Total 04/07/2022 202*    Triglycerides 04/07/2022 98     HDL 04/07/2022 87     LDL Calculated 04/07/2022 95     Sodium 04/07/2022 140     Potassium 04/07/2022 4.7     Chloride 04/07/2022 101     CO2 04/07/2022 22     Anion Gap 04/07/2022 17     Glucose 04/07/2022 86     BUN 04/07/2022 21     CREATININE 04/07/2022 0.9     GFR Non- 04/07/2022 59*    GFR  04/07/2022 >59     Calcium 04/07/2022 10.8*    Total Protein 04/07/2022 6.7     Albumin 04/07/2022 4.6     Total Bilirubin 04/07/2022 0.6     Alkaline Phosphatase 04/07/2022 62     ALT 04/07/2022 11     AST 04/07/2022 16     WBC 04/07/2022 6.0     RBC 04/07/2022 3.25*    Hemoglobin 04/07/2022 11.5*    Hematocrit 04/07/2022 35.1*    MCV 04/07/2022 108.0*    MCH 04/07/2022 35.4*    MCHC 04/07/2022 32.8*    RDW 04/07/2022 14.9*    Platelets 07/22/3815 380     MPV 04/07/2022 10.1     Neutrophils % 04/07/2022 64.7     Lymphocytes % 04/07/2022 24.0     Monocytes % 04/07/2022 7.6     Eosinophils % 04/07/2022 3.2     Basophils % 04/07/2022 0.3     Neutrophils Absolute 04/07/2022 3.9     Immature Granulocytes # 04/07/2022 0.0     Lymphocytes Absolute 04/07/2022 1.4     Monocytes Absolute 04/07/2022 0.50     Eosinophils Absolute 04/07/2022 0.20     Basophils Absolute 04/07/2022 0.00     Urine Culture, Routine 04/07/2022 >100,000 CFU/ml*    Organism 04/07/2022 Klebsiella pneumoniae*    Urine Culture, Routine 04/07/2022 Heavy growth     Organism 04/07/2022 Klebsiella pneumoniae*    Urine Culture, Routine 04/07/2022 Heavy growth 2nd     Microalbumin, Random Uri* 04/07/2022 220.70*    Creatinine, Ur 04/07/2022 117.1     Microalbumin Creatinine * 04/07/2022 1884.7 Office Visit on 04/07/2022   Component Date Value    Color, UA 04/07/2022 brown     Clarity, UA 04/07/2022 cloudy     Glucose, UA POC 04/07/2022 Neg     Bilirubin, UA 04/07/2022 small     Ketones, UA 04/07/2022 15     Spec Grav, UA 04/07/2022 1.025     Blood, UA POC 04/07/2022 large     pH, UA 04/07/2022 6.0     Protein, UA POC 04/07/2022 300     Urobilinogen, UA 04/07/2022 1.0     Leukocytes, UA 04/07/2022 Large     Nitrite, UA 04/07/2022 Positive     Appearance, Fluid 04/07/2022 Cloudy*     Copies of these are in the chart. Current Outpatient Medications   Medication Sig Dispense Refill    cefdinir (OMNICEF) 300 MG capsule Take 1 capsule by mouth 2 times daily for 10 days 20 capsule 0    furosemide (LASIX) 20 MG tablet Take 1 tablet by mouth daily 60 tablet 3    potassium chloride (KLOR-CON M) 20 MEQ extended release tablet Take 1 tablet by mouth daily 30 tablet 5    pramipexole (MIRAPEX) 0.25 MG tablet Take 1 tablet by mouth nightly 90 tablet 3    cephALEXin (KEFLEX) 500 MG capsule Take 1 capsule by mouth daily For uti prophylaxis once done taking cefdinir. 30 capsule 5    fluconazole (DIFLUCAN) 200 MG tablet Take 1 tablet by mouth daily for 12 doses 12 tablet 0    HYDROcodone-acetaminophen (NORCO) 5-325 MG per tablet Take 1 tablet by mouth every 6 hours as needed for Pain.  diazePAM (VALIUM) 2 MG tablet Take 2 mg by mouth every 8 hours as needed for Anxiety.       Folic Acid 0.8 MG CAPS Take 1 tablet by mouth daily       rivaroxaban (XARELTO) 15 MG TABS tablet TAKE 1 TABLET DAILY WITH BREAKFAST 90 tablet 3    propafenone (RYTHMOL) 225 MG tablet TAKE ONE TABLET BY MOUTH EVERY EIGHT HOURS. 270 tablet 3    Cranberry 1000 MG CAPS Take 1 tablet by mouth daily      levothyroxine (SYNTHROID) 88 MCG tablet Take 1 tablet by mouth Daily 90 tablet 3    omeprazole (PRILOSEC) 10 MG delayed release capsule Take 1 capsule by mouth daily 90 capsule 3    loratadine (CLARITIN) 10 MG tablet Take 10 mg by mouth daily      midodrine (PROAMATINE) 10 MG tablet Take 1 tablet by mouth 3 times daily 270 tablet 3    Calcium Carbonate-Vitamin D (CALTRATE 600+D PO) Take 600 mg by mouth 2 times daily. No current facility-administered medications for this visit.        Allergies: Nitrofuran derivatives, Quinine derivatives, Sulfa antibiotics, and Pcn [penicillins]     Past Medical History:   Diagnosis Date    A-fib ([address]     Abnormal weight loss     Anemia     Arthritis     Bullae     CAD (coronary artery disease)     Conjunctivitis     COPD [facility]ch[facility] obstructive [facility]) [facility])     Depression     Fatigue     History of blood transfusion 2017    post op broken hip    Hypertension     Hypothyroidism     Kidney infection     Kidney stone     Leukopenia     Leukopenia     Low back pain     Mild CAD 05/15/2017    Osteoporosis     Palliative care patient 06/04/2021    Paroxysmal A-fib ([facility])     Prolonged emergence from general anesthesia     Sinus pause     10/2014    Thyroid disease     Urinary tract infection     Weakness        Family History   Problem Relation Age of Onset    Cancer Father         lung    Stroke Mother         mini    Hypotension Mother        Past Surgical History:   Procedure Laterality Date    BACK SURGERY      BACK SURGERY      CARDIAC CATHETERIZATION  8/14/14  [address]    Mild, non-occlusive CAD, EF 60%    CYSTOSCOPY Left 8/31/2017    CYSTOSCOPY; LEFT URETEROSCOPY; LEFT URETERAL LASER LITHOTRIPSY AND STONE EXTRACTION; INSERTION LEFT URETERAL DOUBLE J STENT performed by Shree Coronado MD at [facility] Bilateral 12/14/2017    CYSTOSCOPY STENT INSERTION performed by Shree Coronado MD at [place] [address] Right 10/11/2017    FEMUR IM NAIL MICHELLE INSERTION performed by Kenny Prieto DO at [address]      HIP SURGERY      HYSTERECTOMY      KNEE ARTHROPLASTY      x 2    LAMINECTOMY      2 lumbar segments    NV CYSTO/URETERO/PYELOSCOPY W/LITHOTRIPSY Left 1/24/2018    LITHOTRIPSY LASER performed by Mary Ann Estrella MD at [facility] CYSTO/URETERO/PYELOSCOPY, DX Bilateral 1/24/2018    CYSTOSCOPY BILATERAL URETEROSCOPIES WITH STENT REMOVAL AND RETROGRADE PYELOGRAMS WITH BILATERAL STONE MANIPULATION AND RETRIEVAL  PLACEMENT OF DOUBLE J URETERAL STENTS BILATERAL performed by Mary Ann Estrella MD at [facility]         Social History     Tobacco Use    Smoking status: Never Smoker    Smokeless tobacco: Never Used   Substance Use Topics    Alcohol use: No          Post-Discharge Transitional Care Follow Up      Cony Bach   YOB: 1932    Date of Office Visit:  5/9/2022  Date of Hospital Admission: 5/3/22  Date of Hospital Discharge: 5/3/22  Readmission Risk Score (high >=14%. Medium >=10%):Readmission Risk Score: 13.4 ( )      Care management risk score Rising risk (score 2-5) and Complex Care (Scores >=6): 5     Non face to face  following discharge, date last encounter closed (first attempt may have been earlier): 5/2/2022 10:30 AM     Call initiated 2 business days of discharge: Yes     Hospital discharge follow-up  -     NV DISCHARGE MEDS RECONCILED W/ CURRENT OUTPATIENT MED LIST  Acute cystitis with hematuria  -     cefdinir (OMNICEF) 300 MG capsule; Take 1 capsule by mouth 2 times daily for 10 days, Disp-20 capsule, R-0Normal  Pyelonephritis  Delirium, acute  Recurrent UTI  -     cephALEXin (KEFLEX) 500 MG capsule; Take 1 capsule by mouth daily For uti prophylaxis once done taking cefdinir., Disp-30 capsule, R-5Normal  Peripheral edema  -     furosemide (LASIX) 20 MG tablet; Take 1 tablet by mouth daily, Disp-60 tablet, R-3Normal  -     Comprehensive Metabolic Panel; Future  -     CBC with Auto Differential; Future  Hypokalemia  -     potassium chloride (KLOR-CON M) 20 MEQ extended release tablet;  Take 1 tablet by mouth daily, Disp-30 tablet, R-5Normal  -     Comprehensive Metabolic Panel; Future  Tremor  -     pramipexole (MIRAPEX) 0.25 MG tablet; Take 1 tablet by mouth nightly, Disp-90 tablet, R-3Normal      Medical Decision Making: high complexity  Return in 1 month (on 6/9/2022) for 30. On this date 5/9/2022 I have spent 30+ minutes reviewing previous notes, test results and face to face with the patient discussing the diagnosis and importance of compliance with the treatment plan as well as documenting on the day of the visit. Subjective:   HPI  Patient presented to [facility] emergency department with complaints of extreme tremor and hallucinations. She had recently had 2 separate courses of antibiotics for urinary tract infection. She had completed her antibiotics for little over a week but she did continue to have some evidence of blood in her urine. Initially, she had intermittent tremors that were progressively worsening. This was followed by hallucinations of insects. Urinalysis in the emergency department showed TNTC white blood cells and large amount of red blood cells in urine as well as yeast.  CT scan of the head was unremarkable but CT scan of the abdomen and pelvis revealed a staghorn calculi in the left kidney without obstruction. There was urothelial thickening of the upper left renal pelvis suspicious for infection in the left kidney. She was treated with IV antibiotics. Urine culture also produced yeast species and She was started on Diflucan as well. Telemetry monitoring showed episodes of atrial fibrillation the rate controlled. She was also discharged with a Zio patch monitor. Inpatient course: Discharge summary reviewed- see chart. Interval history/Current status:   Since her discharge, she continues to have a tremor that is extremely limiting. She is unable to stand. She is unable to feed herself. She was given antibiotics for total of 5 days.   We are going to extend this given the extensive calculus and evidence of infection in the kidney. We are going to treat her tremor with Mirapex initially at bedtime. We are unable to use other medications due to interactions with propafenone. When she has completed her antibiotics we will need to put her on antibiotic prophylaxis. She is allergic to penicillin and sulfa and nitrofurantoin. We can use Keflex 500 mg daily as prophylaxis as she does tolerate cephalosporins. Principal diagnosis:   Urinary tract infection   Pyelonephritis   Staghorn calculus   Delirium    Secondary diagnoses:   Patient Active Problem List   Diagnosis    Paroxysmal A-fib ([address]    Mild CAD    Hx of fracture of right hip    Gastroesophageal reflux disease    Chronic obstructive pulmonary disease (HCC)    CAD (coronary artery disease)    Non-rheumatic tricuspid valve insufficiency    Thalassemia minor    Anemia    UTI (urinary tract infection)    Oral phase dysphagia    Acquired hypothyroidism    Pyelonephritis    Palliative care patient    Orthostatic hypotension    Peripheral edema    Altered mental status    Anticoagulated    Hallucinations    Abnormal EKG    Infectious encephalopathy       Medications listed as ordered at the time of discharge from hospital     Medication List          Accurate as of May 9, 2022  5:46 PM. If you have any questions, ask your nurse or doctor. START taking these medications    cefdinir 300 MG capsule  Commonly known as: OMNICEF  Take 1 capsule by mouth 2 times daily for 10 days     cephALEXin 500 MG capsule  Commonly known as: KEFLEX  Take 1 capsule by mouth daily For uti prophylaxis once done taking cefdinir.      pramipexole 0.25 MG tablet  Commonly known as: Mirapex  Take 1 tablet by mouth nightly        CHANGE how you take these medications    furosemide 20 MG tablet  Commonly known as: LASIX  Take 1 tablet by mouth daily  What changed:   · when to take this  · reasons to take this potassium chloride 20 MEQ extended release tablet  Commonly known as: KLOR-CON M  Take 1 tablet by mouth daily  What changed: additional instructions        CONTINUE taking these medications    CALTRATE 600+D PO     Claritin 10 MG tablet  Generic drug: loratadine     Cranberry 1000 MG Caps     diazePAM 2 MG tablet  Commonly known as: VALIUM     fluconazole 200 MG tablet  Commonly known as: DIFLUCAN  Take 1 tablet by mouth daily for 12 doses     Folic Acid 0.8 MG Caps     HYDROcodone-acetaminophen 5-325 MG per tablet  Commonly known as: NORCO     levothyroxine 88 MCG tablet  Commonly known as: SYNTHROID  Take 1 tablet by mouth Daily     midodrine 10 MG tablet  Commonly known as: PROAMATINE  Take 1 tablet by mouth 3 times daily     omeprazole 10 MG delayed release capsule  Commonly known as: PRILOSEC  Take 1 capsule by mouth daily     propafenone 225 MG tablet  Commonly known as: RYTHMOL  TAKE ONE TABLET BY MOUTH EVERY EIGHT HOURS.     rivaroxaban 15 MG Tabs tablet  Commonly known as: Xarelto  TAKE 1 TABLET DAILY WITH BREAKFAST           Where to Get Your Medications      These medications were sent to [address] [place] [address],[address]  [facility] [address] [place] [address]    Phone: [phone]   · cefdinir 300 MG capsule  · cephALEXin 500 MG capsule  · furosemide 20 MG tablet  · potassium chloride 20 MEQ extended release tablet  · pramipexole 0.25 MG tablet          Medications marked \"taking\" at this time  Outpatient Medications Marked as Taking for the 5/9/22 encounter (Telemedicine) with NAVYA Rich, DO   Medication Sig Dispense Refill    cefdinir (OMNICEF) 300 MG capsule Take 1 capsule by mouth 2 times daily for 10 days 20 capsule 0    furosemide (LASIX) 20 MG tablet Take 1 tablet by mouth daily 60 tablet 3    potassium chloride (KLOR-CON M) 20 MEQ extended release tablet Take 1 tablet by mouth daily 30 tablet 5    pramipexole (MIRAPEX) 0.25 MG tablet Take 1 tablet by mouth nightly 90 tablet 3    cephALEXin (KEFLEX) 500 MG capsule Take 1 capsule by mouth daily For uti prophylaxis once done taking cefdinir. 30 capsule 5        Medications patient taking as of now reconciled against medications ordered at time of hospital discharge: Yes    Review of Systems   Constitutional: Positive for fatigue. Negative for appetite change, chills and fever. HENT: Negative for congestion, ear discharge, ear pain, postnasal drip, sinus pressure and sore throat. Eyes: Negative for discharge and visual disturbance. Respiratory: Negative for cough, chest tightness, shortness of breath and wheezing. Cardiovascular: Negative for chest pain, palpitations and leg swelling. Gastrointestinal: Negative for abdominal pain, constipation, diarrhea, nausea and vomiting. Genitourinary: Positive for dysuria, flank pain and urgency. Negative for difficulty urinating and menstrual problem. Incontinence of urine. Musculoskeletal: Positive for arthralgias (mild diffuse). Negative for back pain, joint swelling and myalgias. Skin: Negative for rash. Neurological: Positive for dizziness, tremors (marked), syncope (mostly in the mornings), weakness and light-headedness. Negative for headaches. Hematological: Negative for adenopathy. Does not bruise/bleed easily. Psychiatric/Behavioral: Positive for confusion. Negative for sleep disturbance and suicidal ideas. The patient is not nervous/anxious. Objective: There were no vitals taken for this visit. Physical Exam  Physical exam was not performed today as this was a video teleconference visit using [place]      ASSESSMENT and PLAN      ICD-10-CM    1. Hospital discharge follow-up  Z09 PA DISCHARGE MEDS RECONCILED W/ CURRENT OUTPATIENT MED LIST    Medications were reconciled and records were reviewed in detail.      2. Acute cystitis with hematuria  N30.01 cefdinir (OMNICEF) 300 MG capsule  Antibiotics were extended given the CT evidence of pyelonephritis   3. Pyelonephritis  N12  continue with antibiotics with cefdinir   4. Delirium, acute  R41.0  This is resolved   5. Recurrent UTI  N39.0 cephALEXin (KEFLEX) 500 MG capsule  We will require antibiotic prophylaxis with staghorn calculus that is most likely infected. We will utilize Keflex 500 mg daily   6. Peripheral edema  R60.9 furosemide (LASIX) 20 MG tablet  She is needing a refill of her Lasix     Comprehensive Metabolic Panel     CBC with Auto Differential  We will need to check labs on follow-up   7. Hypokalemia  E87.6 potassium chloride (KLOR-CON M) 20 MEQ extended release tablet  Refill of potassium with Lasix. Monitor potassium     Comprehensive Metabolic Panel   8. Tremor  R25.1 pramipexole (MIRAPEX) 0.25 MG tablet  We are limited in medications that we can utilize for her tremor due to propafenone. We will try Mirapex once nightly and increase to twice daily if needed       An electronic signature was used to authenticate this note. --AMRIT Pyle, DO

## 2022-05-15 ENCOUNTER — APPOINTMENT (OUTPATIENT)
Dept: GENERAL RADIOLOGY | Age: 87
End: 2022-05-15
Payer: MEDICARE

## 2022-05-15 ENCOUNTER — HOSPITAL ENCOUNTER (EMERGENCY)
Age: 87
Discharge: HOME | End: 2022-05-15
Attending: EMERGENCY MEDICINE
Payer: MEDICARE

## 2022-05-15 VITALS
BODY MASS INDEX: 18.29 KG/M2 | DIASTOLIC BLOOD PRESSURE: 55 MMHG | HEART RATE: 41 BPM | WEIGHT: 100 LBS | OXYGEN SATURATION: 100 % | RESPIRATION RATE: 24 BRPM | TEMPERATURE: 97.2 F | SYSTOLIC BLOOD PRESSURE: 100 MMHG

## 2022-05-15 DIAGNOSIS — N39.0 URINARY TRACT INFECTION WITHOUT HEMATURIA, SITE UNSPECIFIED: ICD-10-CM

## 2022-05-15 DIAGNOSIS — J96.01 SEPSIS WITH ACUTE HYPOXIC RESPIRATORY FAILURE AND SEPTIC SHOCK, DUE TO UNSPECIFIED ORGANISM (HCC): Primary | ICD-10-CM

## 2022-05-15 DIAGNOSIS — R65.21 SEPSIS WITH ACUTE HYPOXIC RESPIRATORY FAILURE AND SEPTIC SHOCK, DUE TO UNSPECIFIED ORGANISM (HCC): Primary | ICD-10-CM

## 2022-05-15 DIAGNOSIS — A41.9 SEPSIS WITH ACUTE HYPOXIC RESPIRATORY FAILURE AND SEPTIC SHOCK, DUE TO UNSPECIFIED ORGANISM (HCC): Primary | ICD-10-CM

## 2022-05-15 DIAGNOSIS — Z51.5 HOSPICE CARE: ICD-10-CM

## 2022-05-15 LAB
ALBUMIN SERPL-MCNC: 3.8 G/DL (ref 3.5–5.2)
ALP BLD-CCNC: 60 U/L (ref 35–104)
ALT SERPL-CCNC: 11 U/L (ref 5–33)
ANION GAP SERPL CALCULATED.3IONS-SCNC: 14 MMOL/L (ref 7–19)
AST SERPL-CCNC: 21 U/L (ref 5–32)
BACTERIA: NEGATIVE /HPF
BASE EXCESS ARTERIAL: 5.5 MMOL/L (ref -2–2)
BASOPHILS ABSOLUTE: 0 K/UL (ref 0–0.2)
BASOPHILS RELATIVE PERCENT: 0.2 % (ref 0–1)
BILIRUB SERPL-MCNC: 0.5 MG/DL (ref 0.2–1.2)
BILIRUBIN URINE: NEGATIVE
BLOOD, URINE: ABNORMAL
BUN BLDV-MCNC: 41 MG/DL (ref 8–23)
CALCIUM SERPL-MCNC: 10.8 MG/DL (ref 8.8–10.2)
CARBOXYHEMOGLOBIN ARTERIAL: 2.1 % (ref 0–5)
CHLORIDE BLD-SCNC: 100 MMOL/L (ref 98–111)
CLARITY: ABNORMAL
CO2: 26 MMOL/L (ref 22–29)
COLOR: YELLOW
CREAT SERPL-MCNC: 1.2 MG/DL (ref 0.5–0.9)
CRYSTALS, UA: ABNORMAL /HPF
EOSINOPHILS ABSOLUTE: 0 K/UL (ref 0–0.6)
EOSINOPHILS RELATIVE PERCENT: 0 % (ref 0–5)
EPITHELIAL CELLS, UA: ABNORMAL /HPF
FIO2: 21 %
GFR AFRICAN AMERICAN: 51
GFR NON-AFRICAN AMERICAN: 42
GLUCOSE BLD-MCNC: 151 MG/DL (ref 74–109)
GLUCOSE URINE: NEGATIVE MG/DL
HCO3 ARTERIAL: 28.9 MMOL/L (ref 22–26)
HCT VFR BLD CALC: 36.1 % (ref 37–47)
HEMOGLOBIN, ART, EXTENDED: 11.3 G/DL (ref 12–16)
HEMOGLOBIN: 11.6 G/DL (ref 12–16)
IMMATURE GRANULOCYTES #: 0.1 K/UL
KETONES, URINE: 15 MG/DL
LACTIC ACID: 3.6 MMOL/L (ref 0.5–1.9)
LEUKOCYTE ESTERASE, URINE: ABNORMAL
LYMPHOCYTES ABSOLUTE: 1.2 K/UL (ref 1.1–4.5)
LYMPHOCYTES RELATIVE PERCENT: 9.1 % (ref 20–40)
MCH RBC QN AUTO: 32.9 PG (ref 27–31)
MCHC RBC AUTO-ENTMCNC: 32.1 G/DL (ref 33–37)
MCV RBC AUTO: 102.3 FL (ref 81–99)
METHEMOGLOBIN ARTERIAL: 0.9 %
MONOCYTES ABSOLUTE: 1.2 K/UL (ref 0–0.9)
MONOCYTES RELATIVE PERCENT: 9.1 % (ref 0–10)
NEUTROPHILS ABSOLUTE: 10.8 K/UL (ref 1.5–7.5)
NEUTROPHILS RELATIVE PERCENT: 81.1 % (ref 50–65)
NITRITE, URINE: NEGATIVE
O2 CONTENT ARTERIAL: 11.9 ML/DL
O2 SAT, ARTERIAL: 74.9 %
O2 THERAPY: ABNORMAL
PCO2 ARTERIAL: 37 MMHG (ref 35–45)
PDW BLD-RTO: 14.3 % (ref 11.5–14.5)
PH ARTERIAL: 7.5 (ref 7.35–7.45)
PH UA: 5.5 (ref 5–8)
PLATELET # BLD: 241 K/UL (ref 130–400)
PMV BLD AUTO: 10.4 FL (ref 9.4–12.3)
PO2 ARTERIAL: 40 MMHG (ref 80–100)
POTASSIUM REFLEX MAGNESIUM: 4.9 MMOL/L (ref 3.5–5)
POTASSIUM, WHOLE BLOOD: 4.5
PROTEIN UA: 30 MG/DL
RBC # BLD: 3.53 M/UL (ref 4.2–5.4)
RBC UA: ABNORMAL /HPF (ref 0–2)
SAMPLE SOURCE: ABNORMAL
SARS-COV-2, NAAT: NOT DETECTED
SODIUM BLD-SCNC: 140 MMOL/L (ref 136–145)
SPECIFIC GRAVITY UA: 1.02 (ref 1–1.03)
TOTAL PROTEIN: 6.4 G/DL (ref 6.6–8.7)
TROPONIN: <0.01 NG/ML (ref 0–0.03)
UROBILINOGEN, URINE: 1 E.U./DL
WBC # BLD: 13.4 K/UL (ref 4.8–10.8)
WBC UA: ABNORMAL /HPF (ref 0–5)

## 2022-05-15 PROCEDURE — 80053 COMPREHEN METABOLIC PANEL: CPT

## 2022-05-15 PROCEDURE — 96374 THER/PROPH/DIAG INJ IV PUSH: CPT

## 2022-05-15 PROCEDURE — 87086 URINE CULTURE/COLONY COUNT: CPT

## 2022-05-15 PROCEDURE — 85025 COMPLETE CBC W/AUTO DIFF WBC: CPT

## 2022-05-15 PROCEDURE — 6360000002 HC RX W HCPCS: Performed by: EMERGENCY MEDICINE

## 2022-05-15 PROCEDURE — 36415 COLL VENOUS BLD VENIPUNCTURE: CPT

## 2022-05-15 PROCEDURE — 71045 X-RAY EXAM CHEST 1 VIEW: CPT

## 2022-05-15 PROCEDURE — 87635 SARS-COV-2 COVID-19 AMP PRB: CPT

## 2022-05-15 PROCEDURE — 81001 URINALYSIS AUTO W/SCOPE: CPT

## 2022-05-15 PROCEDURE — 36600 WITHDRAWAL OF ARTERIAL BLOOD: CPT

## 2022-05-15 PROCEDURE — 83605 ASSAY OF LACTIC ACID: CPT

## 2022-05-15 PROCEDURE — 87186 SC STD MICRODIL/AGAR DIL: CPT

## 2022-05-15 PROCEDURE — 96361 HYDRATE IV INFUSION ADD-ON: CPT

## 2022-05-15 PROCEDURE — 82803 BLOOD GASES ANY COMBINATION: CPT

## 2022-05-15 PROCEDURE — 99285 EMERGENCY DEPT VISIT HI MDM: CPT

## 2022-05-15 PROCEDURE — 2580000003 HC RX 258: Performed by: EMERGENCY MEDICINE

## 2022-05-15 PROCEDURE — 93005 ELECTROCARDIOGRAM TRACING: CPT | Performed by: EMERGENCY MEDICINE

## 2022-05-15 PROCEDURE — 51702 INSERT TEMP BLADDER CATH: CPT

## 2022-05-15 PROCEDURE — 84484 ASSAY OF TROPONIN QUANT: CPT

## 2022-05-15 RX ORDER — CIPROFLOXACIN 250 MG/1
250 TABLET, FILM COATED ORAL 2 TIMES DAILY
Qty: 20 TABLET | Refills: 0 | Status: SHIPPED | OUTPATIENT
Start: 2022-05-15 | End: 2022-05-25

## 2022-05-15 RX ORDER — 0.9 % SODIUM CHLORIDE 0.9 %
1000 INTRAVENOUS SOLUTION INTRAVENOUS ONCE
Status: COMPLETED | OUTPATIENT
Start: 2022-05-15 | End: 2022-05-15

## 2022-05-15 RX ADMIN — WATER 1000 MG: 1 INJECTION INTRAMUSCULAR; INTRAVENOUS; SUBCUTANEOUS at 14:22

## 2022-05-15 RX ADMIN — SODIUM CHLORIDE 1000 ML: 9 INJECTION, SOLUTION INTRAVENOUS at 10:37

## 2022-05-15 ASSESSMENT — ENCOUNTER SYMPTOMS
BLOOD IN STOOL: 0
NAUSEA: 1
VOMITING: 1
ABDOMINAL PAIN: 0

## 2022-05-15 NOTE — ED NOTES
Spoke with Keiry at [facility] hospice, states they are ready for patient to be discharged home. [facility] called for transport home.      Zandra Kurtz RN  05/15/22 3643

## 2022-05-15 NOTE — ED NOTES
Pt daughter at bedside and expressed that patient is a DNR. Dr. Kavita Ray aware.      Simone Segovia, RN  05/15/22 1123

## 2022-05-15 NOTE — ED NOTES
Pt presents with family for c/o FTT. Pt has obvious weight loss per pt daughter who is present at bedside.      Jl Huber RN  05/15/22 3406

## 2022-05-15 NOTE — CONSULTS
SN contacted by Dr. Wilson Look concerning Hospice consult on this pt. Pt Dtr. Victoria Reid contacted and made aware of Hospice services and is agreeable. Pt is to discharge home from ER once IV ATB are infused. DME discussed and oxygen ordered for home through DME supply company to have in home. Comfort pack meds concierged to [facility] for Dr. Maty Palomino and Dtr will p/u at [address] at discharge. Simone Batista RN will be making admission visit once pt arrives at home. SN spoke with ER RN Monae Read and states pt will discharge home soon via EMS.

## 2022-05-15 NOTE — ED PROVIDER NOTES
[address] EMERGENCY DEPT  eMERGENCY dEPARTMENT eNCOUnter      Pt Name: Marci Rowe  MRN: [id number]  [place] 1/11/1932  Date of evaluation: 5/15/2022  Provider: Adelaide Baker MD    [address] [place] [address]       Chief Complaint   Patient presents with    Failure To Thrive         HISTORY OF PRESENT ILLNESS   (Location/Symptom, Timing/Onset,Context/Setting, Quality, Duration, Modifying Factors, Severity)  Note limiting factors. Marci Rowe is a 80 y.o. female who presents to the emergency department with vomiting and no urine output. 80-year-old female here with her daughter. Started vomiting Friday cannot tolerate even her teeth in her mouth for nausea. Right now she states she is not nauseated. Denies any passage of blood. But urine has decreased. No documented fevers. Patient is pleasantly demented and will tell you she is fine. Discussion with the daughter the patient seems critically ill. She is a DNR. We discussed hospice but want to get some data back on the patient see if we can understand what happened and what is going on to make further decision. Patient's not complaining of anything right now. Daughter states she never complains. The history is provided by the patient, a relative and medical records. NursingNotes were reviewed. REVIEW OF SYSTEMS    (2-9 systems for level 4, 10 or more for level 5)     Review of Systems   Unable to perform ROS: Dementia (Review of systems from the daughter.)   Constitutional: Negative for fever. Gastrointestinal: Positive for nausea and vomiting. Negative for abdominal pain and blood in stool. Genitourinary: Positive for decreased urine volume. Skin: Positive for rash. A complete review of systems was performed and is negative except as noted above in the HPI.        PAST MEDICAL HISTORY     Past Medical History:   Diagnosis Date    A-fib ([facility]     Abnormal weight loss     Anemia     Arthritis     Bullae     CAD (coronary artery disease)     Conjunctivitis     COPD (chronic obstructive pulmonary disease) (HCC)     Depression     Fatigue     History of blood transfusion 2017    post op broken hip    Hypertension     Hypothyroidism     Kidney infection     Kidney stone     Leukopenia     Leukopenia     Low back pain     Mild CAD 05/15/2017    Osteoporosis     Palliative care patient 06/04/2021    Paroxysmal A-fib (HCC)     Prolonged emergence from general anesthesia     Sinus pause     10/2014    Thyroid disease     Urinary tract infection     Weakness          SURGICAL HISTORY       Past Surgical History:   Procedure Laterality Date    BACK SURGERY      BACK SURGERY      CARDIAC CATHETERIZATION  8/14/14  [address]    Mild, non-occlusive CAD, EF 60%    CYSTOSCOPY Left 8/31/2017    CYSTOSCOPY; LEFT URETEROSCOPY; LEFT URETERAL LASER LITHOTRIPSY AND STONE EXTRACTION; INSERTION LEFT URETERAL DOUBLE J STENT performed by Adan Davis MD at [facility] Bilateral 12/14/2017    CYSTOSCOPY STENT INSERTION performed by Adan Davis MD at [place] [id number] Right 10/11/2017    FEMUR IM NAIL MICHELLE INSERTION performed by Yamilex Smith DO at [address] [place] [address] Rd      HYSTERECTOMY      KNEE ARTHROPLASTY      x 2    LAMINECTOMY      2 lumbar segments    MN CYSTO/URETERO/PYELOSCOPY W/LITHOTRIPSY Left 1/24/2018    LITHOTRIPSY LASER performed by Adan Davis MD at [facility] CYSTO/URETERO/PYELOSCOPY, DX Bilateral 1/24/2018    CYSTOSCOPY BILATERAL URETEROSCOPIES WITH STENT REMOVAL AND RETROGRADE PYELOGRAMS WITH BILATERAL STONE MANIPULATION AND RETRIEVAL  PLACEMENT OF DOUBLE J URETERAL STENTS BILATERAL performed by Adan Davis MD at [facility] 67 STENT PLACEMENT           CURRENT MEDICATIONS       Previous Medications    CALCIUM CARBONATE-VITAMIN D (CALTRATE 600+D PO)    Take 600 mg by mouth 2 times daily.     CRANBERRY 1000 MG CAPS    Take 1 tablet by mouth daily    DIAZEPAM (VALIUM) 2 MG TABLET    Take 2 mg by mouth every 8 hours as needed for Anxiety. FOLIC ACID 0.8 MG CAPS    Take 1 tablet by mouth daily     FUROSEMIDE (LASIX) 20 MG TABLET    Take 1 tablet by mouth daily    HYDROCODONE-ACETAMINOPHEN (NORCO) 5-325 MG PER TABLET    Take 1 tablet by mouth every 6 hours as needed for Pain. LEVOTHYROXINE (SYNTHROID) 88 MCG TABLET    Take 1 tablet by mouth Daily    LORATADINE (CLARITIN) 10 MG TABLET    Take 10 mg by mouth daily    MIDODRINE (PROAMATINE) 10 MG TABLET    Take 1 tablet by mouth 3 times daily    OMEPRAZOLE (PRILOSEC) 10 MG DELAYED RELEASE CAPSULE    Take 1 capsule by mouth daily    POTASSIUM CHLORIDE (KLOR-CON M) 20 MEQ EXTENDED RELEASE TABLET    Take 1 tablet by mouth daily    PRAMIPEXOLE (MIRAPEX) 0.25 MG TABLET    Take 1 tablet by mouth nightly    PROPAFENONE (RYTHMOL) 225 MG TABLET    TAKE ONE TABLET BY MOUTH EVERY EIGHT HOURS. RIVAROXABAN (XARELTO) 15 MG TABS TABLET    TAKE 1 TABLET DAILY WITH BREAKFAST       ALLERGIES     Nitrofuran derivatives, Quinine derivatives, Sulfa antibiotics, and Pcn [penicillins]    FAMILY HISTORY       Family History   Problem Relation Age of Onset    Cancer Father         lung    Stroke Mother         mini    Hypotension Mother           SOCIAL HISTORY       Social History     Socioeconomic History    Marital status:       Spouse name: None    Number of children: None    Years of education: None    Highest education level: None   Occupational History    None   Tobacco Use    Smoking status: Never Smoker    Smokeless tobacco: Never Used   Vaping Use    Vaping Use: Never used   Substance and Sexual Activity    Alcohol use: No    Drug use: No    Sexual activity: Yes   Other Topics Concern    None   Social History Narrative    None     Social Determinants of Health     Financial Resource Strain: Low Risk     Difficulty of Paying Living Expenses: Not hard at all Food Insecurity: No Food Insecurity    Worried About Running Out of Food in the Last Year: Never true    Megha of Food in the Last Year: Never true   Transportation Needs:     Lack of Transportation (Medical): Not on file    Lack of Transportation (Non-Medical): Not on file   Physical Activity: Inactive    Days of Exercise per Week: 0 days    Minutes of Exercise per Session: 0 min   Stress:     Feeling of Stress : Not on file   Social Connections:     Frequency of Communication with Friends and Family: Not on file    Frequency of Social Gatherings with Friends and Family: Not on file    Attends Baptism Services: Not on file    Active Member of [address] [place] [address] [employer] or Organizations: Not on file    Attends Club or Organization Meetings: Not on file    Marital Status: Not on file   Intimate Partner Violence:     Fear of Current or Ex-Partner: Not on file    Emotionally Abused: Not on file    Physically Abused: Not on file    Sexually Abused: Not on file   Housing Stability:     Unable to Pay for Housing in the Last Year: Not on file    Number of [place] in the Last Year: Not on file    Unstable Housing in the Last Year: Not on file       SCREENINGS             PHYSICAL EXAM    (up to 7 for level 4, 8 or more for level 5)     ED Triage Vitals [05/15/22 0957]   BP Temp Temp src Pulse Resp SpO2 Height Weight   -- -- -- 62 30 (!) 80 % -- 100 lb (45.4 kg)       Physical Exam  Vitals and nursing note reviewed. Constitutional:       General: She is awake. Appearance: She is well-developed. She is ill-appearing. Comments: She is small and frail   HENT:      Head: Normocephalic and atraumatic. Right Ear: External ear normal.      Left Ear: External ear normal.      Mouth/Throat:      Comments: Moist membranes, tongue is hairy. Eyes:      General: No scleral icterus. Conjunctiva/sclera: Conjunctivae normal.      Pupils: Pupils are equal, round, and reactive to light.    Cardiovascular: Rate and Rhythm: Normal rate. Rhythm irregular. Heart sounds: Normal heart sounds. Pulmonary:      Effort: Pulmonary effort is normal.      Breath sounds: Normal breath sounds. Abdominal:      General: Bowel sounds are normal.      Palpations: Abdomen is soft. Tenderness: There is no abdominal tenderness. Musculoskeletal:         General: No deformity. Normal range of motion. Cervical back: Normal range of motion and neck supple. Skin:     General: Skin is warm and dry. Coloration: Skin is not jaundiced. Neurological:      GCS: GCS eye subscore is 3. GCS verbal subscore is 4. GCS motor subscore is 6. Comments: She is awake and responds to her name being called. But can give little information states she is fine. And denies nausea right now. Denies pain. DIAGNOSTIC RESULTS     EKG: All EKG's are interpreted by the Emergency Department Physician who either signs or Co-signs this chart in the absence of a cardiologist.    Atrial fibrillation rate 70. QTc 669. There are definite changes since her last EKG May 3. The QRS interval has widened the QTc is elongated. RADIOLOGY:   Non-plain film images such as CT, Ultrasound and MRI are read by the radiologist. Plainradiographic images are visualized and preliminarily interpreted by the emergency physician with the below findings:    I have reviewed the results. Interpretation per the Radiologist below, if available at the time of this note:    XR CHEST PORTABLE   Final Result   1. No focal infiltrate or effusion. 2. Mild cardiomegaly. Pulmonary arteries are prominent in size.    Signed by Dr Augusta Lara            ED BEDSIDE ULTRASOUND:   Performed by ED Physician - none    LABS:  Labs Reviewed   CBC WITH AUTO DIFFERENTIAL - Abnormal; Notable for the following components:       Result Value    WBC 13.4 (*)     RBC 3.53 (*)     Hemoglobin 11.6 (*)     Hematocrit 36.1 (*)     .3 (*)     MCH 32.9 (*)     MCHC 32.1 (*)     Neutrophils % 81.1 (*)     Lymphocytes % 9.1 (*)     Neutrophils Absolute 10.8 (*)     Monocytes Absolute 1.20 (*)     All other components within normal limits   COMPREHENSIVE METABOLIC PANEL W/ REFLEX TO MG FOR LOW K - Abnormal; Notable for the following components:    Glucose 151 (*)     BUN 41 (*)     CREATININE 1.2 (*)     GFR Non- 42 (*)     GFR  51 (*)     Calcium 10.8 (*)     Total Protein 6.4 (*)     All other components within normal limits   BLOOD GAS, ARTERIAL - Abnormal; Notable for the following components:    pH, Arterial 7.500 (*)     pO2, Arterial 40.0 (*)     HCO3, Arterial 28.9 (*)     Base Excess, Arterial 5.5 (*)     Hemoglobin, Art, Extended 11.3 (*)     O2 Sat, Arterial 74.9 (*)     All other components within normal limits    Narrative:     CALL  [place]  Manohar Newberry RN ER, 05/15/2022 10:20, by Tone Vee   URINALYSIS WITH REFLEX TO CULTURE - Abnormal; Notable for the following components:    Clarity, UA CLOUDY (*)     Ketones, Urine 15 (*)     Blood, Urine SMALL (*)     Protein, UA 30 (*)     Leukocyte Esterase, Urine LARGE (*)     All other components within normal limits   LACTIC ACID - Abnormal; Notable for the following components:    Lactic Acid 3.6 (*)     All other components within normal limits    Narrative:     CALL  [place]  [facility] tel. ,  Chemistry results called to and read back by [facility] RN ER, 05/15/2022 11:09,  by Arcenio Disla - Abnormal; Notable for the following components:    WBC, UA TNTC (*)     RBC, UA 6-10 (*)     Bacteria, UA NEGATIVE (*)     Crystals, UA NEG (*)     All other components within normal limits   COVID-19, RAPID   CULTURE, URINE   TROPONIN       All other labs were within normal range or not returned as of this dictation.     EMERGENCY DEPARTMENT COURSE and DIFFERENTIALDIAGNOSIS/MDM:   Vitals:    Vitals:    05/15/22 1300 05/15/22 1320 05/15/22 1410 05/15/22 1510   BP: (!) 100/55 (!) 91/56 105/67 (!) 100/55   Pulse: (!) 44 (!) 41 (!) 42 (!) 41   Resp: 26 26 22 24   Temp:       TempSrc:       SpO2: 100% 100% 100% 100%   Weight:           MDM  Number of Diagnoses or Management Options  Hospice care  Sepsis with acute hypoxic respiratory failure and septic shock, due to unspecified organism [facility])  Urinary tract infection without hematuria, site unspecified  Diagnosis management comments: Psych the patient probably has urosepsis. She is a DNR. I discussed this with the daughter and patient present. If they wanted a full court press of treatment. Or if they are ready consider hospice. The daughter would like hospice. She does not want her mother to be in pain or to be uncomfortable. But she wants to be able to take her home if at all possible. Her blood pressure did respond to some fluids. I discussed the case with Dr. Tania Nielson on-call for the hospice team today. Okay to give antibiotics if we send her home but he is going to see if he can get the situation set up where she can go home today another consideration is overnight in the care unit until things can be arranged. The patient is at peace right now she does not seem to be uncomfortable. And vital signs seem to have leveled off.    325. Patient seems to be resting comfortably. Patient is being discharged to home hospice care. CONSULTS:  IP CONSULT TO HOSPICE    PROCEDURES:  Unless otherwise notedbelow, none     Procedures    FINAL IMPRESSION     1. Sepsis with acute hypoxic respiratory failure and septic shock, due to unspecified organism ([facility])    2. Hospice care    3.  Urinary tract infection without hematuria, site unspecified          DISPOSITION/PLAN   DISPOSITION        PATIENT REFERRED TO:  @FUP@    DISCHARGE MEDICATIONS:  New Prescriptions    CIPROFLOXACIN (CIPRO) 250 MG TABLET    Take 1 tablet by mouth 2 times daily for 10 days          (Please note that portions of this note were completed with a voice recognition program.  Efforts were made to edit the dictations butoccasionally words are mis-transcribed.)    Antonina Salcedo MD (electronically signed)  AttendingEmergency Physician          Nancy Adames MD  05/15/22 1512 [address] [place] [address] Eriberto Xavier MD  05/15/22 990 Rudy Howe MD  05/15/22 9937

## 2022-05-16 ENCOUNTER — TELEPHONE (OUTPATIENT)
Dept: PRIMARY CARE CLINIC | Age: 87
End: 2022-05-16

## 2022-05-16 LAB
EKG P AXIS: NORMAL DEGREES
EKG P-R INTERVAL: NORMAL MS
EKG Q-T INTERVAL: 514 MS
EKG QRS DURATION: 188 MS
EKG QTC CALCULATION (BAZETT): 504 MS
EKG T AXIS: -50 DEGREES

## 2022-05-16 PROCEDURE — 93010 ELECTROCARDIOGRAM REPORT: CPT | Performed by: INTERNAL MEDICINE

## 2022-05-21 LAB
ORGANISM: ABNORMAL
ORGANISM: ABNORMAL
URINE CULTURE, ROUTINE: ABNORMAL

## 2022-06-01 DIAGNOSIS — K21.9 GASTROESOPHAGEAL REFLUX DISEASE WITHOUT ESOPHAGITIS: Chronic | ICD-10-CM

## 2022-06-01 RX ORDER — OMEPRAZOLE 10 MG/1
10 CAPSULE, DELAYED RELEASE ORAL DAILY
Qty: 90 CAPSULE | Refills: 3 | Status: SHIPPED | OUTPATIENT
Start: 2022-06-01 | End: 2022-08-09 | Stop reason: SDUPTHER

## 2022-06-01 RX ORDER — NITROFURANTOIN 25; 75 MG/1; MG/1
100 CAPSULE ORAL 2 TIMES DAILY
Qty: 14 CAPSULE | Refills: 0 | Status: SHIPPED | OUTPATIENT
Start: 2022-06-01 | End: 2022-06-08

## 2022-06-01 NOTE — TELEPHONE ENCOUNTER
Received fax from pharmacy requesting refill on pts medication(s). Pt was last seen in office on 5/9/2022  and has a follow up scheduled for Visit date not found. Will send request to  Dr. Kae Sherman  for patient.      Requested Prescriptions     Pending Prescriptions Disp Refills    omeprazole (PRILOSEC) 10 MG delayed release capsule [Pharmacy Med Name: OMEPRAZOLE 10 MG CAP 10 Capsule] 90 capsule 3     Sig: TAKE 1 CAPSULE BY MOUTH DAILY

## 2022-06-01 NOTE — PROGRESS NOTES
[address] [facility], [address]  Phone [phone]   Fax [phone]      OFFICE VISIT: 2022    Bárbara Bach-: 1932      HPI  Reason For Visit:  Melissa Palencia is a 80 y.o. No chief complaint on file. vitals were not taken for this visit. There is no height or weight on file to calculate BMI. I have reviewed the following with the Ms. Bach   Lab Review  Admission on 05/15/2022, Discharged on 05/15/2022   Component Date Value    QRS Duration 05/15/2022 188     Q-T Interval 05/15/2022 514     QTc Calculation (Bazett) 05/15/2022 504     T Axis 05/15/2022 -50     WBC 05/15/2022 13.4*    RBC 05/15/2022 3.53*    Hemoglobin 05/15/2022 11.6*    Hematocrit 05/15/2022 36.1*    MCV 05/15/2022 102.3*    MCH 05/15/2022 32.9*    MCHC 05/15/2022 32.1*    RDW 05/15/2022 14.3     Platelets 45/10/8299 241     MPV 05/15/2022 10.4     Neutrophils % 05/15/2022 81.1*    Lymphocytes % 05/15/2022 9.1*    Monocytes % 05/15/2022 9.1     Eosinophils % 05/15/2022 0.0     Basophils % 05/15/2022 0.2     Neutrophils Absolute 05/15/2022 10.8*    Immature Granulocytes # 05/15/2022 0.1     Lymphocytes Absolute 05/15/2022 1.2     Monocytes Absolute 05/15/2022 1.20*    Eosinophils Absolute 05/15/2022 0.00     Basophils Absolute 05/15/2022 0.00     Sodium 05/15/2022 140     Potassium reflex Magnesi* 05/15/2022 4.9     Chloride 05/15/2022 100     CO2 05/15/2022 26     Anion Gap 05/15/2022 14     Glucose 05/15/2022 151*    BUN 05/15/2022 41*    CREATININE 05/15/2022 1.2*    GFR Non- 05/15/2022 42*    GFR  05/15/2022 51*    Calcium 05/15/2022 10.8*    Total Protein 05/15/2022 6.4*    Albumin 05/15/2022 3.8     Total Bilirubin 05/15/2022 0.5     Alkaline Phosphatase 05/15/2022 60     ALT 05/15/2022 11     AST 05/15/2022 21     pH, Arterial 05/15/2022 7.500*    pCO2, Arterial 05/15/2022 37.0     pO2, Arterial 05/15/2022 40.0*    HCO3, Arterial 05/15/2022 28.9*    Base Excess, Arterial 05/15/2022 5.5*    Hemoglobin, Art, Extended 05/15/2022 11.3*    O2 Sat, Arterial 05/15/2022 74.9*    Carboxyhgb, Arterial 05/15/2022 2.1     Methemoglobin, Arterial 05/15/2022 0.9     O2 Content, Arterial 05/15/2022 11.9     O2 Therapy 05/15/2022 Unknown     FIO2 05/15/2022 21.0     Sample Source 05/15/2022 RB     Potassium, Whole Blood 05/15/2022 4.5     Color, UA 05/15/2022 YELLOW     Clarity, UA 05/15/2022 CLOUDY*    Glucose, Ur 05/15/2022 Negative     Bilirubin Urine 05/15/2022 Negative     Ketones, Urine 05/15/2022 15*    Specific Gravity, UA 05/15/2022 1.024     Blood, Urine 05/15/2022 SMALL*    pH, UA 05/15/2022 5.5     Protein, UA 05/15/2022 30*    Urobilinogen, Urine 05/15/2022 1.0     Nitrite, Urine 05/15/2022 Negative     Leukocyte Esterase, Urine 05/15/2022 LARGE*    Lactic Acid 05/15/2022 3.6*    Troponin 05/15/2022 <0.01     SARS-CoV-2, NAAT 05/15/2022 Not Detected     WBC, UA 05/15/2022 TNTC*    RBC, UA 05/15/2022 6-10*    Epithelial Cells, UA 05/15/2022 10-20     Bacteria, UA 05/15/2022 NEGATIVE*    Crystals, UA 05/15/2022 NEG*    Urine Culture, Routine 05/15/2022 >50,000 CFU/ml*    Organism 05/15/2022 Enterococcus faecium*    Urine Culture, Routine 05/15/2022 Light growth     Organism 05/15/2022 Enterococcus faecium*    Urine Culture, Routine 05/15/2022 Light growth 2nd    Admission on 05/03/2022, Discharged on 05/03/2022   Component Date Value    P-R Interval 05/03/2022 284     QRS Duration 05/03/2022 168     Q-T Interval 05/03/2022 476     QTc Calculation (Bazett) 05/03/2022 455     P Axis 05/03/2022 -45     T Axis 05/03/2022 -147     WBC 05/03/2022 7.9     RBC 05/03/2022 3.26*    Hemoglobin 05/03/2022 10.9*    Hematocrit 05/03/2022 34.3*    MCV 05/03/2022 105.2*    MCH 05/03/2022 33.4*    MCHC 05/03/2022 31.8*    RDW 05/03/2022 14.8*    Platelets 59/26/6333 361     MPV 05/03/2022 9.8     Neutrophils % 05/03/2022 75.4*    Lymphocytes % 05/03/2022 12.7*    Monocytes % 05/03/2022 8.3     Eosinophils % 05/03/2022 2.8     Basophils % 05/03/2022 0.3     Neutrophils Absolute 05/03/2022 5.9     Immature Granulocytes # 05/03/2022 0.0     Lymphocytes Absolute 05/03/2022 1.0*    Monocytes Absolute 05/03/2022 0.70     Eosinophils Absolute 05/03/2022 0.20     Basophils Absolute 05/03/2022 0.00     Sodium 05/03/2022 143     Potassium reflex Magnesi* 05/03/2022 4.5     Chloride 05/03/2022 106     CO2 05/03/2022 27     Anion Gap 05/03/2022 10     Glucose 05/03/2022 115*    BUN 05/03/2022 12     CREATININE 05/03/2022 0.8     GFR Non- 05/03/2022 >60     GFR  05/03/2022 >59     Calcium 05/03/2022 9.9     Total Protein 05/03/2022 6.0*    Albumin 05/03/2022 4.0     Total Bilirubin 05/03/2022 0.4     Alkaline Phosphatase 05/03/2022 48     ALT 05/03/2022 10     AST 05/03/2022 15     Lipase 05/03/2022 13     Color, UA 05/03/2022 YELLOW     Clarity, UA 05/03/2022 TURBID*    Glucose, Ur 05/03/2022 Negative     Bilirubin Urine 05/03/2022 Negative     Ketones, Urine 05/03/2022 TRACE*    Specific [place], UA 05/03/2022 1.015     Blood, Urine 05/03/2022 LARGE*    pH, UA 05/03/2022 7.0     Protein, UA 05/03/2022 100*    Urobilinogen, Urine 05/03/2022 1.0     Nitrite, Urine 05/03/2022 Negative     Leukocyte Esterase, Urine 05/03/2022 LARGE*    Lactic Acid 05/03/2022 1.7     WBC, UA 05/03/2022 TNTC*    RBC, UA 05/03/2022 21-30*    Epithelial Cells, UA 05/03/2022 3-5     Bacteria, UA 05/03/2022 None Seen*    Urine Culture, Routine 05/03/2022 >50,000 CFU/ml*    Organism 05/03/2022 Candida albicans*    Urine Culture, Routine 05/03/2022                      Value: Moderate growth  No further workup     Admission on 04/29/2022, Discharged on 05/01/2022   Component Date Value    Color, UA 04/29/2022 DARK YELLOW*    Clarity, UA 04/29/2022 TURBID*    Glucose, Ur 04/29/2022 Negative     Bilirubin Urine 04/29/2022 Negative     Ketones, Urine 04/29/2022 TRACE*    Specific [place], UA 04/29/2022 1.023     Blood, Urine 04/29/2022 LARGE*    pH, UA 04/29/2022 5.5     Protein, UA 04/29/2022 300     Urobilinogen, Urine 04/29/2022 1.0     Nitrite, Urine 04/29/2022 Negative     Leukocyte Esterase, Urine 04/29/2022 LARGE*    WBC 04/29/2022 4.8     RBC 04/29/2022 3.56*    Hemoglobin 04/29/2022 11.5*    Hematocrit 04/29/2022 36.9*    MCV 04/29/2022 103.7*    MCH 04/29/2022 32.3*    MCHC 04/29/2022 31.2*    RDW 04/29/2022 14.6*    Platelets 21/91/4053 418*    MPV 04/29/2022 9.3*    Neutrophils % 04/29/2022 56.4     Lymphocytes % 04/29/2022 27.7     Monocytes % 04/29/2022 10.5*    Eosinophils % 04/29/2022 4.6     Basophils % 04/29/2022 0.6     Neutrophils Absolute 04/29/2022 2.7     Immature Granulocytes # 04/29/2022 0.0     Lymphocytes Absolute 04/29/2022 1.3     Monocytes Absolute 04/29/2022 0.50     Eosinophils Absolute 04/29/2022 0.20     Basophils Absolute 04/29/2022 0.00     Sodium 04/29/2022 140     Potassium reflex Magnesi* 04/29/2022 3.8     Chloride 04/29/2022 102     CO2 04/29/2022 28     Anion Gap 04/29/2022 10     Glucose 04/29/2022 106     BUN 04/29/2022 21     CREATININE 04/29/2022 1.0*    GFR Non- 04/29/2022 52*    GFR  04/29/2022 >59     Calcium 04/29/2022 10.2     Total Protein 04/29/2022 6.0*    Albumin 04/29/2022 4.0     Total Bilirubin 04/29/2022 0.3     Alkaline Phosphatase 04/29/2022 48     ALT 04/29/2022 9     AST 04/29/2022 15     TSH Reflex FT4 04/29/2022 2.29     Troponin 04/29/2022 <0.01     Pro-BNP 04/29/2022 2,405*    Protime 04/29/2022 13.8     INR 04/29/2022 1.07     aPTT 04/29/2022 28.4     QRS Duration 04/29/2022 182     Q-T Interval 04/29/2022 512     QTc Calculation (Bazett) 04/29/2022 493     T Axis 04/29/2022 -13     WBC, UA 04/29/2022 TNTC*    RBC, UA 04/29/2022 50-75*    Bacteria, UA 04/29/2022 TRACE*    Yeast, UA 04/29/2022 Present*    Crystals, UA 04/29/2022 NEG*    Urine Culture, Routine 04/29/2022 *                    Value:>50,000 CFU/ml  Mixed skin cherelle present      Organism 04/29/2022 Candida albicans*    Urine Culture, Routine 04/29/2022                      Value:Light growth  No further workup      Lactic Acid, Sepsis 04/29/2022 0.7     Lactic Acid, Sepsis 04/29/2022 0.9     Culture, Blood 2 04/29/2022 No growth after 5 days of incubation.  Blood Culture, Routine 04/29/2022 No growth after 5 days of incubation.      Magnesium 04/29/2022 1.9     Phosphorus 04/29/2022 3.2     Left Ventricular Ejectio* 04/29/2022 50     LVEF MODALITY 04/29/2022 ECHO     Sodium 04/30/2022 141     Potassium reflex Magnesi* 04/30/2022 4.1     Chloride 04/30/2022 106     CO2 04/30/2022 24     Anion Gap 04/30/2022 11     Glucose 04/30/2022 68*    BUN 04/30/2022 23     CREATININE 04/30/2022 0.9     GFR Non- 04/30/2022 59*    GFR  04/30/2022 >59     Calcium 04/30/2022 9.1     WBC 04/30/2022 5.1     RBC 04/30/2022 3.06*    Hemoglobin 04/30/2022 9.9*    Hematocrit 04/30/2022 31.6*    MCV 04/30/2022 103.3*    MCH 04/30/2022 32.4*    MCHC 04/30/2022 31.3*    RDW 04/30/2022 14.8*    Platelets 11/91/2051 351     MPV 04/30/2022 10.0     Neutrophils % 04/30/2022 55.1     Lymphocytes % 04/30/2022 27.7     Monocytes % 04/30/2022 11.9*    Eosinophils % 04/30/2022 4.7     Basophils % 04/30/2022 0.4     Neutrophils Absolute 04/30/2022 2.8     Immature Granulocytes # 04/30/2022 0.0     Lymphocytes Absolute 04/30/2022 1.4     Monocytes Absolute 04/30/2022 0.60     Eosinophils Absolute 04/30/2022 0.20     Basophils Absolute 04/30/2022 0.00     Sodium 05/01/2022 140     Potassium reflex Magnesi* 05/01/2022 3.9     Chloride 05/01/2022 108     CO2 05/01/2022 23     Anion Gap 05/01/2022 9     Glucose 05/01/2022 83     BUN 05/01/2022 17     CREATININE 05/01/2022 0.7     GFR Non- 05/01/2022 >60     GFR  05/01/2022 >59     Calcium 05/01/2022 8.8     WBC 05/01/2022 4.6*    RBC 05/01/2022 3.00*    Hemoglobin 05/01/2022 9.7*    Hematocrit 05/01/2022 31.0*    MCV 05/01/2022 103.3*    MCH 05/01/2022 32.3*    MCHC 05/01/2022 31.3*    RDW 05/01/2022 14.7*    Platelets 03/46/7795 320     MPV 05/01/2022 9.3*    Neutrophils % 05/01/2022 51.3     Lymphocytes % 05/01/2022 31.4     Monocytes % 05/01/2022 10.3*    Eosinophils % 05/01/2022 6.1*    Basophils % 05/01/2022 0.7     Neutrophils Absolute 05/01/2022 2.3     Immature Granulocytes # 05/01/2022 0.0     Lymphocytes Absolute 05/01/2022 1.4     Monocytes Absolute 05/01/2022 0.50     Eosinophils Absolute 05/01/2022 0.30     Basophils Absolute 05/01/2022 0.00    Orders Only on 04/07/2022   Component Date Value    TSH 04/07/2022 2.500     T4 Free 04/07/2022 1.58     Cholesterol, Total 04/07/2022 202*    Triglycerides 04/07/2022 98     HDL 04/07/2022 87     LDL Calculated 04/07/2022 95     Sodium 04/07/2022 140     Potassium 04/07/2022 4.7     Chloride 04/07/2022 101     CO2 04/07/2022 22     Anion Gap 04/07/2022 17     Glucose 04/07/2022 86     BUN 04/07/2022 21     CREATININE 04/07/2022 0.9     GFR Non- 04/07/2022 59*    GFR  04/07/2022 >59     Calcium 04/07/2022 10.8*    Total Protein 04/07/2022 6.7     Albumin 04/07/2022 4.6     Total Bilirubin 04/07/2022 0.6     Alkaline Phosphatase 04/07/2022 62     ALT 04/07/2022 11     AST 04/07/2022 16     WBC 04/07/2022 6.0     RBC 04/07/2022 3.25*    Hemoglobin 04/07/2022 11.5*    Hematocrit 04/07/2022 35.1*    MCV 04/07/2022 108.0*    MCH 04/07/2022 35.4*    MCHC 04/07/2022 32.8*    RDW 04/07/2022 14.9*    Platelets 89/68/9409 380     MPV 04/07/2022 10.1     Neutrophils % 04/07/2022 64.7     Lymphocytes % 04/07/2022 24.0     Monocytes % 04/07/2022 7.6     Eosinophils % 04/07/2022 3.2     Basophils % 04/07/2022 0.3     Neutrophils Absolute 04/07/2022 3.9     Immature Granulocytes # 04/07/2022 0.0     Lymphocytes Absolute 04/07/2022 1.4     Monocytes Absolute 04/07/2022 0.50     Eosinophils Absolute 04/07/2022 0.20     Basophils Absolute 04/07/2022 0.00     Urine Culture, Routine 04/07/2022 >100,000 CFU/ml*    Organism 04/07/2022 Klebsiella pneumoniae*    Urine Culture, Routine 04/07/2022 Heavy growth     Organism 04/07/2022 Klebsiella pneumoniae*    Urine Culture, Routine 04/07/2022 Heavy growth 2nd     Microalbumin, Random Uri* 04/07/2022 220.70*    Creatinine, Ur 04/07/2022 117.1     Microalbumin Creatinine * 04/07/2022 1884.7    Office Visit on 04/07/2022   Component Date Value    Color, UA 04/07/2022 brown     Clarity, UA 04/07/2022 cloudy     Glucose, UA POC 04/07/2022 Neg     Bilirubin, UA 04/07/2022 small     Ketones, UA 04/07/2022 15     Spec Grav, UA 04/07/2022 1.025     Blood, UA POC 04/07/2022 large     pH, UA 04/07/2022 6.0     Protein, UA POC 04/07/2022 300     Urobilinogen, UA 04/07/2022 1.0     Leukocytes, UA 04/07/2022 Large     Nitrite, UA 04/07/2022 Positive     Appearance, Fluid 04/07/2022 Cloudy*     Copies of these are in the chart. Current Outpatient Medications   Medication Sig Dispense Refill    furosemide (LASIX) 20 MG tablet Take 1 tablet by mouth daily 60 tablet 3    potassium chloride (KLOR-CON M) 20 MEQ extended release tablet Take 1 tablet by mouth daily 30 tablet 5    pramipexole (MIRAPEX) 0.25 MG tablet Take 1 tablet by mouth nightly 90 tablet 3    HYDROcodone-acetaminophen (NORCO) 5-325 MG per tablet Take 1 tablet by mouth every 6 hours as needed for Pain.  diazePAM (VALIUM) 2 MG tablet Take 2 mg by mouth every 8 hours as needed for Anxiety.       Folic Acid 0.8 MG CAPS Take 1 tablet by mouth daily       rivaroxaban (XARELTO) 15 MG TABS tablet TAKE 1 TABLET DAILY WITH BREAKFAST 90 tablet 3    propafenone (RYTHMOL) 225 MG tablet TAKE ONE TABLET BY MOUTH EVERY EIGHT HOURS. 270 tablet 3    Cranberry 1000 MG CAPS Take 1 tablet by mouth daily      levothyroxine (SYNTHROID) 88 MCG tablet Take 1 tablet by mouth Daily 90 tablet 3    omeprazole (PRILOSEC) 10 MG delayed release capsule Take 1 capsule by mouth daily 90 capsule 3    loratadine (CLARITIN) 10 MG tablet Take 10 mg by mouth daily      midodrine (PROAMATINE) 10 MG tablet Take 1 tablet by mouth 3 times daily 270 tablet 3    Calcium Carbonate-Vitamin D (CALTRATE 600+D PO) Take 600 mg by mouth 2 times daily. No current facility-administered medications for this visit.        Allergies: Nitrofuran derivatives, Quinine derivatives, Sulfa antibiotics, and Pcn [penicillins]     Past Medical History:   Diagnosis Date    A-fib ([facility] [address]     Abnormal weight loss     Anemia     Arthritis     Bullae     CAD (coronary artery disease)     Conjunctivitis     COPD (chronic obstructive pulmonary disease) (HCC)     Depression     Fatigue     History of blood transfusion 2017    post op broken hip    Hypertension     Hypothyroidism     Kidney infection     Kidney stone     Leukopenia     Leukopenia     Low back pain     Mild CAD 05/15/2017    Osteoporosis     Palliative care patient 06/04/2021    Paroxysmal A-fib (HCC)     Prolonged emergence from general anesthesia     Sinus pause     10/2014    Thyroid disease     Urinary tract infection     Weakness        Family History   Problem Relation Age of Onset    Cancer Father         lung    Stroke Mother         mini    Hypotension Mother        Past Surgical History:   Procedure Laterality Date    BACK SURGERY      BACK SURGERY      CARDIAC CATHETERIZATION  [facility]/14/14  [facility]    Mild, non-occlusive CAD, EF 60%    CYSTOSCOPY Left 8/31/2017    CYSTOSCOPY; LEFT URETEROSCOPY; LEFT URETERAL LASER LITHOTRIPSY AND STONE EXTRACTION; INSERTION LEFT URETERAL DOUBLE J STENT performed by Essie Santana MD at [facility] Bilateral 12/14/2017    CYSTOSCOPY STENT INSERTION performed by Essie Santana MD at [place] [address] Right 10/11/2017    FEMUR IM NAIL MICHELLE INSERTION performed by Priyank Staley DO at [facility]      x 2    LAMINECTOMY      2 lumbar segments    OH CYSTO/URETERO/PYELOSCOPY W/LITHOTRIPSY Left 1/24/2018    LITHOTRIPSY LASER performed by Essie Santana MD at [facility] CYSTO/URETERO/PYELOSCOPY, DX Bilateral 1/24/2018    CYSTOSCOPY BILATERAL URETEROSCOPIES WITH STENT REMOVAL AND RETROGRADE PYELOGRAMS WITH BILATERAL STONE MANIPULATION AND RETRIEVAL  PLACEMENT OF DOUBLE J URETERAL STENTS BILATERAL performed by Essie Santana MD at [address] [place] [address]      URETER STENT PLACEMENT         Social History     Tobacco Use    Smoking status: Never Smoker    Smokeless tobacco: Never Used   Substance Use Topics    Alcohol use: No        Review of Systems    Physical Exam        ASSESSMENT    No diagnosis found. PLAN    There are no diagnoses linked to this encounter. No orders of the defined types were placed in this encounter. No follow-ups on file.

## 2022-07-19 ENCOUNTER — TELEPHONE (OUTPATIENT)
Dept: PRIMARY CARE CLINIC | Age: 87
End: 2022-07-19

## 2022-07-19 DIAGNOSIS — R41.82 ALTERED MENTAL STATUS, UNSPECIFIED ALTERED MENTAL STATUS TYPE: ICD-10-CM

## 2022-07-19 DIAGNOSIS — I48.0 PAROXYSMAL A-FIB (HCC): ICD-10-CM

## 2022-07-19 DIAGNOSIS — I95.1 ORTHOSTATIC HYPOTENSION: ICD-10-CM

## 2022-07-19 DIAGNOSIS — G89.4 CHRONIC PAIN SYNDROME: ICD-10-CM

## 2022-07-19 DIAGNOSIS — R41.0 DELIRIUM, ACUTE: Primary | ICD-10-CM

## 2022-07-19 NOTE — TELEPHONE ENCOUNTER
Mariana with [facility] called, she said that they need to D/C pt as she is doing better. Needs an appt here and needs referral to [place] if this is all okay.

## 2022-07-21 ENCOUNTER — TELEPHONE (OUTPATIENT)
Dept: PRIMARY CARE CLINIC | Age: 87
End: 2022-07-21

## 2022-07-21 DIAGNOSIS — Z51.5 PALLIATIVE CARE PATIENT: ICD-10-CM

## 2022-07-21 DIAGNOSIS — G89.4 CHRONIC PAIN SYNDROME: ICD-10-CM

## 2022-07-21 DIAGNOSIS — R41.82 ALTERED MENTAL STATUS, UNSPECIFIED ALTERED MENTAL STATUS TYPE: ICD-10-CM

## 2022-07-21 DIAGNOSIS — J44.9 CHRONIC OBSTRUCTIVE PULMONARY DISEASE, UNSPECIFIED COPD TYPE (HCC): Primary | ICD-10-CM

## 2022-07-21 DIAGNOSIS — Z87.81 HX OF FRACTURE OF RIGHT HIP: ICD-10-CM

## 2022-07-21 DIAGNOSIS — K21.9 GASTROESOPHAGEAL REFLUX DISEASE WITHOUT ESOPHAGITIS: ICD-10-CM

## 2022-07-21 NOTE — TELEPHONE ENCOUNTER
Mariana with hospice called, pt needs an order for home O2 with [place] [place] [employer]. Her appt is not until Aug 9th

## 2022-07-22 ENCOUNTER — TELEPHONE (OUTPATIENT)
Dept: PRIMARY CARE CLINIC | Age: 87
End: 2022-07-22

## 2022-07-22 NOTE — TELEPHONE ENCOUNTER
freddy PT with [facility] called, she did her eval today. She is very weak, can not walk. Needs assistance to get out of bed. Her HR went to 38 when sitting and 48 laying in bed. Daughter said her HR stays around 50-55. Christinüla said that she kind of blacked out while sitting, but once they laid her back down, she went back to normal.     They will see her twice a week for 4 weeks.

## 2022-07-22 NOTE — TELEPHONE ENCOUNTER
Kanchan Beatrice with [facility] called, she went to admit pt today. She will see her once a week for 4 weeks. For general CardioPulm assessments. She said that her BP was 96/46 with pts wrist BP cuff. She needs a peds cuff and Kanchan Barron didn't have one. Daughter said that is a normal BP for her though. Also, pt was DNR with Hospice and wants to be DNR with [facility] as well.  Wants an okay for that

## 2022-07-25 NOTE — TELEPHONE ENCOUNTER
Also needs a mattress-alternating pressure  mattress      Faxed through [facility] to [phone]

## 2022-08-09 ENCOUNTER — TELEMEDICINE (OUTPATIENT)
Dept: PRIMARY CARE CLINIC | Age: 87
End: 2022-08-09
Payer: MEDICARE

## 2022-08-09 DIAGNOSIS — E03.9 ACQUIRED HYPOTHYROIDISM: ICD-10-CM

## 2022-08-09 DIAGNOSIS — R60.9 PERIPHERAL EDEMA: ICD-10-CM

## 2022-08-09 DIAGNOSIS — K21.9 GASTROESOPHAGEAL REFLUX DISEASE WITHOUT ESOPHAGITIS: Chronic | ICD-10-CM

## 2022-08-09 DIAGNOSIS — M54.50 CHRONIC MIDLINE LOW BACK PAIN WITHOUT SCIATICA: ICD-10-CM

## 2022-08-09 DIAGNOSIS — F51.01 PRIMARY INSOMNIA: Primary | ICD-10-CM

## 2022-08-09 DIAGNOSIS — I48.0 PAROXYSMAL ATRIAL FIBRILLATION (HCC): ICD-10-CM

## 2022-08-09 DIAGNOSIS — I95.1 ORTHOSTATIC HYPOTENSION: ICD-10-CM

## 2022-08-09 DIAGNOSIS — G89.29 CHRONIC MIDLINE LOW BACK PAIN WITHOUT SCIATICA: ICD-10-CM

## 2022-08-09 PROCEDURE — 1090F PRES/ABSN URINE INCON ASSESS: CPT | Performed by: PEDIATRICS

## 2022-08-09 PROCEDURE — G8428 CUR MEDS NOT DOCUMENT: HCPCS | Performed by: PEDIATRICS

## 2022-08-09 PROCEDURE — 99214 OFFICE O/P EST MOD 30 MIN: CPT | Performed by: PEDIATRICS

## 2022-08-09 PROCEDURE — 1123F ACP DISCUSS/DSCN MKR DOCD: CPT | Performed by: PEDIATRICS

## 2022-08-09 RX ORDER — TEMAZEPAM 15 MG/1
15 CAPSULE ORAL NIGHTLY PRN
Qty: 30 CAPSULE | Refills: 2 | Status: SHIPPED | OUTPATIENT
Start: 2022-08-09 | End: 2022-10-14

## 2022-08-09 RX ORDER — OMEPRAZOLE 10 MG/1
10 CAPSULE, DELAYED RELEASE ORAL DAILY
Qty: 90 CAPSULE | Refills: 3 | Status: SHIPPED | OUTPATIENT
Start: 2022-08-09

## 2022-08-09 RX ORDER — MIDODRINE HYDROCHLORIDE 10 MG/1
10 TABLET ORAL 3 TIMES DAILY
Qty: 270 TABLET | Refills: 3 | Status: SHIPPED | OUTPATIENT
Start: 2022-08-09

## 2022-08-09 RX ORDER — PROPAFENONE HYDROCHLORIDE 225 MG/1
TABLET, FILM COATED ORAL
Qty: 270 TABLET | Refills: 3 | Status: SHIPPED | OUTPATIENT
Start: 2022-08-09

## 2022-08-09 RX ORDER — LEVOTHYROXINE SODIUM 88 UG/1
88 TABLET ORAL DAILY
Qty: 90 TABLET | Refills: 3 | Status: SHIPPED | OUTPATIENT
Start: 2022-08-09

## 2022-08-09 RX ORDER — HYDROCODONE BITARTRATE AND ACETAMINOPHEN 5; 325 MG/1; MG/1
1 TABLET ORAL EVERY 6 HOURS PRN
Qty: 60 TABLET | Refills: 0 | Status: SHIPPED | OUTPATIENT
Start: 2022-08-09 | End: 2022-09-08

## 2022-08-09 ASSESSMENT — ENCOUNTER SYMPTOMS
NAUSEA: 0
SHORTNESS OF BREATH: 0
EYE DISCHARGE: 0
DIARRHEA: 0
VOMITING: 0
ABDOMINAL PAIN: 0
WHEEZING: 0
BACK PAIN: 0
CONSTIPATION: 0
COUGH: 0
SINUS PRESSURE: 0
SORE THROAT: 0
CHEST TIGHTNESS: 0

## 2022-08-09 NOTE — PROGRESS NOTES
[address] [facility], [address]  Phone [phone]   Fax [phone]      OFFICE VISIT: 2022    Sagar Bach-: 1932      [facility]  Reason For Visit:  Nato Torres is a 80 y.o. No chief complaint on file. Patient presents on follow up . She up for re-evaluation of whether she needs to continue ith home health. She needs all of her medications refilled. Since she came off hospice, she needs meds refilled. Orthostatic hypotension:  Medication:   Midodrine 10 mg 3 times daily  Symptoms: This does help with her blood pressure. GERD:  Medication:   Omeprazole 10 mg daily  Symptoms: Acid reflux is controlled. Paroxysmal atrial fibrillation:  Medication:   Propafenone 225 mg every 8 hours. Symptoms: Controlled on medication      Hypothyroid:  Medication:   Synthroid 88 mcg daily  Normalized on this regimen. Insomnia:  Medication:   Temazepam 15 mg nightly as needed  This significantly improves her sleep      She needs refills of all of these medications above. vitals were not taken for this visit. There is no height or weight on file to calculate BMI. I have reviewed the following with the Ms. Bach   Lab Review  Admission on 05/15/2022, Discharged on 05/15/2022   Component Date Value    QRS Duration 05/15/2022 188     Q-T Interval 05/15/2022 514     QTc Calculation (Bazett) 05/15/2022 504     T Axis 05/15/2022 -50     WBC 05/15/2022 13.4 (A)    RBC 05/15/2022 3.53 (A)    Hemoglobin 05/15/2022 11.6 (A)    Hematocrit 05/15/2022 36.1 (A)    MCV 05/15/2022 102.3 (A)    MCH 05/15/2022 32.9 (A)    MCHC 05/15/2022 32.1 (A)    RDW 05/15/2022 14.3     Platelets  241     MPV 05/15/2022 10.4     Neutrophils % 05/15/2022 81.1 (A)    Lymphocytes % 05/15/2022 9.1 (A)    Monocytes % 05/15/2022 9.1     Eosinophils % 05/15/2022 0.0     Basophils % 05/15/2022 0.2     Neutrophils Absolute 05/15/2022 10.8 (A)    Immature Granulocytes # 05/15/2022 0.1     Lymphocytes Absolute 05/15/2022 1.2     Monocytes Absolute 05/15/2022 1.20 (A)    Eosinophils Absolute 05/15/2022 0.00     Basophils Absolute 05/15/2022 0.00     Sodium 05/15/2022 140     Potassium reflex Magnesi* 05/15/2022 4.9     Chloride 05/15/2022 100     CO2 05/15/2022 26     Anion Gap 05/15/2022 14     Glucose 05/15/2022 151 (A)    BUN 05/15/2022 41 (A)    Creatinine 05/15/2022 1.2 (A)    GFR Non- 05/15/2022 42 (A)    GFR  05/15/2022 51 (A)    Calcium 05/15/2022 10.8 (A)    Total Protein 05/15/2022 6.4 (A)    Albumin 05/15/2022 3.8     Total Bilirubin 05/15/2022 0.5     Alkaline Phosphatase 05/15/2022 60     ALT 05/15/2022 11     AST 05/15/2022 21     pH, Arterial 05/15/2022 7.500 (A)    pCO2, Arterial 05/15/2022 37.0     pO2, Arterial 05/15/2022 40.0 (A)    HCO3, Arterial 05/15/2022 28.9 (A)    Base Excess, Arterial 05/15/2022 5.5 (A)    Hemoglobin, Art, Extended 05/15/2022 11.3 (A)    O2 Sat, Arterial 05/15/2022 74.9 (A)    Carboxyhgb, Arterial 05/15/2022 2.1     Methemoglobin, Arterial 05/15/2022 0.9     O2 Content, Arterial 05/15/2022 11.9     O2 Therapy 05/15/2022 Unknown     FIO2 05/15/2022 21.0     Sample Source 05/15/2022 RB     Potassium, Whole Blood 05/15/2022 4.5     Color, UA 05/15/2022 YELLOW     Clarity, UA 05/15/2022 CLOUDY (A)    Glucose, Ur 05/15/2022 Negative     Bilirubin Urine 05/15/2022 Negative     Ketones, Urine 05/15/2022 15 (A)    Specific [place], UA 05/15/2022 1.024     Blood, Urine 05/15/2022 SMALL (A)    pH, UA 05/15/2022 5.5     Protein, UA 05/15/2022 30 (A)    Urobilinogen, Urine 05/15/2022 1.0     Nitrite, Urine 05/15/2022 Negative     Leukocyte Esterase, Urine 05/15/2022 LARGE (A)    Lactic Acid 05/15/2022 3.6 (A)    Troponin 05/15/2022 <0.01     SARS-CoV-2, NAAT 05/15/2022 Not Detected     WBC, UA 05/15/2022 TNTC (A)    RBC, UA 05/15/2022 6-10 (A)    Epithelial Cells, UA 05/15/2022 10-20     Bacteria, UA 05/15/2022 NEGATIVE (A)    Crystals, UA 05/15/2022 NEG (A)    Urine Culture, Routine 05/15/2022 >50,000 CFU/ml (A)    Organism 05/15/2022 Enterococcus faecium (A)    Urine Culture, Routine 05/15/2022 Light growth     Organism 05/15/2022 Enterococcus faecium (A)    Urine Culture, Routine 05/15/2022 Light growth 2nd    Admission on 05/03/2022, Discharged on 05/03/2022   Component Date Value    P-R Interval 05/03/2022 284     QRS Duration 05/03/2022 168     Q-T Interval 05/03/2022 476     QTc Calculation (Bazett) 05/03/2022 455     P Axis 05/03/2022 -45     T Axis 05/03/2022 -147     WBC 05/03/2022 7.9     RBC 05/03/2022 3.26 (A)    Hemoglobin 05/03/2022 10.9 (A)    Hematocrit 05/03/2022 34.3 (A)    MCV 05/03/2022 105.2 (A)    MCH 05/03/2022 33.4 (A)    MCHC 05/03/2022 31.8 (A)    RDW 05/03/2022 14.8 (A)    Platelets 26/50/9473 361     MPV 05/03/2022 9.8     Neutrophils % 05/03/2022 75.4 (A)    Lymphocytes % 05/03/2022 12.7 (A)    Monocytes % 05/03/2022 8.3     Eosinophils % 05/03/2022 2.8     Basophils % 05/03/2022 0.3     Neutrophils Absolute 05/03/2022 5.9     Immature Granulocytes # 05/03/2022 0.0     Lymphocytes Absolute 05/03/2022 1.0 (A)    Monocytes Absolute 05/03/2022 0.70     Eosinophils Absolute 05/03/2022 0.20     Basophils Absolute 05/03/2022 0.00     Sodium 05/03/2022 143     Potassium reflex Magnesi* 05/03/2022 4.5     Chloride 05/03/2022 106     CO2 05/03/2022 27     Anion Gap 05/03/2022 10     Glucose 05/03/2022 115 (A)    BUN 05/03/2022 12     Creatinine 05/03/2022 0.8     GFR Non- 05/03/2022 >60     GFR  05/03/2022 >59     Calcium 05/03/2022 9.9     Total Protein 05/03/2022 6.0 (A)    Albumin 05/03/2022 4.0     Total Bilirubin 05/03/2022 0.4     Alkaline Phosphatase 05/03/2022 48     ALT 05/03/2022 10     AST 05/03/2022 15     Lipase 05/03/2022 13     Color, UA 05/03/2022 YELLOW     Clarity, UA 05/03/2022 TURBID (A)    Glucose, Ur 05/03/2022 Negative     Bilirubin Urine 05/03/2022 Negative     Ketones, Urine 05/03/2022 TRACE (A)    Specific Gravity, UA 05/03/2022 1.015     Blood, Urine 05/03/2022 LARGE (A)    pH, UA 05/03/2022 7.0     Protein, UA 05/03/2022 100 (A)    Urobilinogen, Urine 05/03/2022 1.0     Nitrite, Urine 05/03/2022 Negative     Leukocyte Esterase, Urine 05/03/2022 LARGE (A)    Lactic Acid 05/03/2022 1.7     WBC, UA 05/03/2022 TNTC (A)    RBC, UA 05/03/2022 21-30 (A)    Epithelial Cells, UA 05/03/2022 3-5     Bacteria, UA 05/03/2022 None Seen (A)    Urine Culture, Routine 05/03/2022 >50,000 CFU/ml (A)    Organism 05/03/2022 Candida albicans (A)    Urine Culture, Routine 05/03/2022                      Value: Moderate growth  No further workup     Admission on 04/29/2022, Discharged on 05/01/2022   Component Date Value    Color, UA 04/29/2022 DARK YELLOW (A)    Clarity, UA 04/29/2022 TURBID (A)    Glucose, Ur 04/29/2022 Negative     Bilirubin Urine 04/29/2022 Negative     Ketones, Urine 04/29/2022 TRACE (A)    Specific [place], UA 04/29/2022 1.023     Blood, Urine 04/29/2022 LARGE (A)    pH, UA 04/29/2022 5.5     Protein, UA 04/29/2022 300     Urobilinogen, Urine 04/29/2022 1.0     Nitrite, Urine 04/29/2022 Negative     Leukocyte Esterase, Urine 04/29/2022 LARGE (A)    WBC 04/29/2022 4.8     RBC 04/29/2022 3.56 (A)    Hemoglobin 04/29/2022 11.5 (A)    Hematocrit 04/29/2022 36.9 (A)    MCV 04/29/2022 103.7 (A)    MCH 04/29/2022 32.3 (A)    MCHC 04/29/2022 31.2 (A)    RDW 04/29/2022 14.6 (A)    Platelets 27/67/1169 418 (A)    MPV 04/29/2022 9.3 (A)    Neutrophils % 04/29/2022 56.4     Lymphocytes % 04/29/2022 27.7     Monocytes % 04/29/2022 10.5 (A)    Eosinophils % 04/29/2022 4.6     Basophils % 04/29/2022 0.6     Neutrophils Absolute 04/29/2022 2.7     Immature Granulocytes # 04/29/2022 0.0     Lymphocytes Absolute 04/29/2022 1.3     Monocytes Absolute 04/29/2022 0.50     Eosinophils Absolute 04/29/2022 0.20     Basophils Absolute 04/29/2022 0.00     Sodium 04/29/2022 140 Potassium reflex Magnesi* 04/29/2022 3.8     Chloride 04/29/2022 102     CO2 04/29/2022 28     Anion Gap 04/29/2022 10     Glucose 04/29/2022 106     BUN 04/29/2022 21     Creatinine 04/29/2022 1.0 (A)    GFR Non- 04/29/2022 52 (A)    GFR  04/29/2022 >59     Calcium 04/29/2022 10.2     Total Protein 04/29/2022 6.0 (A)    Albumin 04/29/2022 4.0     Total Bilirubin 04/29/2022 0.3     Alkaline Phosphatase 04/29/2022 48     ALT 04/29/2022 9     AST 04/29/2022 15     TSH Reflex FT4 04/29/2022 2.29     Troponin 04/29/2022 <0.01     Pro-BNP 04/29/2022 2,405 (A)    Protime 04/29/2022 13.8     INR 04/29/2022 1.07     aPTT 04/29/2022 28.4     QRS Duration 04/29/2022 182     Q-T Interval 04/29/2022 512     QTc Calculation (Bazett) 04/29/2022 493     T Axis 04/29/2022 -13     WBC, UA 04/29/2022 TNTC (A)    RBC, UA 04/29/2022 50-75 (A)    Bacteria, UA 04/29/2022 TRACE (A)    Yeast, UA 04/29/2022 Present (A)    Crystals, UA 04/29/2022 NEG (A)    Urine Culture, Routine 04/29/2022  (A)                    Value:>50,000 CFU/ml  Mixed skin cherelle present      Organism 04/29/2022 Candida albicans (A)    Urine Culture, Routine 04/29/2022                      Value:Light growth  No further workup      Lactic Acid, Sepsis 04/29/2022 0.7     Lactic Acid, Sepsis 04/29/2022 0.9     Culture, Blood 2 04/29/2022 No growth after 5 days of incubation. Blood Culture, Routine 04/29/2022 No growth after 5 days of incubation.      Magnesium 04/29/2022 1.9     Phosphorus 04/29/2022 3.2     Left Ventricular Ejectio* 04/29/2022 50     LVEF MODALITY 04/29/2022 ECHO     Sodium 04/30/2022 141     Potassium reflex Magnesi* 04/30/2022 4.1     Chloride 04/30/2022 106     CO2 04/30/2022 24     Anion Gap 04/30/2022 11     Glucose 04/30/2022 68 (A)    BUN 04/30/2022 23     Creatinine 04/30/2022 0.9     GFR Non- 04/30/2022 59 (A)    GFR  04/30/2022 >59     Calcium 04/30/2022 9.1     WBC 04/30/2022 5. 1     RBC 04/30/2022 3.06 (A)    Hemoglobin 04/30/2022 9.9 (A)    Hematocrit 04/30/2022 31.6 (A)    MCV 04/30/2022 103.3 (A)    MCH 04/30/2022 32.4 (A)    MCHC 04/30/2022 31.3 (A)    RDW 04/30/2022 14.8 (A)    Platelets 92/73/4363 351     MPV 04/30/2022 10.0     Neutrophils % 04/30/2022 55.1     Lymphocytes % 04/30/2022 27.7     Monocytes % 04/30/2022 11.9 (A)    Eosinophils % 04/30/2022 4.7     Basophils % 04/30/2022 0.4     Neutrophils Absolute 04/30/2022 2.8     Immature Granulocytes # 04/30/2022 0.0     Lymphocytes Absolute 04/30/2022 1.4     Monocytes Absolute 04/30/2022 0.60     Eosinophils Absolute 04/30/2022 0.20     Basophils Absolute 04/30/2022 0.00     Sodium 05/01/2022 140     Potassium reflex Magnesi* 05/01/2022 3.9     Chloride 05/01/2022 108     CO2 05/01/2022 23     Anion Gap 05/01/2022 9     Glucose 05/01/2022 83     BUN 05/01/2022 17     Creatinine 05/01/2022 0.7     GFR Non- 05/01/2022 >60     GFR  05/01/2022 >59     Calcium 05/01/2022 8.8     WBC 05/01/2022 4.6 (A)    RBC 05/01/2022 3.00 (A)    Hemoglobin 05/01/2022 9.7 (A)    Hematocrit 05/01/2022 31.0 (A)    MCV 05/01/2022 103.3 (A)    MCH 05/01/2022 32.3 (A)    MCHC 05/01/2022 31.3 (A)    RDW 05/01/2022 14.7 (A)    Platelets 45/86/2371 320     MPV 05/01/2022 9.3 (A)    Neutrophils % 05/01/2022 51.3     Lymphocytes % 05/01/2022 31.4     Monocytes % 05/01/2022 10.3 (A)    Eosinophils % 05/01/2022 6.1 (A)    Basophils % 05/01/2022 0.7     Neutrophils Absolute 05/01/2022 2.3     Immature Granulocytes # 05/01/2022 0.0     Lymphocytes Absolute 05/01/2022 1.4     Monocytes Absolute 05/01/2022 0.50     Eosinophils Absolute 05/01/2022 0.30     Basophils Absolute 05/01/2022 0.00    Orders Only on 04/07/2022   Component Date Value    TSH 04/07/2022 2.500     T4 Free 04/07/2022 1.58     Cholesterol, Total 04/07/2022 202 (A)    Triglycerides 04/07/2022 98     HDL 04/07/2022 87     LDL Calculated 04/07/2022 95     Sodium 04/07/2022 140     Potassium 04/07/2022 4.7     Chloride 04/07/2022 101     CO2 04/07/2022 22     Anion Gap 04/07/2022 17     Glucose 04/07/2022 86     BUN 04/07/2022 21     Creatinine 04/07/2022 0.9     GFR Non- 04/07/2022 59 (A)    GFR  04/07/2022 >59     Calcium 04/07/2022 10.8 (A)    Total Protein 04/07/2022 6.7     Albumin 04/07/2022 4.6     Total Bilirubin 04/07/2022 0.6     Alkaline Phosphatase 04/07/2022 62     ALT 04/07/2022 11     AST 04/07/2022 16     WBC 04/07/2022 6.0     RBC 04/07/2022 3.25 (A)    Hemoglobin 04/07/2022 11.5 (A)    Hematocrit 04/07/2022 35.1 (A)    MCV 04/07/2022 108.0 (A)    MCH 04/07/2022 35.4 (A)    MCHC 04/07/2022 32.8 (A)    RDW 04/07/2022 14.9 (A)    Platelets 11/20/4160 380     MPV 04/07/2022 10.1     Neutrophils % 04/07/2022 64.7     Lymphocytes % 04/07/2022 24.0     Monocytes % 04/07/2022 7.6     Eosinophils % 04/07/2022 3.2     Basophils % 04/07/2022 0.3     Neutrophils Absolute 04/07/2022 3.9     Immature Granulocytes # 04/07/2022 0.0     Lymphocytes Absolute 04/07/2022 1.4     Monocytes Absolute 04/07/2022 0.50     Eosinophils Absolute 04/07/2022 0.20     Basophils Absolute 04/07/2022 0.00     Urine Culture, Routine 04/07/2022 >100,000 CFU/ml (A)    Organism 04/07/2022 Klebsiella pneumoniae (A)    Urine Culture, Routine 04/07/2022 Heavy growth     Organism 04/07/2022 Klebsiella pneumoniae (A)    Urine Culture, Routine 04/07/2022 Heavy growth 2nd     Microalbumin, Random Uri* 04/07/2022 220.70 (A)    Creatinine, Ur 04/07/2022 117.1     Microalbumin Creatinine * 04/07/2022 1884.7    Office Visit on 04/07/2022   Component Date Value    Color, UA 04/07/2022 brown     Clarity, UA 04/07/2022 cloudy     Glucose, UA POC 04/07/2022 Neg     Bilirubin, UA 04/07/2022 small     Ketones, UA 04/07/2022 15     Spec Grav, UA 04/07/2022 1.025     Blood, UA POC 04/07/2022 large     pH, UA 04/07/2022 6.0     Protein, UA POC 04/07/2022 300     Urobilinogen, UA 04/07/2022 1.0     Leukocytes, UA 04/07/2022 Large     Nitrite, UA 04/07/2022 Positive     Appearance, Fluid 04/07/2022 Cloudy (A)     Copies of these are in the chart. Current Outpatient Medications   Medication Sig Dispense Refill    omeprazole (PRILOSEC) 10 MG delayed release capsule Take 1 capsule by mouth in the morning. 90 capsule 3    midodrine (PROAMATINE) 10 MG tablet Take 1 tablet by mouth in the morning and 1 tablet at noon and 1 tablet before bedtime. 270 tablet 3    propafenone (RYTHMOL) 225 MG tablet TAKE ONE TABLET BY MOUTH EVERY EIGHT HOURS. 270 tablet 3    levothyroxine (SYNTHROID) 88 MCG tablet Take 1 tablet by mouth in the morning. 90 tablet 3    temazepam (RESTORIL) 15 MG capsule Take 1 capsule by mouth nightly as needed for Sleep for up to 30 days. 30 capsule 2    HYDROcodone-acetaminophen (NORCO) 5-325 MG per tablet Take 1 tablet by mouth every 6 hours as needed for Pain for up to 30 days. 60 tablet 0    furosemide (LASIX) 20 MG tablet Take 1 tablet by mouth daily 60 tablet 3    potassium chloride (KLOR-CON M) 20 MEQ extended release tablet Take 1 tablet by mouth daily 30 tablet 5    pramipexole (MIRAPEX) 0.25 MG tablet Take 1 tablet by mouth nightly 90 tablet 3    diazePAM (VALIUM) 2 MG tablet Take 2 mg by mouth every 8 hours as needed for Anxiety. Folic Acid 0.8 MG CAPS Take 1 tablet by mouth daily       rivaroxaban (XARELTO) 15 MG TABS tablet TAKE 1 TABLET DAILY WITH BREAKFAST 90 tablet 3    Cranberry 1000 MG CAPS Take 1 tablet by mouth daily      loratadine (CLARITIN) 10 MG tablet Take 10 mg by mouth daily      Calcium Carbonate-Vitamin D (CALTRATE 600+D PO) Take 600 mg by mouth 2 times daily. No current facility-administered medications for this visit.        Allergies: Nitrofuran derivatives, Quinine derivatives, Sulfa antibiotics, and Pcn [penicillins]     Past Medical History:   Diagnosis Date    [facility]     Abnormal weight loss     Anemia     Arthritis     Bullae CAD (coronary artery disease)     Conjunctivitis     COPD (chronic obstructive pulmonary disease) (HCC)     Depression     Fatigue     History of blood transfusion 2017    post op broken hip    Hypertension     Hypothyroidism     Kidney infection     Kidney stone     Leukopenia     Leukopenia     Low back pain     Mild CAD 05/15/2017    Osteoporosis     Palliative care patient 06/04/2021    Paroxysmal A-fib (HCC)     Prolonged emergence from general anesthesia     Sinus pause     10/2014    Thyroid disease     Urinary tract infection     Weakness        Family History   Problem Relation Age of Onset    Cancer Father         lung    Stroke Mother         mini    Hypotension Mother        Past Surgical History:   Procedure Laterality Date    BACK SURGERY      BACK SURGERY      CARDIAC CATHETERIZATION  8/14/14  [address]    Mild, non-occlusive CAD, EF 60%    CYSTOSCOPY Left 8/31/2017    CYSTOSCOPY; LEFT URETEROSCOPY; LEFT URETERAL LASER LITHOTRIPSY AND STONE EXTRACTION; INSERTION LEFT URETERAL DOUBLE J STENT performed by Dario Okeefe MD at [place] [address] Bilateral 12/14/2017    CYSTOSCOPY STENT INSERTION performed by Dario Okeefe MD at [facility] Right 10/11/2017    FEMUR IM NAIL MICHELLE INSERTION performed by Teresa Huggins DO at [address] [place] [address] Dr      x 2    LAMINECTOMY      2 lumbar segments    MS CYSTO/URETERO/PYELOSCOPY W/LITHOTRIPSY Left 1/24/2018    LITHOTRIPSY LASER performed by Dario Okeefe MD at [facility] CYSTO/URETERO/PYELOSCOPY, DX Bilateral 1/24/2018    CYSTOSCOPY BILATERAL URETEROSCOPIES WITH STENT REMOVAL AND RETROGRADE PYELOGRAMS WITH BILATERAL STONE MANIPULATION AND RETRIEVAL  PLACEMENT OF DOUBLE J URETERAL STENTS BILATERAL performed by Dario Okeefe MD at [address] [place] [address]         Social History     Tobacco Use    Smoking status: Never Smokeless tobacco: Never   Substance Use Topics    Alcohol use: No        Review of Systems   Constitutional:  Positive for fatigue (but she is slowly improving with PT.). Negative for appetite change, chills and fever. HENT:  Negative for congestion, ear discharge, ear pain, postnasal drip, sinus pressure and sore throat. Eyes:  Negative for discharge and visual disturbance. Respiratory:  Negative for cough, chest tightness, shortness of breath and wheezing. Cardiovascular:  Negative for chest pain, palpitations and leg swelling. Gastrointestinal:  Negative for abdominal pain, constipation, diarrhea, nausea and vomiting. Genitourinary:  Positive for dysuria, flank pain and urgency. Negative for difficulty urinating and menstrual problem. Incontinence of urine. Musculoskeletal:  Positive for arthralgias (mild diffuse). Negative for back pain, joint swelling and myalgias. Skin:  Negative for rash. Neurological:  Positive for dizziness, tremors (marked), syncope (mostly in the mornings), weakness and light-headedness. Negative for headaches. Hematological:  Negative for adenopathy. Does not bruise/bleed easily. Psychiatric/Behavioral:  Positive for confusion. Negative for sleep disturbance and suicidal ideas. The patient is not nervous/anxious. Physical Exam  Physical exam was not performed today as this was a video teleconference visit using [address]    1. Primary insomnia  F51.01 temazepam (RESTORIL) 15 MG capsule      2. Gastroesophageal reflux disease without esophagitis  K21.9 omeprazole (PRILOSEC) 10 MG delayed release capsule      3. Peripheral edema  R60.9       4. Orthostatic hypotension  I95.1 midodrine (PROAMATINE) 10 MG tablet      5. Paroxysmal atrial fibrillation (HCC)  I48.0 propafenone (RYTHMOL) 225 MG tablet      6.  Chronic midline low back pain without sciatica  M54.50 HYDROcodone-acetaminophen (NORCO) 5-325 MG per tablet    G89.29 7. Acquired hypothyroidism  E03.9 levothyroxine (SYNTHROID) 88 MCG tablet            PLAN    1. Gastroesophageal reflux disease without esophagitis  The current medical regimen is effective;  continue present plan and medications. - omeprazole (PRILOSEC) 10 MG delayed release capsule; Take 1 capsule by mouth in the morning. Dispense: 90 capsule; Refill: 3    2. Peripheral edema  The current medical regimen is effective;  continue present plan and medications. 3. Orthostatic hypotension  The current medical regimen is effective;  continue present plan and medications. - midodrine (PROAMATINE) 10 MG tablet; Take 1 tablet by mouth in the morning and 1 tablet at noon and 1 tablet before bedtime. Dispense: 270 tablet; Refill: 3    4. Paroxysmal atrial fibrillation (HCC)  The current medical regimen is effective;  continue present plan and medications. - propafenone (RYTHMOL) 225 MG tablet; TAKE ONE TABLET BY MOUTH EVERY EIGHT HOURS. Dispense: 270 tablet; Refill: 3    5. Primary insomnia  The current medical regimen is effective;  continue present plan and medications. - temazepam (RESTORIL) 15 MG capsule; Take 1 capsule by mouth nightly as needed for Sleep for up to 30 days. Dispense: 30 capsule; Refill: 2    6. Chronic midline low back pain without sciatica  The current medical regimen is effective;  continue present plan and medications.  - HYDROcodone-acetaminophen (NORCO) 5-325 MG per tablet; Take 1 tablet by mouth every 6 hours as needed for Pain for up to 30 days. Dispense: 60 tablet; Refill: 0    7. Acquired hypothyroidism  The current medical regimen is effective;  continue present plan and medications. - levothyroxine (SYNTHROID) 88 MCG tablet; Take 1 tablet by mouth in the morning. Dispense: 90 tablet; Refill: 3    No orders of the defined types were placed in this encounter. Return in about 3 months (around 11/9/2022) for 30.        Dasha Herman, was evaluated through a synchronous (real-time) audio-video encounter. The patient (or guardian if applicable) is aware that this is a billable service, which includes applicable co-pays. This Virtual Visit was conducted with patient's (and/or legal guardian's) consent. The visit was conducted pursuant to the emergency declaration under the [address] [place] [address], [address] [place] [address] authority and the [employer] and [employer] General Act. Patient identification was verified, and a caregiver was present when appropriate. The patient was located at Home: [address] [place] [address]. Total time spent for this encounter:  30m    --AMRIT Gregg DO on 8/9/2022 at 10:21 AM    An electronic signature was used to authenticate this note.

## 2022-08-24 ENCOUNTER — TELEPHONE (OUTPATIENT)
Dept: PRIMARY CARE CLINIC | Age: 87
End: 2022-08-24

## 2022-08-24 NOTE — TELEPHONE ENCOUNTER
Bryson Cooley with [facility] OT called, she will see pt one more time this week and 2x a week for 3 weeks to work with self care and transfer to showers.

## 2022-09-13 ENCOUNTER — TELEPHONE (OUTPATIENT)
Dept: PRIMARY CARE CLINIC | Age: 87
End: 2022-09-13

## 2022-09-13 NOTE — TELEPHONE ENCOUNTER
Lucia Presume with [facility] called, pts daughter states that her BP has been higher than normal. They want the okay for a PRN nurse visit to go check on this. Called and advised her yes, that is fine.

## 2022-09-15 ENCOUNTER — TELEPHONE (OUTPATIENT)
Dept: PRIMARY CARE CLINIC | Age: 87
End: 2022-09-15

## 2022-09-15 NOTE — TELEPHONE ENCOUNTER
[facility] called in stating that they have been seeing patient, she is making good progress, they want tp continue OT and will send over orders.

## 2022-09-16 ENCOUNTER — TELEPHONE (OUTPATIENT)
Dept: PRIMARY CARE CLINIC | Age: 87
End: 2022-09-16

## 2022-09-16 NOTE — TELEPHONE ENCOUNTER
Received a call from [facility] stating the patient no longer wanted skilled nursing staff to come out to her home.

## 2022-10-03 DIAGNOSIS — A41.9 SEPSIS, DUE TO UNSPECIFIED ORGANISM, UNSPECIFIED WHETHER ACUTE ORGAN DYSFUNCTION PRESENT (HCC): ICD-10-CM

## 2022-10-03 DIAGNOSIS — N30.00 ACUTE CYSTITIS WITHOUT HEMATURIA: Primary | ICD-10-CM

## 2022-10-03 NOTE — PROGRESS NOTES
[address] [facility], [address]  Phone [phone]   Fax [phone]      OFFICE VISIT: 10/3/2022    Kath Bach-: 1932      HPI  Reason For Visit:  Dominga Lucero is a 80 y.o. No chief complaint on file. Patient daughter describes that her mom is confused with increased frequency and urgency of urine. She is extremely weak. She describes a state of delirium. She is reaching for things a lot better, picking at things, etc.  This is the same as she has been in the past with urinary tract infection and urosepsis. She is on home hospice. They do want antibiotics. We are going to see if we can get home health to come out and get a cath urine specimen as well as a CBC and CMP. We will also see if they can administer Invanz 1 g IM daily x7 days. vitals were not taken for this visit. There is no height or weight on file to calculate BMI. I have reviewed the following with the Ms. Bach   Lab Review  Admission on 05/15/2022, Discharged on 05/15/2022   Component Date Value    QRS Duration 05/15/2022 188     Q-T Interval 05/15/2022 514     QTc Calculation (Bazett) 05/15/2022 504     T Axis 05/15/2022 -50     WBC 05/15/2022 13.4 (A)     RBC 05/15/2022 3.53 (A)     Hemoglobin 05/15/2022 11.6 (A)     Hematocrit 05/15/2022 36.1 (A)     MCV 05/15/2022 102.3 (A)     MCH 05/15/2022 32.9 (A)     MCHC 05/15/2022 32.1 (A)     RDW 05/15/2022 14.3     Platelets 7570 241     MPV 05/15/2022 10.4     Neutrophils % 05/15/2022 81.1 (A)     Lymphocytes % 05/15/2022 9.1 (A)     Monocytes % 05/15/2022 9.1     Eosinophils % 05/15/2022 0.0     Basophils % 05/15/2022 0.2     Neutrophils Absolute 05/15/2022 10.8 (A)     Immature Granulocytes # 05/15/2022 0.1     Lymphocytes Absolute 05/15/2022 1.2     Monocytes Absolute 05/15/2022 1.20 (A)     Eosinophils Absolute 05/15/2022 0.00     Basophils Absolute 05/15/2022 0.00     Sodium 05/15/2022 140     Potassium reflex Urobilinogen, Urine 05/03/2022 1.0     Nitrite, Urine 05/03/2022 Negative     Leukocyte Esterase, Urine 05/03/2022 LARGE (A)     Lactic Acid 05/03/2022 1.7     WBC, UA 05/03/2022 TNTC (A)     RBC, UA 05/03/2022 21-30 (A)     Epithelial Cells, UA 05/03/2022 3-5     Bacteria, UA 05/03/2022 None Seen (A)     Urine Culture, Routine 05/03/2022 >50,000 CFU/ml (A)     Organism 05/03/2022 Candida albicans (A)     Urine Culture, Routine 05/03/2022                      Value: Moderate growth  No further workup     Admission on 04/29/2022, Discharged on 05/01/2022   Component Date Value    Color, UA 04/29/2022 DARK YELLOW (A)     Clarity, UA 04/29/2022 TURBID (A)     Glucose, Ur 04/29/2022 Negative     Bilirubin Urine 04/29/2022 Negative     Ketones, Urine 04/29/2022 TRACE (A)     Specific [place], UA 04/29/2022 1.023     Blood, Urine 04/29/2022 LARGE (A)     pH, UA 04/29/2022 5.5     Protein, UA 04/29/2022 300     Urobilinogen, Urine 04/29/2022 1.0     Nitrite, Urine 04/29/2022 Negative     Leukocyte Esterase, Urine 04/29/2022 LARGE (A)     WBC 04/29/2022 4.8     RBC 04/29/2022 3.56 (A)     Hemoglobin 04/29/2022 11.5 (A)     Hematocrit 04/29/2022 36.9 (A)     MCV 04/29/2022 103.7 (A)     MCH 04/29/2022 32.3 (A)     MCHC 04/29/2022 31.2 (A)     RDW 04/29/2022 14.6 (A)     Platelets 09/40/1365 418 (A)     MPV 04/29/2022 9.3 (A)     Neutrophils % 04/29/2022 56.4     Lymphocytes % 04/29/2022 27.7     Monocytes % 04/29/2022 10.5 (A)     Eosinophils % 04/29/2022 4.6     Basophils % 04/29/2022 0.6     Neutrophils Absolute 04/29/2022 2.7     Immature Granulocytes # 04/29/2022 0.0     Lymphocytes Absolute 04/29/2022 1.3     Monocytes Absolute 04/29/2022 0.50     Eosinophils Absolute 04/29/2022 0.20     Basophils Absolute 04/29/2022 0.00     Sodium 04/29/2022 140     Potassium reflex Magnesi* 04/29/2022 3.8     Chloride 04/29/2022 102     CO2 04/29/2022 28     Anion Gap 04/29/2022 10     Glucose 04/29/2022 106     BUN 04/29/2022 21 Creatinine 04/29/2022 1.0 (A)     GFR Non- 04/29/2022 52 (A)     GFR  04/29/2022 >59     Calcium 04/29/2022 10.2     Total Protein 04/29/2022 6.0 (A)     Albumin 04/29/2022 4.0     Total Bilirubin 04/29/2022 0.3     Alkaline Phosphatase 04/29/2022 48     ALT 04/29/2022 9     AST 04/29/2022 15     TSH Reflex FT4 04/29/2022 2.29     Troponin 04/29/2022 <0.01     Pro-BNP 04/29/2022 2,405 (A)     Protime 04/29/2022 13.8     INR 04/29/2022 1.07     aPTT 04/29/2022 28.4     QRS Duration 04/29/2022 182     Q-T Interval 04/29/2022 512     QTc Calculation (Bazett) 04/29/2022 493     T Axis 04/29/2022 -13     WBC, UA 04/29/2022 TNTC (A)     RBC, UA 04/29/2022 50-75 (A)     Bacteria, UA 04/29/2022 TRACE (A)     Yeast, UA 04/29/2022 Present (A)     Crystals, UA 04/29/2022 NEG (A)     Urine Culture, Routine 04/29/2022  (A)                     Value:>50,000 CFU/ml  Mixed skin cherelle present      Organism 04/29/2022 Candida albicans (A)     Urine Culture, Routine 04/29/2022                      Value:Light growth  No further workup      Lactic Acid, Sepsis 04/29/2022 0.7     Lactic Acid, Sepsis 04/29/2022 0.9     Culture, Blood 2 04/29/2022 No growth after 5 days of incubation. Blood Culture, Routine 04/29/2022 No growth after 5 days of incubation.      Magnesium 04/29/2022 1.9     Phosphorus 04/29/2022 3.2     Left Ventricular Ejectio* 04/29/2022 50     LVEF MODALITY 04/29/2022 ECHO     Sodium 04/30/2022 141     Potassium reflex Magnesi* 04/30/2022 4.1     Chloride 04/30/2022 106     CO2 04/30/2022 24     Anion Gap 04/30/2022 11     Glucose 04/30/2022 68 (A)     BUN 04/30/2022 23     Creatinine 04/30/2022 0.9     GFR Non- 04/30/2022 59 (A)     GFR  04/30/2022 >59     Calcium 04/30/2022 9.1     WBC 04/30/2022 5.1     RBC 04/30/2022 3.06 (A)     Hemoglobin 04/30/2022 9.9 (A)     Hematocrit 04/30/2022 31.6 (A)     MCV 04/30/2022 103.3 (A)     MCH 04/30/2022 32.4 (A)     MCHC 04/30/2022 31.3 (A)     RDW 04/30/2022 14.8 (A)     Platelets 38/78/2374 351     MPV 04/30/2022 10.0     Neutrophils % 04/30/2022 55.1     Lymphocytes % 04/30/2022 27.7     Monocytes % 04/30/2022 11.9 (A)     Eosinophils % 04/30/2022 4.7     Basophils % 04/30/2022 0.4     Neutrophils Absolute 04/30/2022 2.8     Immature Granulocytes # 04/30/2022 0.0     Lymphocytes Absolute 04/30/2022 1.4     Monocytes Absolute 04/30/2022 0.60     Eosinophils Absolute 04/30/2022 0.20     Basophils Absolute 04/30/2022 0.00     Sodium 05/01/2022 140     Potassium reflex Magnesi* 05/01/2022 3.9     Chloride 05/01/2022 108     CO2 05/01/2022 23     Anion Gap 05/01/2022 9     Glucose 05/01/2022 83     BUN 05/01/2022 17     Creatinine 05/01/2022 0.7     GFR Non- 05/01/2022 >60     GFR  05/01/2022 >59     Calcium 05/01/2022 8.8     WBC 05/01/2022 4.6 (A)     RBC 05/01/2022 3.00 (A)     Hemoglobin 05/01/2022 9.7 (A)     Hematocrit 05/01/2022 31.0 (A)     MCV 05/01/2022 103.3 (A)     MCH 05/01/2022 32.3 (A)     MCHC 05/01/2022 31.3 (A)     RDW 05/01/2022 14.7 (A)     Platelets 86/08/5359 320     MPV 05/01/2022 9.3 (A)     Neutrophils % 05/01/2022 51.3     Lymphocytes % 05/01/2022 31.4     Monocytes % 05/01/2022 10.3 (A)     Eosinophils % 05/01/2022 6.1 (A)     Basophils % 05/01/2022 0.7     Neutrophils Absolute 05/01/2022 2.3     Immature Granulocytes # 05/01/2022 0.0     Lymphocytes Absolute 05/01/2022 1.4     Monocytes Absolute 05/01/2022 0.50     Eosinophils Absolute 05/01/2022 0.30     Basophils Absolute 05/01/2022 0.00    Orders Only on 04/07/2022   Component Date Value    TSH 04/07/2022 2.500     T4 Free 04/07/2022 1.58     Cholesterol, Total 04/07/2022 202 (A)     Triglycerides 04/07/2022 98     HDL 04/07/2022 87     LDL Calculated 04/07/2022 95     Sodium 04/07/2022 140     Potassium 04/07/2022 4.7     Chloride 04/07/2022 101     CO2 04/07/2022 22     Anion Gap 04/07/2022 17     Glucose 04/07/2022 86     BUN 04/07/2022 21     Creatinine 04/07/2022 0.9     GFR Non- 04/07/2022 59 (A)     GFR  04/07/2022 >59     Calcium 04/07/2022 10.8 (A)     Total Protein 04/07/2022 6.7     Albumin 04/07/2022 4.6     Total Bilirubin 04/07/2022 0.6     Alkaline Phosphatase 04/07/2022 62     ALT 04/07/2022 11     AST 04/07/2022 16     WBC 04/07/2022 6.0     RBC 04/07/2022 3.25 (A)     Hemoglobin 04/07/2022 11.5 (A)     Hematocrit 04/07/2022 35.1 (A)     MCV 04/07/2022 108.0 (A)     MCH 04/07/2022 35.4 (A)     MCHC 04/07/2022 32.8 (A)     RDW 04/07/2022 14.9 (A)     Platelets 77/97/1453 380     MPV 04/07/2022 10.1     Neutrophils % 04/07/2022 64.7     Lymphocytes % 04/07/2022 24.0     Monocytes % 04/07/2022 7.6     Eosinophils % 04/07/2022 3.2     Basophils % 04/07/2022 0.3     Neutrophils Absolute 04/07/2022 3.9     Immature Granulocytes # 04/07/2022 0.0     Lymphocytes Absolute 04/07/2022 1.4     Monocytes Absolute 04/07/2022 0.50     Eosinophils Absolute 04/07/2022 0.20     Basophils Absolute 04/07/2022 0.00     Urine Culture, Routine 04/07/2022 >100,000 CFU/ml (A)     Organism 04/07/2022 Klebsiella pneumoniae (A)     Urine Culture, Routine 04/07/2022 Heavy growth     Organism 04/07/2022 Klebsiella pneumoniae (A)     Urine Culture, Routine 04/07/2022 Heavy growth 2nd     Microalbumin, Random Uri* 04/07/2022 220.70 (A)     Creatinine, Ur 04/07/2022 117.1     Microalbumin Creatinine * 04/07/2022 1884.7    Office Visit on 04/07/2022   Component Date Value    Color, UA 04/07/2022 brown     Clarity, UA 04/07/2022 cloudy     Glucose, UA POC 04/07/2022 Neg     Bilirubin, UA 04/07/2022 small     Ketones, UA 04/07/2022 15     Spec Grav, UA 04/07/2022 1.025     Blood, UA POC 04/07/2022 large     pH, UA 04/07/2022 6.0     Protein, UA POC 04/07/2022 300     Urobilinogen, UA 04/07/2022 1.0     Leukocytes, UA 04/07/2022 Large     Nitrite, UA 04/07/2022 Positive     Appearance, Fluid 04/07/2022 Cloudy (A)      Copies of these are in the chart. Current Outpatient Medications   Medication Sig Dispense Refill    omeprazole (PRILOSEC) 10 MG delayed release capsule Take 1 capsule by mouth in the morning. 90 capsule 3    midodrine (PROAMATINE) 10 MG tablet Take 1 tablet by mouth in the morning and 1 tablet at noon and 1 tablet before bedtime. 270 tablet 3    propafenone (RYTHMOL) 225 MG tablet TAKE ONE TABLET BY MOUTH EVERY EIGHT HOURS. 270 tablet 3    levothyroxine (SYNTHROID) 88 MCG tablet Take 1 tablet by mouth in the morning. 90 tablet 3    potassium chloride (KLOR-CON M) 20 MEQ extended release tablet Take 1 tablet by mouth daily 30 tablet 5    pramipexole (MIRAPEX) 0.25 MG tablet Take 1 tablet by mouth nightly 90 tablet 3    diazePAM (VALIUM) 2 MG tablet Take 2 mg by mouth every 8 hours as needed for Anxiety. Folic Acid 0.8 MG CAPS Take 1 tablet by mouth daily       rivaroxaban (XARELTO) 15 MG TABS tablet TAKE 1 TABLET DAILY WITH BREAKFAST 90 tablet 3    Cranberry 1000 MG CAPS Take 1 tablet by mouth daily      loratadine (CLARITIN) 10 MG tablet Take 10 mg by mouth daily      Calcium Carbonate-Vitamin D (CALTRATE 600+D PO) Take 600 mg by mouth 2 times daily. No current facility-administered medications for this visit.        Allergies: Nitrofuran derivatives, Quinine derivatives, Sulfa antibiotics, and Pcn [penicillins]     Past Medical History:   Diagnosis Date    A-fib ([facility] [address]     Abnormal weight loss     Anemia     Arthritis     Bullae     CAD (coronary artery disease)     Conjunctivitis     COPD (chronic obstructive pulmonary disease) ([facility])     Depression     Fatigue     History of blood transfusion 2017    post op broken hip    Hypertension     Hypothyroidism     Kidney infection     Kidney stone     Leukopenia     Leukopenia     Low back pain     Mild CAD 05/15/2017    Osteoporosis     Palliative care patient 06/04/2021    Paroxysmal A-fib ([facility])     Prolonged emergence from general anesthesia     Sinus pause     10/2014    Thyroid disease     Urinary tract infection     Weakness        Family History   Problem Relation Age of Onset    Cancer Father         lung    Stroke Mother         mini    Hypotension Mother        Past Surgical History:   Procedure Laterality Date    BACK SURGERY      BACK SURGERY      CARDIAC CATHETERIZATION  8/14/14  [address]    Mild, non-occlusive CAD, EF 60%    CYSTOSCOPY Left 8/31/2017    CYSTOSCOPY; LEFT URETEROSCOPY; LEFT URETERAL LASER LITHOTRIPSY AND STONE EXTRACTION; INSERTION LEFT URETERAL DOUBLE J STENT [facility] Bilateral 12/14/2017    CYSTOSCOPY STENT INSERTION performed by Soni Quiroz MD at [place] Right 10/11/2017    FEMUR IM NAIL MICHELLE INSERTION performed by Krista Broderick DO at [facility]      x 2    LAMINECTOMY      2 lumbar segments    MT CYSTO/URETERO/PYELOSCOPY W/LITHOTRIPSY Left 1/24/2018    LITHOTRIPSY LASER performed by Soni Quiroz MD at [address] [facility] CYSTO/URETERO/PYELOSCOPY, DX Bilateral 1/24/2018    CYSTOSCOPY BILATERAL URETEROSCOPIES WITH STENT REMOVAL AND RETROGRADE PYELOGRAMS WITH BILATERAL STONE MANIPULATION AND RETRIEVAL  PLACEMENT OF DOUBLE J URETERAL STENTS BILATERAL performed by Soni Quiroz MD at [facility]      URETERAL STENT PLACEMENT         Social History     Tobacco Use    Smoking status: Never    Smokeless tobacco: Never   Substance Use Topics    Alcohol use: No        Review of Systems   Constitutional:  Positive for activity change (unable to do anything), fatigue and fever. Cardiovascular:         Low bp and high heart rate. Genitourinary:  Positive for dysuria, frequency and urgency. Neurological:  Positive for weakness (generalized).         Confusion     Physical Exam  Physical exam was not performed today as this was a video teleconference visit      ASSESSMENT    UTI with suspected Urosepsis  We are going to put in a home health referral for daily IM Invanz x1 week. Will also check labs and Urine specimen to evaluate urine for infection and culture        PLAN    UTI with suspected Urosepsis  We are going to put in a home health referral for daily IM Invanz x1 week. Will also check labs and Urine specimen to evaluate urine for infection and culture  Continue home hospice care. Will also go thank you order home physical therapy and Occupational Therapy as needed     1. Acute cystitis without hematuria  We are going to treat with Invanz 1g Im daily [address]    2. Sepsis, due to unspecified organism, unspecified whether acute organ dysfunction present [facility])  As above plus [facility] consult. - [address] [place] [address]    Orders Placed This Encounter   Procedures    [address] [place] [address]          No follow-ups on file. Sunita Diaz, was evaluated through a synchronous (real-time) audio-video encounter. The patient (or guardian if applicable) is aware that this is a billable service, which includes applicable co-pays. This Virtual Visit was conducted with patient's (and/or legal guardian's) consent. The visit was conducted pursuant to the emergency declaration under the [address] [place] [address], [address] [place] [address] authority and the [employer] and [employer] General Act. Patient identification was verified, and a caregiver was present when appropriate. The patient was located at Home: [address] [place] [id number]. Total time spent for this encounter:  25m    --AMRIT Lund DO on 10/3/2022 at 4:31 PM    An electronic signature was used to authenticate this note.

## 2022-10-04 ENCOUNTER — PATIENT MESSAGE (OUTPATIENT)
Dept: PRIMARY CARE CLINIC | Age: 87
End: 2022-10-04

## 2022-10-04 DIAGNOSIS — A41.9 SEPSIS, DUE TO UNSPECIFIED ORGANISM, UNSPECIFIED WHETHER ACUTE ORGAN DYSFUNCTION PRESENT (HCC): ICD-10-CM

## 2022-10-04 DIAGNOSIS — N30.00 ACUTE CYSTITIS WITHOUT HEMATURIA: Primary | ICD-10-CM

## 2022-10-04 RX ORDER — ERTAPENEM 1 G/1
1000 INJECTION, POWDER, LYOPHILIZED, FOR SOLUTION INTRAMUSCULAR; INTRAVENOUS EVERY 24 HOURS
Qty: 10 EACH | Refills: 0 | Status: SHIPPED | OUTPATIENT
Start: 2022-10-04

## 2022-10-04 NOTE — TELEPHONE ENCOUNTER
Patient's daughter called in again acquiring about this, she said that she has nurse friend who could give her the first injection if she could pick it up from the pharmacy.

## 2022-10-05 NOTE — TELEPHONE ENCOUNTER
Called daughter to discuss. Dr Virgil Rico did okay the friend to give injection as long as Epi pen was available. Next issue is no pharmacy local have the medication. I left voicemail for daughter to call back to discuss.

## 2022-10-05 NOTE — TELEPHONE ENCOUNTER
I do not understand why home health will not do the injection. If they are not going to help us, we can call [facility]. This should have all been taken care of by now.

## 2022-10-05 NOTE — TELEPHONE ENCOUNTER
Spoke with Tiffaniabhijit patients daughter. They are declining treatment at this time. She will call back with any other concerns.

## 2022-10-05 NOTE — TELEPHONE ENCOUNTER
Spoke with [facility]. They will not give first injection due to patient never having this before. Daughter would like to know if she can  the medication and a friend administer the first dose then [facility] follow with the others?

## 2022-10-13 DIAGNOSIS — F51.01 PRIMARY INSOMNIA: ICD-10-CM

## 2022-10-14 RX ORDER — TEMAZEPAM 15 MG/1
15 CAPSULE ORAL NIGHTLY PRN
Qty: 30 CAPSULE | Refills: 2 | Status: SHIPPED | OUTPATIENT
Start: 2022-10-14 | End: 2022-11-13

## 2022-10-14 NOTE — TELEPHONE ENCOUNTER
Received fax from pharmacy requesting refill on pts medication(s). Pt was last seen in office on 8/9/2022  and has a follow up scheduled for Visit date not found. Will send request to  Dr. Yojana Mesa  for authorization. Requested Prescriptions     Pending Prescriptions Disp Refills    temazepam (RESTORIL) 15 MG capsule [Pharmacy Med Name: TEMAZEPAM 15 MG CAP 15 Capsule] 30 capsule 2     Sig: Take 1 capsule by mouth nightly as needed for Sleep for up to 30 days.

## 2023-11-02 NOTE — PROGRESS NOTES
24 hr orders verified Electronically signed by Sheila Downing RN on 4/30/2022 at 10:03 PM Statement Selected

## 2024-10-28 NOTE — CARE COORDINATION
----- Message from Jihan Puri sent at 10/25/2024  7:23 PM CDT -----  Please let her know her chest x-ray showed no acute lung disease.  I will see her back in the office as scheduled.  Thank you   Ryan 45 Transitions Follow Up Call    2021    Patient: Pippa Hendrix  Patient : 1932   MRN: [id number]   Discharge Date: 21 RARS: Readmission Risk Score: 15         Spoke with: Daughter    Care Transitions Subsequent and Final Call    Schedule Follow Up Appointment with PCP: Completed  Subsequent and Final Calls  Do you have any ongoing symptoms?: No  Have your medications changed?: No  Do you have any questions related to your medications?: No  Do you currently have any active services?: No  Do you have any needs or concerns that I can assist you with?: No  Identified Barriers: None  Care Transitions Interventions  Other Interventions:         Placed a call to the number listed for patient and did a follow up call with her daughter. She reported that patient is doing very well. She said they had a follow up with her PCP this am and that went well. He did not change any medications or do any labs, etc.  She said that patient is no longer having UTI symptoms. She said she is eating well. Their garden vegetables are beginning to come in and she is eating plenty of those. Appetite is good. She is drinking plenty of fluids also. She did say that she is having a little bit of an issue with constipation, but they hope the vegetables will start helping that. Did discuss fresh fruits, whole grain breads and cereals, etc.  NO other issues reported. To call prn problems. CTN to follow up as indicated.     Follow Up  Future Appointments   Date Time Provider Serge Copeland   10/6/2021 10:30 AM NAVYA Denis, [facility]       Milla Kelly RN

## (undated) DEVICE — CATHETER FOL 18 FR 5 CC 2 W HYDRGEL COAT DOVER

## (undated) DEVICE — SUTURE VCRL SZ 2-0 L36IN ABSRB UD L36MM CT-1 1/2 CIR J945H

## (undated) DEVICE — Z DISCONTINUED NO SUB IDED BF FIBER LSR DIA365UM HOLM 2 WVLNGTH DEL SYS REUSE SLM LN

## (undated) DEVICE — ADAPTER CATHETER PLAS FOR 4 6FR URET CATHETER

## (undated) DEVICE — SUTURE VCRL SZ 0 L27IN ABSRB UD L36MM CT-1 1/2 CIR J260H

## (undated) DEVICE — GUIDEWIRE ORTH L400MM DIA3.2MM FOR TFN

## (undated) DEVICE — NITINOL STONE RETRIEVAL BASKET: Brand: ZERO TIP

## (undated) DEVICE — GUIDEWIRE ENDOSCP L150CM DIA0.035IN TIP 3CM PTFE NIT

## (undated) DEVICE — NITINOL STONE RETRIEVAL DEVICE: Brand: DAKOTA

## (undated) DEVICE — SEAL ENDO INSTR SELF SEAL UROLOGY

## (undated) DEVICE — FLEXOR, URETERAL ACCESS SHEATH WITH AQ, HYDROPHILIC COATING: Brand: FLEXOR

## (undated) DEVICE — DEVICE INFL ENCORE MEDI 26

## (undated) DEVICE — RADIFOCUS GLIDEWIRE: Brand: GLIDEWIRE

## (undated) DEVICE — C-ARMOR C-ARM EQUIPMENT COVERS CLEAR STERILE UNIVERSAL FIT 12 PER CASE: Brand: C-ARMOR

## (undated) DEVICE — INFLATION DEVICE: Brand: ENCORE™ 26

## (undated) DEVICE — DISCONTINUED USE 424903 FIBER LASER SLMLN 200UM HO DLV SYS DISP

## (undated) DEVICE — STERILE LATEX POWDER FREE SURGICAL GLOVES WITH HYDROGEL COATING: Brand: PROTEXIS

## (undated) DEVICE — 3M™ STERI-DRAPE™ U-DRAPE 1015: Brand: STERI-DRAPE™

## (undated) DEVICE — PAD,EYE,1-5/8X2 5/8,STERILE,LF,1/PK: Brand: MEDLINE

## (undated) DEVICE — BAG DRNGE COMB PK

## (undated) DEVICE — CONTRAST IOTHALAMATE MEGLUMINE 60% 50 ML INJ CONRAY 60

## (undated) DEVICE — SURGICAL PROCEDURE PACK LOWER EXTREMITY LOURDES HOSP

## (undated) DEVICE — BALLOON DILATATION CATHETER: Brand: UROMAX ULTRA

## (undated) DEVICE — THREE QUARTER SHEET: Brand: CONVERTORS

## (undated) DEVICE — U-DRAPE: Brand: CONVERTORS

## (undated) DEVICE — Z INACTIVE USE 2660664 SOLUTION IRRIG 3000ML 0.9% SOD CHL USP UROMATIC PLAS CONT

## (undated) DEVICE — SURGICAL PROCEDURE PACK CYTOSCOPY

## (undated) DEVICE — SOLUTION IV IRRIG POUR BRL 0.9% SODIUM CHL 2F7124

## (undated) DEVICE — CATHETER URET 5FR L70CM OPN END SGL LUMN INJ HUB FLEXIMA

## (undated) DEVICE — C-ARM: Brand: UNBRANDED

## (undated) DEVICE — BIT DRL L260MM DIA4MM CALIB L60MM 3 FLUT QUIK CPL FOR TFN

## (undated) DEVICE — BAG URIN DRNAGE 2000ML TB L48IN NDL SAMP ANTIREFLX CHMBR DRN

## (undated) DEVICE — GLOVE SURG SZ 75 L12IN FNGR THK87MIL DK GRN LTX FREE ISOLEX

## (undated) DEVICE — 6617 IOBAN II PATIENT ISOLATION DRAPE 5/BX,4BX/CS: Brand: STERI-DRAPE™ IOBAN™ 2

## (undated) DEVICE — Device

## (undated) DEVICE — GLOVE SURG SZ 8 CRM LTX FREE POLYISOPRENE POLYMER BEAD ANTI

## (undated) DEVICE — GLOVE SURG SZ 8 L12IN FNGR THK13MIL BRN LTX SYN POLYMER W